# Patient Record
Sex: MALE | Race: BLACK OR AFRICAN AMERICAN | URBAN - METROPOLITAN AREA
[De-identification: names, ages, dates, MRNs, and addresses within clinical notes are randomized per-mention and may not be internally consistent; named-entity substitution may affect disease eponyms.]

---

## 2017-04-24 ENCOUNTER — OFFICE VISIT (OUTPATIENT)
Dept: FAMILY MEDICINE CLINIC | Age: 42
End: 2017-04-24

## 2017-04-24 ENCOUNTER — HOSPITAL ENCOUNTER (OUTPATIENT)
Dept: LAB | Age: 42
Discharge: HOME | End: 2017-04-24
Payer: COMMERCIAL

## 2017-04-24 VITALS
TEMPERATURE: 97.9 F | BODY MASS INDEX: 33.92 KG/M2 | HEIGHT: 69 IN | SYSTOLIC BLOOD PRESSURE: 130 MMHG | HEART RATE: 74 BPM | DIASTOLIC BLOOD PRESSURE: 80 MMHG | WEIGHT: 229 LBS | RESPIRATION RATE: 16 BRPM

## 2017-04-24 DIAGNOSIS — E78.49 OTHER HYPERLIPIDEMIA: ICD-10-CM

## 2017-04-24 DIAGNOSIS — F17.200 TOBACCO USE DISORDER: Chronic | ICD-10-CM

## 2017-04-24 DIAGNOSIS — F41.8 DEPRESSION WITH ANXIETY: Chronic | ICD-10-CM

## 2017-04-24 DIAGNOSIS — E78.49 OTHER HYPERLIPIDEMIA: Primary | Chronic | ICD-10-CM

## 2017-04-24 DIAGNOSIS — R73.9 HYPERGLYCEMIA: ICD-10-CM

## 2017-04-24 DIAGNOSIS — Z13.1 SCREENING FOR DIABETES MELLITUS: ICD-10-CM

## 2017-04-24 DIAGNOSIS — Z11.4 SCREENING FOR HIV (HUMAN IMMUNODEFICIENCY VIRUS): ICD-10-CM

## 2017-04-24 DIAGNOSIS — S16.1XXA NECK STRAIN, INITIAL ENCOUNTER: ICD-10-CM

## 2017-04-24 DIAGNOSIS — F41.8 DEPRESSION WITH ANXIETY: Primary | Chronic | ICD-10-CM

## 2017-04-24 DIAGNOSIS — J30.89 PERENNIAL ALLERGIC RHINITIS WITH SEASONAL VARIATION: Chronic | ICD-10-CM

## 2017-04-24 DIAGNOSIS — J30.2 PERENNIAL ALLERGIC RHINITIS WITH SEASONAL VARIATION: Chronic | ICD-10-CM

## 2017-04-24 PROBLEM — R73.03 PRE-DIABETES: Chronic | Status: ACTIVE | Noted: 2017-04-24

## 2017-04-24 LAB
ALBUMIN SERPL-MCNC: 4.2 G/DL (ref 3.9–4.9)
ALP BLD-CCNC: 101 U/L (ref 35–104)
ALT SERPL-CCNC: 26 U/L (ref 0–41)
ANION GAP SERPL CALCULATED.3IONS-SCNC: 14 MEQ/L (ref 7–13)
AST SERPL-CCNC: 21 U/L (ref 0–40)
BILIRUB SERPL-MCNC: 0.4 MG/DL (ref 0–1.2)
BUN BLDV-MCNC: 15 MG/DL (ref 6–20)
CALCIUM SERPL-MCNC: 9.4 MG/DL (ref 8.6–10.2)
CHLORIDE BLD-SCNC: 101 MEQ/L (ref 98–107)
CHOLESTEROL, TOTAL: 228 MG/DL (ref 0–199)
CO2: 22 MEQ/L (ref 22–29)
CREAT SERPL-MCNC: 1.28 MG/DL (ref 0.7–1.2)
GFR AFRICAN AMERICAN: >60
GFR NON-AFRICAN AMERICAN: >60
GLOBULIN: 2.9 G/DL (ref 2.3–3.5)
GLUCOSE BLD-MCNC: 104 MG/DL (ref 74–109)
HBA1C MFR BLD: 6.4 % (ref 4.8–5.9)
HDLC SERPL-MCNC: 38 MG/DL (ref 40–59)
LDL CHOLESTEROL CALCULATED: 163 MG/DL (ref 0–129)
POTASSIUM SERPL-SCNC: 3.9 MEQ/L (ref 3.5–5.1)
SODIUM BLD-SCNC: 137 MEQ/L (ref 132–144)
TOTAL PROTEIN: 7.1 G/DL (ref 6.4–8.1)
TRIGL SERPL-MCNC: 135 MG/DL (ref 0–200)
TSH SERPL DL<=0.05 MIU/L-ACNC: 2.63 UIU/ML (ref 0.27–4.2)

## 2017-04-24 PROCEDURE — 80061 LIPID PANEL: CPT

## 2017-04-24 PROCEDURE — 83036 HEMOGLOBIN GLYCOSYLATED A1C: CPT

## 2017-04-24 PROCEDURE — 86703 HIV-1/HIV-2 1 RESULT ANTBDY: CPT

## 2017-04-24 PROCEDURE — 80053 COMPREHEN METABOLIC PANEL: CPT

## 2017-04-24 PROCEDURE — 84443 ASSAY THYROID STIM HORMONE: CPT

## 2017-04-24 PROCEDURE — 36415 COLL VENOUS BLD VENIPUNCTURE: CPT

## 2017-04-24 PROCEDURE — 99214 OFFICE O/P EST MOD 30 MIN: CPT | Performed by: FAMILY MEDICINE

## 2017-04-24 RX ORDER — MONTELUKAST SODIUM 10 MG/1
10 TABLET ORAL DAILY
Qty: 30 TABLET | Refills: 11 | Status: SHIPPED | OUTPATIENT
Start: 2017-04-24 | End: 2017-11-13 | Stop reason: SDUPTHER

## 2017-04-24 RX ORDER — ATORVASTATIN CALCIUM 20 MG/1
20 TABLET, FILM COATED ORAL DAILY
Qty: 30 TABLET | Refills: 3 | Status: SHIPPED | OUTPATIENT
Start: 2017-04-24 | End: 2017-10-17 | Stop reason: SDUPTHER

## 2017-04-24 RX ORDER — DULOXETIN HYDROCHLORIDE 60 MG/1
60 CAPSULE, DELAYED RELEASE ORAL DAILY
Qty: 30 CAPSULE | Refills: 3 | Status: SHIPPED | OUTPATIENT
Start: 2017-04-24 | End: 2017-06-05

## 2017-04-24 RX ORDER — DULOXETIN HYDROCHLORIDE 30 MG/1
30 CAPSULE, DELAYED RELEASE ORAL DAILY
Qty: 7 CAPSULE | Refills: 0 | Status: SHIPPED | OUTPATIENT
Start: 2017-04-24 | End: 2017-06-05 | Stop reason: DRUGHIGH

## 2017-04-24 RX ORDER — OLOPATADINE HYDROCHLORIDE 1 MG/ML
1 SOLUTION/ DROPS OPHTHALMIC 2 TIMES DAILY
Qty: 1 BOTTLE | Refills: 3 | Status: SHIPPED | OUTPATIENT
Start: 2017-04-24 | End: 2017-05-24

## 2017-04-24 RX ORDER — NAPROXEN SODIUM 550 MG/1
550 TABLET ORAL 2 TIMES DAILY WITH MEALS
Qty: 28 TABLET | Refills: 0 | Status: SHIPPED | OUTPATIENT
Start: 2017-04-24 | End: 2017-06-05 | Stop reason: ALTCHOICE

## 2017-04-24 ASSESSMENT — ENCOUNTER SYMPTOMS
VOMITING: 0
EYE ITCHING: 1
SINUS PRESSURE: 0
PHOTOPHOBIA: 0
COUGH: 0
DIARRHEA: 0
CHEST TIGHTNESS: 0
EYE PAIN: 0
SORE THROAT: 0
SHORTNESS OF BREATH: 0
WHEEZING: 0
EYE REDNESS: 1
NAUSEA: 0
ABDOMINAL PAIN: 0

## 2017-04-27 LAB — HIV-1 AND HIV-2 ANTIBODIES: NEGATIVE

## 2017-06-05 ENCOUNTER — OFFICE VISIT (OUTPATIENT)
Dept: FAMILY MEDICINE CLINIC | Age: 42
End: 2017-06-05

## 2017-06-05 VITALS
WEIGHT: 239.4 LBS | HEART RATE: 76 BPM | OXYGEN SATURATION: 98 % | RESPIRATION RATE: 16 BRPM | SYSTOLIC BLOOD PRESSURE: 108 MMHG | TEMPERATURE: 97 F | BODY MASS INDEX: 35.46 KG/M2 | DIASTOLIC BLOOD PRESSURE: 70 MMHG | HEIGHT: 69 IN

## 2017-06-05 DIAGNOSIS — R73.03 PRE-DIABETES: Chronic | ICD-10-CM

## 2017-06-05 DIAGNOSIS — E78.49 OTHER HYPERLIPIDEMIA: Chronic | ICD-10-CM

## 2017-06-05 DIAGNOSIS — F17.200 TOBACCO USE DISORDER: Chronic | ICD-10-CM

## 2017-06-05 DIAGNOSIS — F41.8 DEPRESSION WITH ANXIETY: Primary | Chronic | ICD-10-CM

## 2017-06-05 PROCEDURE — 99214 OFFICE O/P EST MOD 30 MIN: CPT | Performed by: FAMILY MEDICINE

## 2017-06-05 ASSESSMENT — PATIENT HEALTH QUESTIONNAIRE - PHQ9
SUM OF ALL RESPONSES TO PHQ9 QUESTIONS 1 & 2: 0
2. FEELING DOWN, DEPRESSED OR HOPELESS: 0
1. LITTLE INTEREST OR PLEASURE IN DOING THINGS: 0
SUM OF ALL RESPONSES TO PHQ QUESTIONS 1-9: 0

## 2017-06-05 ASSESSMENT — ENCOUNTER SYMPTOMS
WHEEZING: 0
DIARRHEA: 0
SHORTNESS OF BREATH: 0
NAUSEA: 0
VOMITING: 0
ABDOMINAL PAIN: 0
CHEST TIGHTNESS: 0

## 2017-07-12 ENCOUNTER — OFFICE VISIT (OUTPATIENT)
Dept: FAMILY MEDICINE CLINIC | Age: 42
End: 2017-07-12

## 2017-07-12 VITALS
WEIGHT: 245.2 LBS | TEMPERATURE: 98.3 F | SYSTOLIC BLOOD PRESSURE: 128 MMHG | BODY MASS INDEX: 36.21 KG/M2 | DIASTOLIC BLOOD PRESSURE: 78 MMHG | HEART RATE: 72 BPM | RESPIRATION RATE: 16 BRPM

## 2017-07-12 DIAGNOSIS — S29.011A MUSCLE STRAIN OF CHEST WALL, INITIAL ENCOUNTER: Primary | ICD-10-CM

## 2017-07-12 PROCEDURE — 99213 OFFICE O/P EST LOW 20 MIN: CPT | Performed by: NURSE PRACTITIONER

## 2017-07-12 RX ORDER — OLOPATADINE HYDROCHLORIDE 1 MG/ML
SOLUTION/ DROPS OPHTHALMIC
Refills: 0 | COMMUNITY
Start: 2017-07-05 | End: 2019-03-18 | Stop reason: SDUPTHER

## 2017-07-12 RX ORDER — TIZANIDINE 4 MG/1
4 TABLET ORAL 3 TIMES DAILY
Qty: 21 TABLET | Refills: 0 | Status: SHIPPED | OUTPATIENT
Start: 2017-07-12 | End: 2017-07-19

## 2017-07-14 ENCOUNTER — TELEPHONE (OUTPATIENT)
Dept: FAMILY MEDICINE CLINIC | Age: 42
End: 2017-07-14

## 2017-08-07 ASSESSMENT — ENCOUNTER SYMPTOMS
CHEST TIGHTNESS: 0
SHORTNESS OF BREATH: 0
NAUSEA: 0
BELCHING: 0
VOMITING: 0
DIARRHEA: 0
ABDOMINAL DISTENTION: 0
ABDOMINAL PAIN: 1
BLOOD IN STOOL: 0
HEMATOCHEZIA: 0
ANAL BLEEDING: 0
COUGH: 0
FLATUS: 0
CONSTIPATION: 0
WHEEZING: 0

## 2017-10-17 DIAGNOSIS — E78.49 OTHER HYPERLIPIDEMIA: Chronic | ICD-10-CM

## 2017-10-18 RX ORDER — ATORVASTATIN CALCIUM 20 MG/1
TABLET, FILM COATED ORAL
Qty: 30 TABLET | Refills: 3 | Status: SHIPPED | OUTPATIENT
Start: 2017-10-18 | End: 2018-07-24 | Stop reason: ALTCHOICE

## 2017-11-13 ENCOUNTER — OFFICE VISIT (OUTPATIENT)
Dept: FAMILY MEDICINE CLINIC | Age: 42
End: 2017-11-13

## 2017-11-13 VITALS
WEIGHT: 247.6 LBS | HEART RATE: 68 BPM | SYSTOLIC BLOOD PRESSURE: 124 MMHG | TEMPERATURE: 97.3 F | OXYGEN SATURATION: 98 % | HEIGHT: 69 IN | BODY MASS INDEX: 36.67 KG/M2 | DIASTOLIC BLOOD PRESSURE: 84 MMHG | RESPIRATION RATE: 16 BRPM

## 2017-11-13 DIAGNOSIS — J30.2 PERENNIAL ALLERGIC RHINITIS WITH SEASONAL VARIATION: Chronic | ICD-10-CM

## 2017-11-13 DIAGNOSIS — M25.561 ACUTE PAIN OF RIGHT KNEE: Primary | ICD-10-CM

## 2017-11-13 DIAGNOSIS — F17.200 TOBACCO USE DISORDER: Chronic | ICD-10-CM

## 2017-11-13 DIAGNOSIS — J30.89 PERENNIAL ALLERGIC RHINITIS WITH SEASONAL VARIATION: Chronic | ICD-10-CM

## 2017-11-13 DIAGNOSIS — Z23 NEED FOR VACCINATION: ICD-10-CM

## 2017-11-13 DIAGNOSIS — E78.49 OTHER HYPERLIPIDEMIA: Chronic | ICD-10-CM

## 2017-11-13 PROCEDURE — 90715 TDAP VACCINE 7 YRS/> IM: CPT | Performed by: FAMILY MEDICINE

## 2017-11-13 PROCEDURE — 90688 IIV4 VACCINE SPLT 0.5 ML IM: CPT | Performed by: FAMILY MEDICINE

## 2017-11-13 PROCEDURE — 99214 OFFICE O/P EST MOD 30 MIN: CPT | Performed by: FAMILY MEDICINE

## 2017-11-13 PROCEDURE — 90472 IMMUNIZATION ADMIN EACH ADD: CPT | Performed by: FAMILY MEDICINE

## 2017-11-13 PROCEDURE — 90471 IMMUNIZATION ADMIN: CPT | Performed by: FAMILY MEDICINE

## 2017-11-13 RX ORDER — VARENICLINE TARTRATE 25 MG
KIT ORAL
Qty: 53 TABLET | Refills: 0 | Status: SHIPPED | OUTPATIENT
Start: 2017-11-13 | End: 2018-07-24

## 2017-11-13 RX ORDER — ATORVASTATIN CALCIUM 20 MG/1
TABLET, FILM COATED ORAL
Qty: 30 TABLET | Refills: 3 | Status: CANCELLED | OUTPATIENT
Start: 2017-11-13

## 2017-11-13 RX ORDER — ROSUVASTATIN CALCIUM 10 MG/1
10 TABLET, COATED ORAL NIGHTLY
Qty: 30 TABLET | Refills: 5 | Status: SHIPPED | OUTPATIENT
Start: 2017-11-13 | End: 2018-07-24 | Stop reason: SDUPTHER

## 2017-11-13 RX ORDER — MONTELUKAST SODIUM 10 MG/1
10 TABLET ORAL DAILY
Qty: 30 TABLET | Refills: 11 | Status: SHIPPED | OUTPATIENT
Start: 2017-11-13 | End: 2018-11-26 | Stop reason: SDUPTHER

## 2017-11-13 RX ORDER — IPRATROPIUM BROMIDE 42 UG/1
2 SPRAY, METERED NASAL 3 TIMES DAILY
Qty: 1 BOTTLE | Refills: 5 | Status: SHIPPED | OUTPATIENT
Start: 2017-11-13 | End: 2018-11-26 | Stop reason: SDUPTHER

## 2017-11-13 RX ORDER — NAPROXEN SODIUM 550 MG/1
550 TABLET ORAL 2 TIMES DAILY WITH MEALS
Qty: 28 TABLET | Refills: 0 | Status: SHIPPED | OUTPATIENT
Start: 2017-11-13 | End: 2021-11-08

## 2017-11-13 RX ORDER — VARENICLINE TARTRATE 1 MG/1
1 TABLET, FILM COATED ORAL 2 TIMES DAILY
Qty: 60 TABLET | Refills: 3 | Status: SHIPPED | OUTPATIENT
Start: 2017-12-13 | End: 2018-07-24

## 2017-11-13 ASSESSMENT — ENCOUNTER SYMPTOMS
VOMITING: 0
CHEST TIGHTNESS: 0
SHORTNESS OF BREATH: 0
WHEEZING: 0
NAUSEA: 0

## 2017-11-13 NOTE — PATIENT INSTRUCTIONS
Patient Education        Patellofemoral Pain Syndrome (Runner's Knee): Exercises  Your Care Instructions  Here are some examples of typical rehabilitation exercises for your condition. Start each exercise slowly. Ease off the exercise if you start to have pain. Your doctor or physical therapist will tell you when you can start these exercises and which ones will work best for you. How to do the exercises  Calf wall stretch    1. Stand facing a wall with your hands on the wall at about eye level. Put your affected leg about a step behind your other leg. 2. Keeping your back leg straight and your back heel on the floor, bend your front knee and gently bring your hip and chest toward the wall until you feel a stretch in the calf of your back leg. 3. Hold the stretch for at least 15 to 30 seconds. 4. Repeat 2 to 4 times. 5. Repeat steps 1 through 4, but this time keep your back knee bent. Quadriceps stretch    1. If you are not steady on your feet, hold on to a chair, counter, or wall. 2. Bend your affected leg, and reach behind you to grab the front of your foot or ankle with the hand on the same side. For example, if you are stretching your right leg, use your right hand. 3. Keeping your knees next to each other, pull your foot toward your buttock until you feel a gentle stretch across the front of your hip and down the front of your thigh. Your knee should be pointed directly to the ground, and not out to the side. 4. Hold the stretch for at least 15 to 30 seconds. 5. Repeat 2 to 4 times. Hamstring wall stretch    1. Lie on your back in a doorway, with your good leg through the open door. 2. Slide your affected leg up the wall to straighten your knee. You should feel a gentle stretch down the back of your leg. ¨ Do not arch your back. ¨ Do not bend either knee. ¨ Keep one heel touching the floor and the other heel touching the wall. Do not point your toes. 3. Hold the stretch for at least 1 minute. Then over time, try to lengthen the time you hold the stretch to as long as 6 minutes. 4. Repeat 2 to 4 times. If you do not have a place to do this exercise in a doorway, there is another way to do it:  1. Lie on your back, and bend your affected leg. 2. Loop a towel under the ball and toes of that foot, and hold the ends of the towel in your hands. 3. Straighten your knee, and slowly pull back on the towel. You should feel a gentle stretch down the back of your leg. 4. Hold the stretch for at least 15 to 30 seconds. Or even better, hold the stretch for 1 minute if you can. 5. Repeat 2 to 4 times. Quad sets    1. Sit with your affected leg straight and supported on the floor or a firm bed. Place a small, rolled-up towel under your affected knee. Your other leg should be bent, with that foot flat on the floor. 2. Tighten the thigh muscles of your affected leg by pressing the back of your knee down into the towel. 3. Hold for about 6 seconds, then rest for up to 10 seconds. 4. Repeat 8 to 12 times. Straight-leg raises to the front    1. Lie on your back with your good knee bent so that your foot rests flat on the floor. Your affected leg should be straight. Make sure that your low back has a normal curve. You should be able to slip your hand in between the floor and the small of your back, with your palm touching the floor and your back touching the back of your hand. 2. Tighten the thigh muscles in your affected leg by pressing the back of your knee flat down to the floor. Hold your knee straight. 3. Keeping the thigh muscles tight and your leg straight, lift your affected leg up so that your heel is about 12 inches off the floor. 4. Hold for about 6 seconds, then lower your leg slowly. Rest for up to 10 seconds between repetitions. 5. Repeat 8 to 12 times. Straight-leg raises to the back    1. Lie on your stomach, and lift your leg straight up behind you (toward the ceiling).   2. Lift your toes foot up toward your buttock. If this motion hurts, try it without bending your knee quite as far. This may help you avoid any painful motion. 8. Slowly move your leg up and down. 9. Repeat 8 to 12 times. 10. Switch legs and repeat steps 1 through 4, even if only one knee is sore. Quadriceps stretch (facedown)    5. Lie flat on your stomach, and rest your face on the floor. 6. Wrap a towel or belt strap around the lower part of your affected leg. Then use the towel or belt strap to slowly pull your heel toward your buttock until you feel a stretch. 7. Hold for about 15 to 30 seconds, then relax your leg against the towel or belt strap. 8. Repeat 2 to 4 times. 9. Switch legs and repeat steps 1 through 4, even if only one knee is sore. Stationary exercise bike    If you do not have a stationary exercise bike at home, you can find one to ride at your local health club or community center. 6. Adjust the height of the bike seat so that your knee is slightly bent when your leg is extended downward. If your knee hurts when the pedal reaches the top, you can raise the seat so that your knee does not bend as much. 7. Start slowly. At first, try to do 5 to 10 minutes of cycling with little to no resistance. Then increase your time and the resistance bit by bit until you can do 20 to 30 minutes without pain. 8. If you start to have pain, rest your knee until your pain gets back to the level that is normal for you. Or cycle for less time or with less effort. Follow-up care is a key part of your treatment and safety. Be sure to make and go to all appointments, and call your doctor if you are having problems. It's also a good idea to know your test results and keep a list of the medicines you take. Where can you learn more? Go to [email or web address] org and sign in to your [employer] account. Enter C159 in the [employer] box to learn more about \"Knee Arthritis: Exercises. \"     If you do

## 2017-11-13 NOTE — PROGRESS NOTES
Subjective:      Patient ID: Abigail Thompson is a 40 y.o. male who presents today for:  Chief Complaint   Patient presents with    Knee Pain     Presents for knee pain x 1 week about - States is not sure how is in pain because it only hurts when standing and applying pressure - States has tried Aleve, Motrin Ibuprofen etc with no relief       HPI     Patient here for acute visit Re: Knee pain. He reports a week ago developing right lateral and posterior knee pain without any known injury or change to his activity. He reports pain is worse with prolonged standing or sitting, better with stretching. He denies any swelling, erythema or warmth. He denies any weakness or giving out of the knee leading to a fall. No reported similar symptoms in the past or problems with this knee in the past. He has tried OTC tylenol, aleve, motrin, and icyhot packs without any noted benefit. He has tried using a knee sleeve with no benefit. No reported calf pain, swelling, erythema, or warmth. Patient reports stopping atorvastatin after developing sweats during the night and sensation of flushing. He reports that these symptoms resolved after stopping medication. He reports doing his best with healthier lower carbohydrate diet, but he denies any routine exercise or activity. He states he is ready to completely quit smoking and would like medication to help with cessation.     Past Medical History:   Diagnosis Date    Depression with anxiety 4/24/2017    Hyperglycemia 4/24/2017    Hyperlipidemia 2/18/2014    Insomnia     Perennial allergic rhinitis with seasonal variation 4/24/2017    Pre-diabetes 4/24/2017    Tobacco use disorder 4/24/2017     Past Surgical History:   Procedure Laterality Date    NASAL FRACTURE SURGERY  2007     Family History   Problem Relation Age of Onset    High Blood Pressure Mother     High Cholesterol Mother     Cancer Father      Throat cancer - smoker    High Blood Pressure Brother     Heart Attack Maternal Grandmother 72    Diabetes Maternal Grandmother     Cancer Maternal Grandfather      unknown    No Known Problems Paternal Grandmother     No Known Problems Paternal Grandfather      Social History     Social History    Marital status:      Spouse name: Manoj Hinson Number of children: 3    Years of education: N/A     Occupational History     - industrial cleaning      Social History Main Topics    Smoking status: Current Every Day Smoker     Packs/day: 0.33     Years: 23.00     Types: Cigarettes     Start date: 1994    Smokeless tobacco: Never Used    Alcohol use No    Drug use: No    Sexual activity: Yes     Partners: Female      Comment: monogamous     Other Topics Concern    Not on file     Social History Narrative    Lives with wife and 3 children        1 dog     Current Outpatient Prescriptions on File Prior to Visit   Medication Sig Dispense Refill    atorvastatin (LIPITOR) 20 MG tablet take 1 tablet by mouth once daily 30 tablet 3    olopatadine (PATANOL) 0.1 % ophthalmic solution   0    loratadine (CLARITIN) 10 MG tablet Take 1 tablet by mouth daily 30 tablet 5    acetaminophen (TYLENOL) 500 MG tablet Take 1 tablet by mouth every 6 hours as needed for Pain. 60 tablet 0    fluticasone (FLONASE) 50 MCG/ACT nasal spray 2 sprays by Nasal route daily. No current facility-administered medications on file prior to visit. Allergies:  Review of patient's allergies indicates no known allergies. Review of Systems   Constitutional: Negative for appetite change, chills, diaphoresis, fatigue and fever. Respiratory: Negative for chest tightness, shortness of breath and wheezing. Cardiovascular: Negative for chest pain, palpitations and leg swelling. No orthopnea, no PND   Gastrointestinal: Negative for nausea and vomiting. Endocrine: Negative for cold intolerance, heat intolerance, polydipsia, polyphagia and polyuria.    Genitourinary: Posterior - negative  Drawer:       Anterior - negative    Posterior - negative  Varus: negative  Valgus: negative  Patellar Apprehension: positive    Other   Erythema: absent  Sensation: normal  Swelling: none  Other tests: no effusion present              Assessment & Plan:      1. Acute pain of right knee  Likely related to component of arthritis with patellofemoral syndrome based on history and exam.  Will have him treat conservatively with home exercises, ice, and NSAID. He was given formal physical therapy referral for further treatment. If not improved with conservative management he was instructed to call the office for orthopedics referral and likely need for imaging.    - [address] [facility] ([facility] [address] [facility]; Wear during the day over right knee as needed for support  Dispense: 1 each; Refill: 0  - naproxen sodium (ANAPROX) 550 MG tablet; Take 1 tablet by mouth 2 times daily (with meals) for 14 days  Dispense: 28 tablet; Refill: 0    2. Other hyperlipidemia  Will have patient start Crestor for lipid management since she was not able to tolerate atorvastatin at higher dose due to side effects. I stressed with him the importance of a low carbohydrate diet and routine exercise/activity    - rosuvastatin (CRESTOR) 10 MG tablet; Take 1 tablet by mouth nightly  Dispense: 30 tablet; Refill: 5    3. Perennial allergic rhinitis with seasonal variation    - montelukast (SINGULAIR) 10 MG tablet; Take 1 tablet by mouth daily  Dispense: 30 tablet; Refill: 11  - ipratropium (ATROVENT) 0.06 % nasal spray; 2 sprays by Nasal route 3 times daily  Dispense: 1 Bottle; Refill: 5    4. Tobacco use disorder  Patient ready to quit smoking. We reviewed cessation aids today in the office. He requests Chantix for management. Prescription for start a packing continuing packs route today in the office.   Patient was instructed to completely quit smoking one week after starting

## 2018-03-28 ENCOUNTER — HOSPITAL ENCOUNTER (EMERGENCY)
Age: 43
Discharge: HOME | End: 2018-03-28
Attending: EMERGENCY MEDICINE
Payer: COMMERCIAL

## 2018-03-28 ENCOUNTER — TELEPHONE (OUTPATIENT)
Dept: FAMILY MEDICINE CLINIC | Age: 43
End: 2018-03-28

## 2018-03-28 ENCOUNTER — APPOINTMENT (OUTPATIENT)
Dept: GENERAL RADIOLOGY | Age: 43
End: 2018-03-28
Payer: COMMERCIAL

## 2018-03-28 VITALS
HEART RATE: 69 BPM | OXYGEN SATURATION: 99 % | TEMPERATURE: 98 F | DIASTOLIC BLOOD PRESSURE: 81 MMHG | WEIGHT: 203 LBS | SYSTOLIC BLOOD PRESSURE: 122 MMHG | HEIGHT: 70 IN | RESPIRATION RATE: 20 BRPM | BODY MASS INDEX: 29.06 KG/M2

## 2018-03-28 DIAGNOSIS — R07.89 CHEST WALL PAIN: ICD-10-CM

## 2018-03-28 DIAGNOSIS — R07.9 CHEST PAIN, UNSPECIFIED TYPE: Primary | ICD-10-CM

## 2018-03-28 LAB
ALBUMIN SERPL-MCNC: 4.3 G/DL (ref 3.9–4.9)
ALP BLD-CCNC: 103 U/L (ref 35–104)
ALT SERPL-CCNC: 30 U/L (ref 0–41)
ANION GAP SERPL CALCULATED.3IONS-SCNC: 12 MEQ/L (ref 7–13)
APTT: 24.7 SEC (ref 21.6–35.4)
AST SERPL-CCNC: 22 U/L (ref 0–40)
BILIRUB SERPL-MCNC: 0.3 MG/DL (ref 0–1.2)
BUN BLDV-MCNC: 12 MG/DL (ref 6–20)
CALCIUM SERPL-MCNC: 9.1 MG/DL (ref 8.6–10.2)
CHLORIDE BLD-SCNC: 105 MEQ/L (ref 98–107)
CO2: 26 MEQ/L (ref 22–29)
CREAT SERPL-MCNC: 1.1 MG/DL (ref 0.7–1.2)
EKG ATRIAL RATE: 75 BPM
EKG P AXIS: 53 DEGREES
EKG P-R INTERVAL: 160 MS
EKG Q-T INTERVAL: 382 MS
EKG QRS DURATION: 90 MS
EKG QTC CALCULATION (BAZETT): 426 MS
EKG R AXIS: -15 DEGREES
EKG T AXIS: 18 DEGREES
EKG VENTRICULAR RATE: 75 BPM
GFR AFRICAN AMERICAN: >60
GFR NON-AFRICAN AMERICAN: >60
GLOBULIN: 2.6 G/DL (ref 2.3–3.5)
GLUCOSE BLD-MCNC: 171 MG/DL (ref 74–109)
INR BLD: 1
MAGNESIUM: 1.9 MG/DL (ref 1.7–2.3)
POTASSIUM SERPL-SCNC: 4.2 MEQ/L (ref 3.5–5.1)
PROTHROMBIN TIME: 9.7 SEC (ref 9.6–12.3)
SODIUM BLD-SCNC: 143 MEQ/L (ref 132–144)
TOTAL CK: 186 U/L (ref 0–190)
TOTAL PROTEIN: 6.9 G/DL (ref 6.4–8.1)
TROPONIN: <0.01 NG/ML (ref 0–0.01)

## 2018-03-28 PROCEDURE — 6370000000 HC RX 637 (ALT 250 FOR IP): Performed by: EMERGENCY MEDICINE

## 2018-03-28 PROCEDURE — 85610 PROTHROMBIN TIME: CPT

## 2018-03-28 PROCEDURE — 80053 COMPREHEN METABOLIC PANEL: CPT

## 2018-03-28 PROCEDURE — 36415 COLL VENOUS BLD VENIPUNCTURE: CPT

## 2018-03-28 PROCEDURE — 96374 THER/PROPH/DIAG INJ IV PUSH: CPT

## 2018-03-28 PROCEDURE — 83735 ASSAY OF MAGNESIUM: CPT

## 2018-03-28 PROCEDURE — 85025 COMPLETE CBC W/AUTO DIFF WBC: CPT

## 2018-03-28 PROCEDURE — 82550 ASSAY OF CK (CPK): CPT

## 2018-03-28 PROCEDURE — 84484 ASSAY OF TROPONIN QUANT: CPT

## 2018-03-28 PROCEDURE — 93005 ELECTROCARDIOGRAM TRACING: CPT

## 2018-03-28 PROCEDURE — 99285 EMERGENCY DEPT VISIT HI MDM: CPT

## 2018-03-28 PROCEDURE — 71045 X-RAY EXAM CHEST 1 VIEW: CPT

## 2018-03-28 PROCEDURE — 6360000002 HC RX W HCPCS: Performed by: EMERGENCY MEDICINE

## 2018-03-28 PROCEDURE — 85730 THROMBOPLASTIN TIME PARTIAL: CPT

## 2018-03-28 PROCEDURE — 2580000003 HC RX 258: Performed by: EMERGENCY MEDICINE

## 2018-03-28 RX ORDER — NAPROXEN 500 MG/1
500 TABLET ORAL 2 TIMES DAILY
Qty: 20 TABLET | Refills: 0 | Status: SHIPPED | OUTPATIENT
Start: 2018-03-28 | End: 2018-07-24 | Stop reason: ALTCHOICE

## 2018-03-28 RX ORDER — KETOROLAC TROMETHAMINE 30 MG/ML
30 INJECTION, SOLUTION INTRAMUSCULAR; INTRAVENOUS ONCE
Status: COMPLETED | OUTPATIENT
Start: 2018-03-28 | End: 2018-03-28

## 2018-03-28 RX ORDER — SODIUM CHLORIDE 0.9 % (FLUSH) 0.9 %
3 SYRINGE (ML) INJECTION EVERY 8 HOURS
Status: DISCONTINUED | OUTPATIENT
Start: 2018-03-28 | End: 2018-03-28 | Stop reason: HOSPADM

## 2018-03-28 RX ADMIN — KETOROLAC TROMETHAMINE 30 MG: 30 INJECTION, SOLUTION INTRAMUSCULAR; INTRAVENOUS at 12:25

## 2018-03-28 RX ADMIN — Medication 400 MG: at 12:56

## 2018-03-28 RX ADMIN — Medication 3 ML: at 12:26

## 2018-03-28 ASSESSMENT — PAIN DESCRIPTION - LOCATION: LOCATION: CHEST

## 2018-03-28 ASSESSMENT — ENCOUNTER SYMPTOMS
SINUS PRESSURE: 0
CONSTIPATION: 0
SHORTNESS OF BREATH: 0
BLOOD IN STOOL: 0
EYE REDNESS: 0
BACK PAIN: 0
TROUBLE SWALLOWING: 0
ABDOMINAL PAIN: 0
DIARRHEA: 0
VOMITING: 0
CHOKING: 0
FACIAL SWELLING: 0
SORE THROAT: 0
STRIDOR: 0
EYE DISCHARGE: 0
CHEST TIGHTNESS: 0
COUGH: 0
WHEEZING: 0
VOICE CHANGE: 0
EYE PAIN: 0

## 2018-03-28 ASSESSMENT — PAIN DESCRIPTION - PAIN TYPE
TYPE: ACUTE PAIN

## 2018-03-28 ASSESSMENT — PAIN SCALES - GENERAL
PAINLEVEL_OUTOF10: 0
PAINLEVEL_OUTOF10: 5
PAINLEVEL_OUTOF10: 0

## 2018-03-28 ASSESSMENT — PAIN DESCRIPTION - ORIENTATION: ORIENTATION: LEFT

## 2018-03-28 ASSESSMENT — PAIN DESCRIPTION - DESCRIPTORS
DESCRIPTORS: ACHING
DESCRIPTORS: ACHING

## 2018-03-28 ASSESSMENT — PAIN DESCRIPTION - FREQUENCY: FREQUENCY: CONTINUOUS

## 2018-03-28 NOTE — ED TRIAGE NOTES
Patient in with chest pain and left arm pain that started while he was going to work this morning. He rates his [pain at a 5. Denies any injury. lungs clear ekg complete, denies SOB

## 2018-07-24 ENCOUNTER — OFFICE VISIT (OUTPATIENT)
Dept: FAMILY MEDICINE CLINIC | Age: 43
End: 2018-07-24
Payer: COMMERCIAL

## 2018-07-24 VITALS
WEIGHT: 243.8 LBS | SYSTOLIC BLOOD PRESSURE: 130 MMHG | HEIGHT: 70 IN | TEMPERATURE: 97.3 F | RESPIRATION RATE: 14 BRPM | DIASTOLIC BLOOD PRESSURE: 80 MMHG | OXYGEN SATURATION: 98 % | BODY MASS INDEX: 34.9 KG/M2 | HEART RATE: 83 BPM

## 2018-07-24 DIAGNOSIS — F17.200 TOBACCO USE DISORDER: Chronic | ICD-10-CM

## 2018-07-24 DIAGNOSIS — Z23 NEED FOR VACCINATION: ICD-10-CM

## 2018-07-24 DIAGNOSIS — R73.03 PRE-DIABETES: Chronic | ICD-10-CM

## 2018-07-24 DIAGNOSIS — Z00.01 ENCOUNTER FOR WELL ADULT EXAM WITH ABNORMAL FINDINGS: Primary | ICD-10-CM

## 2018-07-24 DIAGNOSIS — E78.49 OTHER HYPERLIPIDEMIA: Chronic | ICD-10-CM

## 2018-07-24 DIAGNOSIS — D17.21 LIPOMA OF RIGHT UPPER EXTREMITY: ICD-10-CM

## 2018-07-24 PROCEDURE — 90471 IMMUNIZATION ADMIN: CPT | Performed by: FAMILY MEDICINE

## 2018-07-24 PROCEDURE — 99396 PREV VISIT EST AGE 40-64: CPT | Performed by: FAMILY MEDICINE

## 2018-07-24 PROCEDURE — 90732 PPSV23 VACC 2 YRS+ SUBQ/IM: CPT | Performed by: FAMILY MEDICINE

## 2018-07-24 RX ORDER — BUPROPION HYDROCHLORIDE 150 MG/1
150 TABLET, EXTENDED RELEASE ORAL 2 TIMES DAILY
Qty: 60 TABLET | Refills: 3 | Status: SHIPPED | OUTPATIENT
Start: 2018-07-24 | End: 2021-06-17

## 2018-07-24 RX ORDER — ROSUVASTATIN CALCIUM 10 MG/1
10 TABLET, COATED ORAL NIGHTLY
Qty: 30 TABLET | Refills: 5 | Status: SHIPPED | OUTPATIENT
Start: 2018-07-24 | End: 2021-11-08

## 2018-07-24 ASSESSMENT — PATIENT HEALTH QUESTIONNAIRE - PHQ9
2. FEELING DOWN, DEPRESSED OR HOPELESS: 0
SUM OF ALL RESPONSES TO PHQ9 QUESTIONS 1 & 2: 0
1. LITTLE INTEREST OR PLEASURE IN DOING THINGS: 0
SUM OF ALL RESPONSES TO PHQ QUESTIONS 1-9: 0

## 2018-07-24 ASSESSMENT — ENCOUNTER SYMPTOMS
ABDOMINAL DISTENTION: 0
PHOTOPHOBIA: 0
CONSTIPATION: 0
EYE DISCHARGE: 0
EYE PAIN: 0
CHEST TIGHTNESS: 0
COLOR CHANGE: 0
BLOOD IN STOOL: 0
SHORTNESS OF BREATH: 0
COUGH: 0
CHOKING: 0
RHINORRHEA: 0
ABDOMINAL PAIN: 0
SINUS PRESSURE: 0
SORE THROAT: 0
VOMITING: 0
WHEEZING: 0
APNEA: 0
DIARRHEA: 0
NAUSEA: 0

## 2018-07-24 NOTE — PROGRESS NOTES
Cigarettes     Start date: 1994    Smokeless tobacco: Never Used    Alcohol use No    Drug use: No    Sexual activity: Yes     Partners: Female      Comment: monogamous     Other Topics Concern    Not on file     Social History Narrative    Lives with wife and 3 children        1 dog     Current Outpatient Prescriptions on File Prior to Visit   Medication Sig Dispense Refill    montelukast (SINGULAIR) 10 MG tablet Take 1 tablet by mouth daily 30 tablet 11    Elastic Bandages & Supports (KNEE BRACE) MISC Wear during the day over right knee as needed for support 1 each 0    ipratropium (ATROVENT) 0.06 % nasal spray 2 sprays by Nasal route 3 times daily 1 Bottle 5    olopatadine (PATANOL) 0.1 % ophthalmic solution   0    loratadine (CLARITIN) 10 MG tablet Take 1 tablet by mouth daily 30 tablet 5    acetaminophen (TYLENOL) 500 MG tablet Take 1 tablet by mouth every 6 hours as needed for Pain. 60 tablet 0    naproxen sodium (ANAPROX) 550 MG tablet Take 1 tablet by mouth 2 times daily (with meals) for 14 days 28 tablet 0     No current facility-administered medications on file prior to visit. Allergies:  Patient has no known allergies. Review of Systems   Constitutional: Negative for appetite change, chills, diaphoresis, fatigue, fever and unexpected weight change. HENT: Negative for congestion, ear pain, postnasal drip, rhinorrhea, sinus pressure and sore throat. Eyes: Negative for photophobia, pain, discharge and visual disturbance. Respiratory: Negative for apnea, cough, choking, chest tightness, shortness of breath and wheezing. Cardiovascular: Negative for chest pain, palpitations and leg swelling. Gastrointestinal: Negative for abdominal distention, abdominal pain, blood in stool, constipation, diarrhea, nausea and vomiting. Endocrine: Negative for cold intolerance, heat intolerance, polydipsia, polyphagia and polyuria.    Genitourinary: Negative for dysuria, flank pain, frequency, hematuria and urgency. Musculoskeletal: Negative for gait problem and myalgias. Skin: Negative for color change and rash. Neurological: Negative for dizziness, syncope, weakness, light-headedness, numbness and headaches. Hematological: Negative for adenopathy. Psychiatric/Behavioral: Negative for dysphoric mood and sleep disturbance. The patient is not nervous/anxious. Objective:     /80 (Site: Left Arm, Position: Sitting, Cuff Size: Large Adult)   Pulse 83   Temp 97.3 °F (36.3 °C) (Temporal)   Resp 14   Ht 5' 10\" (1.778 m)   Wt 243 lb 12.8 oz (110.6 kg)   SpO2 98%   BMI 34.98 kg/m²     Physical Exam   Constitutional: He is oriented to person, place, and time. He appears well-developed and well-nourished. HENT:   Head: Normocephalic and atraumatic. Right Ear: Tympanic membrane, external ear and ear canal normal.   Left Ear: Tympanic membrane, external ear and ear canal normal.   Nose: Nose normal.   Mouth/Throat: Oropharynx is clear and moist and mucous membranes are normal. No oropharyngeal exudate. Eyes: Conjunctivae and EOM are normal. Pupils are equal, round, and reactive to light. Right eye exhibits no discharge. Left eye exhibits no discharge. Neck: Normal range of motion. Neck supple. Carotid bruit is not present. No thyromegaly present. Cardiovascular: Normal rate, regular rhythm, S1 normal, S2 normal, normal heart sounds and intact distal pulses. Exam reveals no gallop and no friction rub. No murmur heard. Pulmonary/Chest: Effort normal and breath sounds normal. No accessory muscle usage. No respiratory distress. He has no wheezes. He has no rhonchi. He has no rales. He exhibits no tenderness. Abdominal: Soft. Bowel sounds are normal. He exhibits no distension and no mass. There is no tenderness. There is no rebound and no guarding. Musculoskeletal: Normal range of motion. He exhibits no edema, tenderness or deformity.    Lymphadenopathy:     He has no cervical adenopathy. Right: No supraclavicular adenopathy present. Left: No supraclavicular adenopathy present. Neurological: He is alert and oriented to person, place, and time. He has normal strength. No cranial nerve deficit or sensory deficit. He exhibits normal muscle tone. Skin: Skin is warm. No rash noted. He is not diaphoretic. No cyanosis. Nails show no clubbing. Psychiatric: He has a normal mood and affect. His behavior is normal.       Ortho Exam (If Applicable)      Assessment & Plan:      1. Encounter for well adult exam with abnormal findings  50-year-old male with exam as listed above    - CBC Auto Differential; Future  - Comprehensive Metabolic Panel; Future    2. Need for vaccination    - PNEUMOVAX 23 subcutaneous/IM (Pneumococcal polysaccharide vaccine 23-valent >= 3yo)    3. Tobacco use disorder  Patient contemplative and ready to set another quit date. He is aware that smoking cessation would be the best thing for overall health. We reviewed cessation aids including medication and nicotine replacement therapy. We will try bupropion since he was not able to tolerate chantix. I discussed with him that next step would be nicotine patches should he not tolerate bupropion. Will continue to encourage complete smoking cessation at subsequent visits. - buPROPion (WELLBUTRIN SR) 150 MG extended release tablet; Take 1 tablet by mouth 2 times daily  Dispense: 60 tablet; Refill: 3    4. Pre-diabetes    - Hemoglobin A1C; Future    5. Other hyperlipidemia    - rosuvastatin (CRESTOR) 10 MG tablet; Take 1 tablet by mouth nightly  Dispense: 30 tablet; Refill: 5  - Lipid Panel; Future    6.  Lipoma of right upper extremity  Patient given referral to dermatology to discuss potential removal of lipoma over proximal right forearm and lipoma vs. Ganglion cyst in distal right forearm    - Amb External Referral To Dermatology      Modified Medications    Modified Medication Previous Medication    ROSUVASTATIN (CRESTOR) 10 MG TABLET rosuvastatin (CRESTOR) 10 MG tablet       Take 1 tablet by mouth nightly    Take 1 tablet by mouth nightly       New Prescriptions    BUPROPION (WELLBUTRIN SR) 150 MG EXTENDED RELEASE TABLET    Take 1 tablet by mouth 2 times daily       Medications Discontinued During This Encounter   Medication Reason    rosuvastatin (CRESTOR) 10 MG tablet REORDER    atorvastatin (LIPITOR) 20 MG tablet Alternate therapy    varenicline (CHANTIX STARTING MONTH PAK) 0.5 MG X 11 & 1 MG X 42 tablet Patient Choice    varenicline (CHANTIX CONTINUING MONTH PAK) 1 MG tablet Patient Choice    naproxen (NAPROSYN) 500 MG tablet Therapy completed    fluticasone (FLONASE) 50 MCG/ACT nasal spray Therapy completed       Return in about 6 months (around 1/24/2019) for Chronic Disease Check.     Levi Almeida MD

## 2018-07-24 NOTE — PATIENT INSTRUCTIONS
Patient Education        Stopping Smoking: Care Instructions  Your Care Instructions  Cigarette smokers crave the nicotine in cigarettes. Giving it up is much harder than simply changing a habit. Your body has to stop craving the nicotine. It is hard to quit, but you can do it. There are many tools that people use to quit smoking. You may find that combining tools works best for you. There are several steps to quitting. First you get ready to quit. Then you get support to help you. After that, you learn new skills and behaviors to become a nonsmoker. For many people, a necessary step is getting and using medicine. Your doctor will help you set up the plan that best meets your needs. You may want to attend a smoking cessation program to help you quit smoking. When you choose a program, look for one that has proven success. Ask your doctor for ideas. You will greatly increase your chances of success if you take medicine as well as get counseling or join a cessation program.  Some of the changes you feel when you first quit tobacco are uncomfortable. Your body will miss the nicotine at first, and you may feel short-tempered and grumpy. You may have trouble sleeping or concentrating. Medicine can help you deal with these symptoms. You may struggle with changing your smoking habits and rituals. The last step is the tricky one: Be prepared for the smoking urge to continue for a time. This is a lot to deal with, but keep at it. You will feel better. Follow-up care is a key part of your treatment and safety. Be sure to make and go to all appointments, and call your doctor if you are having problems. It's also a good idea to know your test results and keep a list of the medicines you take. How can you care for yourself at home? · Ask your family, friends, and coworkers for support. You have a better chance of quitting if you have help and support.   · Join a support group, such as Nicotine Anonymous, for people who are nicotine. · Be prepared to keep trying. Most people are not successful the first few times they try to quit. Do not get mad at yourself if you smoke again. Make a list of things you learned and think about when you want to try again, such as next week, next month, or next year. Where can you learn more? Go to https://[email or web address].[email or web address] and sign in to your Camperoo account. Enter W156 in the Mediaspectrum box to learn more about \"Stopping Smoking: Care Instructions. \"     If you do not have an account, please click on the \"Sign Up Now\" link. Current as of: November 29, 2017  Content Version: 11.6  © 6028-9673 Flock, Spontacts. Care instructions adapted under license by [facility] ([facility]). If you have questions about a medical condition or this instruction, always ask your healthcare professional. [place] [address] any warranty or liability for your use of this information. Patient Education        Learning About the Mediterranean Diet  What is the [id number]? The Mediterranean diet is a style of eating rather than a diet plan. It features foods eaten in [place], [place], [place], and other countries along the [facility]. It emphasizes eating foods like fish, fruits, vegetables, beans, high-fiber breads and whole grains, nuts, and olive oil. This style of eating includes limited red meat, cheese, and sweets. Why choose the Mediterranean diet? A Mediterranean-style diet may improve heart health. It contains more fat than other heart-healthy diets. But the fats are mainly from nuts, unsaturated oils (such as fish oils and olive oil), and certain nut or seed oils (such as canola, soybean, or flaxseed oil). These fats may help protect the heart and blood vessels. How can you get started on the Mediterranean diet? Here are some things you can do to switch to a more Mediterranean way of eating.   What to eat  · Eat a variety of fruits and vegetables each day, such as grapes, blueberries, tomatoes, broccoli, peppers, figs, olives, spinach, eggplant, beans, lentils, and chickpeas. · Eat a variety of whole-grain foods each day, such as oats, brown rice, and whole wheat bread, pasta, and couscous. · Eat fish at least 2 times a week. Try tuna, salmon, mackerel, lake trout, herring, or sardines. · Eat moderate amounts of low-fat dairy products, such as milk, cheese, or yogurt. · Eat moderate amounts of poultry and eggs. · Choose healthy (unsaturated) fats, such as nuts, olive oil, and certain nut or seed oils like canola, soybean, and flaxseed. · Limit unhealthy (saturated) fats, such as butter, palm oil, and coconut oil. And limit fats found in animal products, such as meat and dairy products made with whole milk. Try to eat red meat only a few times a month in very small amounts. · Limit sweets and desserts to only a few times a week. This includes sugar-sweetened drinks like soda. The Mediterranean diet may also include red wine with your meal-1 glass each day for women and up to 2 glasses a day for men. Tips for eating at home  · Use herbs, spices, garlic, lemon zest, and citrus juice instead of salt to add flavor to foods. · Add avocado slices to your sandwich instead of wolfe. · Have fish for lunch or dinner instead of red meat. Brush the fish with olive oil, and broil or grill it. · Sprinkle your salad with seeds or nuts instead of cheese. · Cook with olive or canola oil instead of butter or oils that are high in saturated fat. · Switch from 2% milk or whole milk to 1% or fat-free milk. · Dip raw vegetables in a vinaigrette dressing or hummus instead of dips made from mayonnaise or sour cream.  · Have a piece of fruit for dessert instead of a piece of cake. Try baked apples, or have some dried fruit. Tips for eating out  · Try broiled, grilled, baked, or poached fish instead of having it fried or breaded.   · Ask your  to have your meals prepared with olive oil instead of butter. · Order dishes made with marinara sauce or sauces made from olive oil. Avoid sauces made from cream or mayonnaise. · Choose whole-grain breads, whole wheat pasta and pizza crust, brown rice, beans, and lentils. · Cut back on butter or margarine on bread. Instead, you can dip your bread in a small amount of olive oil. · Ask for a side salad or grilled vegetables instead of french fries or chips. Where can you learn more? Go to https://PieceablepeGigamoneb.[email or web address] [email or web address] org and sign in to your Feedback-Machine account. Enter [phone] in the [employer] Inc. box to learn more about \"Learning About the Mediterranean Diet. \"     If you do not have an account, please click on the \"Sign Up Now\" link. Current as of: May 12, 2017  Content Version: 11.6  © 5789-1408 Hone and Strop, Incorporated. Care instructions adapted under license by [facility] ([facility]). If you have questions about a medical condition or this instruction, always ask your healthcare professional. [place] [address] any warranty or liability for your use of this information.

## 2018-08-04 ENCOUNTER — HOSPITAL ENCOUNTER (OUTPATIENT)
Dept: LAB | Age: 43
Discharge: HOME | End: 2018-08-04
Payer: COMMERCIAL

## 2018-08-04 DIAGNOSIS — E78.49 OTHER HYPERLIPIDEMIA: Chronic | ICD-10-CM

## 2018-08-04 DIAGNOSIS — Z00.01 ENCOUNTER FOR WELL ADULT EXAM WITH ABNORMAL FINDINGS: ICD-10-CM

## 2018-08-04 DIAGNOSIS — R73.03 PRE-DIABETES: Chronic | ICD-10-CM

## 2018-08-04 LAB
ALBUMIN SERPL-MCNC: 4.2 G/DL (ref 3.9–4.9)
ALP BLD-CCNC: 99 U/L (ref 35–104)
ALT SERPL-CCNC: 32 U/L (ref 0–41)
ANION GAP SERPL CALCULATED.3IONS-SCNC: 14 MEQ/L (ref 7–13)
AST SERPL-CCNC: 30 U/L (ref 0–40)
BASOPHILS ABSOLUTE: 0.1 K/UL (ref 0–0.2)
BASOPHILS RELATIVE PERCENT: 0.8 %
BILIRUB SERPL-MCNC: 0.4 MG/DL (ref 0–1.2)
BUN BLDV-MCNC: 14 MG/DL (ref 6–20)
CALCIUM SERPL-MCNC: 9.5 MG/DL (ref 8.6–10.2)
CHLORIDE BLD-SCNC: 102 MEQ/L (ref 98–107)
CHOLESTEROL, TOTAL: 128 MG/DL (ref 0–199)
CO2: 24 MEQ/L (ref 22–29)
CREAT SERPL-MCNC: 1.29 MG/DL (ref 0.7–1.2)
EOSINOPHILS ABSOLUTE: 0.4 K/UL (ref 0–0.7)
EOSINOPHILS RELATIVE PERCENT: 4.3 %
GFR AFRICAN AMERICAN: >60
GFR NON-AFRICAN AMERICAN: >60
GLOBULIN: 3.2 G/DL (ref 2.3–3.5)
GLUCOSE BLD-MCNC: 87 MG/DL (ref 74–109)
HBA1C MFR BLD: 6.6 % (ref 4.8–5.9)
HCT VFR BLD CALC: 45.7 % (ref 42–52)
HDLC SERPL-MCNC: 36 MG/DL (ref 40–59)
HEMOGLOBIN: 15.3 G/DL (ref 14–18)
LDL CHOLESTEROL CALCULATED: 76 MG/DL (ref 0–129)
LYMPHOCYTES ABSOLUTE: 4.3 K/UL (ref 1–4.8)
LYMPHOCYTES RELATIVE PERCENT: 51.1 %
MCH RBC QN AUTO: 29.1 PG (ref 27–31.3)
MCHC RBC AUTO-ENTMCNC: 33.6 % (ref 33–37)
MCV RBC AUTO: 86.6 FL (ref 80–100)
MONOCYTES ABSOLUTE: 0.7 K/UL (ref 0.2–0.8)
MONOCYTES RELATIVE PERCENT: 8.4 %
NEUTROPHILS ABSOLUTE: 3 K/UL (ref 1.4–6.5)
NEUTROPHILS RELATIVE PERCENT: 35.4 %
PDW BLD-RTO: 13.4 % (ref 11.5–14.5)
PLATELET # BLD: 226 K/UL (ref 130–400)
POTASSIUM SERPL-SCNC: 4.1 MEQ/L (ref 3.5–5.1)
RBC # BLD: 5.28 M/UL (ref 4.7–6.1)
SODIUM BLD-SCNC: 140 MEQ/L (ref 132–144)
TOTAL PROTEIN: 7.4 G/DL (ref 6.4–8.1)
TRIGL SERPL-MCNC: 79 MG/DL (ref 0–200)
WBC # BLD: 8.4 K/UL (ref 4.8–10.8)

## 2018-08-04 PROCEDURE — 80053 COMPREHEN METABOLIC PANEL: CPT

## 2018-08-04 PROCEDURE — 85025 COMPLETE CBC W/AUTO DIFF WBC: CPT

## 2018-08-04 PROCEDURE — 83036 HEMOGLOBIN GLYCOSYLATED A1C: CPT

## 2018-08-04 PROCEDURE — 36415 COLL VENOUS BLD VENIPUNCTURE: CPT

## 2018-08-04 PROCEDURE — 80061 LIPID PANEL: CPT

## 2018-08-06 DIAGNOSIS — E11.9 TYPE 2 DIABETES MELLITUS WITHOUT COMPLICATION, WITHOUT LONG-TERM CURRENT USE OF INSULIN (HCC): Chronic | ICD-10-CM

## 2018-08-07 NOTE — PATIENT INSTRUCTIONS
list of the medicines you take. Where can you learn more? Go to https://[email or web address] [email or web address] and sign in to your Fondeadora account. Enter J215 in the [employer] box to learn more about \"Learning About Type 2 Diabetes. \"     If you do not have an account, please click on the \"Sign Up Now\" link. Current as of: December 7, 2017  Content Version: 11.6  © 6456-5396 TranscribeMe. Care instructions adapted under license by [facility] ([facility]). If you have questions about a medical condition or this instruction, always ask your healthcare professional. [address] any warranty or liability for your use of this information. Patient Education        Type 2 Diabetes: Care Instructions  Your Care Instructions    Type 2 diabetes is a disease that develops when the body's tissues cannot use insulin properly. Over time, the pancreas cannot make enough insulin. Insulin is a hormone that helps the body's cells use sugar (glucose) for energy. It also helps the body store extra sugar in muscle, fat, and liver cells. Without insulin, the sugar cannot get into the cells to do its work. It stays in the blood instead. This can cause high blood sugar levels. A person has diabetes when the blood sugar stays too high too much of the time. Over time, diabetes can lead to diseases of the heart, blood vessels, nerves, kidneys, and eyes. You may be able to control your blood sugar by losing weight, eating a healthy diet, and getting daily exercise. You may also have to take insulin or other diabetes medicine. Follow-up care is a key part of your treatment and safety. Be sure to make and go to all appointments. Call your doctor if you are having problems. It's also a good idea to know your test results and keep a list of the medicines you take. How can you care for yourself at home? · Keep your blood sugar at a target level (which you set with your doctor).   ¨ Eat a good diet that spreads carbohydrate throughout the day. Carbohydrate-the body's main source of fuel-affects blood sugar more than any other nutrient. Carbohydrate is in fruits, vegetables, milk, and yogurt. It also is in breads, cereals, vegetables such as potatoes and corn, and sugary foods such as candy and cakes. ¨ Aim for 30 minutes of exercise on most, preferably all, days of the week. Walking is a good choice. You also may want to do other activities, such as running, swimming, cycling, or playing tennis or team sports. If your doctor says it's okay, do muscle-strengthening exercises at least 2 times a week. ¨ Take your medicines exactly as prescribed. Call your doctor if you think you are having a problem with your medicine. You will get more details on the specific medicines your doctor prescribes. · Check your blood sugar as often as your doctor recommends. It is important to keep track of any symptoms you have, such as low blood sugar. Also tell your doctor if you have any changes in your activities, diet, or insulin use. · Talk to your doctor before you start taking aspirin every day. Aspirin can help certain people lower their risk of a heart attack or stroke. But taking aspirin isn't right for everyone, because it can cause serious bleeding. · Do not smoke. If you need help quitting, talk to your doctor about stop-smoking programs and medicines. These can increase your chances of quitting for good. · Keep your cholesterol and blood pressure at normal levels. You may need to take one or more medicines to reach your goals. Take them exactly as directed. Do not stop or change a medicine without talking to your doctor first.  When should you call for help? Call 911 anytime you think you may need emergency care. For example, call if:    · You passed out (lost consciousness), or you suddenly become very sleepy or confused.  (You may have very low blood sugar.)    Call your doctor now or seek immediate medical care if:    · Your blood sugar is 300 mg/dL or is higher than the level your doctor has set for you.     · You have symptoms of low blood sugar, such as:  ¨ Sweating. ¨ Feeling nervous, shaky, and weak. ¨ Extreme hunger and slight nausea. ¨ Dizziness and headache. ¨ Blurred vision. ¨ Confusion.    Watch closely for changes in your health, and be sure to contact your doctor if:    · You often have problems controlling your blood sugar.     · You have symptoms of long-term diabetes problems, such as:  ¨ New vision changes. ¨ New pain, numbness, or tingling in your hands or feet. ¨ Skin problems. Where can you learn more? Go to https://Nallatech.Sape. org and sign in to your Mobile Cohesion account. Enter C553 in the Vertical Knowledge box to learn more about \"Type 2 Diabetes: Care Instructions. \"     If you do not have an account, please click on the \"Sign Up Now\" link. Current as of: December 7, 2017  Content Version: 11.6  © 7298-2638 UroSens. Care instructions adapted under license by [employer] ([facility]). If you have questions about a medical condition or this instruction, always ask your healthcare professional. [email or web address] any warranty or liability for your use of this information. Patient Education            Current as of: March 22, 2017  Content Version: 11.6  © 6271-3715 UroSens. Care instructions adapted under license by [employer] ([facility]). If you have questions about a medical condition or this instruction, always ask your healthcare professional. [address] any warranty or liability for your use of this information. Patient Education        Learning About Diabetes Food Guidelines  Your Care Instructions    Meal planning is important to manage diabetes. It helps keep your blood sugar at a target level (which you set with your doctor). You don't have to eat special foods.  You can eat what your family eats, including sweets once in a while. But you do have to pay attention to how often you eat and how much you eat of certain foods. You may want to work with a dietitian or a certified diabetes educator (CDE) to help you plan meals and snacks. A dietitian or CDE can also help you lose weight if that is one of your goals. What should you know about eating carbs? Managing the amount of carbohydrate (carbs) you eat is an important part of healthy meals when you have diabetes. Carbohydrate is found in many foods. · Learn which foods have carbs. And learn the amounts of carbs in different foods. ¨ Bread, cereal, pasta, and rice have about 15 grams of carbs in a serving. A serving is 1 slice of bread (1 ounce), ½ cup of cooked cereal, or 1/3 cup of cooked pasta or rice. ¨ Fruits have 15 grams of carbs in a serving. A serving is 1 small fresh fruit, such as an apple or orange; ½ of a banana; ½ cup of cooked or canned fruit; ½ cup of fruit juice; 1 cup of melon or raspberries; or 2 tablespoons of dried fruit. ¨ Milk and no-sugar-added yogurt have 15 grams of carbs in a serving. A serving is 1 cup of milk or 2/3 cup of no-sugar-added yogurt. ¨ Starchy vegetables have 15 grams of carbs in a serving. A serving is ½ cup of mashed potatoes or sweet potato; 1 cup winter squash; ½ of a small baked potato; ½ cup of cooked beans; or ½ cup cooked corn or green peas. · Learn how much carbs to eat each day and at each meal. A dietitian or CDE can teach you how to keep track of the amount of carbs you eat. This is called carbohydrate counting. · If you are not sure how to count carbohydrate grams, use the Plate Method to plan meals. It is a good, quick way to make sure that you have a balanced meal. It also helps you spread carbs throughout the day. ¨ Divide your plate by types of foods.  Put non-starchy vegetables on half the plate, meat or other protein food on one-quarter of the plate, and a grain or starchy vegetable in the final quarter of the plate. To this you can add a small piece of fruit and 1 cup of milk or yogurt, depending on how many carbs you are supposed to eat at a meal.  · Try to eat about the same amount of carbs at each meal. Do not \"save up\" your daily allowance of carbs to eat at one meal.  · Proteins have very little or no carbs per serving. Examples of proteins are beef, chicken, turkey, fish, eggs, tofu, cheese, cottage cheese, and peanut butter. A serving size of meat is 3 ounces, which is about the size of a deck of cards. Examples of meat substitute serving sizes (equal to 1 ounce of meat) are 1/4 cup of cottage cheese, 1 egg, 1 tablespoon of peanut butter, and ½ cup of tofu. How can you eat out and still eat healthy? · Learn to estimate the serving sizes of foods that have carbohydrate. If you measure food at home, it will be easier to estimate the amount in a serving of restaurant food. · If the meal you order has too much carbohydrate (such as potatoes, corn, or baked beans), ask to have a low-carbohydrate food instead. Ask for a salad or green vegetables. · If you use insulin, check your blood sugar before and after eating out to help you plan how much to eat in the future. · If you eat more carbohydrate at a meal than you had planned, take a walk or do other exercise. This will help lower your blood sugar. What else should you know? · Limit saturated fat, such as the fat from meat and dairy products. This is a healthy choice because people who have diabetes are at higher risk of heart disease. So choose lean cuts of meat and nonfat or low-fat dairy products. Use olive or canola oil instead of butter or shortening when cooking. · Don't skip meals. Your blood sugar may drop too low if you skip meals and take insulin or certain medicines for diabetes. · Check with your doctor before you drink alcohol. Alcohol can cause your blood sugar to drop too low.  Alcohol can also cause a bad reaction if you take certain diabetes medicines. Follow-up care is a key part of your treatment and safety. Be sure to make and go to all appointments, and call your doctor if you are having problems. It's also a good idea to know your test results and keep a list of the medicines you take. Where can you learn more? Go to https://[email or web address].[email or web address] Media [email or web address] and sign in to your ONEHOPE account. Enter Z428 in the [employer]ation box to learn more about \"Learning About Diabetes Food Guidelines. \"     If you do not have an account, please click on the \"Sign Up Now\" link. Current as of: December 7, 2017  Content Version: 11.6  © 4952-0246 Pirate Pay. Care instructions adapted under license by [facility] ([facility]). If you have questions about a medical condition or this instruction, always ask your healthcare professional. [place] [address] any warranty or liability for your use of this information. Patient Education        Learning About Meal Planning for Diabetes  Why plan your meals? Meal planning can be a key part of managing diabetes. Planning meals and snacks with the right balance of carbohydrate, protein, and fat can help you keep your blood sugar at the target level you set with your doctor. You don't have to eat special foods. You can eat what your family eats, including sweets once in a while. But you do have to pay attention to how often you eat and how much you eat of certain foods. You may want to work with a dietitian or a certified diabetes educator. He or she can give you tips and meal ideas and can answer your questions about meal planning. This health professional can also help you reach a healthy weight if that is one of your goals. What plan is right for you? Your dietitian or diabetes educator may suggest that you start with the plate format or carbohydrate counting. The plate format  The plate format is a simple way to help you manage how you eat.  You plan meals by learning how much space each food should take on a plate. Using the plate format helps you spread carbohydrate throughout the day. It can make it easier to keep your blood sugar level within your target range. It also helps you see if you're eating healthy portion sizes. To use the plate format, you put non-starchy vegetables on half your plate. Add meat or meat substitutes on one-quarter of the plate. Put a grain or starchy vegetable (such as brown rice or a potato) on the final quarter of the plate. You can add a small piece of fruit and some low-fat or fat-free milk or yogurt, depending on your carbohydrate goal for each meal.  Here are some tips for using the plate format:  · Make sure that you are not using an oversized plate. A 9-inch plate is best. Many restaurants use larger plates. · Get used to using the plate format at home. Then you can use it when you eat out. · Write down your questions about using the plate format. Talk to your doctor, a dietitian, or a diabetes educator about your concerns. Carbohydrate counting  With carbohydrate counting, you plan meals based on the amount of carbohydrate in each food. Carbohydrate raises blood sugar higher and more quickly than any other nutrient. It is found in desserts, breads and cereals, and fruit. It's also found in starchy vegetables such as potatoes and corn, grains such as rice and pasta, and milk and yogurt. Spreading carbohydrate throughout the day helps keep your blood sugar levels within your target range. Your daily amount depends on several things, including your weight, how active you are, which diabetes medicines you take, and what your goals are for your blood sugar levels. A registered dietitian or diabetes educator can help you plan how much carbohydrate to include in each meal and snack. A guideline for your daily amount of carbohydrate is:  · 45 to 60 grams at each meal. That's about the same as 3 to 4 carbohydrate servings.   · 15 to 20 grams at each snack. That's about the same as 1 carbohydrate serving. The Nutrition Facts label on packaged foods tells you how much carbohydrate is in a serving of the food. First, look at the serving size on the food label. Is that the amount you eat in a serving? All of the nutrition information on a food label is based on that serving size. So if you eat more or less than that, you'll need to adjust the other numbers. Total carbohydrate is the next thing you need to look for on the label. If you count carbohydrate servings, one serving of carbohydrate is 15 grams. For foods that don't come with labels, such as fresh fruits and vegetables, you'll need a guide that lists carbohydrate in these foods. Ask your doctor, dietitian, or diabetes educator about books or other nutrition guides you can use. If you take insulin, you need to know how many grams of carbohydrate are in a meal. This lets you know how much rapid-acting insulin to take before you eat. If you use an insulin pump, you get a constant rate of insulin during the day. So the pump must be programmed at meals to give you extra insulin to cover the rise in blood sugar after meals. When you know how much carbohydrate you will eat, you can take the right amount of insulin. Or, if you always use the same amount of insulin, you need to make sure that you eat the same amount of carbohydrate at meals. If you need more help to understand carbohydrate counting and food labels, ask your doctor, dietitian, or diabetes educator. How do you get started with meal planning? Here are some tips to get started:  · Plan your meals a week at a time. Don't forget to include snacks too. · Use cookbooks or online recipes to plan several main meals. Plan some quick meals for busy nights. You also can double some recipes that freeze well. Then you can save half for other busy nights when you don't have time to cook.   · Make sure you have the ingredients you need for your recipes. If you're running low on basic items, put these items on your shopping list too. · List foods that you use to make breakfasts, lunches, and snacks. List plenty of fruits and vegetables. · Post this list on the refrigerator. Add to it as you think of more things you need. · Take the list to the store to do your weekly shopping. Follow-up care is a key part of your treatment and safety. Be sure to make and go to all appointments, and call your doctor if you are having problems. It's also a good idea to know your test results and keep a list of the medicines you take. Where can you learn more? Go to https://enymotionpeEmbrace Pet [email or web address].[email or web address] org and sign in to your Konjekt account. Enter W407 in the Personal Style Finder box to learn more about \"Learning About Meal Planning for Diabetes. \"     If you do not have an account, please click on the \"Sign Up Now\" link. Current as of: December 7, 2017  Content Version: 11.6  © 4003-5214 Sandlot Solutions, Incorporated. Care instructions adapted under license by [facility] ([facility]). If you have questions about a medical condition or this instruction, always ask your healthcare professional. [place] [address] any warranty or liability for your use of this information.

## 2018-10-26 ENCOUNTER — TELEPHONE (OUTPATIENT)
Dept: FAMILY MEDICINE CLINIC | Age: 43
End: 2018-10-26

## 2018-11-26 DIAGNOSIS — J30.2 PERENNIAL ALLERGIC RHINITIS WITH SEASONAL VARIATION: Chronic | ICD-10-CM

## 2018-11-26 DIAGNOSIS — J30.89 PERENNIAL ALLERGIC RHINITIS WITH SEASONAL VARIATION: Chronic | ICD-10-CM

## 2018-11-26 RX ORDER — IPRATROPIUM BROMIDE 42 UG/1
2 SPRAY, METERED NASAL 3 TIMES DAILY
Qty: 1 BOTTLE | Refills: 1 | Status: SHIPPED | OUTPATIENT
Start: 2018-11-26

## 2018-11-26 RX ORDER — MONTELUKAST SODIUM 10 MG/1
10 TABLET ORAL DAILY
Qty: 30 TABLET | Refills: 1 | Status: SHIPPED | OUTPATIENT
Start: 2018-11-26

## 2018-12-10 ENCOUNTER — HOSPITAL ENCOUNTER (EMERGENCY)
Age: 43
Discharge: HOME | End: 2018-12-10
Payer: COMMERCIAL

## 2018-12-10 VITALS
OXYGEN SATURATION: 99 % | RESPIRATION RATE: 16 BRPM | WEIGHT: 225 LBS | BODY MASS INDEX: 32.21 KG/M2 | TEMPERATURE: 98.9 F | HEART RATE: 84 BPM | HEIGHT: 70 IN | SYSTOLIC BLOOD PRESSURE: 157 MMHG | DIASTOLIC BLOOD PRESSURE: 90 MMHG

## 2018-12-10 DIAGNOSIS — M77.9 TENDONITIS: Primary | ICD-10-CM

## 2018-12-10 DIAGNOSIS — M67.40 GANGLION CYST: ICD-10-CM

## 2018-12-10 PROCEDURE — 99282 EMERGENCY DEPT VISIT SF MDM: CPT

## 2018-12-10 PROCEDURE — 29125 APPL SHORT ARM SPLINT STATIC: CPT

## 2018-12-10 RX ORDER — PREDNISONE 10 MG/1
TABLET ORAL
Qty: 30 TABLET | Refills: 0 | Status: SHIPPED | OUTPATIENT
Start: 2018-12-10 | End: 2021-06-17

## 2018-12-10 RX ORDER — MELOXICAM 15 MG/1
15 TABLET ORAL DAILY
Qty: 10 TABLET | Refills: 0 | Status: SHIPPED | OUTPATIENT
Start: 2018-12-10 | End: 2021-06-17

## 2018-12-10 ASSESSMENT — ENCOUNTER SYMPTOMS
ABDOMINAL PAIN: 0
COUGH: 0
PHOTOPHOBIA: 0
BACK PAIN: 0
EYE PAIN: 0
SORE THROAT: 0
SHORTNESS OF BREATH: 0
RHINORRHEA: 0
VOMITING: 0
NAUSEA: 0
DIARRHEA: 0

## 2018-12-10 ASSESSMENT — PAIN DESCRIPTION - PROGRESSION: CLINICAL_PROGRESSION: NOT CHANGED

## 2018-12-10 ASSESSMENT — PAIN DESCRIPTION - FREQUENCY: FREQUENCY: CONTINUOUS

## 2018-12-10 ASSESSMENT — PAIN DESCRIPTION - LOCATION: LOCATION: GENERALIZED

## 2018-12-10 ASSESSMENT — PAIN DESCRIPTION - ONSET: ONSET: ON-GOING

## 2018-12-10 ASSESSMENT — PAIN DESCRIPTION - DESCRIPTORS: DESCRIPTORS: ACHING;DISCOMFORT;SORE

## 2018-12-10 ASSESSMENT — PAIN SCALES - GENERAL: PAINLEVEL_OUTOF10: 5

## 2018-12-10 ASSESSMENT — PAIN DESCRIPTION - PAIN TYPE: TYPE: ACUTE PAIN

## 2018-12-10 NOTE — ED PROVIDER NOTES
[address] [facility] ED  eMERGENCY dEPARTMENT eNCOUnter      Pt Name: Arnulfo Tobias  MRN: [id number]  [place] 1975  Date of evaluation: 12/10/2018  Provider: Vamshi Guo PA-C      HISTORY OF PRESENT ILLNESS    Arnulfo Tobias is a 44 y.o. male who presents to the Emergency Department with left wrist pain. This began 2 weeks ago. Pt denies injury or trauma. He is a . Pain is worse extension of thumb. Denies numbness or tingling. Took motrin yesterday for it. Pt also reports myalgias for the last 2 days but says just take care of my wrist and I am out of here. Pt denies fever or chills. REVIEW OF SYSTEMS       Review of Systems   Constitutional: Negative for chills, diaphoresis, fatigue and fever. HENT: Negative for congestion, rhinorrhea and sore throat. Eyes: Negative for photophobia and pain. Respiratory: Negative for cough and shortness of breath. Cardiovascular: Negative for chest pain and palpitations. Gastrointestinal: Negative for abdominal pain, diarrhea, nausea and vomiting. Genitourinary: Negative for dysuria and flank pain. Musculoskeletal: Positive for arthralgias and myalgias. Negative for back pain. Skin: Negative for rash. Neurological: Negative for dizziness, light-headedness and headaches. All other systems reviewed and are negative.         PAST MEDICAL HISTORY     Past Medical History:   Diagnosis Date    Depression with anxiety 4/24/2017    Hyperglycemia 4/24/2017    Hyperlipidemia 2/18/2014    Insomnia     Perennial allergic rhinitis with seasonal variation 4/24/2017    Pre-diabetes 4/24/2017    Tobacco use disorder 4/24/2017    Type 2 diabetes mellitus without complication, without long-term current use of insulin ([facility] [address] 8/6/2018         SURGICAL HISTORY       Past Surgical History:   Procedure Laterality Date    NASAL FRACTURE SURGERY  2007         CURRENT MEDICATIONS       Previous Medications    ACETAMINOPHEN (TYLENOL) 500 MG TABLET

## 2019-03-18 RX ORDER — OLOPATADINE HYDROCHLORIDE 1 MG/ML
1 SOLUTION/ DROPS OPHTHALMIC 2 TIMES DAILY
Qty: 5 ML | Refills: 0 | Status: SHIPPED | OUTPATIENT
Start: 2019-03-18

## 2020-02-24 ENCOUNTER — HOSPITAL ENCOUNTER (OUTPATIENT)
Dept: MRI IMAGING | Age: 45
Discharge: HOME | End: 2020-02-26
Payer: COMMERCIAL

## 2020-02-24 PROCEDURE — 72148 MRI LUMBAR SPINE W/O DYE: CPT

## 2020-02-24 PROCEDURE — 72146 MRI CHEST SPINE W/O DYE: CPT

## 2020-02-25 ENCOUNTER — HOSPITAL ENCOUNTER (OUTPATIENT)
Dept: PHYSICAL THERAPY | Age: 45
Setting detail: THERAPIES SERIES
Discharge: HOME | End: 2020-02-25
Payer: COMMERCIAL

## 2020-02-25 PROCEDURE — G0283 ELEC STIM OTHER THAN WOUND: HCPCS

## 2020-02-25 PROCEDURE — 97162 PT EVAL MOD COMPLEX 30 MIN: CPT

## 2020-02-25 ASSESSMENT — PAIN DESCRIPTION - FREQUENCY: FREQUENCY: CONTINUOUS

## 2020-02-25 ASSESSMENT — PAIN DESCRIPTION - ONSET: ONSET: AWAKENED FROM SLEEP

## 2020-02-25 ASSESSMENT — PAIN SCALES - GENERAL: PAINLEVEL_OUTOF10: 9

## 2020-02-25 ASSESSMENT — PAIN DESCRIPTION - PROGRESSION: CLINICAL_PROGRESSION: GRADUALLY WORSENING

## 2020-02-25 ASSESSMENT — PAIN DESCRIPTION - ORIENTATION: ORIENTATION: LEFT;LOWER;MID

## 2020-02-25 ASSESSMENT — PAIN DESCRIPTION - LOCATION: LOCATION: BACK;LEG;BUTTOCKS

## 2020-02-25 NOTE — PROGRESS NOTES
Decreased sensation  Assessment: Pt. exhibited difficulty with change in positions during evaluation. Pt apprehensive to perform exercises due to back pain. Able to perform single knee to chest, but limited with range (to use towel to assist if needed). Prognosis: Good  Activity Tolerance: Patient limited by pain; Patient limited by endurance(Pt can only sit for about 20 min. , stand for 15 min. and ambulate for 5 min. before pain in back gets bad and needs to change position)  Activity Tolerance: Did not tolerate ROM or strength testing well due to back/leg pain     Decision Making: Medium Complexity     Exam: Examination took longer than 60 minutes due to back pain with transfers/movement   Clinical Presentation: Presents with poor posture and significant pain in back and L leg        Plan  Frequency/Duration:  Plan  Times per week: 2  Plan weeks: 4-6  Current Treatment Recommendations: Strengthening, ROM, Pain Management, Modalities, Home Exercise Program, Manual Therapy - Soft Tissue Mobilization  Plan Comment: Obtain MRI results (MRI was done on 2/24/2020). Review single knee to chest & progress with exercises as tolerated         Patient Education  New Education Provided: Patient Education: Pt was instructed on good sitting posture and supine to sit transfer. Also discussed good body mechanics while ambulating    POST-PAIN     Pain Rating (0-10 pain scale): 5-6  /10 immediately after IFC, but went back up to 8-9 after moving around  Location and pain description same as pre-treatment unless indicated. Action: [] NA  [] Call Physician  [] Perform HEP  [] Meds as prescribed    Evaluation and patient rights have been reviewed and patient agrees with plan of care.   Yes  []  No  []   Explain:       Goals  Short term goals  Time Frame for Short term goals: 3 weeks  Short term goal 1: Pt able to sit without weight shifting to right buttocks  Short term goal 2: Ambulating with Normal babs and upright

## 2020-03-03 ENCOUNTER — HOSPITAL ENCOUNTER (OUTPATIENT)
Dept: PHYSICAL THERAPY | Age: 45
Setting detail: THERAPIES SERIES
Discharge: HOME | End: 2020-03-03
Payer: COMMERCIAL

## 2020-03-03 PROCEDURE — G0283 ELEC STIM OTHER THAN WOUND: HCPCS

## 2020-03-03 PROCEDURE — 97140 MANUAL THERAPY 1/> REGIONS: CPT

## 2020-03-03 PROCEDURE — 97110 THERAPEUTIC EXERCISES: CPT

## 2020-03-03 ASSESSMENT — PAIN DESCRIPTION - FREQUENCY: FREQUENCY: CONTINUOUS

## 2020-03-03 ASSESSMENT — PAIN DESCRIPTION - ORIENTATION: ORIENTATION: LOWER

## 2020-03-03 ASSESSMENT — PAIN DESCRIPTION - DESCRIPTORS: DESCRIPTORS: BURNING;SHARP

## 2020-03-03 ASSESSMENT — PAIN SCALES - GENERAL: PAINLEVEL_OUTOF10: 9

## 2020-03-03 ASSESSMENT — PAIN DESCRIPTION - PROGRESSION: CLINICAL_PROGRESSION: GRADUALLY WORSENING

## 2020-03-03 ASSESSMENT — PAIN DESCRIPTION - ONSET: ONSET: AWAKENED FROM SLEEP

## 2020-03-03 ASSESSMENT — PAIN DESCRIPTION - LOCATION: LOCATION: BACK

## 2020-03-03 NOTE — PROGRESS NOTES
sleeping) 4. Back pain 5. L LE pain/numbness 6. Poor posture  Clinical Presentation: Poor posture sitting with right side bend and on right buttock. Significant pain in back and L leg  REQUIRES PT FOLLOW UP: Yes      Goals:  Short term goals  Time Frame for Short term goals: 3 weeks  Short term goal 1: Pt able to sit without weight shifting to right buttocks  Short term goal 2: Ambulating with Normal babs and upright posture  Short term goal 3: LE strength 4-/5 or greater  Short term goal 4: Increase trunk mobility to 75% WFL's  Short term goal 5: Pt able to sit for 30-45 min. with decreased back pain, able to wash dishes 15-30 min with decreased back pain and ambulate 15 min. with decreased back pain  Short term goal 6: Decrease back pain to 4-5/10  Short term goal 7: intermittent L LE pain/numbness  Long term goals  Time Frame for Long term goals : 4-6 weeks  Long term goal 1: Trunk AROM WFL'S all planes  Long term goal 2: Bilat. LE strength WFL's all planes  Long term goal 3: Bilat.  UE strength WFL's all planes  Long term goal 4: Pain decreased to 0-1/10 with ADL'S  Long term goal 5: Pt sleeping through night without pain waking him up  Patient Goals   Patient goals : pt would like to return to work without any pain, no lower extremity numbness/pain    Plan:    Plan  Times per week: 2  Plan weeks: 4-6  Current Treatment Recommendations: Strengthening, ROM, Pain Management, Modalities, Home Exercise Program, Manual Therapy - Soft Tissue Mobilization  Plan Comment: Pt [facility] to earlier date  Timed Code Treatment Minutes: 45 Minutes     Therapy Time   Individual Concurrent Group Co-treatment   Time In 1305         Time Out 1400         Minutes 55         Timed Code Treatment Minutes: Monyatastacia 44, PTA

## 2020-03-05 ENCOUNTER — HOSPITAL ENCOUNTER (OUTPATIENT)
Dept: PHYSICAL THERAPY | Age: 45
Setting detail: THERAPIES SERIES
Discharge: HOME | End: 2020-03-05
Payer: COMMERCIAL

## 2020-03-05 PROCEDURE — 97110 THERAPEUTIC EXERCISES: CPT

## 2020-03-05 PROCEDURE — 97035 APP MDLTY 1+ULTRASOUND EA 15: CPT

## 2020-03-05 PROCEDURE — 97140 MANUAL THERAPY 1/> REGIONS: CPT

## 2020-03-05 ASSESSMENT — PAIN DESCRIPTION - PROGRESSION: CLINICAL_PROGRESSION: GRADUALLY WORSENING

## 2020-03-05 ASSESSMENT — PAIN DESCRIPTION - FREQUENCY: FREQUENCY: CONTINUOUS

## 2020-03-05 ASSESSMENT — PAIN DESCRIPTION - ORIENTATION: ORIENTATION: LEFT

## 2020-03-05 ASSESSMENT — PAIN SCALES - GENERAL: PAINLEVEL_OUTOF10: 9

## 2020-03-05 ASSESSMENT — PAIN DESCRIPTION - DIRECTION: RADIATING_TOWARDS: LEFT LOWER EXTREMITY

## 2020-03-05 ASSESSMENT — PAIN DESCRIPTION - ONSET: ONSET: AWAKENED FROM SLEEP

## 2020-03-05 ASSESSMENT — PAIN DESCRIPTION - LOCATION: LOCATION: BACK;HIP

## 2020-03-05 ASSESSMENT — PAIN - FUNCTIONAL ASSESSMENT: PAIN_FUNCTIONAL_ASSESSMENT: PREVENTS OR INTERFERES WITH MANY ACTIVE NOT PASSIVE ACTIVITIES

## 2020-03-05 NOTE — PROGRESS NOTES
4-6  Current Treatment Recommendations: Strengthening, ROM, Pain Management, Modalities, Home Exercise Program, Manual Therapy - Soft Tissue Mobilization  Plan Comment: Pt to follow up with [address] prior to next appointment     Pt to continue current HEP. See objective section for any therapeutic exercise changes, additions or modifications this date.          PT Individual Minutes  Time In: 5627  Time Out: 1355  Minutes: 50  Timed Code Treatment Minutes: 32 Minutes  Procedure Minutes: 10 Minutes     Timed Activity Minutes Units   Ther Ex 15 1   Manual  17 1       Signature:  Electronically signed by Ramirez Smith PTA on 3/5/20 at 2:42 PM

## 2020-03-10 ENCOUNTER — HOSPITAL ENCOUNTER (OUTPATIENT)
Dept: PHYSICAL THERAPY | Age: 45
Setting detail: THERAPIES SERIES
Discharge: HOME | End: 2020-03-10
Payer: COMMERCIAL

## 2020-03-10 PROCEDURE — 97124 MASSAGE THERAPY: CPT

## 2020-03-10 PROCEDURE — 97035 APP MDLTY 1+ULTRASOUND EA 15: CPT

## 2020-03-10 PROCEDURE — 97140 MANUAL THERAPY 1/> REGIONS: CPT

## 2020-03-10 ASSESSMENT — PAIN DESCRIPTION - ONSET: ONSET: AWAKENED FROM SLEEP

## 2020-03-10 ASSESSMENT — PAIN DESCRIPTION - LOCATION: LOCATION: BACK;HIP

## 2020-03-10 ASSESSMENT — PAIN DESCRIPTION - PROGRESSION: CLINICAL_PROGRESSION: GRADUALLY WORSENING

## 2020-03-10 ASSESSMENT — PAIN DESCRIPTION - DESCRIPTORS: DESCRIPTORS: BURNING;STABBING

## 2020-03-10 ASSESSMENT — PAIN DESCRIPTION - PAIN TYPE: TYPE: ACUTE PAIN

## 2020-03-10 ASSESSMENT — PAIN DESCRIPTION - FREQUENCY: FREQUENCY: CONTINUOUS

## 2020-03-10 ASSESSMENT — PAIN DESCRIPTION - ORIENTATION: ORIENTATION: LEFT

## 2020-03-10 ASSESSMENT — PAIN SCALES - GENERAL: PAINLEVEL_OUTOF10: 9

## 2020-03-10 NOTE — PROGRESS NOTES
limitations: Decreased functional mobility , Decreased ADL status, Decreased ROM, Decreased strength, Increased pain, Decreased sensation  Assessment: Pt still exhibiting moderate muscle tightness along left paraspinal muscles. Difficult to progress today as pt had difficulty tolerating traction and hip flexor stretching  Treatment Diagnosis: Thoracic & Lumbar strain  1. Decreased trunk AROM 2. Decreased LE and UE strength 3. Decreased ADLs (sitting, standing, walking, sleeping) 4. Back pain 5. L LE pain/numbness 6. Poor posture  Prognosis: Good       Goals:  Short term goals  Time Frame for Short term goals: 3 weeks  Short term goal 1: Pt able to sit without weight shifting to right buttocks  Short term goal 2: Ambulating with Normal babs and upright posture  Short term goal 3: LE strength 4-/5 or greater  Short term goal 4: Increase trunk mobility to 75% WFL's  Short term goal 5: Pt able to sit for 30-45 min. with decreased back pain, able to wash dishes 15-30 min with decreased back pain and ambulate 15 min. with decreased back pain  Short term goal 6: Decrease back pain to 4-5/10  Short term goal 7: intermittent L LE pain/numbness    Long term goals  Time Frame for Long term goals : 4-6 weeks  Long term goal 1: Trunk AROM WFL'S all planes  Long term goal 2: Bilat. LE strength WFL's all planes  Long term goal 3: Bilat. UE strength WFL's all planes  Long term goal 4: Pain decreased to 0-1/10 with ADL'S  Long term goal 5: Pt sleeping through night without pain waking him up  Progress toward goals:    POST-PAIN       Pain Rating (0-10 pain scale):  8-9 /10   Location and pain description same as pre-treatment unless indicated.    Action: [] NA   [] Perform HEP  [x] Meds as prescribed  [] Modalities as prescribed   [] Call Physician     Frequency/Duration:  Plan  Times per week: 2  Plan weeks: 4-6  Current Treatment Recommendations: Strengthening, ROM, Pain Management, Modalities, Home Exercise Program, Manual Therapy - Soft Tissue Mobilization  Plan Comment: Continue with manual movement of left hip and soft tissue as well as attempt to progress ex     Pt to continue current HEP. See objective section for any therapeutic exercise changes, additions or modifications this date.     PT Individual Minutes  Time In: 6318  Time Out: 1350  Minutes: 38  Timed Code Treatment Minutes: 30 Minutes  Procedure Minutes: 8 minutes     Timed Activity Minutes Units   Ultrasound  8 minutes 1   Massage 10 minutes 1   Manual  12 minutes 1       Signature:  Electronically signed by Ronny Lesch, PTA on 3/10/20 at 2:08 PM EDT

## 2020-03-12 ENCOUNTER — HOSPITAL ENCOUNTER (OUTPATIENT)
Dept: PHYSICAL THERAPY | Age: 45
Setting detail: THERAPIES SERIES
Discharge: HOME | End: 2020-03-12
Payer: COMMERCIAL

## 2020-03-12 PROCEDURE — 97140 MANUAL THERAPY 1/> REGIONS: CPT

## 2020-03-12 PROCEDURE — 97035 APP MDLTY 1+ULTRASOUND EA 15: CPT

## 2020-03-12 PROCEDURE — 97124 MASSAGE THERAPY: CPT

## 2020-03-12 ASSESSMENT — PAIN DESCRIPTION - DESCRIPTORS: DESCRIPTORS: BURNING;NUMBNESS;STABBING

## 2020-03-12 ASSESSMENT — PAIN DESCRIPTION - LOCATION: LOCATION: BACK;HIP

## 2020-03-12 ASSESSMENT — PAIN DESCRIPTION - PROGRESSION: CLINICAL_PROGRESSION: GRADUALLY WORSENING

## 2020-03-12 ASSESSMENT — PAIN DESCRIPTION - FREQUENCY: FREQUENCY: CONTINUOUS

## 2020-03-12 ASSESSMENT — PAIN DESCRIPTION - ORIENTATION: ORIENTATION: LEFT

## 2020-03-12 ASSESSMENT — PAIN DESCRIPTION - ONSET: ONSET: GRADUAL

## 2020-03-12 NOTE — PROGRESS NOTES
[address]   Treatment Note                                          Date: 3/12/2020  Patient: Rosaline Cole  : 1975  ACCT #: [de-identified]  Visit Information:  PT Visit Information  Total # of Visits to Date: 5    Subjective: Pt states he had decreased pain to 5/10 for most of the day yesterday. However, when he drove to his PT appointment today he felt an immediate increase to 8-9/10. He reports he is taking tramadol twice daily as well as flexeril. HEP Compliance:  [] Good [x] Fair [] Poor [] Reports not doing due to:    Vital Signs  Patient Currently in Pain: Yes   Pain Screening  Patient Currently in Pain: Yes  Pain Assessment  Pain Assessment: 0-10  Patient's Stated Pain Goal: 9  Pain Location: Back; Hip  Pain Orientation: Left  Pain Radiating Towards: Burning and numbness down left lower extremity depending on position or activity  Pain Descriptors: Burning;Numbness; Stabbing  Pain Frequency: Continuous  Pain Onset: Gradual  Clinical Progression: Gradually worsening    OBJECTIVE:   Exercises  Exercise 1: Pt performed bent knee fallouts bilaterally with limited mobility due to pain    Strength: [x] NT Due to pain  [] MMT completed:    ROM: [] NT  [] ROM measurements:  Manual:   Manual therapy  PROM: Sidelying left hip flexor stretching X3  PNF: Continued PNF patterns to left hip to depress left side  Manual traction: Left lower extremity leg pulls in prone X3  Soft Tissue Mobalization: Provided soft tissue massage to left lumbar paraspinals X11 minutes and to left superior IT band and hip joint X 13 min    Modalities:  Modalities  Ultrasound: 1 Mhz @ 1.0 w/cm2 X8 min to left lumbar paraspinals     *Indicates exercise, modality, or manual techniques to be initiated when appropriate    Assessment:   Activity Tolerance  Activity Tolerance: Patient limited by pain, Patient limited by endurance(Pt can only sit for about 20 min. , stand for 15 min. and ambulate for 5 min.  before pain in back gets bad and needs to change position)  Activity Tolerance: Difficult for pt to tolerate stretching exs    Body structures, Functions, Activity limitations: Decreased functional mobility , Decreased ADL status, Decreased ROM, Decreased strength, Increased pain, Decreased sensation  Assessment: Pt still having difficulty tolerating stretching exs as well exhibiting constant guarding throughout ex  Treatment Diagnosis: Thoracic & Lumbar strain  1. Decreased trunk AROM 2. Decreased LE and UE strength 3. Decreased ADLs (sitting, standing, walking, sleeping) 4. Back pain 5. L LE pain/numbness 6. Poor posture  Prognosis: Good       Goals:  Short term goals  Time Frame for Short term goals: 3 weeks  Short term goal 1: Pt able to sit without weight shifting to right buttocks  Short term goal 2: Ambulating with Normal babs and upright posture  Short term goal 3: LE strength 4-/5 or greater  Short term goal 4: Increase trunk mobility to 75% WFL's  Short term goal 5: Pt able to sit for 30-45 min. with decreased back pain, able to wash dishes 15-30 min with decreased back pain and ambulate 15 min. with decreased back pain  Short term goal 6: Decrease back pain to 4-5/10  Short term goal 7: intermittent L LE pain/numbness    Long term goals  Time Frame for Long term goals : 4-6 weeks  Long term goal 1: Trunk AROM WFL'S all planes  Long term goal 2: Bilat. LE strength WFL's all planes  Long term goal 3: Bilat. UE strength WFL's all planes  Long term goal 4: Pain decreased to 0-1/10 with ADL'S  Long term goal 5: Pt sleeping through night without pain waking him up  Progress toward goals: Pt having difficulty progressing towards short term goals. POST-PAIN       Pain Rating (0-10 pain scale):   9/10   Location and pain description same as pre-treatment unless indicated.    Action: [] NA   [x] Perform HEP  [x] Meds as prescribed  [] Modalities as prescribed   [] Call Physician     Frequency/Duration:  Plan  Times per week:

## 2020-03-16 ENCOUNTER — OFFICE VISIT (OUTPATIENT)
Dept: ORTHOPEDIC SURGERY | Age: 45
End: 2020-03-16
Payer: COMMERCIAL

## 2020-03-16 VITALS
HEIGHT: 70 IN | WEIGHT: 225 LBS | BODY MASS INDEX: 32.21 KG/M2 | TEMPERATURE: 97.3 F | HEART RATE: 78 BPM | OXYGEN SATURATION: 98 %

## 2020-03-16 PROCEDURE — 99202 OFFICE O/P NEW SF 15 MIN: CPT | Performed by: ORTHOPAEDIC SURGERY

## 2020-03-16 ASSESSMENT — ENCOUNTER SYMPTOMS
CONSTIPATION: 0
COLOR CHANGE: 0
NAUSEA: 0
VOMITING: 0
BACK PAIN: 1
SHORTNESS OF BREATH: 0
DIARRHEA: 0
WHEEZING: 0
STRIDOR: 0

## 2020-03-16 NOTE — PROGRESS NOTES
Subjective:      HPI: Maddie Mayes is a 41 y.o. male who presents today for lower back pain. This is been occurring since early February with a twisting injury at work. He has since been on [employer]. States that his entire left leg is weak and feels like it is sleeping at times. Gets worse at night and he is unable to sleep. There is no distinct pathway of the shooting pain that he describes, rather it his entire leg. Denies any bowel or bladder incontinence. He has been attending physical therapy which is providing him minimal relief. Taken Toradol in the past and that has not helped. MRI from early February showed no significant change from previous study and there is no herniated disc or stenosis of his spinal canal.  Chief Complaint   Patient presents with    Back Pain     W/C case, referred by Gagan Manrique.  Mariusz Wheeler MD,pt states his full back pain and left leg pain,  pt states he was lifting at work and hurt his knee, incident Feb,3/2020         Past Medical History:   Diagnosis Date    Depression with anxiety 4/24/2017    Hyperglycemia 4/24/2017    Hyperlipidemia 2/18/2014    Insomnia     Perennial allergic rhinitis with seasonal variation 4/24/2017    Pre-diabetes 4/24/2017    Tobacco use disorder 4/24/2017    Type 2 diabetes mellitus without complication, without long-term current use of insulin ([facility] 8/6/2018      Past Surgical History:   Procedure Laterality Date    NASAL FRACTURE SURGERY  2007     Social History     Socioeconomic History    Marital status:      Spouse name: Beth Beckford Number of children: 3    Years of education: Not on file    Highest education level: Not on file   Occupational History    Occupation:  - industrial cleaning   Social Needs    Financial resource strain: Not on file    Food insecurity     Worry: Not on file     Inability: Not on file   [employer] needs     Medical: Not on file     Non-medical: Not on file   Tobacco Use  Smoking status: Current Every Day Smoker     Packs/day: 0.50     Years: 23.00     Pack years: 11.50     Types: Cigarettes     Start date: 13    Smokeless tobacco: Never Used   Substance and Sexual Activity    Alcohol use: No    Drug use: No    Sexual activity: Yes     Partners: Female     Comment: monogamous   Lifestyle    Physical activity     Days per week: Not on file     Minutes per session: Not on file    Stress: Not on file   Relationships    Social connections     Talks on phone: Not on file     Gets together: Not on file     Attends [employer] service: Not on file     Active member of club or organization: Not on file     Attends meetings of clubs or organizations: Not on file     Relationship status: Not on file    Intimate partner violence     Fear of current or ex partner: Not on file     Emotionally abused: Not on file     Physically abused: Not on file     Forced sexual activity: Not on file   Other Topics Concern    Not on file   Social History Narrative    Lives with wife and 3 children        1 dog     Family History   Problem Relation Age of Onset    High Blood Pressure Mother     High Cholesterol Mother     Cancer Father         Throat cancer - smoker    High Blood Pressure Brother     Heart Attack Maternal Grandmother 72    Diabetes Maternal Grandmother     Cancer Maternal Grandfather         unknown    No Known Problems Paternal Grandmother     No Known Problems Paternal Grandfather      No Known Allergies  Current Outpatient Medications on File Prior to Visit   Medication Sig Dispense Refill    olopatadine (PATANOL) 0.1 % ophthalmic solution Place 1 drop into both eyes 2 times daily 5 mL 0    meloxicam (MOBIC) 15 MG tablet Take 1 tablet by mouth daily 10 tablet 0    predniSONE (DELTASONE) 10 MG tablet 5 tabs po qam for 2 days then 4,3,2,1 tabs qam for 2 days each total of 10 days 30 tablet 0    montelukast (SINGULAIR) 10 MG tablet Take 1 tablet by mouth daily 30 tablet 1    ipratropium (ATROVENT) 0.06 % nasal spray 2 sprays by Nasal route 3 times daily 1 Bottle 1    metFORMIN (GLUCOPHAGE) 500 MG tablet Take 1 tablet by mouth 2 times daily (with meals) 60 tablet 5    rosuvastatin (CRESTOR) 10 MG tablet Take 1 tablet by mouth nightly 30 tablet 5    buPROPion (WELLBUTRIN SR) 150 MG extended release tablet Take 1 tablet by mouth 2 times daily 60 tablet 3    Elastic Bandages & Supports (KNEE BRACE) MISC Wear during the day over right knee as needed for support 1 each 0    loratadine (CLARITIN) 10 MG tablet Take 1 tablet by mouth daily 30 tablet 5    acetaminophen (TYLENOL) 500 MG tablet Take 1 tablet by mouth every 6 hours as needed for Pain. 60 tablet 0    naproxen sodium (ANAPROX) 550 MG tablet Take 1 tablet by mouth 2 times daily (with meals) for 14 days 28 tablet 0     No current facility-administered medications on file prior to visit. Review of Systems   Constitutional: Negative for appetite change and fever. Respiratory: Negative for shortness of breath, wheezing and stridor. Cardiovascular: Negative for chest pain and palpitations. Gastrointestinal: Negative for constipation, diarrhea, nausea and vomiting. Musculoskeletal: Positive for back pain. Negative for arthralgias, joint swelling, myalgias and neck pain. Skin: Negative for color change and rash. Neurological: Positive for weakness and numbness. Negative for dizziness, speech difficulty and light-headedness. Objective:   Pulse 78   Temp 97.3 °F (36.3 °C) (Temporal)   Ht 5' 10\" (1.778 m)   Wt 225 lb (102.1 kg)   SpO2 98%   BMI 32.28 kg/m²     Back Exam     Tenderness   The patient is experiencing no tenderness. Range of Motion   The patient has normal back ROM.   Lateral bend right: normal   Rotation right: normal     Muscle Strength   Right Quadriceps:  5/5   Left Quadriceps:  3/5   Right Hamstrings:  5/5   Left Hamstrings:  5/5     Tests   Straight leg raise right: negative  Straight leg raise left: positive    Reflexes   Patellar: 3/4 (left)  Achilles: 4/4    Other   Toe walk: abnormal  Heel walk: abnormal  Sensation: decreased    Comments:  Efforts during exam were of question              Radiographs and Laboratory Studies:     Diagnostic Imaging Studies:    EXAMINATION: MRI lumbar spine without contrast       CLINICAL HISTORY: Lifting injury 2 weeks earlier. Back pain radiating down left leg.       FINDINGS: Unenhanced scans obtained. T1 and T2-weighted sagittal and axial sequences and a STIR sagittal sequence were acquired. Today's study is compared with the previous MRI of May 4, 2010.       Vertebral bodies normal in signal and normal in height. Disc spaces are adequately maintained. Conus normal caliber signal.       T12-L1, L1-L2, L2-L3, L3-L4: No disc herniation. No evidence of central canal or foraminal stenosis. No change from previous MRI.       L4-L5: Mild bulging of the annulus similar in appearance to the study in 2010. No disc herniation. No central canal or foraminal stenosis.       L5-S1: Unremarkable. Laboratory Studies:   Lab Results   Component Value Date    WBC 8.4 08/04/2018    HGB 15.3 08/04/2018    HCT 45.7 08/04/2018    MCV 86.6 08/04/2018     08/04/2018     No results found for: SEDRATE  No results found for: CRP    Assessment and Plan:      Diagnosis Orders   1. Strain of latissimus dorsi muscle, subsequent encounter     2. Lumbar spine strain, subsequent encounter     MRI of back does not show any bulging discs, stenosis, that correlate with his symptoms  -continue going to PT  --referral to neurology, pain management if he feels that it is necessary        No orders of the defined types were placed in this encounter. No orders of the defined types were placed in this encounter. No follow-ups on file.       Félix Arambula PA-C  [facility]

## 2020-03-16 NOTE — PROGRESS NOTES
Attends meetings of clubs or organizations: Not on file     Relationship status: Not on file    Intimate partner violence     Fear of current or ex partner: Not on file     Emotionally abused: Not on file     Physically abused: Not on file     Forced sexual activity: Not on file   Other Topics Concern    Not on file   Social History Narrative    Lives with wife and 3 children        1 dog     Family History   Problem Relation Age of Onset    High Blood Pressure Mother     High Cholesterol Mother     Cancer Father         Throat cancer - smoker    High Blood Pressure Brother     Heart Attack Maternal Grandmother 72    Diabetes Maternal Grandmother     Cancer Maternal Grandfather         unknown    No Known Problems Paternal Grandmother     No Known Problems Paternal Grandfather      No Known Allergies  Current Outpatient Medications on File Prior to Visit   Medication Sig Dispense Refill    olopatadine (PATANOL) 0.1 % ophthalmic solution Place 1 drop into both eyes 2 times daily 5 mL 0    meloxicam (MOBIC) 15 MG tablet Take 1 tablet by mouth daily 10 tablet 0    predniSONE (DELTASONE) 10 MG tablet 5 tabs po qam for 2 days then 4,3,2,1 tabs qam for 2 days each total of 10 days 30 tablet 0    montelukast (SINGULAIR) 10 MG tablet Take 1 tablet by mouth daily 30 tablet 1    ipratropium (ATROVENT) 0.06 % nasal spray 2 sprays by Nasal route 3 times daily 1 Bottle 1    metFORMIN (GLUCOPHAGE) 500 MG tablet Take 1 tablet by mouth 2 times daily (with meals) 60 tablet 5    rosuvastatin (CRESTOR) 10 MG tablet Take 1 tablet by mouth nightly 30 tablet 5    buPROPion (WELLBUTRIN SR) 150 MG extended release tablet Take 1 tablet by mouth 2 times daily 60 tablet 3    Elastic Bandages & Supports (KNEE BRACE) MISC Wear during the day over right knee as needed for support 1 each 0    loratadine (CLARITIN) 10 MG tablet Take 1 tablet by mouth daily 30 tablet 5    acetaminophen (TYLENOL) 500 MG tablet Take 1 tablet by mouth every 6 hours as needed for Pain. 60 tablet 0    naproxen sodium (ANAPROX) 550 MG tablet Take 1 tablet by mouth 2 times daily (with meals) for 14 days 28 tablet 0     No current facility-administered medications on file prior to visit. Review of Systems  ***    Objective:   Pulse 78   Temp 97.3 °F (36.3 °C) (Temporal)   Ht 5' 10\" (1.778 m)   Wt 225 lb (102.1 kg)   SpO2 98%   BMI 32.28 kg/m²     Ortho Exam  ***    Radiographs and Laboratory Studies:     Diagnostic Imaging Studies:    ***    Assessment:       Diagnosis Orders   1. Strain of latissimus dorsi muscle, subsequent encounter     2. Lumbar spine strain, subsequent encounter           Plan:    ***    No orders of the defined types were placed in this encounter. No orders of the defined types were placed in this encounter. No follow-ups on file.       Leticia Duron DO

## 2020-03-17 ENCOUNTER — HOSPITAL ENCOUNTER (OUTPATIENT)
Dept: PHYSICAL THERAPY | Age: 45
Setting detail: THERAPIES SERIES
End: 2020-03-17
Payer: COMMERCIAL

## 2020-03-19 ENCOUNTER — APPOINTMENT (OUTPATIENT)
Dept: PHYSICAL THERAPY | Age: 45
End: 2020-03-19
Payer: COMMERCIAL

## 2020-11-03 NOTE — TELEPHONE ENCOUNTER
CALLED OR CHARGE TO NOTIFY THEM OF PT'S K AT 3.4. PER CHARGE WILL START IV PROTOCOL. 

-------------------------------------------------------------------------------

Addendum: 11/03/20 at 0819 by Reina Benoit RN

-------------------------------------------------------------------------------

WRONG PT Patient wants to know what he can use to quit smoking?

## 2021-04-29 PROBLEM — M51.27 HERNIATED NUCLEUS PULPOSUS, L5-S1, LEFT: Status: ACTIVE | Noted: 2021-04-29

## 2021-04-29 PROBLEM — F17.200 SMOKER: Status: ACTIVE | Noted: 2017-04-24

## 2021-05-24 ENCOUNTER — HOSPITAL ENCOUNTER (OUTPATIENT)
Dept: MRI IMAGING | Age: 46
Discharge: HOME | End: 2021-05-26
Payer: COMMERCIAL

## 2021-05-24 DIAGNOSIS — M51.27 HERNIATED NUCLEUS PULPOSUS, L5-S1: ICD-10-CM

## 2021-05-24 PROCEDURE — 72148 MRI LUMBAR SPINE W/O DYE: CPT

## 2021-06-01 DIAGNOSIS — F17.200 SMOKER: ICD-10-CM

## 2021-06-01 DIAGNOSIS — E11.9 TYPE 2 DIABETES MELLITUS WITHOUT COMPLICATION, WITHOUT LONG-TERM CURRENT USE OF INSULIN (HCC): ICD-10-CM

## 2021-06-01 DIAGNOSIS — M47.816 LUMBAR SPONDYLOSIS: Primary | ICD-10-CM

## 2021-06-17 ENCOUNTER — OFFICE VISIT (OUTPATIENT)
Dept: PAIN MANAGEMENT | Age: 46
End: 2021-06-17
Payer: COMMERCIAL

## 2021-06-17 VITALS — WEIGHT: 240 LBS | HEIGHT: 70 IN | TEMPERATURE: 96.4 F | BODY MASS INDEX: 34.36 KG/M2

## 2021-06-17 DIAGNOSIS — Z72.820 POOR SLEEP: ICD-10-CM

## 2021-06-17 DIAGNOSIS — M47.817 LUMBOSACRAL SPONDYLOSIS WITHOUT MYELOPATHY: Primary | ICD-10-CM

## 2021-06-17 DIAGNOSIS — M25.552 LEFT HIP PAIN: ICD-10-CM

## 2021-06-17 DIAGNOSIS — R79.89 ELEVATED SERUM CREATININE: ICD-10-CM

## 2021-06-17 DIAGNOSIS — M79.605 LEFT LEG PAIN: ICD-10-CM

## 2021-06-17 DIAGNOSIS — R06.83 SNORES: ICD-10-CM

## 2021-06-17 PROCEDURE — 99215 OFFICE O/P EST HI 40 MIN: CPT | Performed by: PHYSICAL MEDICINE & REHABILITATION

## 2021-06-17 RX ORDER — TIZANIDINE 2 MG/1
2 TABLET ORAL EVERY EVENING
Qty: 30 TABLET | Refills: 0 | Status: SHIPPED | OUTPATIENT
Start: 2021-06-17 | End: 2021-08-13 | Stop reason: SDUPTHER

## 2021-06-17 RX ORDER — LIDOCAINE 40 MG/G
CREAM TOPICAL
Qty: 1 TUBE | Refills: 1 | Status: SHIPPED | OUTPATIENT
Start: 2021-06-17

## 2021-06-17 ASSESSMENT — ENCOUNTER SYMPTOMS
CONSTIPATION: 0
DIARRHEA: 0
SHORTNESS OF BREATH: 0
NAUSEA: 0
BACK PAIN: 1

## 2021-06-17 NOTE — PROGRESS NOTES
[address] [place], [place]  P: [phone]  F: [phone]        Brown Harry  (1975)    6/17/2021    Subjective:     Brown Harry is 40 y.o. male who complains today of:    Chief Complaint   Patient presents with    Back Pain       Brown Harry is a 40 y.o. male who presents for evaluation by request of Dr. Gagan Daniel for lumbar pain management. He has struggled with pain for over 1 year. He denies any immediately-preceding traumatic or inciting events. He has been previously evaluated by Dr Omar Nathan whose records are reviewed below. He describes pain located in the left low back and down the left leg. Pain is a constant ache and is currently a 5/10 and gets up to a 9/10 at its worst and goes down to a 4/10 at its best. Pain is worse with standing and bending. Pain is better with sitting. Pain is located 0% on the right and 100% on the left. Pain is located 80% in the back and 20% in the leg. He denies any numbness, tingling, weakness, bowel or bladder dysfunction, saddle anesthesia, falls, history of cancer, unexplained weight loss, persistent night pain and sweats, fever, IV drug abuse, immunocompromise, chronic prednisone or antibiotic use, or any other red flag symptoms.      He has tried:  Home exercise program with minimal relief  Chiropractor Dr Jane George in [facility], last went March 2021, min relief    Diagnostic testing previously performed includes XRs and MRI    Medications tried include:  Acetaminophen with minimal relief for over 3 months  Ibuprofen with minimal relief for over 3 months  Flexeril min relief  Gabapentin 600 mg Dr Marely Pablo min relief  Lyrica 100 mg BID min relief    Allergies, Medications, Past Medical History, Family History, Social History, Work History, and Review of Systems reviewed below     +diabetes mellitus   +snores  +Depression and Anxiety   +Insomnia on Ambien    No Seizures, Epilepsy or Brain Surgery     Spends his time: home, last worked Feb 2020. Used to work as . He used to enjoy playing basketball. He is a [employer] fan. Allergies:  Patient has no known allergies.     Past Medical History:   Diagnosis Date    Depression with anxiety 4/24/2017    Hyperglycemia 4/24/2017    Hyperlipidemia 2/18/2014    Insomnia     Perennial allergic rhinitis with seasonal variation 4/24/2017    Pre-diabetes 4/24/2017    Tobacco use disorder 4/24/2017    Type 2 diabetes mellitus without complication, without long-term current use of insulin ([facility] [address] 8/6/2018     Past Surgical History:   Procedure Laterality Date    NASAL FRACTURE SURGERY  2007     Family History   Problem Relation Age of Onset    High Blood Pressure Mother     High Cholesterol Mother     Cancer Father         Throat cancer - smoker    High Blood Pressure Brother     Heart Attack Maternal Grandmother 72    Diabetes Maternal Grandmother     Cancer Maternal Grandfather         unknown    No Known Problems Paternal Grandmother     No Known Problems Paternal Grandfather      Social History     Socioeconomic History    Marital status:      Spouse name: Renay Arellano Number of children: 3    Years of education: Not on file    Highest education level: Not on file   Occupational History    Occupation:  - industrial cleaning   Tobacco Use    Smoking status: Current Every Day Smoker     Packs/day: 0.50     Years: 23.00     Pack years: 11.50     Types: Cigarettes     Start date: 1994    Smokeless tobacco: Never Used   Vaping Use    Vaping Use: Never used   Substance and Sexual Activity    Alcohol use: No    Drug use: No    Sexual activity: Yes     Partners: Female     Comment: monogamous   Other Topics Concern    Not on file   Social History Narrative    Lives with wife and 3 children        1 dog     Social Determinants of Health     Financial Resource Strain:     Difficulty of Paying Living Expenses:    Food Insecurity:     Worried About Running Out of Food in the Last Year:     [address] in the Last Year:    Transportation Needs:     Lack of Transportation (Medical):  Lack of Transportation (Non-Medical):    Physical Activity:     Days of Exercise per Week:     Minutes of Exercise per Session:    Stress:     Feeling of Stress :    Social Connections:     Frequency of Communication with Friends and Family:     Frequency of Social Gatherings with Friends and Family:     Attends Sikhism Services:     Active Member of Clubs or Organizations:     Attends Club or Organization Meetings:     Marital Status:    Intimate Partner Violence:     Fear of Current or Ex-Partner:     Emotionally Abused:     Physically Abused:     Sexually Abused:        Current Outpatient Medications on File Prior to Visit   Medication Sig Dispense Refill    metFORMIN (GLUCOPHAGE) 500 MG tablet Take 1 tablet by mouth 2 times daily (with meals) 60 tablet 5    rosuvastatin (CRESTOR) 10 MG tablet Take 1 tablet by mouth nightly 30 tablet 5    olopatadine (PATANOL) 0.1 % ophthalmic solution Place 1 drop into both eyes 2 times daily 5 mL 0    montelukast (SINGULAIR) 10 MG tablet Take 1 tablet by mouth daily 30 tablet 1    ipratropium (ATROVENT) 0.06 % nasal spray 2 sprays by Nasal route 3 times daily 1 Bottle 1    Elastic Bandages & Supports (KNEE BRACE) MISC Wear during the day over right knee as needed for support 1 each 0    naproxen sodium (ANAPROX) 550 MG tablet Take 1 tablet by mouth 2 times daily (with meals) for 14 days 28 tablet 0    loratadine (CLARITIN) 10 MG tablet Take 1 tablet by mouth daily 30 tablet 5    acetaminophen (TYLENOL) 500 MG tablet Take 1 tablet by mouth every 6 hours as needed for Pain. 60 tablet 0     No current facility-administered medications on file prior to visit. Review of Systems   Constitutional: Negative for fever.    HENT: Negative for hearing loss. Respiratory: Negative for shortness of breath. Gastrointestinal: Negative for constipation, diarrhea and nausea. Genitourinary: Negative for difficulty urinating. Musculoskeletal: Positive for back pain. Negative for neck pain. Skin: Negative for rash. Neurological: Negative for headaches. Hematological: Does not bruise/bleed easily. Psychiatric/Behavioral: Negative for sleep disturbance. Objective:     Vitals:  Temp 96.4 °F (35.8 °C)   Ht 5' 10\" (1.778 m)   Wt 240 lb (108.9 kg)   BMI 34.44 kg/m² Pain Score:   8      Exam performed under Coronavirus precautions  Gen: No acute distress  Neck: Grossly symmetric without any significant thyromegaly or masses appreciated. Eyes: No scleral icterus or lid lag appreciated bilaterally. Irises without gross defects bilaterally. HEENT: Hearing grossly intact bilaterally. Normocephalic, external ears and visible portions of nose and mouth atraumatic. Lymph: No gross neck or axillary lymphadenopathy  Cardio: No significant lower extremity edema, pulses intact without significant digit ischemia. Abd: No gross masses or large hernias appreciated. Skin: Visualized skin without any dermatomal rashes or sores. Palpation free of any tightening or subcutaneous nodules. MSK: Gait is antalgic. No significant upper limb digit ischemia appreciated. Psych: Pleasant and cooperative with the history and exam. Mood and Affect normal. Appropriately dressed with good eye contact. Judgement and insight normal. Recent and remote memory intact. Alert and Oriented x3. Neuro: Cranial nerves II-XII grossly intact. No significant pathologic reflexes appreciated. Rises from a seated to standing position with moderate difficulty. Gait is antalgic. +sp cane    Heel and toe walk intact. Lumbar flexion to 40 degrees, extension to 10 degrees. Very limited lumbar spine range of motion.  Rotation and extension reproduces axial low back pain. Other facet provocative maneuvers are positive. No gross step offs noted. Tenderness to palpation over the mid to low lumbar spinous processes and bilateral lumbar paraspinals from L2 down to the sacrum. No tenderness over bilateral PSIS. No tenderness over bilateral greater trochanters. No tenderness over bilateral deep gluteal regions. Sensation grossly intact in both legs except for left L5 paresthesias  Reflexes and strength functional for ambulation, no abnormal reflexes appreciated on exam today  Strength greater than 3/5 bilateral legs  Straight leg raise negative bilaterally. Outside record review:  Review of the original consultation request reveals no specific diagnostic requests or clinical concerns aside from lumbar pain management that require particular attention. There are no suggested, requested, or specified tests to be ordered or any prior diagnostics performed that require follow-up or further investigation. Dr. Dayami Rios 4/29/2021: Left low back and left leg pain. Exam with positive straight leg raise and weakness left foot dorsiflexion. MRI lumbar spine. Impression left L5-S1 extruded disc. Telephone call Dr. Dayami Rios 5/27/2021: Not a candidate for surgery. Refer to pain management. MRI LS spine 5/24/2021 no fracture. Degenerative disc disease. Congenital spinal canal stenosis. T12-L4 normal canal foramen. Degenerative disc disease and facet arthropathy. L4-L5 mild canal stenosis, mild bilateral foraminal narrowing. L5-S1 normal canal foramen. MRI thoracic spine 2/24/2020: Degenerative disc disease and facet arthropathy. Disc bulge T7-T8. No canal or foraminal stenosis. Unremarkable MRI of the thoracic spine. XR LS Spine 11/19/20: no fracture, no instability, disc spaces preserved. Dr Thomas Maurice 10/28/20: not responding to conservative treatment. Nerve blocks, patient not interested in any injections. Referred to surgery to al for surgery. EMG.  follow up after evaluation. EMG result from outside facilty show evidence left S1 nerve injury. Component      Latest Ref Rng & Units 8/4/2018           9:25 AM   Platelet Count      313 - 400 K/uL 226     Component      Latest Ref Rng & Units 8/4/2018           9:25 AM   Creatinine      0.70 - 1.20 mg/dL 1.29 (H)     Component      Latest Ref Rng & Units 8/4/2018           9:25 AM   Alk Phos      35 - 104 U/L 99   ALT      0 - 41 U/L 32   AST      0 - 40 U/L 30     Component      Latest Ref Rng & Units 8/4/2018           9:25 AM   Hemoglobin A1C      4.8 - 5.9 % 6.6 (H)       Assessment:      Diagnosis Orders   1. Lumbosacral spondylosis without myelopathy  Amb External Referral To Physical Therapy    tiZANidine (ZANAFLEX) 2 MG tablet    NH LSO SAG R AN/POS PNL PRE OTS    NH INJ DX/THER AGNT PARAVERT FACET JOINT, LUMBAR/SAC, 1ST LEVEL    NH INJ DX/THER AGNT PARAVERT FACET JOINT, LUMBAR/SAC, 2ND LEVEL    NH INJ DX/THER AGNT PARAVERT FACET JOINT, LUMBAR/SAC, ADD LEVEL    lidocaine (LMX) 4 % cream   2. Myrna Dyer MD, Pulmonology, [facility], [place]   3. Poor sleep  Internal Referral to [facility] 1997   4. Left hip pain  XR HIP LEFT (2-3 VIEWS)   5. Left leg pain  EMG   6. Elevated serum creatinine  Amb External Referral To Nephrology       Plan:     Periodic Controlled Substance Monitoring: Obtaining appropriate analgesic effect of treatment.  Jane Allen MD)    Orders Placed This Encounter   Medications    tiZANidine (ZANAFLEX) 2 MG tablet     Sig: Take 1 tablet by mouth every evening As needed for pain and spasms     Dispense:  30 tablet     Refill:  0    lidocaine (LMX) 4 % cream     Sig: Apply a half dollar sized amount to intact skin topically up to twice daily as needed for pain     Dispense:  1 Tube     Refill:  1       Orders Placed This Encounter   Procedures    XR HIP LEFT (2-3 VIEWS)     Standing Status:   Future     Standing Expiration Date:   6/17/2022   Mark Hwang MD, Standing Status:   Future     Standing Expiration Date:   9/15/2021    FL INJ DX/THER AGNT PARAVERT FACET JOINT, LUMBAR/SAC, ADD LEVEL     Bilateral Lumbar L3/4 L4/5 L5/S1 facet corticosteroid joint injections under XR with Dr Leann Michel. No anticoagulation, no antibiotics, +diabetes mellitus, no osteoporosis, no bleeding or platelet dysfunction, IV Dye okay, NKDA. 15 minute procedure. Prone position     Standing Status:   Future     Standing Expiration Date:   9/15/2021       -PT for lumbar spondylosis  -Refer to nephrology due to elevated serum creatinine, Pulm for snoring, podiatry for diabetic foot care, refer to sleep center due to poor sleep  -XR L hip eval osteoarthritis   -Reviewed XR LS Spine and MRI LS Spine above, all questions answered  -Obtain outside EMG results  -EMG B LE eval left leg pain  -Lidocaine 4% ointment topical BID prn #1 tube one refill start 6/17/2021   -No NSAIDs given elevated serum creatinine  -Min relief with Gabapentin , Lyrica 100 mg BID, and Flexeril  -Trial Tizanidine 2 mg by mouth every evening #30 no refills start 6/17/2021. Avoid all other muscle relaxers and/or sedating medicines.  -No opioids recommended at this time  -Bilateral Lumbar L3/4 L4/5 L5/S1 facet corticosteroid joint injections under XR with Dr Leann Michel. No anticoagulation, no antibiotics, +diabetes mellitus, no osteoporosis, no bleeding or platelet dysfunction, IV Dye okay, NKDA. 15 minute procedure. Prone position Consider 6 mg betamethasone. Risks of sugar elevation are less than potential benefit of pain relief    -Recommend a lumbar spine LSO pain brace for the patient's diagnosis of lumbar spondylosis. This orthosis is required to reduce pain by restricting mobility of the trunk and supporting weak spinal musculature. Length of need is over 6 months.     Controlled Substance Monitoring:    Acute and Chronic Pain Monitoring:   RX Monitoring 6/17/2021   Periodic Controlled Substance Monitoring Obtaining appropriate analgesic effect of treatment. Discussed the risks, side effects, and symptoms that would warrant urgent or emergent physician evaluation of all medications prescribed today. Discussed the risks of the above recommended procedures including but not limited to bleeding, infection, worsened pain, damage to surrounding structures, side effects, toxicity, allergic reactions to medications used, immune and stress-response dysfunction, fat necrosis, decreased bone mineralization, cartilage loss, increased fracture risk, avascular necrosis, skin pigmentation changes, blood sugar elevation, need for surgery, premature damage or degeneration of the joint, as well as catastrophic injury such as vision loss, paralysis, stroke, bowel or bladder incontinence, ventilator dependence, loss of use of the joint and/or extremity, and death. Discussed the risks, benefits, alternative procedures, and alternatives to the procedure including no procedure at all. Discussed that we cannot undo any permanent neurologic or orthopaedic damage or change the course of any underlying disease. After thorough discussion, patient expressed complete understanding and willingness to proceed. Discussed the risks of the above recommended procedures including but not limited to bleeding, infection, worsened pain, damage to surrounding structures, side effects, toxicity, allergic reactions to medications used, immune and stress-response dysfunction, fat necrosis, skin pigmentation changes, blood sugar elevation, headache, vision changes, need for surgery, as well as catastrophic injury such as vision loss, paralysis, stroke, spinal cord and/or plexus infarction or injury, intrathecal injection, spinal cord puncture, arachnoiditis, discitis, bowel or bladder incontinence, ventilator dependence, loss of use of the arms and/or legs, and death.  Discussed the risks, benefits, alternative procedures, and alternatives to the procedure including no procedure at all. Discussed that we cannot undo any permanent neurologic damage or change the course of any underlying disease. After thorough discussion, patient expressed understanding and willingness to proceed. Provided education and counseling regarding the diagnosis, prognosis, and treatment options. All questions were answered. Encouraged him to follow-up with his primary care physician and/or specialists as required for his overall health and management of his comorbidities as well as any new positive symptoms mentioned in review of systems above. Care was provided within the definitions and limitations of our specialty practice. Encouraged lifestyle interventions including healthy habits, lifestyle changes, regular aerobic exercise and appropriate weight maintenance as advised by their primary care physician or cardiovascular health provider. Discussed well care and disease prevention/maintenance. All recommendations for therapy are provided to improve function with activities of daily living, decrease pain, and help develop an exercise program. All recommendations for medications are meant to help decrease pain, improve function with activities of daily living, maintain compliance with home exercise program, and improve quality of life. All recommendations for therapeutic injections are meant to help decrease pain, improve function with activities of daily living, maintain compliance with home exercise program, improve quality of life, and decrease reliance upon oral medications. Encouraged compliance with his home exercise program. Recommended compliance with physical therapy program as outlined above. Informed him to wear bracing as appropriate, and that bracing is not a replacement for core strengthening or muscle stabilization. Discussed the elevated risks of excessive sedation while on pain medications.  Advised him against driving or operating heavy machinery or performing any activities where he may harm himself or others while on pain medications. Particular caution was emphasized especially during dose adjustments and medication changes. Discussed the elevated risks of respiratory depression and death while on opioid medications, especially when combined with other sedative substances. Discussed the risks of temporary disability, permanent disability, morbidity, and mortality with poorly-managed or undiagnosed medical conditions and comorbidities. Emphasized the importance of timely medical evaluation and treatment as previously recommended by us or other medical professionals. Risks of not pursing these recommendations were emphasized. Advised him that any lab testing, imaging, or other diagnostic test results are best discussed in person in the office so that we can provide a clear explanation of their significance and best treatment based upon these results. It is his responsibility to make and keep a follow up appointment to discuss these test results in person to discuss the significance of the findings and appropriate follow-up steps. He expressed complete understanding and agreement with the entire plan as outlined above. Portions of this note may have been typed, auto-populated, dictated or transcribed by voice recognition resulting in errors, omissions, or close substitutions which may be missed despite careful proofreading. Please contact the author for any questions or concerns. Thank you Dr. Sujatha Moulton for the opportunity to participate in this patient's care. If you have any questions or concerns, please do not hesitate to contact us. Follow up:  Return in about 1 month (around 7/17/2021) for reassessment of pain and symptoms, EMG Internal, P.T. External Ref.     Brenton Venegas MD

## 2021-06-18 ENCOUNTER — TELEPHONE (OUTPATIENT)
Dept: PAIN MANAGEMENT | Age: 46
End: 2021-06-18

## 2021-07-08 ENCOUNTER — TELEPHONE (OUTPATIENT)
Dept: PAIN MANAGEMENT | Age: 46
End: 2021-07-08

## 2021-07-08 DIAGNOSIS — M25.512 LEFT SHOULDER PAIN, UNSPECIFIED CHRONICITY: Primary | ICD-10-CM

## 2021-07-08 NOTE — TELEPHONE ENCOUNTER
Patient called stating that he has been having pain in his left arm and shoulder. He is asking if he could have an Xray ordered for this so that he can get it done when he goes to have his hip Xray.

## 2021-07-09 NOTE — TELEPHONE ENCOUNTER
TRIED TO CALL THE PATIENT -[phone] AND THE PHONE JUST HUNG UP EVERY TIME, I WAS UNABLE TO REACH THE PATIENT, HIS XRAY ORDER IS AT THE FRONT FOR HIM TO

## 2021-07-12 ENCOUNTER — OFFICE VISIT (OUTPATIENT)
Dept: PAIN MANAGEMENT | Age: 46
End: 2021-07-12
Payer: COMMERCIAL

## 2021-07-12 VITALS — BODY MASS INDEX: 34.36 KG/M2 | HEIGHT: 70 IN | WEIGHT: 240 LBS

## 2021-07-12 DIAGNOSIS — G89.29 CHRONIC LEFT SHOULDER PAIN: ICD-10-CM

## 2021-07-12 DIAGNOSIS — M79.605 LEFT LEG PAIN: Primary | ICD-10-CM

## 2021-07-12 DIAGNOSIS — M25.512 CHRONIC LEFT SHOULDER PAIN: ICD-10-CM

## 2021-07-12 PROCEDURE — 95911 NRV CNDJ TEST 9-10 STUDIES: CPT | Performed by: PHYSICAL MEDICINE & REHABILITATION

## 2021-07-12 PROCEDURE — 95886 MUSC TEST DONE W/N TEST COMP: CPT | Performed by: PHYSICAL MEDICINE & REHABILITATION

## 2021-07-12 NOTE — PROGRESS NOTES
He has left shoulder pain. Constant ache for over 3 months. No trauma. No arm pain. A focused physical exam reveals tenderness over the left glenohumeral joint with limited left shoulder range of motion. XR L Shoulder pending    -Left shoulder glenohumeral joint inj under XR with Dr Nano Rodgers. 10 minute procedure. Consider 6 mg betamethasone.
sensorimotor peripheral polyneuropathy. RECOMMENDATIONS: Today's study does not explain the patient's left leg pain. The patient should follow up as previously instructed. If his symptoms persist or worsen, further electrodiagnostic evaluation may be considered if the patient is agreeable. Clinical correlation is recommended.

## 2021-07-13 ENCOUNTER — TELEPHONE (OUTPATIENT)
Dept: PAIN MANAGEMENT | Age: 46
End: 2021-07-13

## 2021-07-13 ENCOUNTER — PROCEDURE VISIT (OUTPATIENT)
Dept: PAIN MANAGEMENT | Age: 46
End: 2021-07-13
Payer: COMMERCIAL

## 2021-07-13 DIAGNOSIS — M47.817 LUMBOSACRAL SPONDYLOSIS WITHOUT MYELOPATHY: ICD-10-CM

## 2021-07-13 DIAGNOSIS — M25.512 LEFT SHOULDER PAIN, UNSPECIFIED CHRONICITY: ICD-10-CM

## 2021-07-13 DIAGNOSIS — M25.552 LEFT HIP PAIN: ICD-10-CM

## 2021-07-13 PROCEDURE — 64493 INJ PARAVERT F JNT L/S 1 LEV: CPT | Performed by: PHYSICAL MEDICINE & REHABILITATION

## 2021-07-13 PROCEDURE — 64495 INJ PARAVERT F JNT L/S 3 LEV: CPT | Performed by: PHYSICAL MEDICINE & REHABILITATION

## 2021-07-13 PROCEDURE — 64494 INJ PARAVERT F JNT L/S 2 LEV: CPT | Performed by: PHYSICAL MEDICINE & REHABILITATION

## 2021-07-13 RX ORDER — LIDOCAINE HYDROCHLORIDE 10 MG/ML
5 INJECTION, SOLUTION EPIDURAL; INFILTRATION; INTRACAUDAL; PERINEURAL ONCE
Status: COMPLETED | OUTPATIENT
Start: 2021-07-13 | End: 2021-07-13

## 2021-07-13 RX ORDER — BETAMETHASONE SODIUM PHOSPHATE AND BETAMETHASONE ACETATE 3; 3 MG/ML; MG/ML
6 INJECTION, SUSPENSION INTRA-ARTICULAR; INTRALESIONAL; INTRAMUSCULAR; SOFT TISSUE ONCE
Status: COMPLETED | OUTPATIENT
Start: 2021-07-13 | End: 2021-07-13

## 2021-07-13 RX ADMIN — Medication 0.5 MEQ: at 13:49

## 2021-07-13 RX ADMIN — LIDOCAINE HYDROCHLORIDE 5 ML: 10 INJECTION, SOLUTION EPIDURAL; INFILTRATION; INTRACAUDAL; PERINEURAL at 13:49

## 2021-07-13 RX ADMIN — BETAMETHASONE SODIUM PHOSPHATE AND BETAMETHASONE ACETATE 6 MG: 3; 3 INJECTION, SUSPENSION INTRA-ARTICULAR; INTRALESIONAL; INTRAMUSCULAR; SOFT TISSUE at 13:48

## 2021-07-13 NOTE — PROGRESS NOTES
LUMBAR FACET ZYGAPOPHYSEAL JOINT INJECTIONS             Patient Name: Louis Kearns   : 1975  Date: 2021    Provider: Fabio Sanchez MD      Louis Kearns is here today for interventional pain management. Preoperatively, the patient presents with symptoms and physical exam findings consistent with lumbar facet zygapophyseal joint mediated pain. He has had persistent pain that limits his function and activities of daily living. The pain is persistent despite conservative measures. He has significant functional and psychological impairment due to this condition. Given his symptoms, physical exam findings, impairment in activities of daily living, and lack of response to conservative measures, consideration for lumbar facet zygapophyseal joint corticosteroid injections was given. Discussed the risks of the procedure including, but not limited to, bleeding, infection, worsened pain, damage to surrounding structures, side effects, toxicity, allergic reactions to medications used, immune and stress-response dysfunction, fat necrosis, avascular necrosis, skin pigmentation changes, blood sugar elevation, headache, vision changes, need for surgery, as well as catastrophic injury such as vision loss, paralysis, stroke, spinal cord infarction or injury, intrathecal injection, spinal cord puncture, arachnoiditis, bowel or bladder incontinence, loss of use of the legs, ventilator dependence, and death. Discussed the risks, benefits, alternative procedures, and alternatives to the procedure including no procedure at all. Discussed that we cannot undo any permanent neurologic damage or change the course of any underlying disease. After thorough discussion, patient expressed understanding and willingness to proceed. Written consent was obtained and is in the chart. Verbal consent to proceed was obtained. Standard ASI guidelines were followed and sterile technique used.   Area was cleaned with Betadine three times. Fluoroscopic guidance was used for this procedure. The L5 vertebral body was taken as the first lumbar-appearing vertebral body directly above the sacrum on a lateral view. Appropriate oblique views were used to open up the facet joints and three 26 gauge 3.5 inch spinal needles were used and each needle was advanced to each facet joint. The patient was to undergo a bilateral procedure. Due to discomfort during the procedure, the procedure as discontinued after the right side was complete. A total of 3 mg of betamethasone was wasted. The procedure was completed at 50%. Negative aspiration was achieved. Oblique views were obtained. A 1.5 mL injectate containing 0.5 mL of 3 mg of betamethasone and 1 mL of 1% preservative free Lidocaine was equally divided and injected into the joints without difficulty. He tolerated the procedure poor. No obvious complications occurred during the procedure. He was appropriately monitored and discharged home in stable condition with his usual motor strength. Postoperative instructions were provided. He was advised that his blood sugars may increase after today's procedure.              [] Bilateral [] T12-L1    [] L1-2   [x] Right [] L2-3    [x] L3-4   [] Left [x] L4-5      [x] L5-S1              [place], [address] [facility], [facility] [address]  Phone [phone]/[employer] [phone]

## 2021-07-27 ENCOUNTER — PROCEDURE VISIT (OUTPATIENT)
Dept: PAIN MANAGEMENT | Age: 46
End: 2021-07-27
Payer: COMMERCIAL

## 2021-07-27 DIAGNOSIS — M25.512 CHRONIC LEFT SHOULDER PAIN: Primary | ICD-10-CM

## 2021-07-27 DIAGNOSIS — F40.298 NEEDLE PHOBIA: ICD-10-CM

## 2021-07-27 DIAGNOSIS — G89.29 CHRONIC LEFT SHOULDER PAIN: Primary | ICD-10-CM

## 2021-07-27 DIAGNOSIS — M47.817 LUMBOSACRAL SPONDYLOSIS WITHOUT MYELOPATHY: ICD-10-CM

## 2021-07-27 PROCEDURE — 20610 DRAIN/INJ JOINT/BURSA W/O US: CPT | Performed by: PHYSICAL MEDICINE & REHABILITATION

## 2021-07-27 PROCEDURE — 77002 NEEDLE LOCALIZATION BY XRAY: CPT | Performed by: PHYSICAL MEDICINE & REHABILITATION

## 2021-07-27 RX ORDER — LIDOCAINE HYDROCHLORIDE 10 MG/ML
5 INJECTION, SOLUTION EPIDURAL; INFILTRATION; INTRACAUDAL; PERINEURAL ONCE
Status: COMPLETED | OUTPATIENT
Start: 2021-07-27 | End: 2021-07-27

## 2021-07-27 RX ORDER — BETAMETHASONE SODIUM PHOSPHATE AND BETAMETHASONE ACETATE 3; 3 MG/ML; MG/ML
6 INJECTION, SUSPENSION INTRA-ARTICULAR; INTRALESIONAL; INTRAMUSCULAR; SOFT TISSUE ONCE
Status: COMPLETED | OUTPATIENT
Start: 2021-07-27 | End: 2021-07-27

## 2021-07-27 RX ORDER — DIAZEPAM 5 MG/1
5 TABLET ORAL 2 TIMES DAILY PRN
Qty: 2 TABLET | Refills: 0 | Status: SHIPPED | OUTPATIENT
Start: 2021-07-27 | End: 2021-07-28

## 2021-07-27 RX ADMIN — Medication 0.5 MEQ: at 16:49

## 2021-07-27 RX ADMIN — LIDOCAINE HYDROCHLORIDE 5 ML: 10 INJECTION, SOLUTION EPIDURAL; INFILTRATION; INTRACAUDAL; PERINEURAL at 16:49

## 2021-07-27 RX ADMIN — BETAMETHASONE SODIUM PHOSPHATE AND BETAMETHASONE ACETATE 6 MG: 3; 3 INJECTION, SUSPENSION INTRA-ARTICULAR; INTRALESIONAL; INTRAMUSCULAR; SOFT TISSUE at 16:48

## 2021-07-27 NOTE — PROGRESS NOTES
Shoulder Glenohumeral Joint Injection           Patient Name: Jeremiah Benitez   : 1975  Date: 2021     Provider: Corinne Parkinson MD        PROCEDURE: Left glenohumeral joint shoulder corticosteroid injection under fluoroscopic guidance    INDICATIONS: Jeremiah Benitez is a 40 y.o. male who presents with symptoms and physical exam findings consistent with left shoulder pain. He has had persistent pain that limits his activities of daily living such as upper body dressing. The pain is persistent despite conservative measures including home exercise program. Given his symptoms, physical exam findings, impairment in activities of daily living, and lack of response to conservative measures, consideration for Left glenohumeral joint shoulder corticosteroid injection under fluoroscopic guidance was given. Discussed the risks including but not limited to bleeding, infection, worsened pain, damage to surrounding structures, side effects, toxicity, allergic reactions to medications used, immune and stress-response dysfunction, fat necrosis, decreased bone mineralization, cartilage loss, increased fracture risk, avascular necrosis, skin pigmentation changes, blood sugar elevation, need for surgery, premature damage or degeneration of the joint, pneumothorax, as well as catastrophic injury such as vision loss, paralysis, spinal cord or plexus injury, stroke, bowel or bladder incontinence, ventilator dependence, loss of use of the joint and/or extremity, and death. Discussed his elevated risk given his diabetic status, and the risk of hyperglycemia, uncontrolled blood sugars, sepsis, and death. Discussed the risks, benefits, alternative procedures, and alternatives to the procedure including no procedure at all. Discussed that we cannot undo any permanent neurologic or orthopaedic damage or change the course of any underlying disease.  After thorough discussion, patient expressed understanding and willingness to

## 2021-07-27 NOTE — PROGRESS NOTES
-Valium Diazepam 5 mg BID prn #2 no ref start 7/27/21  -Left Lumbar L3/4 L4/5 L5/S1 facet corticosteroid joint inj under XR with Dr Nalini Munoz. 15 minute procedure. Consider 3 mg betamethasone. Controlled Substance Monitoring:    Acute and Chronic Pain Monitoring:   RX Monitoring 7/27/2021   Periodic Controlled Substance Monitoring Obtaining appropriate analgesic effect of treatment.

## 2021-07-28 ENCOUNTER — TELEPHONE (OUTPATIENT)
Dept: PAIN MANAGEMENT | Age: 46
End: 2021-07-28

## 2021-07-28 ENCOUNTER — INITIAL CONSULT (OUTPATIENT)
Dept: PODIATRY | Age: 46
End: 2021-07-28
Payer: COMMERCIAL

## 2021-07-28 VITALS — HEIGHT: 70 IN | WEIGHT: 250 LBS | TEMPERATURE: 97.1 F | BODY MASS INDEX: 35.79 KG/M2

## 2021-07-28 DIAGNOSIS — E11.9 TYPE 2 DIABETES MELLITUS WITHOUT COMPLICATION, WITHOUT LONG-TERM CURRENT USE OF INSULIN (HCC): Chronic | ICD-10-CM

## 2021-07-28 DIAGNOSIS — M79.672 PAIN IN BOTH FEET: Primary | ICD-10-CM

## 2021-07-28 DIAGNOSIS — M20.12 HALLUX ABDUCTO VALGUS, BILATERAL: ICD-10-CM

## 2021-07-28 DIAGNOSIS — M79.671 PAIN IN BOTH FEET: Primary | ICD-10-CM

## 2021-07-28 DIAGNOSIS — M21.622 TAILOR'S BUNIONETTE, BILATERAL: ICD-10-CM

## 2021-07-28 DIAGNOSIS — M21.621 TAILOR'S BUNIONETTE, BILATERAL: ICD-10-CM

## 2021-07-28 DIAGNOSIS — M20.11 HALLUX ABDUCTO VALGUS, BILATERAL: ICD-10-CM

## 2021-07-28 DIAGNOSIS — G57.92 NEURITIS OF LEFT FOOT: ICD-10-CM

## 2021-07-28 DIAGNOSIS — M21.372 FOOT DROP, LEFT: ICD-10-CM

## 2021-07-28 PROCEDURE — 99243 OFF/OP CNSLTJ NEW/EST LOW 30: CPT | Performed by: PODIATRIST

## 2021-07-28 ASSESSMENT — ENCOUNTER SYMPTOMS
VOMITING: 0
BACK PAIN: 1
SHORTNESS OF BREATH: 0
NAUSEA: 0

## 2021-07-28 NOTE — TELEPHONE ENCOUNTER
ORDER PLACED:    Date: 7/27/21  Description: LEFT L3-4,L4-5,L5-S1 FACET  Order Number: [id number]  Ordering Provider: [facility]  Performing Provider:   CPT Codes: 36173,32313,27304  ICD10 Codes: M47.817    LULU SAUCEDO

## 2021-07-28 NOTE — PROGRESS NOTES
[address]  [address]  Dept: [phone]  Loc: [phone]       Keo Regan  (1975)    7/28/21    Subjective     Keo Regan is 40 y.o. male who complains today of:    Chief Complaint   Patient presents with    Foot Pain     left side, goes numb     Keo Regan is seen in consultation at the request of Dr. Jailene Edwards for evaluation of the left foot. HPI: Patient presents with complaint of paresthesia to the left foot. Patient also has sharp, shooting, and achy pain. This has been present for about 1-1/2 years. Patient states that this pain began after a workplace injury and has been stable. Patient rates his pain at 8/10. Patient is currently in pain management. Patient complains of weakness to the foot, toes can get caught under when he is walking leading to stumbles. Patient denies recent falls or loss of consciousness. Patient states his symptoms are exacerbated by walking/standing for extended period or for sitting in 1 position for an extended period. Patient states that intermittent episodes of severe pain that limit his ability to walk, he has been using a walking stick. Patient reports a history of a borderline type II diabetic. Patient states that he does take Metformin, he does not use insulin. Patient does not check his blood glucose on a regular basis. The last time it was checked was at an office visit recently he states that it was around 100 mg/dL. Patient does not recall exact last hemoglobin A1c but states that it was less than 7%. Patient does complain of right bunion pain. Patient states that his maternal grandmother was diabetic, she did not develop complications such as foot ulcerations or require amputations.   Patient states that the right foot deformities started happening after stepping on a nail as a child, patient points to a scar at the plantar aspect of the right foot and the dorsum of the right foot at the first metatarsal interspace. He states that he started developing right foot deformities after that injury, he began developing left foot deformities later. Review of Systems   Constitutional: Negative for chills and fever. HENT: Negative for hearing loss. Respiratory: Negative for shortness of breath. Cardiovascular: Negative for chest pain. Gastrointestinal: Negative for nausea and vomiting. Genitourinary: Negative for difficulty urinating. Musculoskeletal: Positive for back pain and gait problem. Skin: Negative for wound. Neurological: Positive for numbness. Hematological: Does not bruise/bleed easily. Psychiatric/Behavioral: Positive for sleep disturbance. The patient is a diabetic. PCP/ Endocrinologist: Dr. Charly Tadeo   Date last seen: 6/2021    Allergies:  Bupropion, Escitalopram, and Hydroxyzine    Current Outpatient Medications on File Prior to Visit   Medication Sig Dispense Refill    diazePAM (VALIUM) 5 MG tablet Take 1 tablet by mouth 2 times daily as needed (procedure Sedation) for up to 1 day. Take 5 mg by mouth one hour prior to procedure. May take another 5 mg at time of procedure if needed. Do not drive while on this medication.  2 tablet 0    lidocaine (LMX) 4 % cream Apply a half dollar sized amount to intact skin topically up to twice daily as needed for pain 1 Tube 1    olopatadine (PATANOL) 0.1 % ophthalmic solution Place 1 drop into both eyes 2 times daily 5 mL 0    montelukast (SINGULAIR) 10 MG tablet Take 1 tablet by mouth daily 30 tablet 1    ipratropium (ATROVENT) 0.06 % nasal spray 2 sprays by Nasal route 3 times daily 1 Bottle 1    metFORMIN (GLUCOPHAGE) 500 MG tablet Take 1 tablet by mouth 2 times daily (with meals) 60 tablet 5    rosuvastatin (CRESTOR) 10 MG tablet Take 1 tablet by mouth nightly 30 tablet 5    Elastic Bandages & Supports (KNEE BRACE) MISC Wear during the day over right knee as needed for support 1 each 0    naproxen sodium (ANAPROX) 550 MG tablet Take 1 tablet by mouth 2 times daily (with meals) for 14 days 28 tablet 0    loratadine (CLARITIN) 10 MG tablet Take 1 tablet by mouth daily 30 tablet 5    acetaminophen (TYLENOL) 500 MG tablet Take 1 tablet by mouth every 6 hours as needed for Pain. 60 tablet 0     No current facility-administered medications on file prior to visit.        Past Medical History:   Diagnosis Date    Depression with anxiety 4/24/2017    Hyperglycemia 4/24/2017    Hyperlipidemia 2/18/2014    Insomnia     Perennial allergic rhinitis with seasonal variation 4/24/2017    Pre-diabetes 4/24/2017    Tobacco use disorder 4/24/2017    Type 2 diabetes mellitus without complication, without long-term current use of insulin ([facility] [address] 8/6/2018     Past Surgical History:   Procedure Laterality Date    NASAL FRACTURE SURGERY  2007     Social History     Socioeconomic History    Marital status:      Spouse name: Danielito Bautista Number of children: 3    Years of education: Not on file    Highest education level: Not on file   Occupational History    Occupation:  - industrial cleaning   Tobacco Use    Smoking status: Current Every Day Smoker     Packs/day: 0.50     Years: 23.00     Pack years: 11.50     Types: Cigarettes     Start date: 1994    Smokeless tobacco: Never Used   Vaping Use    Vaping Use: Never used   Substance and Sexual Activity    Alcohol use: No    Drug use: No    Sexual activity: Yes     Partners: Female     Comment: monogamous   Other Topics Concern    Not on file   Social History Narrative    Lives with wife and 3 children        1 dog     Social Determinants of Health     Financial Resource Strain:     Difficulty of Paying Living Expenses:    Food Insecurity:     Worried About Running Out of Food in the Last Year:     [address] in the Last Year:    Transportation Needs:     Lack of Transportation (Medical):  Lack of Transportation (Non-Medical):    Physical Activity:     Days of Exercise per Week:     Minutes of Exercise per Session:    Stress:     Feeling of Stress :    Social Connections:     Frequency of Communication with Friends and Family:     Frequency of Social Gatherings with Friends and Family:     Attends Hinduism Services:     Active Member of Clubs or Organizations:     Attends Club or Organization Meetings:     Marital Status:    Intimate Partner Violence:     Fear of Current or Ex-Partner:     Emotionally Abused:     Physically Abused:     Sexually Abused:      Family History   Problem Relation Age of Onset    High Blood Pressure Mother     High Cholesterol Mother     Cancer Father         Throat cancer - smoker    High Blood Pressure Brother     Heart Attack Maternal Grandmother 72    Diabetes Maternal Grandmother     Cancer Maternal Grandfather         unknown    No Known Problems Paternal Grandmother     No Known Problems Paternal Grandfather            Objective:   Vitals:  Temp 97.1 °F (36.2 °C)   Ht 5' 10\" (1.778 m)   Wt 250 lb (113.4 kg)   BMI 35.87 kg/m² Pain Score:   7    Physical Exam  Constitutional:     Well nourished and well developed, obese. Appears neat and clean. Patient is alert, oriented x 3, and in no apparent distress. Pulmonary:   Pulmonary effort is normal. No respiratory distress is observed. Vascular:   Dorsalis pedis pulse is palpable bilateral foot. Posterior tibial pulse is palpable bilateral foot. Capillary refill is immediate bilateral hallux. There are no varicosities noted bilaterally. There are no telangiectasia noted bilaterally. Skin temperature gradient is noted to be warm to warm bilaterally. There are no signs of ischemia or skin atrophy noted bilaterally. Hair growth is present bilaterally. Lymphatic:  The popliteal lymph nodes are soft and nontender bilateral lower extremity.   There is no edema noted to the bilateral lower extremity. Neurological:   Light touch sensation is altered but intact to the left foot; protective sensation is intact bilaterally. Vibratory sensation is intact bilaterally. Hot versus cold discrimination is diminished to the left foot bilaterally. Sharp versus dull discrimination is diminished to the left foot bilaterally. Patellar deep tendon reflex is graded at 0/4 bilaterally. Achilles tendon deep tendon reflex is graded at 0/4 bilaterally. The Babinski response is negative bilaterally. No ankle clonus is noted bilaterally. Dermatological:  No open lesions noted bilateral foot. Cicatrix noted to the dorsal and plantar right foot at the first metatarsal interspace. The toenails are within normal limits and are well groomed bilaterally. Focal hyperkeratotic lesions noted: Lateral aspect of the fifth toe at the PIPJ bilateral foot, medial aspect of the bilateral hallux at the IPJ. Normal skin turgor noted bilaterally. Webspace 14 is dry and intact bilateral foot. Musculoskeletal:  Planus foot type noted bilaterally. Manual muscle strength is graded at 5/5 for all groups tested right foot, 2/5 for the left foot. Gross static deformities noted: Laterally deviated hallux with a prominent medial eminence of the head of the first metatarsal noted bilaterally, severe to the right, moderate to the left. Hallux rigidus noted to the right foot. Adductovarus rotation of the fifth toe noted bilaterally, there is mild lateral eminence noted at the head of the fifth metatarsal.  Pain or crepitus noted with active or passive range of motion of the joints of the foot or ankle: Right first MPJ. Decreased ankle joint dorsiflexion noted bilateral lower extremity. Psychiatric:    Judgement and insight intact. Short and long term memory intact. No evidence of depression, anxiety, or agitation. Patient is calm, cooperative, and communicative.     Appropriate interactions and affect. Assessment:      Diagnosis Orders   1. Pain in both feet     2. Type 2 diabetes mellitus without complication, without long-term current use of insulin ([address]     3. Neuritis of left foot     4. Foot drop, left  GA AFO PLASTIC   5. Hallux abducto valgus, bilateral     6. Tailor's bunionette, bilateral         Plan:     Neuropathic pain, left foot: I have prescribed a topical compounded pain cream, apply to the left foot as directed. Left foot drop: I have submitted an order for a static AFO for the left lower extremity to increase the patient's stability when walking and decrease the risk of falls. An increase in function/decrease in pain will allow the patient to ambulate with less difficulty and perform activities of daily life with less pain. The patient will likely require the device for the lifetime of the device. Diabetes mellitus/foot deformities: I discussed the \"do's and donts\" of diabetes mellitus and diabetic foot care. The patient should adhere to the random glucose monitoring schedule according to their internist/endocrinologist and report any significant fluctuations or problems to them immediately. I emphasized the importance of daily foot checks and applying a moisturizing cream to the feet daily, but not in between the toes. If any ulcerations or signs and symptoms of infection arise, the patient is to call and return immediately for evaluation. I have prescribed custom molded diabetic inserts and extra depth diabetic shoes. Orders Placed This Encounter   Procedures    GA AFO PLASTIC     Standing Status:   Future     Standing Expiration Date:   10/28/2021           Follow up:  Return in about 3 months (around 10/28/2021). Alfonso Jon DPM      Level of medical decision making: low.        Please note that this report has been partially produced using speech recognition software which may cause errors including grammar, punctuation, and spelling or words and phrases that may seem inappropriate. If there are questions or concerns please feel free to contact me for clarification.

## 2021-08-04 ENCOUNTER — TELEPHONE (OUTPATIENT)
Dept: PAIN MANAGEMENT | Age: 46
End: 2021-08-04

## 2021-08-04 NOTE — TELEPHONE ENCOUNTER
Patient called stating that he was having a lot of pain in his shoulder. He is wondering if there is something he can be prescribed for this? He says the injection did not work.

## 2021-08-04 NOTE — TELEPHONE ENCOUNTER
I am sorry to hear about the persistent shoulder pain.   We may need to get an MRI different outlets going on.      -Encourage follow-up for assessment of his shoulder pain

## 2021-08-06 ENCOUNTER — OFFICE VISIT (OUTPATIENT)
Dept: PULMONOLOGY | Age: 46
End: 2021-08-06
Payer: COMMERCIAL

## 2021-08-06 VITALS
WEIGHT: 249 LBS | BODY MASS INDEX: 35.65 KG/M2 | HEART RATE: 71 BPM | TEMPERATURE: 98.2 F | SYSTOLIC BLOOD PRESSURE: 132 MMHG | OXYGEN SATURATION: 99 % | HEIGHT: 70 IN | DIASTOLIC BLOOD PRESSURE: 80 MMHG

## 2021-08-06 DIAGNOSIS — E66.9 OBESITY (BMI 30-39.9): ICD-10-CM

## 2021-08-06 DIAGNOSIS — G47.33 OBSTRUCTIVE SLEEP APNEA: Primary | ICD-10-CM

## 2021-08-06 PROCEDURE — 99243 OFF/OP CNSLTJ NEW/EST LOW 30: CPT | Performed by: INTERNAL MEDICINE

## 2021-08-06 ASSESSMENT — ENCOUNTER SYMPTOMS
VOICE CHANGE: 0
EYE ITCHING: 0
NAUSEA: 0
SORE THROAT: 0
SHORTNESS OF BREATH: 0
RHINORRHEA: 0
ABDOMINAL PAIN: 0
COUGH: 0
CHEST TIGHTNESS: 0
VOMITING: 0
WHEEZING: 0
DIARRHEA: 0

## 2021-08-13 ENCOUNTER — OFFICE VISIT (OUTPATIENT)
Dept: PAIN MANAGEMENT | Age: 46
End: 2021-08-13
Payer: COMMERCIAL

## 2021-08-13 VITALS
WEIGHT: 250 LBS | SYSTOLIC BLOOD PRESSURE: 120 MMHG | HEIGHT: 70 IN | OXYGEN SATURATION: 98 % | BODY MASS INDEX: 35.79 KG/M2 | RESPIRATION RATE: 12 BRPM | HEART RATE: 86 BPM | DIASTOLIC BLOOD PRESSURE: 82 MMHG

## 2021-08-13 DIAGNOSIS — M75.42 SHOULDER IMPINGEMENT, LEFT: ICD-10-CM

## 2021-08-13 DIAGNOSIS — M79.602 LEFT ARM PAIN: ICD-10-CM

## 2021-08-13 DIAGNOSIS — M47.817 LUMBOSACRAL SPONDYLOSIS WITHOUT MYELOPATHY: Primary | ICD-10-CM

## 2021-08-13 DIAGNOSIS — G89.29 CHRONIC LEFT SHOULDER PAIN: ICD-10-CM

## 2021-08-13 DIAGNOSIS — M25.512 CHRONIC LEFT SHOULDER PAIN: ICD-10-CM

## 2021-08-13 PROCEDURE — 99214 OFFICE O/P EST MOD 30 MIN: CPT | Performed by: PHYSICAL MEDICINE & REHABILITATION

## 2021-08-13 RX ORDER — TIZANIDINE 4 MG/1
4 TABLET ORAL EVERY EVENING
Qty: 30 TABLET | Refills: 0 | Status: SHIPPED | OUTPATIENT
Start: 2021-08-13 | End: 2021-09-16 | Stop reason: SDUPTHER

## 2021-08-13 ASSESSMENT — ENCOUNTER SYMPTOMS
BACK PAIN: 1
NAUSEA: 0
CONSTIPATION: 0
DIARRHEA: 0
SHORTNESS OF BREATH: 0

## 2021-08-13 NOTE — PROGRESS NOTES
Doug Vela  ([birth year])    8/13/2021    Subjective:     Doug Vela is 40 y.o. male who complains today of:    Chief Complaint   Patient presents with    Shoulder Pain    Back Pain       Last seen 6/17/21: EMG, x-ray L hip, x-ray L shoulder done, reviewed below. Underwent right lumbar L3-4 L4-5 L5-S1 facet corticosteroid joint injections on 7/13/2021 with greater than 80% pain relief. He wished it lasted longer. Did not undergo the left-sided injections due to intolerance. He was given Valium but did not return for the left-sided procedure. He states he was not called to get this done. Underwent left glenohumeral joint shoulder corticosteroid injection on 7/27/2021 with greater than 50 % pain relief. He wished it last longer. He did not see nephrology. Lidocaine with minimal relief. Saw podiatry who recommended a brace. Saw pulmonology and given a machine for sleep apnea. He did not get the back brace yet. Did not do any new physical therapy. No other test therapy updates from other physicians, no emergency room visits. Tizanidine 2 mg nightly helps with remaining functional with chores, personal hygiene, remaining compliant with home exercise program, maintaining his quality of life, and performing activities of daily living. He obtains greater than 50% pain relief without any significant side effects. He is clear to avoid driving or operating heavy machinery or to perform any activities where he may harm himself or others while on pain medications. Low back pain is currently a 9/10. Gets up to a 10/10. The pain is located in both sides low back. Left side is worse than the right. No leg pain. Pain is worse with bending and activity as well as standing. Pain is better with sitting. Is been a constant ache for over 1 year. There are no other associated symptoms or contextual factors.  He denies any classic radicular symptoms, new weakness, saddle anesthesia, bowel or bladder dysfunction, or falls. Left shoulder pain is currently a 9/10. Is up to a 10/10. The pain is worse with lifting things as well as using his left arm. The pain is better with very little. Is been a constant ache for over 4 months. No arm weakness and clumsiness. The right shoulder is okay. There are no other associated symptoms or contextual factors. He denies any classic radicular symptoms, new weakness, saddle anesthesia, bowel or bladder dysfunction, or falls.      Allergies:  Bupropion, Escitalopram, and Hydroxyzine    Past Medical History:   Diagnosis Date    Depression with anxiety 4/24/2017    Hyperglycemia 4/24/2017    Hyperlipidemia 2/18/2014    Insomnia     Perennial allergic rhinitis with seasonal variation 4/24/2017    Pre-diabetes 4/24/2017    Tobacco use disorder 4/24/2017    Type 2 diabetes mellitus without complication, without long-term current use of insulin [facility] 8/6/2018     Past Surgical History:   Procedure Laterality Date    NASAL FRACTURE SURGERY  2007     Family History   Problem Relation Age of Onset    High Blood Pressure Mother     High Cholesterol Mother     Cancer Father         Throat cancer - smoker    High Blood Pressure Brother     Heart Attack Maternal Grandmother 72    Diabetes Maternal Grandmother     Cancer Maternal Grandfather         unknown    No Known Problems Paternal Grandmother     No Known Problems Paternal Grandfather      Social History     Socioeconomic History    Marital status:      Spouse name: Alain Rubinstein Number of children: 3    Years of education: Not on file    Highest education level: Not on file   Occupational History    Occupation:  - industrial cleaning   Tobacco Use    Smoking status: Current Every Day Smoker     Packs/day: 0.50     Years: 23.00     Pack years: 11.50     Types: Cigarettes     Start date: 1994    Smokeless tobacco: Never Used   Vaping Use    Vaping Use: Never used   Substance and (CLARITIN) 10 MG tablet Take 1 tablet by mouth daily 30 tablet 5    acetaminophen (TYLENOL) 500 MG tablet Take 1 tablet by mouth every 6 hours as needed for Pain. 60 tablet 0    naproxen sodium (ANAPROX) 550 MG tablet Take 1 tablet by mouth 2 times daily (with meals) for 14 days 28 tablet 0     No current facility-administered medications on file prior to visit. Review of Systems   Constitutional: Negative for fever. HENT: Negative for hearing loss. Respiratory: Negative for shortness of breath. Gastrointestinal: Negative for constipation, diarrhea and nausea. Genitourinary: Negative for difficulty urinating. Musculoskeletal: Positive for back pain. Negative for neck pain. Skin: Negative for rash. Neurological: Negative for headaches. Hematological: Does not bruise/bleed easily. Psychiatric/Behavioral: Negative for sleep disturbance. Objective:     Vitals:  /82 (Site: Right Upper Arm, Position: Sitting, Cuff Size: Large Adult)   Pulse 86   Resp 12   Ht 5' 10\" (1.778 m)   Wt 250 lb (113.4 kg)   SpO2 98%   BMI 35.87 kg/m²        Exam performed under coronavirus precautions  General: No acute distress  Eyes: No scleral icterus or lid lag appreciated bilaterally  ENMT: Moist mucous membranes. Hearing grossly intact bilaterally. No masses or lesions on ears or nose  Neck: Symmetric without any overt masses or lesions, trachea midline  Respiratory: Respirations are non-labored  Skin: Visualized skin is intact  Psych: Mood normal. Affect normal. Recent and remote memory intact. Judgment and insight normal.  Neurologic: Gait antalgic, no assistive devices for ambulation. Pt is alert and appropriately interactive. Pleasant and cooperative with history and exam. No signs of excessive sedation. Responds promptly and appropriately to questions asked. No aberrant behaviors appreciated. Sensation grossly intact in both legs.    Reflexes and strength functional for ambulation, no abnormal reflexes appreciated on exam today  Strength greater than 3/5 bilateral legs  Straight leg raise negative bilaterally. Sensation intact in both arms  Reflexes and strength functional for arm use, no abnormal reflexes appreciated on exam today  Strength greater than 3/5 in both arms  Spurling's negative on exam today    Left shoulder pain with positive Dang sign and positive impingement movers, painful arc sign    There is tenderness to palpation over the lower lumbar spinous processes from L2 down to sacrum with bilateral paraspinal muscle tenderness. Rotation and extension reproduces axial low back pain. Other facet provocative maneuvers are positive. Outside record review:  XR L shoulder 7/13/2021: Bulbous subacromial process, no fracture, may predispose to impingement  XR L hip 7/12/2021: Minimal arthritis, normal hip joint spaces, no fracture  EMG BLE 7/12/2021:Normal, no lumbosacral motor radiculopathy or peripheral polyneuropathy  MRI LS spine 5/24/2021 no fracture. Degenerative disc disease. Congenital spinal canal stenosis. T12-L4 normal canal foramen. Degenerative disc disease and facet arthropathy. L4-L5 mild canal stenosis, mild bilateral foraminal narrowing. L5-S1 normal canal foramen. MRI thoracic spine 2/24/2020: Degenerative disc disease and facet arthropathy. Disc bulge T7-T8. No canal or foraminal stenosis. Unremarkable MRI of the thoracic spine. XR LS Spine 11/19/20: no fracture, no instability, disc spaces preserved. Assessment:      Diagnosis Orders   1. Lumbosacral spondylosis without myelopathy  ME INJ DX/THER AGNT PARAVERT FACET JOINT, LUMBAR/SAC, 1ST LEVEL    ME INJ DX/THER AGNT PARAVERT FACET JOINT, LUMBAR/SAC, ADD LEVEL    ME INJ DX/THER AGNT PARAVERT FACET JOINT, LUMBAR/SAC, 2ND LEVEL    tiZANidine (ZANAFLEX) 4 MG tablet    Amb External Referral To Physical Therapy   2.  Chronic left shoulder pain  tiZANidine (ZANAFLEX) 4 MG tablet Amb External Referral To Physical Therapy   3. Shoulder impingement, left  tiZANidine (ZANAFLEX) 4 MG tablet    Amb External Referral To Physical Therapy    [place] ARTHROCENTESIS ASPIR&/INJ MAJOR JT/BURSA W/O US    CHG FLUOROSCOPIC GUIDANCE NEEDLE PLACEMENT ADD ON   4. Left arm pain  EMG     +diabetes mellitus, snores, Depression and Anxiety, Insomnia on Ambien  -Min relief with Flexeril, Gabapentin 600 mg, and Lyrica 100 mg BID  Plan:          Orders Placed This Encounter   Medications    tiZANidine (ZANAFLEX) 4 MG tablet     Sig: Take 1 tablet by mouth every evening As needed for pain and spasms     Dispense:  30 tablet     Refill:  0       Orders Placed This Encounter   Procedures    Amb External Referral To Physical Therapy     Referral Priority:   Routine     Referral Type:   Consult for Advice and Opinion     Referral Reason:   Specialty Services Required     Requested Specialty:   Physical Therapy     Number of Visits Requested:   1    CHG FLUOROSCOPIC GUIDANCE NEEDLE PLACEMENT ADD ON     Standing Status:   Future     Standing Expiration Date:   11/11/2021    EMG     Standing Status:   Future     Standing Expiration Date:   11/11/2021     Order Specific Question:   Which body part? Answer:   Bilateral Upper Extremities    HI INJ DX/THER AGNT PARAVERT FACET JOINT, LUMBAR/SAC, 1ST LEVEL     Left Lumbar L3/4 L4/5 L5/S1 facet corticosteroid joint inj under XR with Dr Tristen Brar. 15 minute procedure. Standing Status:   Future     Standing Expiration Date:   11/11/2021    HI INJ DX/THER AGNT PARAVERT FACET JOINT, LUMBAR/SAC, ADD LEVEL     Left Lumbar L3/4 L4/5 L5/S1 facet corticosteroid joint inj under XR with Dr Tristen Brar. 15 minute procedure. Standing Status:   Future     Standing Expiration Date:   11/11/2021    HI INJ DX/THER AGNT PARAVERT FACET JOINT, LUMBAR/SAC, 2ND LEVEL     Left Lumbar L3/4 L4/5 L5/S1 facet corticosteroid joint inj under XR with Dr Tristen Brar. 15 minute procedure.      Standing Status: Future     Standing Expiration Date:   11/11/2021    [place] ARTHROCENTESIS ASPIR&/INJ MAJOR JT/BURSA W/O US     Left shoulder subacromial bursa inj under XR with Dr Katelyn Alvarez. 10 minute procedure     Standing Status:   Future     Standing Expiration Date:   11/11/2021       -Re order PT for lumbar spondylosis and left shoulder pain  -Enc nephrology eval. F/u Pulm Podiatry as rec. -Reviewed EMG B LE, XR L Hip, and XR L Shoulder above, all questions answered  -EMG B UE eval left arm pain  -Consideration for MRI L Shoulder without contrast may be given if pain persists despite conservative treatment  -No neck pain at this time, otherwise would recommend MRI C Spine without contrast  -Continue Lidocaine 4% ointment topical BID prn no ref  -No NSAIDs given elevated serum creatinine  -Min relief with Gabapentin , Lyrica 100 mg BID, and Flexeril  -Increase Tizanidine 2 to 4 mg by mouth every evening #30 no refills start 8/13/2021. Avoid all other muscle relaxers and/or sedating medicines.  -No opioids recommended at this time  -Encourage Left Lumbar L3/4 L4/5 L5/S1 facet corticosteroid joint injections under XR with Dr Katelyn Alvarez. No anticoagulation, no antibiotics, +diabetes mellitus, no osteoporosis, no bleeding or platelet dysfunction, IV Dye JULIENNE morales. 15 minute procedure. Prone position Consider 3 mg betamethasone. Risks of sugar elevation are less than potential benefit of pain relief    -Left shoulder subacromial bursa inj under XR with Dr Katelyn Alvarez. 10 minute procedure. Consider 6 mg betamethasone.   -Okay for Valium prior to procedures. He will call 5 days in advance.  -Encourage lumbar spine brace      Controlled Substance Monitoring:    Acute and Chronic Pain Monitoring:   RX Monitoring 7/27/2021   Periodic Controlled Substance Monitoring Obtaining appropriate analgesic effect of treatment.           Discussed the risks, side effects, and symptoms that would warrant urgent or emergent physician evaluation of all medications prescribed today. Discussed the risks of the above recommended procedures including but not limited to bleeding, infection, worsened pain, damage to surrounding structures, side effects, toxicity, allergic reactions to medications used, immune and stress-response dysfunction, fat necrosis, decreased bone mineralization, cartilage loss, increased fracture risk, avascular necrosis, skin pigmentation changes, blood sugar elevation, need for surgery, premature damage or degeneration of the joint, as well as catastrophic injury such as vision loss, paralysis, stroke, bowel or bladder incontinence, ventilator dependence, loss of use of the joint and/or extremity, and death. Discussed the risks, benefits, alternative procedures, and alternatives to the procedure including no procedure at all. Discussed that we cannot undo any permanent neurologic or orthopaedic damage or change the course of any underlying disease. After thorough discussion, patient expressed complete understanding and willingness to proceed. Discussed the risks of the above recommended procedures including but not limited to bleeding, infection, worsened pain, damage to surrounding structures, side effects, toxicity, allergic reactions to medications used, immune and stress-response dysfunction, fat necrosis, skin pigmentation changes, blood sugar elevation, headache, vision changes, need for surgery, as well as catastrophic injury such as vision loss, paralysis, stroke, spinal cord and/or plexus infarction or injury, intrathecal injection, spinal cord puncture, arachnoiditis, discitis, bowel or bladder incontinence, ventilator dependence, loss of use of the arms and/or legs, and death. Discussed the risks, benefits, alternative procedures, and alternatives to the procedure including no procedure at all. Discussed that we cannot undo any permanent neurologic damage or change the course of any underlying disease.  After thorough discussion, patient expressed understanding and willingness to proceed. Provided education and counseling regarding the diagnosis, prognosis, and treatment options. All questions were answered. Encouraged him to follow-up with his primary care physician and/or specialists as required for his overall health and management of his comorbidities as well as any new positive symptoms mentioned in review of systems above. Care was provided within the definitions and limitations of our specialty practice. Encouraged lifestyle interventions including healthy habits, lifestyle changes, regular aerobic exercise and appropriate weight maintenance as advised by their primary care physician or cardiovascular health provider. Discussed well care and disease prevention/maintenance. All recommendations for therapy are provided to improve function with activities of daily living, decrease pain, and help develop an exercise program. All recommendations for medications are meant to help decrease pain, improve function with activities of daily living, maintain compliance with home exercise program, and improve quality of life. All recommendations for therapeutic injections are meant to help decrease pain, improve function with activities of daily living, maintain compliance with home exercise program, improve quality of life, and decrease reliance upon oral medications. Encouraged compliance with his home exercise program. Recommended compliance with physical therapy program as outlined above. Informed him to wear bracing as appropriate, and that bracing is not a replacement for core strengthening or muscle stabilization. Discussed the elevated risks of excessive sedation while on pain medications. Advised him against driving or operating heavy machinery or performing any activities where he may harm himself or others while on pain medications.  Particular caution was emphasized especially during dose adjustments and medication changes. Discussed the elevated risks of respiratory depression and death while on opioid medications, especially when combined with other sedative substances. Discussed the risks of temporary disability, permanent disability, morbidity, and mortality with poorly-managed or undiagnosed medical conditions and comorbidities. Emphasized the importance of timely medical evaluation and treatment as previously recommended by us or other medical professionals. Risks of not pursing these recommendations were emphasized. Advised him that any lab testing, imaging, or other diagnostic test results are best discussed in person in the office so that we can provide a clear explanation of their significance and best treatment based upon these results. It is his responsibility to make and keep a follow up appointment to discuss these test results in person to discuss the significance of the findings and appropriate follow-up steps. He expressed complete understanding and agreement with the entire plan as outlined above. Portions of this note may have been typed, auto-populated, dictated or transcribed by voice recognition resulting in errors, omissions, or close substitutions which may be missed despite careful proofreading. Please contact the author for any questions or concerns.     Follow up:  Return in about 1 month (around 9/13/2021) for reassessment of pain and symptoms, EMG Internal.    Cindy Martin MD

## 2021-08-16 ENCOUNTER — HOSPITAL ENCOUNTER (EMERGENCY)
Age: 46
Discharge: HOME | End: 2021-08-16
Attending: EMERGENCY MEDICINE
Payer: COMMERCIAL

## 2021-08-16 ENCOUNTER — TELEPHONE (OUTPATIENT)
Dept: PAIN MANAGEMENT | Age: 46
End: 2021-08-16

## 2021-08-16 ENCOUNTER — APPOINTMENT (OUTPATIENT)
Dept: CT IMAGING | Age: 46
End: 2021-08-16
Payer: COMMERCIAL

## 2021-08-16 VITALS
SYSTOLIC BLOOD PRESSURE: 144 MMHG | HEIGHT: 70 IN | WEIGHT: 250 LBS | OXYGEN SATURATION: 97 % | TEMPERATURE: 98.6 F | RESPIRATION RATE: 16 BRPM | HEART RATE: 61 BPM | BODY MASS INDEX: 35.79 KG/M2 | DIASTOLIC BLOOD PRESSURE: 93 MMHG

## 2021-08-16 DIAGNOSIS — M25.512 CHRONIC LEFT SHOULDER PAIN: ICD-10-CM

## 2021-08-16 DIAGNOSIS — G44.209 TENSION HEADACHE: ICD-10-CM

## 2021-08-16 DIAGNOSIS — G89.29 CHRONIC LEFT SHOULDER PAIN: ICD-10-CM

## 2021-08-16 DIAGNOSIS — I10 ESSENTIAL HYPERTENSION: Primary | ICD-10-CM

## 2021-08-16 LAB
ALBUMIN SERPL-MCNC: 4.4 G/DL (ref 3.5–4.6)
ALP BLD-CCNC: 87 U/L (ref 35–104)
ALT SERPL-CCNC: 18 U/L (ref 0–41)
ANION GAP SERPL CALCULATED.3IONS-SCNC: 10 MEQ/L (ref 9–15)
AST SERPL-CCNC: 16 U/L (ref 0–40)
BASOPHILS ABSOLUTE: 0.1 K/UL (ref 0–0.1)
BASOPHILS RELATIVE PERCENT: 0.7 % (ref 0.2–1.2)
BILIRUB SERPL-MCNC: 0.3 MG/DL (ref 0.2–0.7)
BUN BLDV-MCNC: 13 MG/DL (ref 6–20)
CALCIUM SERPL-MCNC: 10 MG/DL (ref 8.5–9.9)
CHLORIDE BLD-SCNC: 103 MEQ/L (ref 95–107)
CO2: 27 MEQ/L (ref 20–31)
CREAT SERPL-MCNC: 1.25 MG/DL (ref 0.7–1.2)
EOSINOPHILS ABSOLUTE: 0.2 K/UL (ref 0–0.5)
EOSINOPHILS RELATIVE PERCENT: 3.1 % (ref 0.8–7)
GFR AFRICAN AMERICAN: >60
GFR NON-AFRICAN AMERICAN: >60
GLOBULIN: 3.2 G/DL (ref 2.3–3.5)
GLUCOSE BLD-MCNC: 113 MG/DL (ref 70–99)
HCT VFR BLD CALC: 44.3 % (ref 42–52)
HEMOGLOBIN: 14.7 G/DL (ref 13.7–17.5)
IMMATURE GRANULOCYTES #: 0 K/UL
IMMATURE GRANULOCYTES %: 0.1 %
INR BLD: 1
LYMPHOCYTES ABSOLUTE: 3.5 K/UL (ref 1.3–3.6)
LYMPHOCYTES RELATIVE PERCENT: 48.7 %
MAGNESIUM: 1.7 MG/DL (ref 1.7–2.4)
MCH RBC QN AUTO: 28.4 PG (ref 25.7–32.2)
MCHC RBC AUTO-ENTMCNC: 33.2 % (ref 32.3–36.5)
MCV RBC AUTO: 85.7 FL (ref 79–92.2)
MONOCYTES ABSOLUTE: 0.5 K/UL (ref 0.3–0.8)
MONOCYTES RELATIVE PERCENT: 6.6 % (ref 5.3–12.2)
NEUTROPHILS ABSOLUTE: 2.9 K/UL (ref 1.8–5.4)
NEUTROPHILS RELATIVE PERCENT: 40.8 % (ref 34–67.9)
PDW BLD-RTO: 12.5 % (ref 11.6–14.4)
PLATELET # BLD: 235 K/UL (ref 163–337)
POTASSIUM SERPL-SCNC: 4.1 MEQ/L (ref 3.4–4.9)
PROTHROMBIN TIME: 12.9 SEC (ref 12.3–14.9)
RBC # BLD: 5.17 M/UL (ref 4.63–6.08)
SODIUM BLD-SCNC: 140 MEQ/L (ref 135–144)
TOTAL PROTEIN: 7.6 G/DL (ref 6.3–8)
TROPONIN: <0.01 NG/ML (ref 0–0.01)
WBC # BLD: 7.1 K/UL (ref 4.2–9)

## 2021-08-16 PROCEDURE — 96376 TX/PRO/DX INJ SAME DRUG ADON: CPT

## 2021-08-16 PROCEDURE — 2500000003 HC RX 250 WO HCPCS: Performed by: EMERGENCY MEDICINE

## 2021-08-16 PROCEDURE — 96375 TX/PRO/DX INJ NEW DRUG ADDON: CPT

## 2021-08-16 PROCEDURE — 2580000003 HC RX 258: Performed by: EMERGENCY MEDICINE

## 2021-08-16 PROCEDURE — 84484 ASSAY OF TROPONIN QUANT: CPT

## 2021-08-16 PROCEDURE — 85025 COMPLETE CBC W/AUTO DIFF WBC: CPT

## 2021-08-16 PROCEDURE — 36415 COLL VENOUS BLD VENIPUNCTURE: CPT

## 2021-08-16 PROCEDURE — 6370000000 HC RX 637 (ALT 250 FOR IP): Performed by: EMERGENCY MEDICINE

## 2021-08-16 PROCEDURE — 85610 PROTHROMBIN TIME: CPT

## 2021-08-16 PROCEDURE — 83735 ASSAY OF MAGNESIUM: CPT

## 2021-08-16 PROCEDURE — 6360000002 HC RX W HCPCS: Performed by: EMERGENCY MEDICINE

## 2021-08-16 PROCEDURE — 99283 EMERGENCY DEPT VISIT LOW MDM: CPT

## 2021-08-16 PROCEDURE — 96374 THER/PROPH/DIAG INJ IV PUSH: CPT

## 2021-08-16 PROCEDURE — 70450 CT HEAD/BRAIN W/O DYE: CPT

## 2021-08-16 PROCEDURE — 80053 COMPREHEN METABOLIC PANEL: CPT

## 2021-08-16 PROCEDURE — 93005 ELECTROCARDIOGRAM TRACING: CPT

## 2021-08-16 RX ORDER — CLONIDINE HYDROCHLORIDE 0.1 MG/1
0.1 TABLET ORAL ONCE
Status: COMPLETED | OUTPATIENT
Start: 2021-08-16 | End: 2021-08-16

## 2021-08-16 RX ORDER — HYDROCODONE BITARTRATE AND ACETAMINOPHEN 5; 325 MG/1; MG/1
1 TABLET ORAL EVERY 6 HOURS PRN
Qty: 12 TABLET | Refills: 0 | Status: SHIPPED | OUTPATIENT
Start: 2021-08-16 | End: 2021-08-20

## 2021-08-16 RX ORDER — SODIUM CHLORIDE 0.9 % (FLUSH) 0.9 %
3 SYRINGE (ML) INJECTION EVERY 8 HOURS
Status: DISCONTINUED | OUTPATIENT
Start: 2021-08-16 | End: 2021-08-16 | Stop reason: HOSPADM

## 2021-08-16 RX ORDER — ONDANSETRON 2 MG/ML
4 INJECTION INTRAMUSCULAR; INTRAVENOUS ONCE
Status: COMPLETED | OUTPATIENT
Start: 2021-08-16 | End: 2021-08-16

## 2021-08-16 RX ORDER — LABETALOL HYDROCHLORIDE 5 MG/ML
5 INJECTION, SOLUTION INTRAVENOUS ONCE
Status: COMPLETED | OUTPATIENT
Start: 2021-08-16 | End: 2021-08-16

## 2021-08-16 RX ORDER — LABETALOL HYDROCHLORIDE 5 MG/ML
10 INJECTION, SOLUTION INTRAVENOUS ONCE
Status: COMPLETED | OUTPATIENT
Start: 2021-08-16 | End: 2021-08-16

## 2021-08-16 RX ORDER — DEXAMETHASONE SODIUM PHOSPHATE 4 MG/ML
4 INJECTION, SOLUTION INTRA-ARTICULAR; INTRALESIONAL; INTRAMUSCULAR; INTRAVENOUS; SOFT TISSUE ONCE
Status: COMPLETED | OUTPATIENT
Start: 2021-08-16 | End: 2021-08-16

## 2021-08-16 RX ORDER — MORPHINE SULFATE 4 MG/ML
4 INJECTION, SOLUTION INTRAMUSCULAR; INTRAVENOUS ONCE
Status: COMPLETED | OUTPATIENT
Start: 2021-08-16 | End: 2021-08-16

## 2021-08-16 RX ADMIN — MORPHINE SULFATE 4 MG: 4 INJECTION, SOLUTION INTRAMUSCULAR; INTRAVENOUS at 16:55

## 2021-08-16 RX ADMIN — LABETALOL HYDROCHLORIDE 5 MG: 5 INJECTION, SOLUTION INTRAVENOUS at 17:54

## 2021-08-16 RX ADMIN — Medication 3 ML: at 16:51

## 2021-08-16 RX ADMIN — ONDANSETRON 4 MG: 2 INJECTION INTRAMUSCULAR; INTRAVENOUS at 16:51

## 2021-08-16 RX ADMIN — CLONIDINE HYDROCHLORIDE 0.1 MG: 0.1 TABLET ORAL at 18:53

## 2021-08-16 RX ADMIN — DEXAMETHASONE SODIUM PHOSPHATE 4 MG: 4 INJECTION, SOLUTION INTRAMUSCULAR; INTRAVENOUS at 16:53

## 2021-08-16 RX ADMIN — LABETALOL HYDROCHLORIDE 10 MG: 5 INJECTION, SOLUTION INTRAVENOUS at 18:40

## 2021-08-16 ASSESSMENT — ENCOUNTER SYMPTOMS
STRIDOR: 0
VOICE CHANGE: 0
CHOKING: 0
EYE DISCHARGE: 0
CHEST TIGHTNESS: 0
SORE THROAT: 0
BLOOD IN STOOL: 0
SINUS PRESSURE: 0
COUGH: 0
SHORTNESS OF BREATH: 0
TROUBLE SWALLOWING: 0
EYE REDNESS: 0
CONSTIPATION: 0
DIARRHEA: 0
ABDOMINAL PAIN: 0
WHEEZING: 0
EYE PAIN: 0
FACIAL SWELLING: 0
BACK PAIN: 0
VOMITING: 0

## 2021-08-16 ASSESSMENT — PAIN DESCRIPTION - ONSET: ONSET: ON-GOING

## 2021-08-16 ASSESSMENT — PAIN DESCRIPTION - PAIN TYPE: TYPE: ACUTE PAIN

## 2021-08-16 ASSESSMENT — PAIN SCALES - GENERAL
PAINLEVEL_OUTOF10: 8
PAINLEVEL_OUTOF10: 8

## 2021-08-16 ASSESSMENT — PAIN DESCRIPTION - ORIENTATION: ORIENTATION: RIGHT;LEFT;ANTERIOR

## 2021-08-16 ASSESSMENT — PAIN DESCRIPTION - FREQUENCY: FREQUENCY: CONTINUOUS

## 2021-08-16 ASSESSMENT — PAIN DESCRIPTION - DESCRIPTORS: DESCRIPTORS: HEADACHE

## 2021-08-16 ASSESSMENT — PAIN DESCRIPTION - LOCATION: LOCATION: HEAD

## 2021-08-16 ASSESSMENT — PAIN DESCRIPTION - PROGRESSION: CLINICAL_PROGRESSION: GRADUALLY WORSENING

## 2021-08-16 NOTE — ED TRIAGE NOTES
Pt arrives to ED, from home, via his spouse's personal vehicle. Pt presents with a headache and chest pain, times two weeks. Pt denies any relieving or exacerbating factors re: his chest pain. Pt is currently neurologically intact.

## 2021-08-16 NOTE — ED PROVIDER NOTES
Negative for cold intolerance, polyphagia and polyuria. Genitourinary: Negative for dysuria, flank pain, frequency, genital sores and urgency. Musculoskeletal: Positive for arthralgias, joint swelling and myalgias. Negative for back pain, neck pain and neck stiffness. Skin: Negative for pallor and rash. Neurological: Positive for headaches. Negative for tremors, seizures, syncope, weakness and numbness. Hematological: Negative for adenopathy. Does not bruise/bleed easily. Psychiatric/Behavioral: Negative for agitation, behavioral problems, hallucinations and sleep disturbance. The patient is nervous/anxious. The patient is not hyperactive. All other systems reviewed and are negative. Except as noted above the remainder of the review of systems was reviewed and negative. PAST MEDICAL HISTORY     Past Medical History:   Diagnosis Date    Depression with anxiety 4/24/2017    Hyperglycemia 4/24/2017    Hyperlipidemia 2/18/2014    Insomnia     Perennial allergic rhinitis with seasonal variation 4/24/2017    Pre-diabetes 4/24/2017    Tobacco use disorder 4/24/2017    Type 2 diabetes mellitus without complication, without long-term current use of insulin ([facility] 8/6/2018         SURGICALHISTORY       Past Surgical History:   Procedure Laterality Date    NASAL FRACTURE SURGERY  2007         CURRENT MEDICATIONS       Previous Medications    ACETAMINOPHEN (TYLENOL) 500 MG TABLET    Take 1 tablet by mouth every 6 hours as needed for Pain.     ELASTIC BANDAGES & SUPPORTS (KNEE BRACE) MISC    Wear during the day over right knee as needed for support    IPRATROPIUM (ATROVENT) 0.06 % NASAL SPRAY    2 sprays by Nasal route 3 times daily    LIDOCAINE (LMX) 4 % CREAM    Apply a half dollar sized amount to intact skin topically up to twice daily as needed for pain    LORATADINE (CLARITIN) 10 MG TABLET    Take 1 tablet by mouth daily    METFORMIN (GLUCOPHAGE) 500 MG TABLET    Take 1 tablet by mouth 2 times daily (with meals)    MONTELUKAST (SINGULAIR) 10 MG TABLET    Take 1 tablet by mouth daily    NAPROXEN SODIUM (ANAPROX) 550 MG TABLET    Take 1 tablet by mouth 2 times daily (with meals) for 14 days    OLOPATADINE (PATANOL) 0.1 % OPHTHALMIC SOLUTION    Place 1 drop into both eyes 2 times daily    ROSUVASTATIN (CRESTOR) 10 MG TABLET    Take 1 tablet by mouth nightly    TIZANIDINE (ZANAFLEX) 4 MG TABLET    Take 1 tablet by mouth every evening As needed for pain and spasms       ALLERGIES     Bupropion, Escitalopram, and Hydroxyzine    FAMILY HISTORY       Family History   Problem Relation Age of Onset    High Blood Pressure Mother     High Cholesterol Mother     Cancer Father         Throat cancer - smoker    High Blood Pressure Brother     Heart Attack Maternal Grandmother 72    Diabetes Maternal Grandmother     Cancer Maternal Grandfather         unknown    No Known Problems Paternal Grandmother     No Known Problems Paternal Grandfather           SOCIAL HISTORY       Social History     Socioeconomic History    Marital status:      Spouse name: Robina    Number of children: 3    Years of education: Not on file    Highest education level: Not on file   Occupational History    Occupation:  - industrial cleaning   Tobacco Use    Smoking status: Current Every Day Smoker     Packs/day: 0.50     Years: 23.00     Pack years: 11.50     Types: Cigarettes     Start date: 1994    Smokeless tobacco: Never Used   Vaping Use    Vaping Use: Never used   Substance and Sexual Activity    Alcohol use: No    Drug use: No    Sexual activity: Yes     Partners: Female     Comment: monogamous   Other Topics Concern    Not on file   Social History Narrative    Lives with wife and 3 children        1 dog     Social Determinants of Health     Financial Resource Strain:     Difficulty of Paying Living Expenses:    Food Insecurity:     Worried About Running Out of Food in the Last Year:     Ran Out of Food in the Last Year:    Transportation Needs:     Lack of Transportation (Medical):  Lack of Transportation (Non-Medical):    Physical Activity:     Days of Exercise per Week:     Minutes of Exercise per Session:    Stress:     Feeling of Stress :    Social Connections:     Frequency of Communication with Friends and Family:     Frequency of Social Gatherings with Friends and Family:     Attends Jew Services:     Active Member of Clubs or Organizations:     Attends Club or Organization Meetings:     Marital Status:    Intimate Partner Violence:     Fear of Current or Ex-Partner:     Emotionally Abused:     Physically Abused:     Sexually Abused:        SCREENINGS      @FLOW([id number])@      PHYSICAL EXAM    (up to 7 for level 4, 8 or more for level 5)     ED Triage Vitals [08/16/21 1615]   BP Temp Temp Source Pulse Resp SpO2 Height Weight   (!) 159/95 98.6 °F (37 °C) Temporal 70 18 98 % 5' 10\" (1.778 m) 250 lb (113.4 kg)       Physical Exam  Vitals and nursing note reviewed. Constitutional:       General: He is in acute distress. Appearance: Normal appearance. He is well-developed and normal weight. He is not ill-appearing, toxic-appearing or diaphoretic. Comments: Alert cooperative patient not photophobic cooperative for my examination   HENT:      Head: Normocephalic and atraumatic. Right Ear: Tympanic membrane, ear canal and external ear normal.      Left Ear: Tympanic membrane, ear canal and external ear normal.      Nose: Nose normal. No congestion or rhinorrhea. Mouth/Throat:      Mouth: Mucous membranes are moist.      Pharynx: No posterior oropharyngeal erythema. Eyes:      General:         Right eye: No discharge. Left eye: No discharge. Pupils: Pupils are equal, round, and reactive to light. Neck:      Vascular: No carotid bruit. Cardiovascular:      Rate and Rhythm: Normal rate and regular rhythm. Pulses: Normal pulses. Heart sounds: Normal heart sounds. No murmur heard. No gallop. Pulmonary:      Effort: No respiratory distress. Breath sounds: Normal breath sounds. No stridor. No wheezing or rhonchi. Chest:      Chest wall: No tenderness. Abdominal:      General: Bowel sounds are normal. There is no distension. Palpations: Abdomen is soft. There is no mass. Tenderness: There is no abdominal tenderness. There is no right CVA tenderness, guarding or rebound. Musculoskeletal:         General: Tenderness present. Normal range of motion. Cervical back: Normal range of motion and neck supple. No rigidity or tenderness. Comments: Attention go to the left shoulder had no deformity no point tenderness diffuse spasm tenderness elicited on examination range of motion slightly diminished good  to left hand neuro vascular intact no sensory deficit   Lymphadenopathy:      Cervical: No cervical adenopathy. Skin:     General: Skin is warm. Capillary Refill: Capillary refill takes less than 2 seconds. Coloration: Skin is not jaundiced. Findings: No bruising, erythema, lesion or rash. Neurological:      General: No focal deficit present. Mental Status: He is alert and oriented to person, place, and time. Cranial Nerves: No cranial nerve deficit. Sensory: No sensory deficit. Motor: No weakness or abnormal muscle tone. Coordination: Coordination normal.      Gait: Gait normal.      Deep Tendon Reflexes: Reflexes normal.   Psychiatric:         Behavior: Behavior normal.         Thought Content:  Thought content normal.         DIAGNOSTIC RESULTS     EKG: All EKG's are interpreted by the Emergency Department Physician who either signs or Co-signsthis chart in the absence of a cardiologist.        RADIOLOGY:   Non-plain filmimages such as CT, Ultrasound and MRI are read by the radiologist. Plain radiographic images are visualized and preliminarily interpreted by the emergency physician with the below findings:        Interpretation per the Radiologist below, if available at the time ofthis note:    CT Head WO Contrast   Final Result      There is no acute intracranial process. Incidentally noted is an unerupted third maxillary molar on the left with possible cortical breakthrough of the lateral maxilla. ED BEDSIDE ULTRASOUND:   Performed by ED Physician - none    LABS:  Labs Reviewed   COMPREHENSIVE METABOLIC PANEL - Abnormal; Notable for the following components:       Result Value    Glucose 113 (*)     CREATININE 1.25 (*)     Calcium 10.0 (*)     All other components within normal limits   CBC WITH AUTO DIFFERENTIAL   MAGNESIUM   TROPONIN   PROTIME-INR   URINE RT REFLEX TO CULTURE   POCT GLUCOSE       All other labs were within normal range or not returned as of this dictation.     EMERGENCY DEPARTMENT COURSE and DIFFERENTIAL DIAGNOSIS/MDM:   Vitals:    Vitals:    08/16/21 1615 08/16/21 1737   BP: (!) 159/95 (!) 144/96   Pulse: 70 87   Resp: 18 16   Temp: 98.6 °F (37 °C)    TempSrc: Temporal    SpO2: 98% 98%   Weight: 250 lb (113.4 kg)    Height: 5' 10\" (1.778 m)            MDM  Number of Diagnoses or Management Options  Chronic left shoulder pain: established and improving  Essential hypertension  Tension headache: established and improving  Diagnosis management comments: Patient with a chronic back issue patient is disabled at home mostly at home no injury no fall has a remote injury to left shoulder ever since he has a pain to the left shoulder and over-the-counter medication not helping as per patient patient also have headache for the last several weeks off and on no fever no cough congestion or sinus issues EKG performed normal sinus rhythm left ventricular hypertrophy and nonspecific ST changes no ischemia no ST elevation noted no PVCs noted rate of 63/min KS interval 174 ms QT interval 404 ms patient has been monitoring blood pressure while patient in the emergency being monitor and diastolic still remain high so patient was given labetalol and instructed to repeat blood pressure with the primary care physician the next 2 days time to decide about medication otherwise to control the salt and cut down the salt patient understood and family understood very well will be given pain medication for the next 3 to 4 days time only to see if pain is causing his blood pressure high patient also think that very       Amount and/or Complexity of Data Reviewed  Clinical lab tests: ordered and reviewed  Tests in the radiology section of CPT®: ordered and reviewed        CRITICAL CARE TIME   Total Critical Care time was  minutes, excluding separately reportableprocedures. There was a high probability of clinicallysignificant/life threatening deterioration in the patient's condition which required my urgent intervention. ONSULTS:  None    PROCEDURES:  Unless otherwise noted below, none     Procedures    FINAL IMPRESSION      1. Essential hypertension    2. Tension headache    3. Chronic left shoulder pain          DISPOSITION/PLAN   DISPOSITION        PATIENT REFERRED TO:  Mari Leger DO  [address] [place] [address]  [phone]    In 2 days  FOR  BLOOD PRESSURE CHECK      DISCHARGE MEDICATIONS:  New Prescriptions    HYDROCODONE-ACETAMINOPHEN (NORCO) 5-325 MG PER TABLET    Take 1 tablet by mouth every 6 hours as needed for Pain for up to 4 days.           (Please note that portions of this note were completed with a voice recognition program.  Efforts were made to edit the dictations but occasionally words are mis-transcribed.)    Mary Jo Stallworth MD (electronically signed)  Attending Emergency Physician       Mary Jo Stallworth MD  08/16/21 1800

## 2021-08-16 NOTE — TELEPHONE ENCOUNTER
ORDER PLACED:    Date: 8/13/21  Description: BILAT UPPER EMG  Order Number: [id number]  Ordering Provider: ALLAN  Performing Provider: Cora Tee  CPT Codes:   ICD10 Codes: O32.756    ORDER GIVEN TO LULU TO Leandra Roberson

## 2021-08-17 LAB
EKG ATRIAL RATE: 63 BPM
EKG P AXIS: 23 DEGREES
EKG P-R INTERVAL: 174 MS
EKG Q-T INTERVAL: 404 MS
EKG QRS DURATION: 88 MS
EKG QTC CALCULATION (BAZETT): 413 MS
EKG R AXIS: -11 DEGREES
EKG T AXIS: 13 DEGREES
EKG VENTRICULAR RATE: 63 BPM

## 2021-08-17 PROCEDURE — 93010 ELECTROCARDIOGRAM REPORT: CPT | Performed by: INTERNAL MEDICINE

## 2021-09-01 ENCOUNTER — TELEPHONE (OUTPATIENT)
Dept: PAIN MANAGEMENT | Age: 46
End: 2021-09-01

## 2021-09-01 NOTE — TELEPHONE ENCOUNTER
Please call and see if patient would like Medrol pack (6 day steroid treatment) sent to pharmacy to help with pain?

## 2021-09-02 RX ORDER — METHYLPREDNISOLONE 4 MG/1
TABLET ORAL
Qty: 1 KIT | Refills: 0 | Status: SHIPPED | OUTPATIENT
Start: 2021-09-02 | End: 2021-09-08

## 2021-09-03 NOTE — TELEPHONE ENCOUNTER
Called patient. Spoke with patient and advised that, per Leela Devries, CNP prescription was electronically sent to pharmacy.

## 2021-09-16 ENCOUNTER — OFFICE VISIT (OUTPATIENT)
Dept: PAIN MANAGEMENT | Age: 46
End: 2021-09-16
Payer: COMMERCIAL

## 2021-09-16 VITALS
DIASTOLIC BLOOD PRESSURE: 84 MMHG | WEIGHT: 245 LBS | SYSTOLIC BLOOD PRESSURE: 140 MMHG | HEIGHT: 70 IN | BODY MASS INDEX: 35.07 KG/M2 | TEMPERATURE: 98 F

## 2021-09-16 DIAGNOSIS — M47.817 LUMBOSACRAL SPONDYLOSIS WITHOUT MYELOPATHY: ICD-10-CM

## 2021-09-16 DIAGNOSIS — M25.512 CHRONIC LEFT SHOULDER PAIN: ICD-10-CM

## 2021-09-16 DIAGNOSIS — G89.29 CHRONIC LEFT SHOULDER PAIN: ICD-10-CM

## 2021-09-16 DIAGNOSIS — M75.42 SHOULDER IMPINGEMENT, LEFT: Primary | ICD-10-CM

## 2021-09-16 PROCEDURE — 99215 OFFICE O/P EST HI 40 MIN: CPT | Performed by: NURSE PRACTITIONER

## 2021-09-16 RX ORDER — TIZANIDINE 4 MG/1
4 TABLET ORAL EVERY EVENING
Qty: 30 TABLET | Refills: 0 | Status: SHIPPED | OUTPATIENT
Start: 2021-09-16 | End: 2021-10-20

## 2021-09-16 RX ORDER — HYDROCODONE BITARTRATE AND ACETAMINOPHEN 5; 325 MG/1; MG/1
1 TABLET ORAL
Qty: 30 TABLET | Refills: 0 | Status: SHIPPED | OUTPATIENT
Start: 2021-09-16 | End: 2021-10-20 | Stop reason: SDUPTHER

## 2021-09-16 ASSESSMENT — ENCOUNTER SYMPTOMS
DIARRHEA: 0
CONSTIPATION: 0
RESPIRATORY NEGATIVE: 1
BACK PAIN: 1

## 2021-09-16 NOTE — PROGRESS NOTES
Patient: Beny May  YOB: 1975  Date: 9/16/21        Subjective:     Beny May is a 40 y.o. male who complains today of:    Chief Complaint   Patient presents with    Back Pain    Shoulder Pain     Left         Allergies:  Bupropion, Escitalopram, and Hydroxyzine    Past Medical History:   Diagnosis Date    Depression with anxiety 4/24/2017    Hyperglycemia 4/24/2017    Hyperlipidemia 2/18/2014    Insomnia     Perennial allergic rhinitis with seasonal variation 4/24/2017    Pre-diabetes 4/24/2017    Tobacco use disorder 4/24/2017    Type 2 diabetes mellitus without complication, without long-term current use of insulin [facility] 8/6/2018     Past Surgical History:   Procedure Laterality Date    NASAL FRACTURE SURGERY  2007     Family History   Problem Relation Age of Onset    High Blood Pressure Mother     High Cholesterol Mother     Cancer Father         Throat cancer - smoker    High Blood Pressure Brother     Heart Attack Maternal Grandmother 72    Diabetes Maternal Grandmother     Cancer Maternal Grandfather         unknown    No Known Problems Paternal Grandmother     No Known Problems Paternal Grandfather      Social History     Socioeconomic History    Marital status:      Spouse name: Nakul Reynolds Number of children: 3    Years of education: Not on file    Highest education level: Not on file   Occupational History    Occupation:  - industrial cleaning   Tobacco Use    Smoking status: Current Every Day Smoker     Packs/day: 0.50     Years: 23.00     Pack years: 11.50     Types: Cigarettes     Start date: 1994    Smokeless tobacco: Never Used   Vaping Use    Vaping Use: Never used   Substance and Sexual Activity    Alcohol use: No    Drug use: No    Sexual activity: Yes     Partners: Female     Comment: monogamous   Other Topics Concern    Not on file   Social History Narrative    Lives with wife and 3 children        1 dog     Social Determinants of Health     Financial Resource Strain:     Difficulty of Paying Living Expenses:    Food Insecurity:     Worried About Running Out of Food in the Last Year:     [address] in the Last Year:    Transportation Needs:     Lack of Transportation (Medical):  Lack of Transportation (Non-Medical):    Physical Activity:     Days of Exercise per Week:     Minutes of Exercise per Session:    Stress:     Feeling of Stress :    Social Connections:     Frequency of Communication with Friends and Family:     Frequency of Social Gatherings with Friends and Family:     Attends Oriental orthodox Services:     Active Member of Clubs or Organizations:     Attends Club or Organization Meetings:     Marital Status:    Intimate Partner Violence:     Fear of Current or Ex-Partner:     Emotionally Abused:     Physically Abused:     Sexually Abused:        Current Outpatient Medications on File Prior to Visit   Medication Sig Dispense Refill    lidocaine (LMX) 4 % cream Apply a half dollar sized amount to intact skin topically up to twice daily as needed for pain 1 Tube 1    olopatadine (PATANOL) 0.1 % ophthalmic solution Place 1 drop into both eyes 2 times daily 5 mL 0    montelukast (SINGULAIR) 10 MG tablet Take 1 tablet by mouth daily 30 tablet 1    ipratropium (ATROVENT) 0.06 % nasal spray 2 sprays by Nasal route 3 times daily 1 Bottle 1    metFORMIN (GLUCOPHAGE) 500 MG tablet Take 1 tablet by mouth 2 times daily (with meals) 60 tablet 5    rosuvastatin (CRESTOR) 10 MG tablet Take 1 tablet by mouth nightly 30 tablet 5    Elastic Bandages & Supports (KNEE BRACE) MISC Wear during the day over right knee as needed for support 1 each 0    loratadine (CLARITIN) 10 MG tablet Take 1 tablet by mouth daily 30 tablet 5    acetaminophen (TYLENOL) 500 MG tablet Take 1 tablet by mouth every 6 hours as needed for Pain.  60 tablet 0    naproxen sodium (ANAPROX) 550 MG tablet Take 1 tablet by mouth 2 times daily (with meals) for 14 days 28 tablet 0     No current facility-administered medications on file prior to visit. 521/2021 81year-old male follows for chronic low back pain and left shoulder pain. Patient tells me his back issue is from a work-related injury about a year and a half ago. He works as a  and it was snowy and he was lifting something and he felt a pop in his back. He has had severe pain since then that goes down his left leg. He also tells me that due to the left leg giving out on him he fell and injured his left shoulder. He is not able to lift his left arm. He has been unable to sleep due to his shoulder pain. He follows with Dr. Nallely Harris. He was having a lot of pain and called the office and given medrol dose pack which did not help. His sugars increased too. He describes his pain and left shoulder across chest and low back pain down left leg to toes. Underwent lumbar facet injections with no pain relief and left GH joint injection with greater than 50% pain relief for 2 days. Yan Martinez He takes tizanidine 2 mg at night as needed with some relief. Back Pain  Pertinent negatives include no headaches, numbness or weakness. Review of Systems   Constitutional: Negative. Negative for activity change and unexpected weight change. HENT: Negative. Negative for hearing loss. Respiratory: Negative. Cardiovascular: Negative for leg swelling. Gastrointestinal: Negative for constipation and diarrhea. Genitourinary: Negative. Musculoskeletal: Positive for back pain. Negative for gait problem, joint swelling and neck pain. Left shoulder     Skin: Negative for rash. Neurological: Negative for dizziness, weakness, numbness and headaches. Psychiatric/Behavioral: Negative. Negative for sleep disturbance.          Objective:     Vitals:  BP (!) 140/84 (Site: Left Upper Arm, Position: Sitting, Cuff Size: Medium Adult)   Temp 98 °F (36.7 °C)   Ht 5' 10\" (1.778 m)   Wt 245 lb (111.1 kg)   BMI 35.15 kg/m² Pain Score:   8    Physical Exam  Vitals reviewed. Constitutional:       Appearance: He is well-developed. HENT:      Head: Normocephalic. Nose: Nose normal.   Pulmonary:      Effort: Pulmonary effort is normal.   Musculoskeletal:      Left shoulder: Tenderness present. Decreased range of motion. Decreased strength. Cervical back: Normal range of motion and neck supple. Lumbar back: Tenderness present. Decreased range of motion. Positive left straight leg raise test.      Comments: Positive empty can left     Lymphadenopathy:      Cervical: No cervical adenopathy. Skin:     General: Skin is warm and dry. Findings: No erythema or rash. Neurological:      Mental Status: He is alert and oriented to person, place, and time. Cranial Nerves: No cranial nerve deficit. Motor: Weakness present. Gait: Gait abnormal.      Deep Tendon Reflexes: Reflexes are normal and symmetric. Reflexes normal.      Comments: Antalgic gait with cane  Difficulty getting out of chair  Spasticity with straight leg raise maneuvers seated   Psychiatric:         Mood and Affect: Mood normal.         Behavior: Behavior normal.         Thought Content: Thought content normal.         Judgment: Judgment normal.            Assessment:        Diagnosis Orders   1. Shoulder impingement, left  Urine Drug Screen    tiZANidine (ZANAFLEX) 4 MG tablet    HYDROcodone-acetaminophen (NORCO) 5-325 MG per tablet    MRI SHOULDER LEFT WO CONTRAST   2. Lumbosacral spondylosis without myelopathy  tiZANidine (ZANAFLEX) 4 MG tablet    HYDROcodone-acetaminophen (NORCO) 5-325 MG per tablet   3.  Chronic left shoulder pain  tiZANidine (ZANAFLEX) 4 MG tablet    MRI SHOULDER LEFT WO CONTRAST       Plan:     Periodic Controlled Substance Monitoring: Possible medication side effects, risk of tolerance/dependence & alternative treatments discussed., No signs of potential drug abuse or diversion identified. , Assessed functional status. Pramod Muñoz, APRN - CNP)    Orders Placed This Encounter   Medications    tiZANidine (ZANAFLEX) 4 MG tablet     Sig: Take 1 tablet by mouth every evening As needed for pain and spasms     Dispense:  30 tablet     Refill:  0    HYDROcodone-acetaminophen (NORCO) 5-325 MG per tablet     Sig: Take 1 tablet by mouth once as needed for Pain for up to 1 dose. Dispense:  30 tablet     Refill:  0     Reduce doses taken as pain becomes manageable       Orders Placed This Encounter   Procedures    MRI SHOULDER LEFT WO CONTRAST     Standing Status:   Future     Standing Expiration Date:   9/16/2022     Order Specific Question:   Reason for exam:     Answer:   eval for tear    Urine Drug Screen     Chronic pain/illicits     I explained the narcotic drug policy that he will sign today  -We will start him on Norco 1 at bedtime to help with the pain and sleep, #30, fill date 9/16/2021  -Refill tizanidine 4 mg as needed as needed for spasm, #30  -We will obtain a urine drug screen, denies illicit drugs or alcohol  -I will order an MRI left shoulder without contrast to evaluate for rotator cuff tear  -I reviewed Dr. Aldair Hall notes discussed treatment plan with him    Follow up:  Return in about 4 weeks (around 10/14/2021) for f/u after procedure and reassess pain/medications, Sign NDP.     Gilma Durham, APRN - CNP

## 2021-09-22 ENCOUNTER — TELEPHONE (OUTPATIENT)
Dept: PAIN MANAGEMENT | Age: 46
End: 2021-09-22

## 2021-09-28 NOTE — TELEPHONE ENCOUNTER
MRI LEFT SHOULDER W/O CONTRAST     AUTHORIZATION:    INSURANCE: [employer]  Judy Werner VIA: [facility] WEBSITE/PRINTED    [id number] #: [id number]    DATE RANGE: 09/24/21 TO 10/08/21    TELEPHONE CALL ROUTED TO MA TO SCHEDULE. OK to schedule procedure approved as above. Please note sides/levels approved and date range.    (If applicable, sides/levels approved may differ from those ordered)

## 2021-10-04 NOTE — TELEPHONE ENCOUNTER
NON SURGICAL BACK BRACE    CALLED PATIENT AND HE WOULD LIKE TO PUT THE BACK BRACE THROUGH HIS INSURANCE.

## 2021-10-04 NOTE — TELEPHONE ENCOUNTER
BENEFITS:    Insurance: [employer]  Phone: [phone]  Contact Name: Myrtle Esteban  Effective Date: 9/1/2020     Plan year: CAL YEAR  Deductible: $2300      Deductible Met: $934.37  Allowed/benefits paid at: 70% AFTER DED IS MET  OOP: $6700 WITH $1330.38 MET  Freq Limits: MEDICAL NECESSITY  Prior Auth Requirement: NO AUTH REQUIRED    Notes:     Call Reference #: [id number]    Time of call: 2:04PM

## 2021-10-04 NOTE — TELEPHONE ENCOUNTER
MRI LEFT SHOULDER W/O CONTRAST     C-9 FOR MRI LEFT SHOULDER W/O CONTRAST WAS EXTENDED FROM 9/24/21 TO 10/14/21

## 2021-10-04 NOTE — TELEPHONE ENCOUNTER
NON SURGICAL BACK BRACE     C-9 DENIED BY [facility] AS TREATING FOR UNALLOWED CONDITIONS IN THE CLAIM. PLEASE SEE REVIEW FOR FURTHER DENIAL. SCANNED.

## 2021-10-04 NOTE — TELEPHONE ENCOUNTER
BENEFITS CHECKED FOR     NO AUTH REQUIRED    OK TO CONTACT PATIENT FOR SCHEDULING    PLEASE NOTE THAT BRACE IS GOING THROUGH PATIENTS MEDICAL INSURANCE AND NOT [facility].

## 2021-10-04 NOTE — TELEPHONE ENCOUNTER
LEFT L3/5, L4/5, L5/S1 FACET JOINT INJ    C-9 FOR THE ABOVE TREATMENT HAS BEEN DENIED BY [facility] AS    TREATING FOR UNALLOWED CONDITIONS. REVIEW STATES CLAIM IS NOT ALLOWED FOR FACET ARTHROPATHY. ALSO STATES, MEDICAL DOESN'T SUPPORT THE NECESSITY FOR INJECTION WHICH PROVIDED NO RELIEF, BACK BRACE AS THERE IS NO FX, SURGERY OR INSTABILITY NOTED AND NO INDICATION OF ANY PAIN RADIATING DOWN THE ARMS OR NUMBNESS OR TINGLING.

## 2021-10-04 NOTE — TELEPHONE ENCOUNTER
BILATERAL UPPER EMG    C-9 FOR THE ABOVE REQUEST HAS BEEN DENIED BY THE [facility]. PLEASE SEE REVIEW FOR FURTHER DETAILS ON DENIAL. -SCANNED.

## 2021-10-04 NOTE — TELEPHONE ENCOUNTER
MRI LEFT SHOULDER W/O CONTRAST     PATIENT HAS APPT ON 10/14/21 FOR MRI LEFT SHOULDER W/O CONTRAST.      CALLED [facility]  ALLIE RAMOS [phone], FAX [phone]  LEFT A  FOR C-9 EXTENSION FOR MRI LEFT SHOULDER TO INCLUDE THE DATE OF THE MRI WHICH IS 10/14/21

## 2021-10-14 ENCOUNTER — HOSPITAL ENCOUNTER (OUTPATIENT)
Dept: MRI IMAGING | Age: 46
Discharge: HOME | End: 2021-10-16
Payer: COMMERCIAL

## 2021-10-14 DIAGNOSIS — G89.29 CHRONIC LEFT SHOULDER PAIN: ICD-10-CM

## 2021-10-14 DIAGNOSIS — M25.512 CHRONIC LEFT SHOULDER PAIN: ICD-10-CM

## 2021-10-14 DIAGNOSIS — M75.42 SHOULDER IMPINGEMENT, LEFT: ICD-10-CM

## 2021-10-14 PROCEDURE — 73221 MRI JOINT UPR EXTREM W/O DYE: CPT

## 2021-10-14 NOTE — TELEPHONE ENCOUNTER
I called the patient to schedule him a brace fitting. I left a voicemail asking patient to return our call.

## 2021-10-20 ENCOUNTER — OFFICE VISIT (OUTPATIENT)
Dept: PAIN MANAGEMENT | Age: 46
End: 2021-10-20
Payer: COMMERCIAL

## 2021-10-20 ENCOUNTER — TELEPHONE (OUTPATIENT)
Dept: PAIN MANAGEMENT | Age: 46
End: 2021-10-20

## 2021-10-20 VITALS
SYSTOLIC BLOOD PRESSURE: 180 MMHG | HEIGHT: 70 IN | BODY MASS INDEX: 35.79 KG/M2 | DIASTOLIC BLOOD PRESSURE: 80 MMHG | WEIGHT: 250 LBS | TEMPERATURE: 96.3 F

## 2021-10-20 DIAGNOSIS — M47.817 LUMBOSACRAL SPONDYLOSIS WITHOUT MYELOPATHY: ICD-10-CM

## 2021-10-20 DIAGNOSIS — M75.42 SHOULDER IMPINGEMENT, LEFT: ICD-10-CM

## 2021-10-20 DIAGNOSIS — M75.102 ROTATOR CUFF SYNDROME OF LEFT SHOULDER: Primary | ICD-10-CM

## 2021-10-20 PROCEDURE — 99213 OFFICE O/P EST LOW 20 MIN: CPT | Performed by: NURSE PRACTITIONER

## 2021-10-20 RX ORDER — HYDROCODONE BITARTRATE AND ACETAMINOPHEN 5; 325 MG/1; MG/1
1 TABLET ORAL
Qty: 30 TABLET | Refills: 0 | Status: SHIPPED | OUTPATIENT
Start: 2021-10-20 | End: 2021-10-21

## 2021-10-20 ASSESSMENT — ENCOUNTER SYMPTOMS
BACK PAIN: 1
DIARRHEA: 0
RESPIRATORY NEGATIVE: 1
CONSTIPATION: 0

## 2021-10-20 NOTE — PROGRESS NOTES
10/14/2021 MRI left shoulder report: Possible labral tear, cysts in the adjacent bony glenoid raises suspicion for true glenoid labral pathology. Possible late phase adhesive capsulitis. Impression rotator cuff peritendinosis mild subacromial subdeltoid bursitis. Glenoid labral superior signal alteration with cysts. Question late phase adhesive capsulitis. Biceps tendon nonspecific small volume of tendon sheath. Referral to Dr. Peyton Pinon for evaluation.

## 2021-10-20 NOTE — PROGRESS NOTES
Patient: Flo Cosby  YOB: 1975  Date: 10/20/21        Subjective:     Flo Cosby is a 40 y.o. male who complains today of:    Chief Complaint   Patient presents with    Follow-up    Shoulder Pain    Back Pain         Allergies:  Bupropion, Escitalopram, and Hydroxyzine    Past Medical History:   Diagnosis Date    Depression with anxiety 4/24/2017    Hyperglycemia 4/24/2017    Hyperlipidemia 2/18/2014    Insomnia     Perennial allergic rhinitis with seasonal variation 4/24/2017    Pre-diabetes 4/24/2017    Tobacco use disorder 4/24/2017    Type 2 diabetes mellitus without complication, without long-term current use of insulin ([facility] [address] 8/6/2018     Past Surgical History:   Procedure Laterality Date    NASAL FRACTURE SURGERY  2007     Family History   Problem Relation Age of Onset    High Blood Pressure Mother     High Cholesterol Mother     Cancer Father         Throat cancer - smoker    High Blood Pressure Brother     Heart Attack Maternal Grandmother 72    Diabetes Maternal Grandmother     Cancer Maternal Grandfather         unknown    No Known Problems Paternal Grandmother     No Known Problems Paternal Grandfather      Social History     Socioeconomic History    Marital status:      Spouse name: Wilberto Mathur Number of children: 3    Years of education: Not on file    Highest education level: Not on file   Occupational History    Occupation:  - industrial cleaning   Tobacco Use    Smoking status: Current Every Day Smoker     Packs/day: 0.50     Years: 23.00     Pack years: 11.50     Types: Cigarettes     Start date: 1994    Smokeless tobacco: Never Used   Vaping Use    Vaping Use: Never used   Substance and Sexual Activity    Alcohol use: No    Drug use: No    Sexual activity: Yes     Partners: Female     Comment: monogamous   Other Topics Concern    Not on file   Social History Narrative    Lives with wife and 3 children        1 dog Social Determinants of Health     Financial Resource Strain:     Difficulty of Paying Living Expenses:    Food Insecurity:     Worried About Running Out of Food in the Last Year:     [address] in the Last Year:    Transportation Needs:     Lack of Transportation (Medical):      Lack of Transportation (Non-Medical):    Physical Activity:     Days of Exercise per Week:     Minutes of Exercise per Session:    Stress:     Feeling of Stress :    Social Connections:     Frequency of Communication with Friends and Family:     Frequency of Social Gatherings with Friends and Family:     Attends Congregational Services:     Active Member of Clubs or Organizations:     Attends Club or Organization Meetings:     Marital Status:    Intimate Partner Violence:     Fear of Current or Ex-Partner:     Emotionally Abused:     Physically Abused:     Sexually Abused:        Current Outpatient Medications on File Prior to Visit   Medication Sig Dispense Refill    lidocaine (LMX) 4 % cream Apply a half dollar sized amount to intact skin topically up to twice daily as needed for pain 1 Tube 1    olopatadine (PATANOL) 0.1 % ophthalmic solution Place 1 drop into both eyes 2 times daily 5 mL 0    montelukast (SINGULAIR) 10 MG tablet Take 1 tablet by mouth daily 30 tablet 1    ipratropium (ATROVENT) 0.06 % nasal spray 2 sprays by Nasal route 3 times daily 1 Bottle 1    metFORMIN (GLUCOPHAGE) 500 MG tablet Take 1 tablet by mouth 2 times daily (with meals) 60 tablet 5    rosuvastatin (CRESTOR) 10 MG tablet Take 1 tablet by mouth nightly 30 tablet 5    Elastic Bandages & Supports (KNEE BRACE) MISC Wear during the day over right knee as needed for support 1 each 0    naproxen sodium (ANAPROX) 550 MG tablet Take 1 tablet by mouth 2 times daily (with meals) for 14 days 28 tablet 0    loratadine (CLARITIN) 10 MG tablet Take 1 tablet by mouth daily 30 tablet 5    acetaminophen (TYLENOL) 500 MG tablet Take 1 tablet by Psychiatric/Behavioral: Negative. Negative for sleep disturbance. Objective:     Vitals:  BP (!) 180/80   Temp 96.3 °F (35.7 °C)   Ht 5' 10\" (1.778 m)   Wt 250 lb (113.4 kg)   BMI 35.87 kg/m² Pain Score:   8    Physical Exam  Vitals reviewed. Constitutional:       Appearance: He is well-developed. HENT:      Head: Normocephalic. Nose: Nose normal.   Pulmonary:      Effort: Pulmonary effort is normal.   Musculoskeletal:      Left shoulder: Tenderness present. Decreased range of motion. Cervical back: Normal range of motion and neck supple. Lymphadenopathy:      Cervical: No cervical adenopathy. Skin:     General: Skin is warm and dry. Findings: No erythema or rash. Neurological:      Mental Status: He is alert and oriented to person, place, and time. Cranial Nerves: No cranial nerve deficit. Deep Tendon Reflexes: Reflexes are normal and symmetric. Reflexes normal.   Psychiatric:         Mood and Affect: Mood normal.         Behavior: Behavior normal.         Thought Content: Thought content normal.         Judgment: Judgment normal.            Assessment:        Diagnosis Orders   1. Shoulder impingement, left  HYDROcodone-acetaminophen (NORCO) 5-325 MG per tablet   2. Lumbosacral spondylosis without myelopathy  HYDROcodone-acetaminophen (NORCO) 5-325 MG per tablet       Plan:     Periodic Controlled Substance Monitoring: Possible medication side effects, risk of tolerance/dependence & alternative treatments discussed., No signs of potential drug abuse or diversion identified. Cristal Richardson, CHELI - CNP)    Orders Placed This Encounter   Medications    HYDROcodone-acetaminophen (NORCO) 5-325 MG per tablet     Sig: Take 1 tablet by mouth once as needed for Pain for up to 1 dose.      Dispense:  30 tablet     Refill:  0     Reduce doses taken as pain becomes manageable     -Refill Norco 5 mg p.o. daily as needed, #30  -Discontinue tizanidine per patient

## 2021-10-20 NOTE — TELEPHONE ENCOUNTER
Pharmacy called needing clarification on patient's norco prescription. Quantity is set for 30 tablets but instructions state \"Take 1 tablet by mouth once as needed for Pain for up to 1 dose. \"    Does Dr. Malika Whitehead want the quantity to be changed to 1 tablet or for it to be filled for 30 days?

## 2021-10-21 RX ORDER — HYDROCODONE BITARTRATE AND ACETAMINOPHEN 5; 325 MG/1; MG/1
1 TABLET ORAL DAILY PRN
Qty: 30 TABLET | Refills: 0 | Status: SHIPPED | OUTPATIENT
Start: 2021-10-21 | End: 2021-10-22 | Stop reason: SDUPTHER

## 2021-10-21 NOTE — TELEPHONE ENCOUNTER
Re ordered    Controlled Substance Monitoring:    Acute and Chronic Pain Monitoring:   RX Monitoring 10/21/2021   Periodic Controlled Substance Monitoring Obtaining appropriate analgesic effect of treatment.    Chronic Pain > 50 MEDD -

## 2021-10-22 DIAGNOSIS — M47.817 LUMBOSACRAL SPONDYLOSIS WITHOUT MYELOPATHY: ICD-10-CM

## 2021-10-22 DIAGNOSIS — M75.42 SHOULDER IMPINGEMENT, LEFT: ICD-10-CM

## 2021-10-22 RX ORDER — HYDROCODONE BITARTRATE AND ACETAMINOPHEN 5; 325 MG/1; MG/1
1 TABLET ORAL DAILY PRN
Qty: 30 TABLET | Refills: 0 | Status: SHIPPED | OUTPATIENT
Start: 2021-10-22 | End: 2021-11-17 | Stop reason: SDUPTHER

## 2021-10-22 RX ORDER — HYDROCODONE BITARTRATE AND ACETAMINOPHEN 5; 325 MG/1; MG/1
1 TABLET ORAL DAILY PRN
Qty: 30 TABLET | Refills: 0 | Status: CANCELLED | OUTPATIENT
Start: 2021-10-22 | End: 2021-11-21

## 2021-10-22 NOTE — TELEPHONE ENCOUNTER
Requested Prescriptions     Pending Prescriptions Disp Refills    HYDROcodone-acetaminophen (NORCO) 5-325 MG per tablet 30 tablet 0     Sig: Take 1 tablet by mouth daily as needed for Pain for up to 30 days.        WILL DR Jared Beltran SEND TO PHARMACY

## 2021-10-22 NOTE — TELEPHONE ENCOUNTER
sent as e Rx    Controlled Substance Monitoring:    Acute and Chronic Pain Monitoring:   RX Monitoring 10/22/2021   Periodic Controlled Substance Monitoring Obtaining appropriate analgesic effect of treatment.    Chronic Pain > 50 MEDD -

## 2021-10-27 NOTE — TELEPHONE ENCOUNTER
I called patient to let him know that someone can fit him this Friday. Patient thought we agreed for next Friday. I scheduled patient's fitting for next Friday, 11/05/21 at 10am with Steffi CARR. I sent Tejas Dumont an email to make her aware.

## 2021-10-27 NOTE — TELEPHONE ENCOUNTER
I called patient to schedule him a brace fitting. Patient stated that he was at our facility the other day but no one was available to fit the back brace. Patient requested Thursday the 28th or Friday the 29th to be fitted. I emailed Damion CARR to ask if she is available to fit patient.

## 2021-11-08 ENCOUNTER — TELEPHONE (OUTPATIENT)
Dept: PAIN MANAGEMENT | Age: 46
End: 2021-11-08

## 2021-11-08 ENCOUNTER — OFFICE VISIT (OUTPATIENT)
Dept: ORTHOPEDIC SURGERY | Age: 46
End: 2021-11-08
Payer: COMMERCIAL

## 2021-11-08 ENCOUNTER — OFFICE VISIT (OUTPATIENT)
Dept: PULMONOLOGY | Age: 46
End: 2021-11-08
Payer: COMMERCIAL

## 2021-11-08 VITALS
WEIGHT: 245 LBS | DIASTOLIC BLOOD PRESSURE: 93 MMHG | SYSTOLIC BLOOD PRESSURE: 154 MMHG | TEMPERATURE: 98.2 F | BODY MASS INDEX: 35.07 KG/M2 | HEART RATE: 66 BPM | OXYGEN SATURATION: 97 % | HEIGHT: 70 IN

## 2021-11-08 VITALS
OXYGEN SATURATION: 98 % | BODY MASS INDEX: 35.07 KG/M2 | HEIGHT: 70 IN | HEART RATE: 64 BPM | TEMPERATURE: 97 F | WEIGHT: 245 LBS

## 2021-11-08 DIAGNOSIS — E66.9 OBESITY (BMI 30-39.9): ICD-10-CM

## 2021-11-08 DIAGNOSIS — M75.02 ADHESIVE CAPSULITIS OF LEFT SHOULDER: Primary | ICD-10-CM

## 2021-11-08 DIAGNOSIS — G47.33 OBSTRUCTIVE SLEEP APNEA: Primary | ICD-10-CM

## 2021-11-08 PROCEDURE — 99213 OFFICE O/P EST LOW 20 MIN: CPT | Performed by: INTERNAL MEDICINE

## 2021-11-08 PROCEDURE — 99215 OFFICE O/P EST HI 40 MIN: CPT | Performed by: ORTHOPAEDIC SURGERY

## 2021-11-08 RX ORDER — ROSUVASTATIN CALCIUM 10 MG/1
TABLET, COATED ORAL
COMMUNITY

## 2021-11-08 RX ORDER — METFORMIN HYDROCHLORIDE 500 MG/1
TABLET, EXTENDED RELEASE ORAL
COMMUNITY
Start: 2021-09-23

## 2021-11-08 RX ORDER — BUPROPION HYDROCHLORIDE 100 MG/1
TABLET, EXTENDED RELEASE ORAL
COMMUNITY
Start: 2021-11-04

## 2021-11-08 RX ORDER — OLMESARTAN MEDOXOMIL 20 MG/1
TABLET ORAL
COMMUNITY
Start: 2021-10-21

## 2021-11-08 RX ORDER — AZELASTINE 1 MG/ML
SPRAY, METERED NASAL
COMMUNITY
Start: 2021-11-04

## 2021-11-08 RX ORDER — ALPRAZOLAM 0.5 MG/1
TABLET ORAL
COMMUNITY
Start: 2021-10-27

## 2021-11-08 RX ORDER — DEXAMETHASONE 0.5 MG/1
0.5 TABLET ORAL EVERY 6 HOURS
Qty: 40 TABLET | Refills: 0 | Status: SHIPPED | OUTPATIENT
Start: 2021-11-08 | End: 2021-11-18

## 2021-11-08 RX ORDER — CLONIDINE HYDROCHLORIDE 0.1 MG/1
TABLET ORAL
COMMUNITY
Start: 2021-10-05

## 2021-11-08 ASSESSMENT — ENCOUNTER SYMPTOMS
CHEST TIGHTNESS: 0
DIARRHEA: 0
COUGH: 0
VOMITING: 0
ABDOMINAL PAIN: 0
RHINORRHEA: 0
SORE THROAT: 0
SHORTNESS OF BREATH: 0
WHEEZING: 0
NAUSEA: 0
VOICE CHANGE: 0
EYE ITCHING: 0

## 2021-11-08 NOTE — PROGRESS NOTES
Subjective:      Patient ID: Michael Howe is a 40 y.o. male who presents today for:  Chief Complaint   Patient presents with    Shoulder Pain     left shoulder pain. Pt states his shoulder gets numb. Pt states pain is 8/10. MRI done 10/14/2021. HPI  Patient is been dealing with pain and discomfort in his left shoulder for approximately 8 months. States that he has baseline back problems and his left leg gave out and he caught himself in his left shoulder which resulted in pain in that region. Patient been doing a home therapy exercise regimen as well as injections in the left shoulder and has had not much relief in that regard. Patient states the pain is essentially present all the time with radiation of the pain into his hand on occasion.     Past Medical History:   Diagnosis Date    Depression with anxiety 4/24/2017    Hyperglycemia 4/24/2017    Hyperlipidemia 2/18/2014    Insomnia     Perennial allergic rhinitis with seasonal variation 4/24/2017    Pre-diabetes 4/24/2017    Tobacco use disorder 4/24/2017    Type 2 diabetes mellitus without complication, without long-term current use of insulin ([facility] [address] 8/6/2018      Past Surgical History:   Procedure Laterality Date    NASAL FRACTURE SURGERY  2007     Social History     Socioeconomic History    Marital status:      Spouse name: Riri Jimenez Number of children: 3    Years of education: Not on file    Highest education level: Not on file   Occupational History    Occupation:  - industrial cleaning   Tobacco Use    Smoking status: Current Every Day Smoker     Packs/day: 0.50     Years: 23.00     Pack years: 11.50     Types: Cigarettes     Start date: 1994    Smokeless tobacco: Never Used   Vaping Use    Vaping Use: Never used   Substance and Sexual Activity    Alcohol use: No    Drug use: No    Sexual activity: Yes     Partners: Female     Comment: monogamous   Other Topics Concern    Not on file   Social History Narrative    Lives with wife and 3 children        1 dog     Social Determinants of Health     Financial Resource Strain:     Difficulty of Paying Living Expenses: Not on file   Food Insecurity:     Worried About Running Out of Food in the Last Year: Not on file    Nicolás of Food in the Last Year: Not on file   Transportation Needs:     Lack of Transportation (Medical): Not on file    Lack of Transportation (Non-Medical):  Not on file   Physical Activity:     Days of Exercise per Week: Not on file    Minutes of Exercise per Session: Not on file   Stress:     Feeling of Stress : Not on file   Social Connections:     Frequency of Communication with Friends and Family: Not on file    Frequency of Social Gatherings with Friends and Family: Not on file    Attends Christianity Services: Not on file    Active Member of [address] [place] [address] [employer] or Organizations: Not on file    Attends Club or Organization Meetings: Not on file    Marital Status: Not on file   Intimate Partner Violence:     Fear of Current or Ex-Partner: Not on file    Emotionally Abused: Not on file    Physically Abused: Not on file    Sexually Abused: Not on file   Housing Stability:     Unable to Pay for Housing in the Last Year: Not on file    Number of [place] in the Last Year: Not on file    Unstable Housing in the Last Year: Not on file     Family History   Problem Relation Age of Onset    High Blood Pressure Mother     High Cholesterol Mother     Cancer Father         Throat cancer - smoker    High Blood Pressure Brother     Heart Attack Maternal Grandmother 72    Diabetes Maternal Grandmother     Cancer Maternal Grandfather         unknown    No Known Problems Paternal Grandmother     No Known Problems Paternal Grandfather      Allergies   Allergen Reactions    Bupropion Other (See Comments)    Escitalopram Other (See Comments)    Hydroxyzine Other (See Comments)     Current Outpatient Medications on File Prior to Visit   Medication Sig Dispense Refill    Respiratory Therapy Supplies SHELBY New Full face CPAP  Mask. 1 each 0    ALPRAZolam (XANAX) 0.5 MG tablet TAKE ONE TABLET BY MOUTH THREE TIMES A DAY FOR PANIC  ANXIETY  AS NEEDED      azelastine (ASTELIN) 0.1 % nasal spray INSTILL TWO SPRAYS PER NOSTRIL TWICE DAILY AS NEEDED      buPROPion (WELLBUTRIN SR) 100 MG extended release tablet       cloNIDine (CATAPRES) 0.1 MG tablet TAKE ONE TABLET BY MOUTH THREE TIMES A DAY AS NEEDED for anxiety  high blood pressure      metFORMIN (GLUCOPHAGE-XR) 500 MG extended release tablet TAKE ONE TABLET BY MOUTH ONCE DAILY WITH THE EVENING MEAL      olmesartan (BENICAR) 20 MG tablet TAKE ONE TABLET BY MOUTH EVERY DAY      rosuvastatin (CRESTOR) 10 MG tablet Take by mouth      HYDROcodone-acetaminophen (NORCO) 5-325 MG per tablet Take 1 tablet by mouth daily as needed for Pain for up to 30 days. 30 tablet 0    lidocaine (LMX) 4 % cream Apply a half dollar sized amount to intact skin topically up to twice daily as needed for pain 1 Tube 1    olopatadine (PATANOL) 0.1 % ophthalmic solution Place 1 drop into both eyes 2 times daily 5 mL 0    montelukast (SINGULAIR) 10 MG tablet Take 1 tablet by mouth daily 30 tablet 1    ipratropium (ATROVENT) 0.06 % nasal spray 2 sprays by Nasal route 3 times daily 1 Bottle 1    Elastic Bandages & Supports (KNEE BRACE) MISC Wear during the day over right knee as needed for support 1 each 0    loratadine (CLARITIN) 10 MG tablet Take 1 tablet by mouth daily 30 tablet 5    acetaminophen (TYLENOL) 500 MG tablet Take 1 tablet by mouth every 6 hours as needed for Pain. 60 tablet 0     No current facility-administered medications on file prior to visit.          Review of Systems      Objective:   Pulse 64   Temp 97 °F (36.1 °C)   Ht 5' 10\" (1.778 m)   Wt 245 lb (111.1 kg)   SpO2 98%   BMI 35.15 kg/m²     Ortho Exam  General: Well-appearing obese male who appears their stated age  Left upper extremity:  Skin: Intact circumferentially, no swelling, ecchymosis or edema is appreciated  Neuro: Sensation intact light touch in the radial, ulnar and median nerve distribution. Able to perform all cardinal hand movements. Vascular: Strong palpable radial pulse, brisk cap refill in all digits. MSK: Patient globally tender palpation about the left shoulder girdle most so anteriorly and about the left AC joint. Patient has pain with terminal extents of internal rotation. Patient has restricted motion appreciated in all planes most noted in flexion as well as external rotation of the shoulder. Strength is present with the arm at the side and assessing for subscapularis, supra and infraspinatus musculature. Radiographs and Laboratory Studies:     Diagnostic Imaging Studies:    MRI of the left shoulder was independently reviewed    Findings are consistent with tendinopathy of the rotator cuff tendon as well as mild degenerative changes appreciated about the labrum with small cystic formation appreciated. No evidence of acute rupture the rotator cuff or significant degeneration of the glenohumeral joint    Laboratory Studies:   Lab Results   Component Value Date    WBC 7.1 08/16/2021    HGB 14.7 08/16/2021    HCT 44.3 08/16/2021    MCV 85.7 08/16/2021     08/16/2021     No results found for: SEDRATE  No results found for: CRP    Assessment:       Diagnosis Orders   1. Adhesive capsulitis of left shoulder  Ambulatory referral to Physical Therapy          Plan:     Patient with adhesive capsulitis of the left shoulder that is limiting his full recovery. This combined with his diabetes is likely the cause for his ongoing shoulder stiffness. Recommend a course of physical therapy to focus on range of motion of the left shoulder with a brief course of steroid medication to decrease inflammation in his left shoulder region.   If after 4 weeks of structured physical therapy patient does not experience any significantly for discussed possible surgical intervention in the form of debridement of the left shoulder however there is no obvious mechanical cause for patient's symptoms at this time. Orders Placed This Encounter   Procedures    Ambulatory referral to Physical Therapy     Referral Priority:   Routine     Referral Type:   Eval and Treat     Referral Reason:   Specialty Services Required     Requested Specialty:   Physical Therapy     Number of Visits Requested:   1     Orders Placed This Encounter   Medications    dexamethasone (DECADRON) 0.5 MG tablet     Sig: Take 1 tablet by mouth every 6 hours for 10 days     Dispense:  40 tablet     Refill:  0       No follow-ups on file.       Joaquin Dolan MD

## 2021-11-08 NOTE — PROGRESS NOTES
Subjective:     Dominic Blackmon is a 40 y.o. male who complains today of:     Chief Complaint   Patient presents with    Sleep Apnea     3 month f/u  needs new face mask for c pap sent to [address] [place] [address]       HPI  He has home sleep study done . Sleep study by Bobby does not have CPAP titration study. He  will provide copy of HST. I do not have copy. He said he received CPAP from [facility] from his last visit . He was seen by dr. Justin Cruz   He said he need full face mask and he is not using CPAP. He received CPAP  In aug 2021 and used for only 3 days. He is complaining of snoring and daytime sleepiness and tiredness. No C/o witness apnea. He wakes up with gasping for air. C/o wakes up frequently during sleep. No complaint of morning headache. He does not have restful sleep. He does not take daily naps. He does not fall asleep while watching TV. He does not have a complaint of sleepiness while driving.     Allergies:  Bupropion, Escitalopram, and Hydroxyzine  Past Medical History:   Diagnosis Date    Depression with anxiety 4/24/2017    Hyperglycemia 4/24/2017    Hyperlipidemia 2/18/2014    Insomnia     Perennial allergic rhinitis with seasonal variation 4/24/2017    Pre-diabetes 4/24/2017    Tobacco use disorder 4/24/2017    Type 2 diabetes mellitus without complication, without long-term current use of insulin ([facility] [address] 8/6/2018     Past Surgical History:   Procedure Laterality Date    NASAL FRACTURE SURGERY  2007     Family History   Problem Relation Age of Onset    High Blood Pressure Mother     High Cholesterol Mother     Cancer Father         Throat cancer - smoker    High Blood Pressure Brother     Heart Attack Maternal Grandmother 72    Diabetes Maternal Grandmother     Cancer Maternal Grandfather         unknown    No Known Problems Paternal Grandmother     No Known Problems Paternal Grandfather      Social History     Socioeconomic History    Marital status:      Spouse name: Robina    Number of children: 3    Years of education: Not on file    Highest education level: Not on file   Occupational History    Occupation:  - industrial cleaning   Tobacco Use    Smoking status: Current Every Day Smoker     Packs/day: 0.50     Years: 23.00     Pack years: 11.50     Types: Cigarettes     Start date: 1994    Smokeless tobacco: Never Used   Vaping Use    Vaping Use: Never used   Substance and Sexual Activity    Alcohol use: No    Drug use: No    Sexual activity: Yes     Partners: Female     Comment: monogamous   Other Topics Concern    Not on file   Social History Narrative    Lives with wife and 3 children        1 dog     Social Determinants of Health     Financial Resource Strain:     Difficulty of Paying Living Expenses: Not on file   Food Insecurity:     Worried About Running Out of Food in the Last Year: Not on file    Nicolás of Food in the Last Year: Not on file   Transportation Needs:     Lack of Transportation (Medical): Not on file    Lack of Transportation (Non-Medical):  Not on file   Physical Activity:     Days of Exercise per Week: Not on file    Minutes of Exercise per Session: Not on file   Stress:     Feeling of Stress : Not on file   Social Connections:     Frequency of Communication with Friends and Family: Not on file    Frequency of Social Gatherings with Friends and Family: Not on file    Attends Hindu Services: Not on file    Active Member of [address] [place] [address] or Organizations: Not on file    Attends Club or Organization Meetings: Not on file    Marital Status: Not on file   Intimate Partner Violence:     Fear of Current or Ex-Partner: Not on file    Emotionally Abused: Not on file    Physically Abused: Not on file    Sexually Abused: Not on file   Housing Stability:     Unable to Pay for Housing in the Last Year: Not on file    Number of [place] in the Last Year: Not on file    Unstable Housing in the Last Year: Not on file Review of Systems   Constitutional: Negative for chills, diaphoresis, fatigue and fever. HENT: Negative for congestion, mouth sores, nosebleeds, postnasal drip, rhinorrhea, sneezing, sore throat and voice change. Snoring    Eyes: Negative for itching and visual disturbance. Respiratory: Negative for cough, chest tightness, shortness of breath and wheezing. Cardiovascular: Negative. Negative for chest pain, palpitations and leg swelling. Gastrointestinal: Negative for abdominal pain, diarrhea, nausea and vomiting. Genitourinary: Negative for difficulty urinating and hematuria. Musculoskeletal: Negative for arthralgias, joint swelling and myalgias. Skin: Negative for rash. Allergic/Immunologic: Negative for environmental allergies. Neurological: Negative for dizziness, tremors, weakness and headaches. Psychiatric/Behavioral: Positive for sleep disturbance. Negative for behavioral problems. :     Vitals:    11/08/21 1000 11/08/21 1003   BP: (!) 154/86 (!) 154/93   Site: Right Upper Arm Left Upper Arm   Position: Sitting Sitting   Cuff Size: Large Adult Large Adult   Pulse: 66    Temp: 98.2 °F (36.8 °C)    SpO2: 97%    Weight: 245 lb (111.1 kg)    Height: 5' 10\" (1.778 m)      Wt Readings from Last 3 Encounters:   11/17/21 245 lb (111.1 kg)   11/08/21 245 lb (111.1 kg)   11/08/21 245 lb (111.1 kg)         Physical Exam  Constitutional:       Appearance: He is well-developed. He is obese. HENT:      Head: Normocephalic and atraumatic. Nose: Nose normal.   Eyes:      Conjunctiva/sclera: Conjunctivae normal.      Pupils: Pupils are equal, round, and reactive to light. Neck:      Thyroid: No thyromegaly. Vascular: No JVD. Trachea: No tracheal deviation. Cardiovascular:      Rate and Rhythm: Normal rate and regular rhythm. Heart sounds: No murmur heard. No friction rub. No gallop.     Pulmonary:      Effort: Pulmonary effort is normal. No respiratory distress. Breath sounds: Normal breath sounds. No wheezing or rales. Chest:      Chest wall: No tenderness. Abdominal:      General: There is no distension. Musculoskeletal:         General: Normal range of motion. Lymphadenopathy:      Cervical: No cervical adenopathy. Skin:     General: Skin is warm and dry. Findings: No rash. Neurological:      Mental Status: He is alert and oriented to person, place, and time. Cranial Nerves: No cranial nerve deficit. Psychiatric:         Behavior: Behavior normal.         Current Outpatient Medications   Medication Sig Dispense Refill    Respiratory Therapy Supplies SHELBY New Full face CPAP  Mask. 1 each 0    lidocaine (LMX) 4 % cream Apply a half dollar sized amount to intact skin topically up to twice daily as needed for pain 1 Tube 1    olopatadine (PATANOL) 0.1 % ophthalmic solution Place 1 drop into both eyes 2 times daily 5 mL 0    montelukast (SINGULAIR) 10 MG tablet Take 1 tablet by mouth daily 30 tablet 1    ipratropium (ATROVENT) 0.06 % nasal spray 2 sprays by Nasal route 3 times daily 1 Bottle 1    Elastic Bandages & Supports (KNEE BRACE) MISC Wear during the day over right knee as needed for support 1 each 0    loratadine (CLARITIN) 10 MG tablet Take 1 tablet by mouth daily 30 tablet 5    acetaminophen (TYLENOL) 500 MG tablet Take 1 tablet by mouth every 6 hours as needed for Pain. 60 tablet 0    HYDROcodone-acetaminophen (NORCO) 5-325 MG per tablet Take 1 tablet by mouth daily as needed for Pain for up to 30 days.  30 tablet 0    ALPRAZolam (XANAX) 0.5 MG tablet TAKE ONE TABLET BY MOUTH THREE TIMES A DAY FOR PANIC  ANXIETY  AS NEEDED      azelastine (ASTELIN) 0.1 % nasal spray INSTILL TWO SPRAYS PER NOSTRIL TWICE DAILY AS NEEDED      buPROPion (WELLBUTRIN SR) 100 MG extended release tablet       cloNIDine (CATAPRES) 0.1 MG tablet TAKE ONE TABLET BY MOUTH THREE TIMES A DAY AS NEEDED for anxiety  high blood pressure     

## 2021-11-10 NOTE — TELEPHONE ENCOUNTER
Patient missed his previous brace fitting apt. I called and spoke to patient. He thought his brace was approved through [employer]. I explained that Mckenzie Castillo did not approve the brace and a previous message stated that he wanted the brace to go through his insurance. Patient agreed. I explained that his insurance pays 70% after his ded is met and as of a month ago his ded had not been met. Patient scheduled his brace fitting for this Friday, 11/12/21 at [address] [place] [address] with Aquiles Maldonado.

## 2021-11-15 DIAGNOSIS — M75.42 SHOULDER IMPINGEMENT, LEFT: ICD-10-CM

## 2021-11-15 DIAGNOSIS — M47.817 LUMBOSACRAL SPONDYLOSIS WITHOUT MYELOPATHY: ICD-10-CM

## 2021-11-15 NOTE — TELEPHONE ENCOUNTER
Requested Prescriptions     Pending Prescriptions Disp Refills    HYDROcodone-acetaminophen (NORCO) 5-325 MG per tablet 30 tablet 0     Sig: Take 1 tablet by mouth daily as needed for Pain for up to 30 days.        PATIENT WAS RESCHEDULED PER DR WILSON'S REQUEST , PATIENT STATED HE IS IN A LOT OF PAIN WITH HIS BACK AND SHOULDER

## 2021-11-17 ENCOUNTER — OFFICE VISIT (OUTPATIENT)
Dept: PODIATRY | Age: 46
End: 2021-11-17
Payer: COMMERCIAL

## 2021-11-17 VITALS — WEIGHT: 245 LBS | TEMPERATURE: 97.3 F | HEIGHT: 70 IN | BODY MASS INDEX: 35.07 KG/M2

## 2021-11-17 DIAGNOSIS — M21.622 TAILOR'S BUNIONETTE, BILATERAL: ICD-10-CM

## 2021-11-17 DIAGNOSIS — M21.372 FOOT DROP, LEFT: ICD-10-CM

## 2021-11-17 DIAGNOSIS — M79.671 PAIN IN BOTH FEET: Primary | ICD-10-CM

## 2021-11-17 DIAGNOSIS — M20.12 HALLUX ABDUCTO VALGUS, BILATERAL: ICD-10-CM

## 2021-11-17 DIAGNOSIS — M79.672 PAIN IN BOTH FEET: Primary | ICD-10-CM

## 2021-11-17 DIAGNOSIS — M21.621 TAILOR'S BUNIONETTE, BILATERAL: ICD-10-CM

## 2021-11-17 DIAGNOSIS — M20.11 HALLUX ABDUCTO VALGUS, BILATERAL: ICD-10-CM

## 2021-11-17 DIAGNOSIS — E11.9 TYPE 2 DIABETES MELLITUS WITHOUT COMPLICATION, WITHOUT LONG-TERM CURRENT USE OF INSULIN (HCC): ICD-10-CM

## 2021-11-17 DIAGNOSIS — G57.92 NEURITIS OF LEFT FOOT: ICD-10-CM

## 2021-11-17 PROCEDURE — 99213 OFFICE O/P EST LOW 20 MIN: CPT | Performed by: PODIATRIST

## 2021-11-17 RX ORDER — HYDROCODONE BITARTRATE AND ACETAMINOPHEN 5; 325 MG/1; MG/1
1 TABLET ORAL DAILY PRN
Qty: 30 TABLET | Refills: 0 | Status: SHIPPED | OUTPATIENT
Start: 2021-11-21 | End: 2022-01-17 | Stop reason: SDUPTHER

## 2021-11-17 ASSESSMENT — ENCOUNTER SYMPTOMS
SHORTNESS OF BREATH: 0
NAUSEA: 0
BACK PAIN: 1
VOMITING: 0

## 2021-11-18 ENCOUNTER — TELEPHONE (OUTPATIENT)
Dept: PODIATRY | Age: 46
End: 2021-11-18

## 2021-11-18 NOTE — PROGRESS NOTES
[facility] [address]  [address]  Dept: [phone]  Loc: [phone]       Rekha Mckeon  (1975)    11/17/21    Subjective     Rekha Mckeon is 40 y.o. male who complains today of:    Chief Complaint   Patient presents with    Foot Pain     Left       HPI: Patient presents for follow-up for continued left foot pain. Patient states that he did receive the previously prescribed topical pain medication, he finds a offers temporary relief when using it. Patient states that he did not get fitted for diabetic shoes, he had difficulty contacting the specialty shoe store. Patient also states that the  did not call him about the foot drop brace. Patient has no new pedal complaints. Review of Systems   Constitutional: Negative for chills and fever. HENT: Negative for hearing loss. Respiratory: Negative for shortness of breath. Cardiovascular: Negative for chest pain. Gastrointestinal: Negative for nausea and vomiting. Genitourinary: Negative for difficulty urinating. Musculoskeletal: Positive for back pain and gait problem. Skin: Negative for wound. Neurological: Negative for numbness. Hematological: Bruises/bleeds easily. Psychiatric/Behavioral: Positive for sleep disturbance. The patient is a diabetic. PCP/ Endocrinologist: Abbie Whitt DO   Date last seen: November 3, 2021    Allergies:  Bupropion, Escitalopram, and Hydroxyzine    Current Outpatient Medications on File Prior to Visit   Medication Sig Dispense Refill    [START ON 11/21/2021] HYDROcodone-acetaminophen (NORCO) 5-325 MG per tablet Take 1 tablet by mouth daily as needed for Pain for up to 30 days. 30 tablet 0    Respiratory Therapy Supplies SHELBY New Full face CPAP  Mask.  1 each 0    ALPRAZolam (XANAX) 0.5 MG tablet TAKE ONE TABLET BY MOUTH THREE TIMES A DAY FOR PANIC  ANXIETY  AS NEEDED  azelastine (ASTELIN) 0.1 % nasal spray INSTILL TWO SPRAYS PER NOSTRIL TWICE DAILY AS NEEDED      buPROPion (WELLBUTRIN SR) 100 MG extended release tablet       cloNIDine (CATAPRES) 0.1 MG tablet TAKE ONE TABLET BY MOUTH THREE TIMES A DAY AS NEEDED for anxiety  high blood pressure      metFORMIN (GLUCOPHAGE-XR) 500 MG extended release tablet TAKE ONE TABLET BY MOUTH ONCE DAILY WITH THE EVENING MEAL      olmesartan (BENICAR) 20 MG tablet TAKE ONE TABLET BY MOUTH EVERY DAY      rosuvastatin (CRESTOR) 10 MG tablet Take by mouth      dexamethasone (DECADRON) 0.5 MG tablet Take 1 tablet by mouth every 6 hours for 10 days 40 tablet 0    lidocaine (LMX) 4 % cream Apply a half dollar sized amount to intact skin topically up to twice daily as needed for pain 1 Tube 1    olopatadine (PATANOL) 0.1 % ophthalmic solution Place 1 drop into both eyes 2 times daily 5 mL 0    montelukast (SINGULAIR) 10 MG tablet Take 1 tablet by mouth daily 30 tablet 1    ipratropium (ATROVENT) 0.06 % nasal spray 2 sprays by Nasal route 3 times daily 1 Bottle 1    Elastic Bandages & Supports (KNEE BRACE) MISC Wear during the day over right knee as needed for support 1 each 0    loratadine (CLARITIN) 10 MG tablet Take 1 tablet by mouth daily 30 tablet 5    acetaminophen (TYLENOL) 500 MG tablet Take 1 tablet by mouth every 6 hours as needed for Pain. 60 tablet 0     No current facility-administered medications on file prior to visit.        Past Medical History:   Diagnosis Date    Depression with anxiety 4/24/2017    Hyperglycemia 4/24/2017    Hyperlipidemia 2/18/2014    Insomnia     Perennial allergic rhinitis with seasonal variation 4/24/2017    Pre-diabetes 4/24/2017    Tobacco use disorder 4/24/2017    Type 2 diabetes mellitus without complication, without long-term current use of insulin ([facility] [address] 8/6/2018     Past Surgical History:   Procedure Laterality Date    NASAL FRACTURE SURGERY  2007     Social History Socioeconomic History    Marital status:      Spouse name: Alexsander Martinez Number of children: 3    Years of education: Not on file    Highest education level: Not on file   Occupational History    Occupation:  - industrial cleaning   Tobacco Use    Smoking status: Current Every Day Smoker     Packs/day: 0.50     Years: 23.00     Pack years: 11.50     Types: Cigarettes     Start date: 1994    Smokeless tobacco: Never Used   Vaping Use    Vaping Use: Never used   Substance and Sexual Activity    Alcohol use: No    Drug use: No    Sexual activity: Yes     Partners: Female     Comment: monogamous   Other Topics Concern    Not on file   Social History Narrative    Lives with wife and 3 children        1 dog     Social Determinants of Health     Financial Resource Strain:     Difficulty of Paying Living Expenses: Not on file   Food Insecurity:     Worried About Running Out of Food in the Last Year: Not on file    Nicolás of Food in the Last Year: Not on file   Transportation Needs:     Lack of Transportation (Medical): Not on file    Lack of Transportation (Non-Medical):  Not on file   Physical Activity:     Days of Exercise per Week: Not on file    Minutes of Exercise per Session: Not on file   Stress:     Feeling of Stress : Not on file   Social Connections:     Frequency of Communication with Friends and Family: Not on file    Frequency of Social Gatherings with Friends and Family: Not on file    Attends Restorationist Services: Not on file    Active Member of Clubs or Organizations: Not on file    Attends Club or Organization Meetings: Not on file    Marital Status: Not on file   Intimate Partner Violence:     Fear of Current or Ex-Partner: Not on file    Emotionally Abused: Not on file    Physically Abused: Not on file    Sexually Abused: Not on file   Housing Stability:     Unable to Pay for Housing in the Last Year: Not on file    Number of [place] in the Last Year: Not on file    Unstable Housing in the Last Year: Not on file     Family History   Problem Relation Age of Onset    High Blood Pressure Mother     High Cholesterol Mother     Cancer Father         Throat cancer - smoker    High Blood Pressure Brother     Heart Attack Maternal Grandmother 72    Diabetes Maternal Grandmother     Cancer Maternal Grandfather         unknown    No Known Problems Paternal Grandmother     No Known Problems Paternal Grandfather            Objective:   Vitals:  Temp 97.3 °F (36.3 °C)   Ht 5' 10\" (1.778 m)   Wt 245 lb (111.1 kg)   BMI 35.15 kg/m² Pain Score:   6      Physical Exam  Feet:      Comments:     Vascular:     Dorsalis pedis pulse is palpable bilateral foot. Posterior tibial pulse is palpable bilateral foot. Capillary refill is immediate bilateral hallux. There are no varicosities noted bilaterally. There are no telangiectasia noted bilaterally. Skin temperature gradient is noted to be warm to warm bilaterally. There are no signs of ischemia or skin atrophy noted bilaterally. Hair growth is present bilaterally.     Lymphatic:    The popliteal lymph nodes are soft and nontender bilateral lower extremity. There is no edema noted to the bilateral lower extremity.     Neurological:     Light touch sensation is altered but intact to the left foot; protective sensation is intact bilaterally. Vibratory sensation is intact bilaterally. Hot versus cold discrimination is diminished to the left foot bilaterally. Sharp versus dull discrimination is diminished to the left foot bilaterally. Patellar deep tendon reflex is graded at 0/4 bilaterally. Achilles tendon deep tendon reflex is graded at 0/4 bilaterally. The Babinski response is negative bilaterally. No ankle clonus is noted bilaterally.     Dermatological:    No open lesions noted bilateral foot. Cicatrix noted to the dorsal and plantar right foot at the first metatarsal interspace.   The toenails are within normal limits and are well groomed bilaterally. Focal hyperkeratotic lesions noted: Lateral aspect of the fifth toe at the PIPJ bilateral foot, medial aspect of the bilateral hallux at the IPJ. Normal skin turgor noted bilaterally. Webspace 1-4 is dry and intact bilateral foot.     Musculoskeletal:    Planus foot type noted bilaterally. Manual muscle strength is graded at 5/5 for all groups tested right foot, 2/5 for the left foot. Gross static deformities noted: Laterally deviated hallux with a prominent medial eminence of the head of the first metatarsal noted bilaterally, severe to the right, moderate to the left. Hallux rigidus noted to the right foot. Adductovarus rotation of the fifth toe noted bilaterally, there is mild lateral eminence noted at the head of the fifth metatarsal.  Pain and crepitus noted with range of motion of the right first MPJ. Decreased ankle joint dorsiflexion noted bilateral lower extremity. Assessment:      Diagnosis Orders   1. Pain in both feet     2. Type 2 diabetes mellitus without complication, without long-term current use of insulin ([address]     3. Neuritis of left foot     4. Foot drop, left     5. Hallux abducto valgus, bilateral     6. Tailor's bunionette, bilateral         Plan:     Neuropathic pain, left foot: Patient instructed continue use of the previously prescribed compounded topical pain cream.      Left foot drop: The order and previous note will be resubmitted to the  so that the process for obtaining the AFO can be started.     Diabetes mellitus/foot deformities:  I still recommend the patient obtain diabetic insoles and shoes, the previously supplied prescription will be faxed to the specialty shoe store. Follow up:  Return in about 6 weeks (around 12/29/2021). Chris Cochran DPM      Level of medical decision making: low.        Please note that this report has been partially produced using speech recognition software which may cause errors including grammar, punctuation, and spelling or words and phrases that may seem inappropriate. If there are questions or concerns please feel free to contact me for clarification.

## 2021-11-22 ENCOUNTER — TELEPHONE (OUTPATIENT)
Dept: PAIN MANAGEMENT | Age: 46
End: 2021-11-22

## 2021-12-22 DIAGNOSIS — M47.817 LUMBOSACRAL SPONDYLOSIS WITHOUT MYELOPATHY: ICD-10-CM

## 2022-01-17 ENCOUNTER — OFFICE VISIT (OUTPATIENT)
Dept: PAIN MANAGEMENT | Age: 47
End: 2022-01-17
Payer: COMMERCIAL

## 2022-01-17 VITALS — HEIGHT: 70 IN | BODY MASS INDEX: 35.07 KG/M2 | WEIGHT: 245 LBS | TEMPERATURE: 98 F

## 2022-01-17 DIAGNOSIS — M25.512 CHRONIC LEFT SHOULDER PAIN: ICD-10-CM

## 2022-01-17 DIAGNOSIS — Z79.891 ENCOUNTER FOR MONITORING OPIOID MAINTENANCE THERAPY: ICD-10-CM

## 2022-01-17 DIAGNOSIS — F11.90 CHRONIC, CONTINUOUS USE OF OPIOIDS: ICD-10-CM

## 2022-01-17 DIAGNOSIS — G89.29 CHRONIC LEFT SHOULDER PAIN: ICD-10-CM

## 2022-01-17 DIAGNOSIS — M79.602 LEFT ARM PAIN: ICD-10-CM

## 2022-01-17 DIAGNOSIS — M47.817 LUMBOSACRAL SPONDYLOSIS WITHOUT MYELOPATHY: ICD-10-CM

## 2022-01-17 DIAGNOSIS — M75.02 ADHESIVE CAPSULITIS OF LEFT SHOULDER: Primary | ICD-10-CM

## 2022-01-17 DIAGNOSIS — Z51.81 ENCOUNTER FOR MONITORING OPIOID MAINTENANCE THERAPY: ICD-10-CM

## 2022-01-17 DIAGNOSIS — F40.298 NEEDLE PHOBIA: ICD-10-CM

## 2022-01-17 PROCEDURE — 99214 OFFICE O/P EST MOD 30 MIN: CPT | Performed by: PHYSICAL MEDICINE & REHABILITATION

## 2022-01-17 RX ORDER — NALOXONE HYDROCHLORIDE 4 MG/.1ML
1 SPRAY NASAL PRN
Qty: 1 EACH | Refills: 0 | Status: SHIPPED | OUTPATIENT
Start: 2022-01-17

## 2022-01-17 RX ORDER — HYDROCODONE BITARTRATE AND ACETAMINOPHEN 5; 325 MG/1; MG/1
1 TABLET ORAL DAILY PRN
Qty: 30 TABLET | Refills: 0 | Status: SHIPPED | OUTPATIENT
Start: 2022-01-17 | End: 2022-03-09 | Stop reason: SDUPTHER

## 2022-01-17 RX ORDER — TIZANIDINE 4 MG/1
4 TABLET ORAL EVERY EVENING
Qty: 30 TABLET | Refills: 0 | Status: SHIPPED | OUTPATIENT
Start: 2022-01-17 | End: 2022-03-09 | Stop reason: SDUPTHER

## 2022-01-17 ASSESSMENT — ENCOUNTER SYMPTOMS
BACK PAIN: 1
CONSTIPATION: 0
NAUSEA: 0
SHORTNESS OF BREATH: 0
DIARRHEA: 0

## 2022-01-18 ENCOUNTER — TELEPHONE (OUTPATIENT)
Dept: PAIN MANAGEMENT | Age: 47
End: 2022-01-18

## 2022-01-18 NOTE — TELEPHONE ENCOUNTER
Narcan 4mg/0.1ml liquid prior authorization request submitted online (CoverMyMeds. com), clinical uploaded, Jarrett Begin pending.       Key: [id number] - PA Case ID: [id number] - Rx #: [id number]

## 2022-01-19 NOTE — TELEPHONE ENCOUNTER
Per [email or web address], Narcan 4mg/. 01ml liquid approved. Approved on January 18  Request Reference Number: [id number]. [employer] is approved through 01/18/2024. Please refer to the fax or electronic case notice for further information.

## 2022-01-20 LAB
6-ACETYLMORPHINE, UR: NORMAL
AMPHETAMINE SCREEN, URINE: NORMAL
BARBITURATE SCREEN, URINE: NORMAL
BENZODIAZEPINE SCREEN, URINE: NORMAL
CANNABINOID SCREEN URINE: NORMAL
COCAINE METABOLITE, URINE: NORMAL
CREATININE URINE: NORMAL
EDDP, URINE: NORMAL
ETHANOL URINE: NORMAL
MDMA URINE: NORMAL
METHADONE SCREEN, URINE: NORMAL
METHAMPHETAMINE, URINE: NORMAL
OPIATES, URINE: NORMAL
OXYCODONE: NORMAL
PCP: NORMAL
PH, URINE: NORMAL
PHENCYCLIDINE, URINE: NORMAL
PROPOXYPHENE, URINE: NORMAL
TRICYCLIC ANTIDEPRESSANTS, UR: NORMAL

## 2022-01-26 ENCOUNTER — PROCEDURE VISIT (OUTPATIENT)
Dept: PAIN MANAGEMENT | Age: 47
End: 2022-01-26
Payer: COMMERCIAL

## 2022-01-26 DIAGNOSIS — M25.512 CHRONIC LEFT SHOULDER PAIN: Primary | ICD-10-CM

## 2022-01-26 DIAGNOSIS — G89.29 CHRONIC LEFT SHOULDER PAIN: Primary | ICD-10-CM

## 2022-01-26 PROCEDURE — 20610 DRAIN/INJ JOINT/BURSA W/O US: CPT | Performed by: PHYSICAL MEDICINE & REHABILITATION

## 2022-01-26 PROCEDURE — 77002 NEEDLE LOCALIZATION BY XRAY: CPT | Performed by: PHYSICAL MEDICINE & REHABILITATION

## 2022-01-26 NOTE — PROGRESS NOTES
Shoulder Glenohumeral Joint Injection - Aborted Procedure           Patient Name: Lina Ortega   : 1975  Date: 2022     Provider: Doretha Montes De Oca MD        PROCEDURE: Left glenohumeral joint shoulder injection under fluoroscopic guidance    INDICATIONS: Lina Ortega is a 40 y.o. male who presents with symptoms and physical exam findings consistent with left shoulder pain. He has had persistent pain that limits his activities of daily living such as upper body dressing. The pain is persistent despite conservative measures including home exercise program. Given his symptoms, physical exam findings, impairment in activities of daily living, and lack of response to conservative measures, consideration for Left glenohumeral joint shoulder injection under fluoroscopic guidance was given. Discussed the risks including but not limited to bleeding, infection, worsened pain, damage to surrounding structures, side effects, toxicity, allergic reactions to medications used, immune and stress-response dysfunction, fat necrosis, cartilage loss, increased fracture risk, avascular necrosis, need for surgery, premature damage or degeneration of the joint, pneumothorax, as well as catastrophic injury such as vision loss, paralysis, spinal cord or plexus injury, stroke, bowel or bladder incontinence, ventilator dependence, loss of use of the joint and/or extremity, and death. Discussed the risks, benefits, alternative procedures, and alternatives to the procedure including no procedure at all. Discussed that we cannot undo any permanent neurologic or orthopaedic damage or change the course of any underlying disease. After thorough discussion, patient expressed understanding and willingness to proceed. Written consent was obtained and is in the chart. Verbal consent to proceed was obtained. DESCRIPTION OF PROCEDURE:  The site was marked. A time-out was performed.  Fluoroscopy was utilized to visualize the pertinent anatomy. The site was then cleaned with Betadine three times and maintained in a sterile manner throughout the procedure. Then a 27-gauge 1.5 inch needle was used to anesthetize the skin and subcutaneous tissue with 1 mL of 1% preservative-free lidocaine and 0.5 mEq sodium bicarbonate. The needle was then advanced under fluoroscopic guidance onto the humeral head. During needle advancement, the patient had some discomfort, withdraw his shoulder, and displaced the needle. The patient declined a re-attempt during this encounter. The procedure was completed about 50% pain. No corticosteroid was used or wasted. The patient tolerated the procedure poorly. There were no immediate post procedure complications. Post procedure instructions were given. The patient was monitored for a short period of time and discharged home with his baseline neurologic and orthopaedic exam. The patient will reschedule this procedure for another day.       [address] [facility]  Phone [phone]

## 2022-02-01 ENCOUNTER — TELEPHONE (OUTPATIENT)
Dept: PAIN MANAGEMENT | Age: 47
End: 2022-02-01

## 2022-02-04 ENCOUNTER — TELEPHONE (OUTPATIENT)
Dept: PAIN MANAGEMENT | Age: 47
End: 2022-02-04

## 2022-02-04 NOTE — TELEPHONE ENCOUNTER
ORDER PLACED:    Date: 01/17/22  Description: EMG BL UPPER EXT   Order Number: [id number]  Ordering Provider: DANNY   Performing Provider: Doreen Ying   CPT Codes:   ICD10 Codes: M75.02

## 2022-02-04 NOTE — TELEPHONE ENCOUNTER
EMG BL UPPER EXTREMITIES    C-9 REQ FOR EMG BL UPPER EXTREMITIES FAXED TO [facility] FOR REVIEW.       SCANNED

## 2022-02-08 NOTE — TELEPHONE ENCOUNTER
EMG LB UPPER EXTREMITIES     C-9 RESPONSE FROM [facility], DECISION IS PENDING PHYSICIAN REVIEW.       SCANNED

## 2022-02-09 ENCOUNTER — OFFICE VISIT (OUTPATIENT)
Dept: PODIATRY | Age: 47
End: 2022-02-09
Payer: COMMERCIAL

## 2022-02-09 VITALS — BODY MASS INDEX: 34.65 KG/M2 | HEIGHT: 70 IN | TEMPERATURE: 96.9 F | WEIGHT: 242 LBS

## 2022-02-09 DIAGNOSIS — E11.9 TYPE 2 DIABETES MELLITUS WITHOUT COMPLICATION, WITHOUT LONG-TERM CURRENT USE OF INSULIN (HCC): ICD-10-CM

## 2022-02-09 DIAGNOSIS — M79.672 PAIN IN BOTH FEET: Primary | ICD-10-CM

## 2022-02-09 DIAGNOSIS — M21.621 TAILOR'S BUNIONETTE, BILATERAL: ICD-10-CM

## 2022-02-09 DIAGNOSIS — M20.12 HALLUX ABDUCTO VALGUS, BILATERAL: ICD-10-CM

## 2022-02-09 DIAGNOSIS — M21.622 TAILOR'S BUNIONETTE, BILATERAL: ICD-10-CM

## 2022-02-09 DIAGNOSIS — M79.671 PAIN IN BOTH FEET: Primary | ICD-10-CM

## 2022-02-09 DIAGNOSIS — M20.11 HALLUX ABDUCTO VALGUS, BILATERAL: ICD-10-CM

## 2022-02-09 DIAGNOSIS — M21.372 FOOT DROP, LEFT: ICD-10-CM

## 2022-02-09 DIAGNOSIS — G57.92 NEURITIS OF LEFT FOOT: ICD-10-CM

## 2022-02-09 PROCEDURE — 99213 OFFICE O/P EST LOW 20 MIN: CPT | Performed by: PODIATRIST

## 2022-02-09 ASSESSMENT — ENCOUNTER SYMPTOMS
NAUSEA: 0
BACK PAIN: 1
VOMITING: 0
SHORTNESS OF BREATH: 0

## 2022-02-09 NOTE — TELEPHONE ENCOUNTER
AUTHORIZATION:    INSURANCE: [employer] VIA: Dariana Funk #: [id number]    DATE RANGE: 02/09/22 TO 03/04/02    TELEPHONE CALL ROUTED TO MA TO SCHEDULE. AMENDED APPROVAL--C-9 ONLY APPROVED FOR EMG OF LEFT UPPER EXT. EMG FOR RIGHT UPPER EXT IS DENIED.       TO BE SCHEDULED WITH DR Brandi Templeton     SCANNED

## 2022-02-09 NOTE — PROGRESS NOTES
[address] [facility]  [address] [place] [address]  Dept: [phone]  Loc: [phone]       Radha Gonzales  (1975)    2/9/22    Subjective     Radha Gonzales is 40 y.o. male who complains today of:    Chief Complaint   Patient presents with    Foot Pain     both feet       HPI: Patient presents for follow-up for left foot drop, left foot neuritis and bilateral painful foot deformities/diabetes. Patient denies significant changes in his condition since the last time he was here. Patient states he has not been checking his blood glucose on a regular basis due to problems with his glucometer, he states that he would like to get a continuous glucose monitor, he states he also has been having problems with his Metformin not dissolving, he knows the intact tablets in his stool. Patient states that he has contacted his primary care doctor about the situation but has not yet heard back. Patient reports that he was unable to obtain the previously prescribed foot drop brace and diabetic shoes due to cost.  Patient states that he has been using the compounded topical pain cream, he inquires about refills. Review of Systems   Constitutional: Negative for chills and fever. HENT: Negative for hearing loss. Respiratory: Negative for shortness of breath. Cardiovascular: Negative for chest pain. Gastrointestinal: Negative for nausea and vomiting. Genitourinary: Negative for difficulty urinating. Musculoskeletal: Positive for back pain and gait problem. Skin: Negative for wound. Neurological: Negative for numbness. Hematological: Does not bruise/bleed easily. Psychiatric/Behavioral: Negative for sleep disturbance. The patient is a diabetic.    PCP: Severiano Collins,    Date last seen: 11/3/21    Allergies:  Bupropion, Escitalopram, and Hydroxyzine    Current Outpatient Medications on File Prior to Visit   Medication Sig Dispense Refill    HYDROcodone-acetaminophen (NORCO) 5-325 MG per tablet Take 1 tablet by mouth daily as needed for Pain for up to 30 days. 30 tablet 0    tiZANidine (ZANAFLEX) 4 MG tablet Take 1 tablet by mouth every evening As needed for pain and spasms 30 tablet 0    naloxone 4 MG/0.1ML LIQD nasal spray 1 spray by Nasal route as needed for Opioid Reversal 1 each 0    Respiratory Therapy Supplies SHELBY New Full face CPAP  Mask. 1 each 0    ALPRAZolam (XANAX) 0.5 MG tablet TAKE ONE TABLET BY MOUTH THREE TIMES A DAY FOR PANIC  ANXIETY  AS NEEDED      azelastine (ASTELIN) 0.1 % nasal spray INSTILL TWO SPRAYS PER NOSTRIL TWICE DAILY AS NEEDED      buPROPion (WELLBUTRIN SR) 100 MG extended release tablet       cloNIDine (CATAPRES) 0.1 MG tablet TAKE ONE TABLET BY MOUTH THREE TIMES A DAY AS NEEDED for anxiety  high blood pressure      metFORMIN (GLUCOPHAGE-XR) 500 MG extended release tablet TAKE ONE TABLET BY MOUTH ONCE DAILY WITH THE EVENING MEAL      olmesartan (BENICAR) 20 MG tablet TAKE ONE TABLET BY MOUTH EVERY DAY      rosuvastatin (CRESTOR) 10 MG tablet Take by mouth      lidocaine (LMX) 4 % cream Apply a half dollar sized amount to intact skin topically up to twice daily as needed for pain 1 Tube 1    olopatadine (PATANOL) 0.1 % ophthalmic solution Place 1 drop into both eyes 2 times daily 5 mL 0    montelukast (SINGULAIR) 10 MG tablet Take 1 tablet by mouth daily 30 tablet 1    ipratropium (ATROVENT) 0.06 % nasal spray 2 sprays by Nasal route 3 times daily 1 Bottle 1    Elastic Bandages & Supports (KNEE BRACE) MISC Wear during the day over right knee as needed for support 1 each 0    loratadine (CLARITIN) 10 MG tablet Take 1 tablet by mouth daily 30 tablet 5    acetaminophen (TYLENOL) 500 MG tablet Take 1 tablet by mouth every 6 hours as needed for Pain. 60 tablet 0     No current facility-administered medications on file prior to visit. Past Medical History:   Diagnosis Date    Depression with anxiety 4/24/2017    Hyperglycemia 4/24/2017    Hyperlipidemia 2/18/2014    Insomnia     Perennial allergic rhinitis with seasonal variation 4/24/2017    Pre-diabetes 4/24/2017    Tobacco use disorder 4/24/2017    Type 2 diabetes mellitus without complication, without long-term current use of insulin ([facility] 8/6/2018     Past Surgical History:   Procedure Laterality Date    NASAL FRACTURE SURGERY  2007     Social History     Socioeconomic History    Marital status:      Spouse name: Jonathan Christian Number of children: 3    Years of education: Not on file    Highest education level: Not on file   Occupational History    Occupation:  - industrial cleaning   Tobacco Use    Smoking status: Current Every Day Smoker     Packs/day: 0.50     Years: 23.00     Pack years: 11.50     Types: Cigarettes     Start date: 1994    Smokeless tobacco: Never Used   Vaping Use    Vaping Use: Never used   Substance and Sexual Activity    Alcohol use: No    Drug use: No    Sexual activity: Yes     Partners: Female     Comment: monogamous   Other Topics Concern    Not on file   Social History Narrative    Lives with wife and 3 children        1 dog     Social Determinants of Health     Financial Resource Strain:     Difficulty of Paying Living Expenses: Not on file   Food Insecurity:     Worried About Running Out of Food in the Last Year: Not on file    Nicolás of Food in the Last Year: Not on file   Transportation Needs:     Lack of Transportation (Medical): Not on file    Lack of Transportation (Non-Medical):  Not on file   Physical Activity:     Days of Exercise per Week: Not on file    Minutes of Exercise per Session: Not on file   Stress:     Feeling of Stress : Not on file   Social Connections:     Frequency of Communication with Friends and Family: Not on file    Frequency of Social Gatherings with Friends and Family: Not on file    Attends Restorationism Services: Not on file    Active Member of Clubs or Organizations: Not on file    Attends Club or Organization Meetings: Not on file    Marital Status: Not on file   Intimate Partner Violence:     Fear of Current or Ex-Partner: Not on file    Emotionally Abused: Not on file    Physically Abused: Not on file    Sexually Abused: Not on file   Housing Stability:     Unable to Pay for Housing in the Last Year: Not on file    Number of [place] in the Last Year: Not on file    Unstable Housing in the Last Year: Not on file     Family History   Problem Relation Age of Onset    High Blood Pressure Mother     High Cholesterol Mother     Cancer Father         Throat cancer - smoker    High Blood Pressure Brother     Heart Attack Maternal Grandmother 72    Diabetes Maternal Grandmother     Cancer Maternal Grandfather         unknown    No Known Problems Paternal Grandmother     No Known Problems Paternal Grandfather            Objective:   Vitals:  Temp 96.9 °F (36.1 °C)   Ht 5' 10\" (1.778 m)   Wt 242 lb (109.8 kg)   BMI 34.72 kg/m²        Physical Exam  Feet:      Comments:     Vascular:     Dorsalis pedis pulse is palpable bilateral foot. Posterior tibial pulse is palpable bilateral foot. Capillary refill is immediate bilateral hallux. There are no varicosities noted bilaterally. There are no telangiectasia noted bilaterally. Skin temperature gradient is noted to be warm to warm bilaterally. There are no signs of ischemia or skin atrophy noted bilaterally. Hair growth is present bilaterally.     Lymphatic:    The popliteal lymph nodes are soft and nontender bilateral lower extremity. There is no edema noted to the bilateral lower extremity.     Neurological:     Light touch sensation is altered but intact to the left foot; protective sensation is intact bilaterally. Vibratory sensation is intact bilaterally.   Hot versus cold discrimination is diminished to the insoles. Left foot neuritis: Patient instructed to continue use of the topical pain cream, refills will be submitted to the pharmacy. Left foot drop: I have recommended a medical grade off-the-shelf static AFO. I recommend the patient apply this under his diabetic insole to offer more cushioning to the foot. Patient instructed to call/return to clinic sooner should any problems arise. Follow up:  Return in about 6 months (around 8/9/2022). Parmjit Preston DPM      Level of medical decision making: low. Please note that this report has been partially produced using speech recognition software which may cause errors including grammar, punctuation, and spelling or words and phrases that may seem inappropriate. If there are questions or concerns please feel free to contact me for clarification.

## 2022-03-09 ENCOUNTER — OFFICE VISIT (OUTPATIENT)
Dept: PAIN MANAGEMENT | Age: 47
End: 2022-03-09
Payer: COMMERCIAL

## 2022-03-09 DIAGNOSIS — F11.90 CHRONIC, CONTINUOUS USE OF OPIOIDS: ICD-10-CM

## 2022-03-09 DIAGNOSIS — M47.817 LUMBOSACRAL SPONDYLOSIS WITHOUT MYELOPATHY: ICD-10-CM

## 2022-03-09 DIAGNOSIS — Z79.891 ENCOUNTER FOR MONITORING OPIOID MAINTENANCE THERAPY: ICD-10-CM

## 2022-03-09 DIAGNOSIS — Z51.81 ENCOUNTER FOR MONITORING OPIOID MAINTENANCE THERAPY: ICD-10-CM

## 2022-03-09 DIAGNOSIS — M79.602 LEFT ARM PAIN: Primary | ICD-10-CM

## 2022-03-09 DIAGNOSIS — M75.02 ADHESIVE CAPSULITIS OF LEFT SHOULDER: ICD-10-CM

## 2022-03-09 PROCEDURE — 95886 MUSC TEST DONE W/N TEST COMP: CPT | Performed by: PHYSICAL MEDICINE & REHABILITATION

## 2022-03-09 PROCEDURE — 95911 NRV CNDJ TEST 9-10 STUDIES: CPT | Performed by: PHYSICAL MEDICINE & REHABILITATION

## 2022-03-09 RX ORDER — HYDROCODONE BITARTRATE AND ACETAMINOPHEN 5; 325 MG/1; MG/1
1 TABLET ORAL DAILY PRN
Qty: 30 TABLET | Refills: 0 | Status: SHIPPED | OUTPATIENT
Start: 2022-03-09 | End: 2022-03-09 | Stop reason: SDUPTHER

## 2022-03-09 RX ORDER — HYDROCODONE BITARTRATE AND ACETAMINOPHEN 5; 325 MG/1; MG/1
1 TABLET ORAL DAILY PRN
Qty: 30 TABLET | Refills: 0 | Status: SHIPPED | OUTPATIENT
Start: 2022-03-09 | End: 2022-04-06 | Stop reason: SDUPTHER

## 2022-03-09 RX ORDER — TIZANIDINE 4 MG/1
4 TABLET ORAL EVERY EVENING
Qty: 30 TABLET | Refills: 0 | Status: SHIPPED | OUTPATIENT
Start: 2022-03-09 | End: 2022-04-06 | Stop reason: SDUPTHER

## 2022-03-09 NOTE — PROGRESS NOTES
-Continue Norco 5/325 mg daily prn #30 no ref 3/9-4/8/22  -Continue Tizanidine 4 mg qHS #30 no ref start 3/9-4/8/22  -UDS today. No Norco today, took 2 days ago    Controlled Substance Monitoring:    Acute and Chronic Pain Monitoring:   RX Monitoring 1/17/2022   Periodic Controlled Substance Monitoring Possible medication side effects, risk of tolerance/dependence & alternative treatments discussed. ;No signs of potential drug abuse or diversion identified. ;Assessed functional status. ;Obtaining appropriate analgesic effect of treatment. ;Random urine drug screen sent today.    Chronic Pain > 50 MEDD -

## 2022-03-09 NOTE — PROGRESS NOTES
Electromyography (EMG)/Nerve conduction studies (NCS) Report: Upper Extremities    Name: Yao Magana   : 1975  Date: 3/9/2022   Physician: Vishal Parson MD        INDICATIONS: Yao Magana is a 40 y.o. male who presents for electrodiagnostic evaluation for left arm pain. He is right-handed. He confirms a history of pre diabetes mellitus. Both limbs are necessary to examine in order to evaluate for any evidence of systemic disease as well as establish normal baseline values from which to compare any abnormal unilateral findings. The study is explained and verbal consent to proceed is obtained. NERVE CONDUCTION STUDIES:  Sensory nerve conduction studies: Bilateral median sensory nerve conduction studies to the second digit demonstrate normal distal latencies and amplitudes. Bilateral ulnar sensory nerve conduction studies to the fifth digit demonstrate normal distal latencies and amplitudes. Bilateral radial sensory nerve conduction studies to the base of the thumb demonstrate normal distal latencies and amplitudes. Bilateral upper limb temperatures are normal.     Motor nerve conduction studies: Bilateral median motor nerve conduction studies with pickup over the abductor pollicis brevis demonstrate normal distal latencies and amplitudes. Bilateral ulnar motor nerve conduction studies with pickup over the abductor digiti minimi demonstrate normal distal latencies and amplitudes. ELECTROMYOGRAPHY: This study is limited due to patient tolerance. A disposable monopolar needle is used to evaluate bilateral deltoid, biceps, triceps, brachioradialis, extensor indicis proprius, first dorsal interosseous, and opponens pollicis. Brief sampling of all of the muscles is free of any clear abnormal spontaneous activity. Motor unit recruitment is limited due to patient tolerance. Bilateral mid cervical paraspinal muscle sampling is free of any clear abnormal spontaneous activity.     SUMMARY:  This study is

## 2022-04-06 ENCOUNTER — OFFICE VISIT (OUTPATIENT)
Dept: PAIN MANAGEMENT | Age: 47
End: 2022-04-06
Payer: COMMERCIAL

## 2022-04-06 VITALS
HEIGHT: 70 IN | TEMPERATURE: 98.3 F | DIASTOLIC BLOOD PRESSURE: 82 MMHG | SYSTOLIC BLOOD PRESSURE: 138 MMHG | WEIGHT: 240 LBS | BODY MASS INDEX: 34.36 KG/M2

## 2022-04-06 DIAGNOSIS — F11.90 CHRONIC, CONTINUOUS USE OF OPIOIDS: ICD-10-CM

## 2022-04-06 DIAGNOSIS — M75.02 ADHESIVE CAPSULITIS OF LEFT SHOULDER: ICD-10-CM

## 2022-04-06 DIAGNOSIS — Z79.891 ENCOUNTER FOR MONITORING OPIOID MAINTENANCE THERAPY: ICD-10-CM

## 2022-04-06 DIAGNOSIS — M47.817 LUMBOSACRAL SPONDYLOSIS WITHOUT MYELOPATHY: ICD-10-CM

## 2022-04-06 DIAGNOSIS — Z51.81 ENCOUNTER FOR MONITORING OPIOID MAINTENANCE THERAPY: ICD-10-CM

## 2022-04-06 PROCEDURE — 99213 OFFICE O/P EST LOW 20 MIN: CPT | Performed by: NURSE PRACTITIONER

## 2022-04-06 RX ORDER — TIZANIDINE 4 MG/1
4 TABLET ORAL EVERY EVENING
Qty: 30 TABLET | Refills: 0 | Status: SHIPPED | OUTPATIENT
Start: 2022-04-06 | End: 2022-05-02 | Stop reason: SDUPTHER

## 2022-04-06 RX ORDER — HYDROCODONE BITARTRATE AND ACETAMINOPHEN 5; 325 MG/1; MG/1
1 TABLET ORAL DAILY PRN
Qty: 30 TABLET | Refills: 0 | Status: SHIPPED | OUTPATIENT
Start: 2022-04-08 | End: 2022-05-02 | Stop reason: SDUPTHER

## 2022-04-06 ASSESSMENT — ENCOUNTER SYMPTOMS
BACK PAIN: 1
CONSTIPATION: 0
DIARRHEA: 0
RESPIRATORY NEGATIVE: 1

## 2022-04-06 NOTE — PROGRESS NOTES
Patient: Archie Eller  YOB: 1975  Date: 4/6/22        Subjective:     Archie Eller is a 56 y.o. male who complains today of:    Chief Complaint   Patient presents with    Shoulder Pain    Neck Pain    Back Pain         Allergies:  Bupropion, Escitalopram, and Hydroxyzine    Past Medical History:   Diagnosis Date    Depression with anxiety 4/24/2017    Hyperglycemia 4/24/2017    Hyperlipidemia 2/18/2014    Insomnia     Perennial allergic rhinitis with seasonal variation 4/24/2017    Pre-diabetes 4/24/2017    Tobacco use disorder 4/24/2017    Type 2 diabetes mellitus without complication, without long-term current use of insulin ([facility] [address] 8/6/2018     Past Surgical History:   Procedure Laterality Date    NASAL FRACTURE SURGERY  2007     Family History   Problem Relation Age of Onset    High Blood Pressure Mother     High Cholesterol Mother     Cancer Father         Throat cancer - smoker    High Blood Pressure Brother     Heart Attack Maternal Grandmother 72    Diabetes Maternal Grandmother     Cancer Maternal Grandfather         unknown    No Known Problems Paternal Grandmother     No Known Problems Paternal Grandfather      Social History     Socioeconomic History    Marital status:      Spouse name: Flako Ricci Number of children: 3    Years of education: Not on file    Highest education level: Not on file   Occupational History    Occupation:  - industrial cleaning   Tobacco Use    Smoking status: Current Every Day Smoker     Packs/day: 0.50     Years: 23.00     Pack years: 11.50     Types: Cigarettes     Start date: 1994    Smokeless tobacco: Never Used   Vaping Use    Vaping Use: Never used   Substance and Sexual Activity    Alcohol use: No    Drug use: No    Sexual activity: Yes     Partners: Female     Comment: monogamous   Other Topics Concern    Not on file   Social History Narrative    Lives with wife and 3 children        1 dog Social Determinants of Health     Financial Resource Strain:     Difficulty of Paying Living Expenses: Not on file   Food Insecurity:     Worried About Running Out of Food in the Last Year: Not on file    Nicolás of Food in the Last Year: Not on file   Transportation Needs:     Lack of Transportation (Medical): Not on file    Lack of Transportation (Non-Medical): Not on file   Physical Activity:     Days of Exercise per Week: Not on file    Minutes of Exercise per Session: Not on file   Stress:     Feeling of Stress : Not on file   Social Connections:     Frequency of Communication with Friends and Family: Not on file    Frequency of Social Gatherings with Friends and Family: Not on file    Attends Restorationism Services: Not on file    Active Member of [address] [place] [address] [employer] or Organizations: Not on file    Attends Club or Organization Meetings: Not on file    Marital Status: Not on file   Intimate Partner Violence:     Fear of Current or Ex-Partner: Not on file    Emotionally Abused: Not on file    Physically Abused: Not on file    Sexually Abused: Not on file   Housing Stability:     Unable to Pay for Housing in the Last Year: Not on file    Number of [place] in the Last Year: Not on file    Unstable Housing in the Last Year: Not on file       Current Outpatient Medications on File Prior to Visit   Medication Sig Dispense Refill    naloxone 4 MG/0.1ML LIQD nasal spray 1 spray by Nasal route as needed for Opioid Reversal 1 each 0    Respiratory Therapy Supplies SHELBY New Full face CPAP  Mask.  1 each 0    ALPRAZolam (XANAX) 0.5 MG tablet TAKE ONE TABLET BY MOUTH THREE TIMES A DAY FOR PANIC  ANXIETY  AS NEEDED      azelastine (ASTELIN) 0.1 % nasal spray INSTILL TWO SPRAYS PER NOSTRIL TWICE DAILY AS NEEDED      buPROPion (WELLBUTRIN SR) 100 MG extended release tablet       cloNIDine (CATAPRES) 0.1 MG tablet TAKE ONE TABLET BY MOUTH THREE TIMES A DAY AS NEEDED for anxiety  high blood pressure      metFORMIN (GLUCOPHAGE-XR) 500 MG extended release tablet TAKE ONE TABLET BY MOUTH ONCE DAILY WITH THE EVENING MEAL      olmesartan (BENICAR) 20 MG tablet TAKE ONE TABLET BY MOUTH EVERY DAY      rosuvastatin (CRESTOR) 10 MG tablet Take by mouth      lidocaine (LMX) 4 % cream Apply a half dollar sized amount to intact skin topically up to twice daily as needed for pain 1 Tube 1    olopatadine (PATANOL) 0.1 % ophthalmic solution Place 1 drop into both eyes 2 times daily 5 mL 0    montelukast (SINGULAIR) 10 MG tablet Take 1 tablet by mouth daily 30 tablet 1    ipratropium (ATROVENT) 0.06 % nasal spray 2 sprays by Nasal route 3 times daily 1 Bottle 1    Elastic Bandages & Supports (KNEE BRACE) MISC Wear during the day over right knee as needed for support 1 each 0    loratadine (CLARITIN) 10 MG tablet Take 1 tablet by mouth daily 30 tablet 5    acetaminophen (TYLENOL) 500 MG tablet Take 1 tablet by mouth every 6 hours as needed for Pain. 60 tablet 0     No current facility-administered medications on file prior to visit. 521/2021 81year-old male follows for chronic low back pain and left shoulder pain. Pain 9/10. EMG upper extremities 3/9/2022 normal.  He was started on Norco 5 mg once daily and tizanidine 4 mg once daily. This does help to some degree. He is able to participate in ADLs and chores. He uses a walking stick. He cannot lift his left arm. Difficulty since he uses his walking stick with his right hand cannot use his left. He is seeing Dr. Diann Barton for physical therapy of his shoulder. Also has neck pain. Legs with constant burning. Patient tells me his back issue is from a work-related injury about a year and a half ago. He works as a  and it was snowy and he was lifting something and he felt a pop in his back. He has had severe pain since then that goes down his left leg.   He also tells me that due to the left leg giving out on him he fell and injured his left shoulder. He is not able to lift his left arm. He describes his pain and left shoulder across chest and low back pain down left leg to toes. Underwent lumbar facet injections with no pain relief and left GH joint injection with greater than 50% pain relief for 2 days. Gabriela Jason He takes tizanidine 2 mg at night as needed with some relief. Review of Systems   Constitutional: Negative. Negative for activity change and unexpected weight change. HENT: Negative. Negative for hearing loss. Respiratory: Negative. Cardiovascular: Negative for leg swelling. Gastrointestinal: Negative for constipation and diarrhea. Genitourinary: Negative. Musculoskeletal: Positive for arthralgias and back pain. Negative for gait problem, joint swelling and neck pain. Skin: Negative for rash. Neurological: Negative for dizziness, weakness, numbness and headaches. Psychiatric/Behavioral: Negative. Negative for sleep disturbance. Objective:     Vitals:  /82   Temp 98.3 °F (36.8 °C)   Ht 5' 10\" (1.778 m)   Wt 240 lb (108.9 kg)   BMI 34.44 kg/m² Pain Score:   9    Physical Exam  Vitals reviewed. Constitutional:       Appearance: He is well-developed. HENT:      Head: Normocephalic. Nose: Nose normal.   Pulmonary:      Effort: Pulmonary effort is normal.   Musculoskeletal:      Left shoulder: Tenderness present. Decreased range of motion. Decreased strength. Arms:       Cervical back: Normal range of motion and neck supple. Lumbar back: Tenderness present. Decreased range of motion. Negative right straight leg raise test and negative left straight leg raise test.   Lymphadenopathy:      Cervical: No cervical adenopathy. Skin:     General: Skin is warm and dry. Findings: No erythema or rash. Neurological:      Mental Status: He is alert and oriented to person, place, and time. Cranial Nerves: No cranial nerve deficit. Motor: Weakness present.       Gait: Gait abnormal.      Deep Tendon Reflexes: Reflexes are normal and symmetric. Reflexes normal.      Comments: Bilateral leg weakness  Antalgic gait with walking stick, difficulty getting up from chair   Psychiatric:         Mood and Affect: Mood normal.         Behavior: Behavior normal.         Thought Content: Thought content normal.         Judgment: Judgment normal.          Outside record review:  MRI L Shoulder 10/14/21: no rotator cuff tear, subacromial bursitis, glenoid labral cyst, question late phase adhesive capsulitis  XR L shoulder 7/13/2021: Bulbous subacromial process, no fracture, may predispose to impingement  XR L hip 7/12/2021: Minimal arthritis, normal hip joint spaces, no fracture  EMG BLE 7/12/2021:Normal, no lumbosacral motor radiculopathy or peripheral polyneuropathy  MRI LS spine 5/24/2021 no fracture. Degenerative disc disease. Congenital spinal canal stenosis. T12-L4 normal canal foramen. Degenerative disc disease and facet arthropathy. L4-L5 mild canal stenosis, mild bilateral foraminal narrowing. L5-S1 normal canal foramen. MRI thoracic spine 2/24/2020: Degenerative disc disease and facet arthropathy. Disc bulge T7-T8. No canal or foraminal stenosis. Unremarkable MRI of the thoracic spine. XR LS Spine 11/19/20: no fracture, no instability, disc spaces preserved. Assessment:        Diagnosis Orders   1. Adhesive capsulitis of left shoulder  tiZANidine (ZANAFLEX) 4 MG tablet    HYDROcodone-acetaminophen (NORCO) 5-325 MG per tablet   2. Lumbosacral spondylosis without myelopathy  tiZANidine (ZANAFLEX) 4 MG tablet    HYDROcodone-acetaminophen (NORCO) 5-325 MG per tablet   3. Chronic, continuous use of opioids  HYDROcodone-acetaminophen (NORCO) 5-325 MG per tablet   4.  Encounter for monitoring opioid maintenance therapy  HYDROcodone-acetaminophen (NORCO) 5-325 MG per tablet       Plan:     Periodic Controlled Substance Monitoring: Possible medication side effects, risk of tolerance/dependence & alternative treatments discussed. ,No signs of potential drug abuse or diversion identified. ,Assessed functional status. Liliana Cornelius, APRN - CNP)    Orders Placed This Encounter   Medications    tiZANidine (ZANAFLEX) 4 MG tablet     Sig: Take 1 tablet by mouth every evening As needed for pain and spasms     Dispense:  30 tablet     Refill:  0    HYDROcodone-acetaminophen (NORCO) 5-325 MG per tablet     Sig: Take 1 tablet by mouth daily as needed for Pain for up to 30 days. Dispense:  30 tablet     Refill:  0     Reduce doses taken as pain becomes manageable       No orders of the defined types were placed in this encounter.    -We are waiting for approval for lumbar facet injections and left shoulder injection  -He will continue physical therapy with Dr. Trish Randloph. Discussed options with the patient today. Anatomic model pathology was shown and reviewed with pt. All questions were answered. Relevant imaging reviewed, NDP reviewed and pain generators reviewed. Pt verbalized understanding and agrees with above plan. Will continue medications as they do help pt function with ADL and improve quality of life. Pt understands potential side effects from opioids such as constipation, dry mouth, dizziness, opioid use disorder, and overdose. Pt has failed non opioid therapy and decision was made to prescribe opioids to help with daily function and improve quality of life. Discussed the principles of opioid tolerance as well as the concerns regarding opioid diversion, misuse, and abuse. Discussed the risks of respiratory depression and death while on pain medications, especially when combined with other sedative substances. Discussed the elevated risks of excessive sedation while on pain medications. Advised him against driving or operating heavy machinery or performing any activities where he may harm himself or others while on pain medications.  Particular caution was emphasized

## 2022-05-02 DIAGNOSIS — Z79.891 ENCOUNTER FOR MONITORING OPIOID MAINTENANCE THERAPY: ICD-10-CM

## 2022-05-02 DIAGNOSIS — M75.02 ADHESIVE CAPSULITIS OF LEFT SHOULDER: ICD-10-CM

## 2022-05-02 DIAGNOSIS — M47.817 LUMBOSACRAL SPONDYLOSIS WITHOUT MYELOPATHY: ICD-10-CM

## 2022-05-02 DIAGNOSIS — F11.90 CHRONIC, CONTINUOUS USE OF OPIOIDS: ICD-10-CM

## 2022-05-02 DIAGNOSIS — Z51.81 ENCOUNTER FOR MONITORING OPIOID MAINTENANCE THERAPY: ICD-10-CM

## 2022-05-02 RX ORDER — TIZANIDINE 4 MG/1
4 TABLET ORAL EVERY EVENING
Qty: 5 TABLET | Refills: 0 | Status: SHIPPED | OUTPATIENT
Start: 2022-05-06 | End: 2022-05-11 | Stop reason: SDUPTHER

## 2022-05-02 RX ORDER — HYDROCODONE BITARTRATE AND ACETAMINOPHEN 5; 325 MG/1; MG/1
1 TABLET ORAL DAILY PRN
Qty: 3 TABLET | Refills: 0 | Status: SHIPPED | OUTPATIENT
Start: 2022-05-08 | End: 2022-05-11 | Stop reason: SDUPTHER

## 2022-05-02 NOTE — TELEPHONE ENCOUNTER
Requested Prescriptions     Pending Prescriptions Disp Refills    tiZANidine (ZANAFLEX) 4 MG tablet 5 tablet 0     Sig: Take 1 tablet by mouth every evening for 5 days As needed for pain and spasms    HYDROcodone-acetaminophen (NORCO) 5-325 MG per tablet 3 tablet 0     Sig: Take 1 tablet by mouth daily as needed for Pain for up to 3 days. Patient last seen on:  4/6/22  Date of last surgery:  n/a  Date of last refill:  4/6/22  Pain level:  n/a  Patient complaining of:  Pt's next appt is after rx is due.    Future appts: 5/11/22

## 2022-05-11 ENCOUNTER — OFFICE VISIT (OUTPATIENT)
Dept: PAIN MANAGEMENT | Age: 47
End: 2022-05-11
Payer: COMMERCIAL

## 2022-05-11 VITALS
DIASTOLIC BLOOD PRESSURE: 80 MMHG | BODY MASS INDEX: 32.93 KG/M2 | WEIGHT: 230 LBS | TEMPERATURE: 97.3 F | HEIGHT: 70 IN | SYSTOLIC BLOOD PRESSURE: 119 MMHG

## 2022-05-11 DIAGNOSIS — Z51.81 ENCOUNTER FOR MONITORING OPIOID MAINTENANCE THERAPY: ICD-10-CM

## 2022-05-11 DIAGNOSIS — Z79.891 ENCOUNTER FOR MONITORING OPIOID MAINTENANCE THERAPY: ICD-10-CM

## 2022-05-11 DIAGNOSIS — M47.817 LUMBOSACRAL SPONDYLOSIS WITHOUT MYELOPATHY: ICD-10-CM

## 2022-05-11 DIAGNOSIS — M75.02 ADHESIVE CAPSULITIS OF LEFT SHOULDER: ICD-10-CM

## 2022-05-11 DIAGNOSIS — F11.90 CHRONIC, CONTINUOUS USE OF OPIOIDS: ICD-10-CM

## 2022-05-11 PROCEDURE — 99213 OFFICE O/P EST LOW 20 MIN: CPT | Performed by: NURSE PRACTITIONER

## 2022-05-11 RX ORDER — TIZANIDINE 4 MG/1
4 TABLET ORAL EVERY EVENING
Qty: 30 TABLET | Refills: 1 | Status: SHIPPED | OUTPATIENT
Start: 2022-05-11 | End: 2022-07-11 | Stop reason: SDUPTHER

## 2022-05-11 RX ORDER — HYDROCODONE BITARTRATE AND ACETAMINOPHEN 5; 325 MG/1; MG/1
1 TABLET ORAL DAILY PRN
Qty: 30 TABLET | Refills: 0 | Status: SHIPPED | OUTPATIENT
Start: 2022-05-11 | End: 2022-06-03 | Stop reason: SDUPTHER

## 2022-05-11 ASSESSMENT — ENCOUNTER SYMPTOMS
CONSTIPATION: 0
BACK PAIN: 1
DIARRHEA: 0
RESPIRATORY NEGATIVE: 1

## 2022-05-11 NOTE — PROGRESS NOTES
Patient: Sil Nagy  YOB: 1975  Date: 5/11/22        Subjective:     Sil Nagy is a 56 y.o. male who complains today of:    Chief Complaint   Patient presents with    Back Pain    Shoulder Pain     left         Allergies:  Bupropion, Escitalopram, and Hydroxyzine    Past Medical History:   Diagnosis Date    Depression with anxiety 4/24/2017    Hyperglycemia 4/24/2017    Hyperlipidemia 2/18/2014    Insomnia     Perennial allergic rhinitis with seasonal variation 4/24/2017    Pre-diabetes 4/24/2017    Tobacco use disorder 4/24/2017    Type 2 diabetes mellitus without complication, without long-term current use of insulin ([facility] [address] 8/6/2018     Past Surgical History:   Procedure Laterality Date    NASAL FRACTURE SURGERY  2007     Family History   Problem Relation Age of Onset    High Blood Pressure Mother     High Cholesterol Mother     Cancer Father         Throat cancer - smoker    High Blood Pressure Brother     Heart Attack Maternal Grandmother 72    Diabetes Maternal Grandmother     Cancer Maternal Grandfather         unknown    No Known Problems Paternal Grandmother     No Known Problems Paternal Grandfather      Social History     Socioeconomic History    Marital status:      Spouse name: Samy Navarro Number of children: 3    Years of education: Not on file    Highest education level: Not on file   Occupational History    Occupation:  - industrial cleaning   Tobacco Use    Smoking status: Current Every Day Smoker     Packs/day: 0.50     Years: 23.00     Pack years: 11.50     Types: Cigarettes     Start date: 1994    Smokeless tobacco: Never Used   Vaping Use    Vaping Use: Never used   Substance and Sexual Activity    Alcohol use: No    Drug use: No    Sexual activity: Yes     Partners: Female     Comment: monogamous   Other Topics Concern    Not on file   Social History Narrative    Lives with wife and 3 children        1 dog     Social Determinants of Health     Financial Resource Strain:     Difficulty of Paying Living Expenses: Not on file   Food Insecurity:     Worried About Running Out of Food in the Last Year: Not on file    Nicolás of Food in the Last Year: Not on file   Transportation Needs:     Lack of Transportation (Medical): Not on file    Lack of Transportation (Non-Medical): Not on file   Physical Activity:     Days of Exercise per Week: Not on file    Minutes of Exercise per Session: Not on file   Stress:     Feeling of Stress : Not on file   Social Connections:     Frequency of Communication with Friends and Family: Not on file    Frequency of Social Gatherings with Friends and Family: Not on file    Attends Zoroastrianism Services: Not on file    Active Member of [address] [place] [address] [employer] or Organizations: Not on file    Attends Club or Organization Meetings: Not on file    Marital Status: Not on file   Intimate Partner Violence:     Fear of Current or Ex-Partner: Not on file    Emotionally Abused: Not on file    Physically Abused: Not on file    Sexually Abused: Not on file   Housing Stability:     Unable to Pay for Housing in the Last Year: Not on file    Number of [place] in the Last Year: Not on file    Unstable Housing in the Last Year: Not on file       Current Outpatient Medications on File Prior to Visit   Medication Sig Dispense Refill    naloxone 4 MG/0.1ML LIQD nasal spray 1 spray by Nasal route as needed for Opioid Reversal 1 each 0    Respiratory Therapy Supplies SHELBY New Full face CPAP  Mask.  1 each 0    ALPRAZolam (XANAX) 0.5 MG tablet TAKE ONE TABLET BY MOUTH THREE TIMES A DAY FOR PANIC  ANXIETY  AS NEEDED      azelastine (ASTELIN) 0.1 % nasal spray INSTILL TWO SPRAYS PER NOSTRIL TWICE DAILY AS NEEDED      buPROPion (WELLBUTRIN SR) 100 MG extended release tablet       cloNIDine (CATAPRES) 0.1 MG tablet TAKE ONE TABLET BY MOUTH THREE TIMES A DAY AS NEEDED for anxiety  high blood pressure      metFORMIN (GLUCOPHAGE-XR) 500 MG extended release tablet TAKE ONE TABLET BY MOUTH ONCE DAILY WITH THE EVENING MEAL      olmesartan (BENICAR) 20 MG tablet TAKE ONE TABLET BY MOUTH EVERY DAY      rosuvastatin (CRESTOR) 10 MG tablet Take by mouth      lidocaine (LMX) 4 % cream Apply a half dollar sized amount to intact skin topically up to twice daily as needed for pain 1 Tube 1    olopatadine (PATANOL) 0.1 % ophthalmic solution Place 1 drop into both eyes 2 times daily 5 mL 0    montelukast (SINGULAIR) 10 MG tablet Take 1 tablet by mouth daily 30 tablet 1    ipratropium (ATROVENT) 0.06 % nasal spray 2 sprays by Nasal route 3 times daily 1 Bottle 1    Elastic Bandages & Supports (KNEE BRACE) MISC Wear during the day over right knee as needed for support 1 each 0    loratadine (CLARITIN) 10 MG tablet Take 1 tablet by mouth daily 30 tablet 5    acetaminophen (TYLENOL) 500 MG tablet Take 1 tablet by mouth every 6 hours as needed for Pain. 60 tablet 0     No current facility-administered medications on file prior to visit. 521/2021 56year-old male follows for chronic low back pain and left shoulder pain. Pain 8/10. EMG upper extremities 3/9/2022 normal.  He was started on Norco 5 mg once daily and tizanidine 4 mg once daily. This does help to some degree. He is able to participate in ADLs and chores. He uses a walking stick. He cannot lift his left arm. Difficulty since he uses his walking stick with his right hand cannot use his left. He is seeing Dr. Jordana Perez for physical therapy of his shoulder. Painful after the visits. Wants to get back to work. Also has neck pain. Legs with constant burning.      Patient tells me his back issue is from a work-related injury about a year and a half ago. He works as a  and it was snowy and he was lifting something and he felt a pop in his back. He has had severe pain since then that goes down his left leg.   He also tells me that due to the left leg giving out on him he fell and injured his left shoulder. He is not able to lift his left arm. He describes his pain and left shoulder across chest and low back pain down left leg to toes. Underwent lumbar facet injections with no pain relief and left GH joint injection with greater than 50% pain relief for 2 days. Ruba Sotomayor He takes tizanidine 2 mg at night as needed with some relief. Back Pain  Pertinent negatives include no headaches, numbness or weakness. Shoulder Pain   Pertinent negatives include no numbness. Review of Systems   Constitutional: Negative. Negative for activity change and unexpected weight change. HENT: Negative. Negative for hearing loss. Respiratory: Negative. Cardiovascular: Negative for leg swelling. Gastrointestinal: Negative for constipation and diarrhea. Genitourinary: Negative. Musculoskeletal: Positive for arthralgias and back pain. Negative for gait problem, joint swelling and neck pain. Skin: Negative for rash. Neurological: Negative for dizziness, weakness, numbness and headaches. Psychiatric/Behavioral: Negative. Negative for sleep disturbance. Objective:     Vitals:  /80 (Site: Left Upper Arm)   Temp 97.3 °F (36.3 °C) (Temporal)   Ht 5' 10\" (1.778 m)   Wt 230 lb (104.3 kg)   BMI 33.00 kg/m² Pain Score:   8    Physical Exam  Vitals reviewed. Constitutional:       Appearance: He is well-developed. HENT:      Head: Normocephalic. Nose: Nose normal.   Pulmonary:      Effort: Pulmonary effort is normal.   Musculoskeletal:      Left shoulder: Tenderness present. Decreased strength. Arms:       Cervical back: Normal range of motion and neck supple. Lumbar back: Tenderness present. Negative right straight leg raise test and negative left straight leg raise test.   Lymphadenopathy:      Cervical: No cervical adenopathy. Skin:     General: Skin is warm and dry. Findings: No erythema or rash.    Neurological: Mental Status: He is alert and oriented to person, place, and time. Cranial Nerves: No cranial nerve deficit. Deep Tendon Reflexes: Reflexes are normal and symmetric. Reflexes normal.   Psychiatric:         Mood and Affect: Mood normal.         Behavior: Behavior normal.         Thought Content: Thought content normal.         Judgment: Judgment normal.          Outside record review:  MRI L Shoulder 10/14/21: no rotator cuff tear, subacromial bursitis, glenoid labral cyst, question late phase adhesive capsulitis  XR L shoulder 7/13/2021: Bulbous subacromial process, no fracture, may predispose to impingement  XR L hip 7/12/2021: Minimal arthritis, normal hip joint spaces, no fracture  EMG BLE 7/12/2021:Normal, no lumbosacral motor radiculopathy or peripheral polyneuropathy  MRI LS spine 5/24/2021 no fracture.  Degenerative disc disease.  Congenital spinal canal stenosis.  T12-L4 normal canal foramen.  Degenerative disc disease and facet arthropathy.  L4-L5 mild canal stenosis, mild bilateral foraminal narrowing.  L5-S1 normal canal foramen. MRI thoracic spine 2/24/2020: Degenerative disc disease and facet arthropathy.  Disc bulge T7-T8.  No canal or foraminal stenosis.  Unremarkable MRI of the thoracic spine. XR LS Spine 11/19/20: no fracture, no instability, disc spaces preserved.    Assessment:        Diagnosis Orders   1. Adhesive capsulitis of left shoulder  tiZANidine (ZANAFLEX) 4 MG tablet    HYDROcodone-acetaminophen (NORCO) 5-325 MG per tablet   2. Lumbosacral spondylosis without myelopathy  tiZANidine (ZANAFLEX) 4 MG tablet    HYDROcodone-acetaminophen (NORCO) 5-325 MG per tablet   3. Chronic, continuous use of opioids  HYDROcodone-acetaminophen (NORCO) 5-325 MG per tablet   4.  Encounter for monitoring opioid maintenance therapy  HYDROcodone-acetaminophen (NORCO) 5-325 MG per tablet       Plan:     Periodic Controlled Substance Monitoring: Possible medication side effects, risk of tolerance/dependence & alternative treatments discussed. ,No signs of potential drug abuse or diversion identified. ,Assessed functional status. Yoan Trevino, APRN - CNP)    Orders Placed This Encounter   Medications    tiZANidine (ZANAFLEX) 4 MG tablet     Sig: Take 1 tablet by mouth every evening As needed for pain and spasms     Dispense:  30 tablet     Refill:  1    HYDROcodone-acetaminophen (NORCO) 5-325 MG per tablet     Sig: Take 1 tablet by mouth daily as needed for Pain for up to 30 days. Dispense:  30 tablet     Refill:  0     Reduce doses taken as pain becomes manageable       No orders of the defined types were placed in this encounter.    -he is waiting for approval of injections from [facility]      Discussed options with the patient today. Anatomic model pathology was shown and reviewed with pt. All questions were answered. Relevant imaging reviewed, NDP reviewed and pain generators reviewed. Pt verbalized understanding and agrees with above plan. Will continue medications as they do help pt function with ADL and improve quality of life. Pt understands potential side effects from opioids such as constipation, dry mouth, dizziness, opioid use disorder, and overdose. Pt has failed non opioid therapy and decision was made to prescribe opioids to help with daily function and improve quality of life. Discussed the principles of opioid tolerance as well as the concerns regarding opioid diversion, misuse, and abuse.      Discussed the risks of respiratory depression and death while on pain medications, especially when combined with other sedative substances.     Discussed the elevated risks of excessive sedation while on pain medications. Advised him against driving or operating heavy machinery or performing any activities where he may harm himself or others while on pain medications. Particular caution was emphasized especially during dose adjustments and medication changes.      OARRS was reviewed. UTOX from 3/2022 appropriate; ORT score = 1  Daily MME 5  Narcan prescribed 3/2022 and understands the proper use in the event of an overdose. This NP saw pt under direct supervision of Dr Rupal Desir. Follow up:  Return in about 4 weeks (around 6/8/2022) for review meds and reassess pain.     Betsy Aaron, CHELI - CNP

## 2022-06-03 DIAGNOSIS — Z79.891 ENCOUNTER FOR MONITORING OPIOID MAINTENANCE THERAPY: ICD-10-CM

## 2022-06-03 DIAGNOSIS — F11.90 CHRONIC, CONTINUOUS USE OF OPIOIDS: ICD-10-CM

## 2022-06-03 DIAGNOSIS — M47.817 LUMBOSACRAL SPONDYLOSIS WITHOUT MYELOPATHY: ICD-10-CM

## 2022-06-03 DIAGNOSIS — M75.02 ADHESIVE CAPSULITIS OF LEFT SHOULDER: ICD-10-CM

## 2022-06-03 DIAGNOSIS — Z51.81 ENCOUNTER FOR MONITORING OPIOID MAINTENANCE THERAPY: ICD-10-CM

## 2022-06-03 RX ORDER — TIZANIDINE 4 MG/1
4 TABLET ORAL EVERY EVENING
Qty: 5 TABLET | Refills: 0 | OUTPATIENT
Start: 2022-06-10 | End: 2022-06-15

## 2022-06-03 RX ORDER — HYDROCODONE BITARTRATE AND ACETAMINOPHEN 5; 325 MG/1; MG/1
1 TABLET ORAL DAILY PRN
Qty: 5 TABLET | Refills: 0 | Status: SHIPPED | OUTPATIENT
Start: 2022-06-10 | End: 2022-06-15 | Stop reason: SDUPTHER

## 2022-06-03 NOTE — TELEPHONE ENCOUNTER
Requested Prescriptions     Pending Prescriptions Disp Refills    tiZANidine (ZANAFLEX) 4 MG tablet 5 tablet 0     Sig: Take 1 tablet by mouth every evening for 5 days As needed for pain and spasms    HYDROcodone-acetaminophen (NORCO) 5-325 MG per tablet 5 tablet 0     Sig: Take 1 tablet by mouth daily as needed for Pain for up to 5 days.        Patient last seen on:  5/11  Date of last surgery:  n/a  Date of last reill:  5/11  Pain level:  n/a  Patient complaining of:  PT is asking for partial refill  Future appts: 6/15

## 2022-06-15 ENCOUNTER — TELEPHONE (OUTPATIENT)
Dept: PAIN MANAGEMENT | Age: 47
End: 2022-06-15

## 2022-06-15 ENCOUNTER — OFFICE VISIT (OUTPATIENT)
Dept: PAIN MANAGEMENT | Age: 47
End: 2022-06-15
Payer: COMMERCIAL

## 2022-06-15 VITALS
HEIGHT: 70 IN | BODY MASS INDEX: 32.93 KG/M2 | SYSTOLIC BLOOD PRESSURE: 124 MMHG | WEIGHT: 230 LBS | TEMPERATURE: 97.5 F | DIASTOLIC BLOOD PRESSURE: 84 MMHG

## 2022-06-15 DIAGNOSIS — Z51.81 ENCOUNTER FOR MONITORING OPIOID MAINTENANCE THERAPY: ICD-10-CM

## 2022-06-15 DIAGNOSIS — Z79.891 ENCOUNTER FOR MONITORING OPIOID MAINTENANCE THERAPY: ICD-10-CM

## 2022-06-15 DIAGNOSIS — M47.817 LUMBOSACRAL SPONDYLOSIS WITHOUT MYELOPATHY: ICD-10-CM

## 2022-06-15 DIAGNOSIS — M75.02 ADHESIVE CAPSULITIS OF LEFT SHOULDER: ICD-10-CM

## 2022-06-15 DIAGNOSIS — F11.90 CHRONIC, CONTINUOUS USE OF OPIOIDS: ICD-10-CM

## 2022-06-15 PROCEDURE — 99213 OFFICE O/P EST LOW 20 MIN: CPT | Performed by: NURSE PRACTITIONER

## 2022-06-15 RX ORDER — HYDROCODONE BITARTRATE AND ACETAMINOPHEN 5; 325 MG/1; MG/1
1 TABLET ORAL DAILY PRN
Qty: 30 TABLET | Refills: 0 | Status: SHIPPED | OUTPATIENT
Start: 2022-06-15 | End: 2022-07-11 | Stop reason: SDUPTHER

## 2022-06-15 ASSESSMENT — ENCOUNTER SYMPTOMS
RESPIRATORY NEGATIVE: 1
DIARRHEA: 0
BACK PAIN: 1
CONSTIPATION: 0

## 2022-06-15 NOTE — PROGRESS NOTES
Patient: Lencho Chahal  YOB: 1975  Date: 6/15/22        Subjective:     Lencho Chahal is a 56 y.o. male who complains today of:    Chief Complaint   Patient presents with    Shoulder Pain         Allergies:  Bupropion, Escitalopram, and Hydroxyzine    Past Medical History:   Diagnosis Date    Depression with anxiety 4/24/2017    Hyperglycemia 4/24/2017    Hyperlipidemia 2/18/2014    Insomnia     Perennial allergic rhinitis with seasonal variation 4/24/2017    Pre-diabetes 4/24/2017    Tobacco use disorder 4/24/2017    Type 2 diabetes mellitus without complication, without long-term current use of insulin ([facility] 8/6/2018     Past Surgical History:   Procedure Laterality Date    NASAL FRACTURE SURGERY  2007     Family History   Problem Relation Age of Onset    High Blood Pressure Mother     High Cholesterol Mother     Cancer Father         Throat cancer - smoker    High Blood Pressure Brother     Heart Attack Maternal Grandmother 72    Diabetes Maternal Grandmother     Cancer Maternal Grandfather         unknown    No Known Problems Paternal Grandmother     No Known Problems Paternal Grandfather      Social History     Socioeconomic History    Marital status:      Spouse name: Jennie Dumont Number of children: 3    Years of education: Not on file    Highest education level: Not on file   Occupational History    Occupation:  - industrial cleaning   Tobacco Use    Smoking status: Current Every Day Smoker     Packs/day: 0.50     Years: 23.00     Pack years: 11.50     Types: Cigarettes     Start date: 1994    Smokeless tobacco: Never Used   Vaping Use    Vaping Use: Never used   Substance and Sexual Activity    Alcohol use: No    Drug use: No    Sexual activity: Yes     Partners: Female     Comment: monogamous   Other Topics Concern    Not on file   Social History Narrative    Lives with wife and 3 children        1 dog     Social Determinants of Health Financial Resource Strain:     Difficulty of Paying Living Expenses: Not on file   Food Insecurity:     Worried About Running Out of Food in the Last Year: Not on file    Nicolás of Food in the Last Year: Not on file   Transportation Needs:     Lack of Transportation (Medical): Not on file    Lack of Transportation (Non-Medical): Not on file   Physical Activity:     Days of Exercise per Week: Not on file    Minutes of Exercise per Session: Not on file   Stress:     Feeling of Stress : Not on file   Social Connections:     Frequency of Communication with Friends and Family: Not on file    Frequency of Social Gatherings with Friends and Family: Not on file    Attends Faith Services: Not on file    Active Member of [address] [place] [address] [employer] or Organizations: Not on file    Attends Club or Organization Meetings: Not on file    Marital Status: Not on file   Intimate Partner Violence:     Fear of Current or Ex-Partner: Not on file    Emotionally Abused: Not on file    Physically Abused: Not on file    Sexually Abused: Not on file   Housing Stability:     Unable to Pay for Housing in the Last Year: Not on file    Number of [place] in the Last Year: Not on file    Unstable Housing in the Last Year: Not on file       Current Outpatient Medications on File Prior to Visit   Medication Sig Dispense Refill    naloxone 4 MG/0.1ML LIQD nasal spray 1 spray by Nasal route as needed for Opioid Reversal 1 each 0    Respiratory Therapy Supplies SHELBY New Full face CPAP  Mask.  1 each 0    ALPRAZolam (XANAX) 0.5 MG tablet TAKE ONE TABLET BY MOUTH THREE TIMES A DAY FOR PANIC  ANXIETY  AS NEEDED      azelastine (ASTELIN) 0.1 % nasal spray INSTILL TWO SPRAYS PER NOSTRIL TWICE DAILY AS NEEDED      buPROPion (WELLBUTRIN SR) 100 MG extended release tablet       cloNIDine (CATAPRES) 0.1 MG tablet TAKE ONE TABLET BY MOUTH THREE TIMES A DAY AS NEEDED for anxiety  high blood pressure      metFORMIN (GLUCOPHAGE-XR) 500 MG extended release tablet TAKE ONE TABLET BY MOUTH ONCE DAILY WITH THE EVENING MEAL      olmesartan (BENICAR) 20 MG tablet TAKE ONE TABLET BY MOUTH EVERY DAY      rosuvastatin (CRESTOR) 10 MG tablet Take by mouth      lidocaine (LMX) 4 % cream Apply a half dollar sized amount to intact skin topically up to twice daily as needed for pain 1 Tube 1    olopatadine (PATANOL) 0.1 % ophthalmic solution Place 1 drop into both eyes 2 times daily 5 mL 0    montelukast (SINGULAIR) 10 MG tablet Take 1 tablet by mouth daily 30 tablet 1    ipratropium (ATROVENT) 0.06 % nasal spray 2 sprays by Nasal route 3 times daily 1 Bottle 1    Elastic Bandages & Supports (KNEE BRACE) MISC Wear during the day over right knee as needed for support 1 each 0    loratadine (CLARITIN) 10 MG tablet Take 1 tablet by mouth daily 30 tablet 5    acetaminophen (TYLENOL) 500 MG tablet Take 1 tablet by mouth every 6 hours as needed for Pain. 60 tablet 0     No current facility-administered medications on file prior to visit. 60-year-old male follows for chronic low back pain and left shoulder pain. He says he talked to his  yesterday and the inejctions are approved but waiting for papaerwork. Pain 7/10. EMG upper extremities 3/9/2022 normal.  He was started on Norco 5 mg once daily and tizanidine 4 mg once daily. This does help to some degree. He is able to participate in ADLs and chores. He uses a walking stick. He cannot lift his left arm. Difficulty since he uses his walking stick with his right hand cannot use his left. He is seeing Dr. Breanna Heath for physical therapy of his shoulder. Painful after the visits. Wants to get back to work. Also has neck pain. Legs with constant burning.      Patient tells me his back issue is from a work-related injury about a year and a half ago. He works as a  and it was snowy and he was lifting something and he felt a pop in his back.   He has had severe pain since then that goes down his left leg. He also tells me that due to the left leg giving out on him he fell and injured his left shoulder. He is not able to lift his left arm. He describes his pain and left shoulder across chest and low back pain down left leg to toes. Underwent lumbar facet injections with no pain relief and left GH joint injection with greater than 50% pain relief for 2 days. William Ra He takes tizanidine 2 mg at night as needed with some relief. Review of Systems   Constitutional: Negative. Negative for activity change and unexpected weight change. HENT: Negative. Negative for hearing loss. Respiratory: Negative. Cardiovascular: Negative for leg swelling. Gastrointestinal: Negative for constipation and diarrhea. Genitourinary: Negative. Musculoskeletal: Positive for arthralgias and back pain. Negative for gait problem, joint swelling and neck pain. Skin: Negative for rash. Neurological: Negative for dizziness, weakness, numbness and headaches. Psychiatric/Behavioral: Negative. Negative for sleep disturbance. Objective:     Vitals:  /84   Temp 97.5 °F (36.4 °C)   Ht 5' 10\" (1.778 m)   Wt 230 lb (104.3 kg)   BMI 33.00 kg/m² Pain Score:   7    Physical Exam  Vitals reviewed. Constitutional:       Appearance: He is well-developed. HENT:      Head: Normocephalic. Nose: Nose normal.   Pulmonary:      Effort: Pulmonary effort is normal.   Musculoskeletal:      Left shoulder: Tenderness present. Decreased range of motion. Cervical back: Normal range of motion and neck supple. Lumbar back: Tenderness present. Decreased range of motion. Negative right straight leg raise test and negative left straight leg raise test.   Lymphadenopathy:      Cervical: No cervical adenopathy. Skin:     General: Skin is warm and dry. Findings: No erythema or rash. Neurological:      Mental Status: He is alert and oriented to person, place, and time.       Cranial Nerves: No cranial nerve deficit. Deep Tendon Reflexes: Reflexes are normal and symmetric. Reflexes normal.   Psychiatric:         Mood and Affect: Mood normal.         Behavior: Behavior normal.         Thought Content: Thought content normal.         Judgment: Judgment normal.            Assessment:        Diagnosis Orders   1. Adhesive capsulitis of left shoulder  HYDROcodone-acetaminophen (NORCO) 5-325 MG per tablet   2. Lumbosacral spondylosis without myelopathy  HYDROcodone-acetaminophen (NORCO) 5-325 MG per tablet   3. Chronic, continuous use of opioids  HYDROcodone-acetaminophen (NORCO) 5-325 MG per tablet   4. Encounter for monitoring opioid maintenance therapy  HYDROcodone-acetaminophen (Donel Siskin) 5-325 MG per tablet       Plan:     Periodic Controlled Substance Monitoring: Possible medication side effects, risk of tolerance/dependence & alternative treatments discussed. ,No signs of potential drug abuse or diversion identified. ,Assessed functional status. CHELI Luo - CNP)    Orders Placed This Encounter   Medications    HYDROcodone-acetaminophen (NORCO) 5-325 MG per tablet     Sig: Take 1 tablet by mouth daily as needed for Pain for up to 30 days. Dispense:  30 tablet     Refill:  0     Reduce doses taken as pain becomes manageable       No orders of the defined types were placed in this encounter. waiting vernon injection approval      Discussed options with the patient today. Anatomic model pathology was shown and reviewed with pt.  All questions were answered.  Relevant imaging reviewed, NDP reviewed and pain generators reviewed. Pt verbalized understanding and agrees with above plan. Will continue medications as they do help pt function with ADL and improve quality of life.  Pt understands potential side effects from opioids such as constipation, dry mouth, dizziness, opioid use disorder, and overdose.  Pt has failed non opioid therapy and decision was made to prescribe opioids to help with daily function and improve quality of life. Discussed the principles of opioid tolerance as well as the concerns regarding opioid diversion, misuse, and abuse.      Discussed the risks of respiratory depression and death while on pain medications, especially when combined with other sedative substances.     Discussed the elevated risks of excessive sedation while on pain medications. Advised him against driving or operating heavy machinery or performing any activities where he may harm himself or others while on pain medications. Particular caution was emphasized especially during dose adjustments and medication changes.      OARRS was reviewed. UTOX from 3/2022 appropriate; ORT score = 1  Daily MME 5  Narcan prescribed 3/2022 and understands the proper use in the event of an overdose.      This NP saw pt under direct supervision of Dr Flaherty. Follow up:  Return in about 4 weeks (around 7/13/2022) for review meds and reassess pain.     CHELI Donahue - CNP

## 2022-06-15 NOTE — TELEPHONE ENCOUNTER
Patient stated that he forgot to request a handicap placard at his appt today. Will Breezy fernandez please advise?

## 2022-07-11 DIAGNOSIS — M75.02 ADHESIVE CAPSULITIS OF LEFT SHOULDER: ICD-10-CM

## 2022-07-11 DIAGNOSIS — M47.817 LUMBOSACRAL SPONDYLOSIS WITHOUT MYELOPATHY: ICD-10-CM

## 2022-07-11 DIAGNOSIS — F11.90 CHRONIC, CONTINUOUS USE OF OPIOIDS: ICD-10-CM

## 2022-07-11 DIAGNOSIS — Z79.891 ENCOUNTER FOR MONITORING OPIOID MAINTENANCE THERAPY: ICD-10-CM

## 2022-07-11 DIAGNOSIS — Z51.81 ENCOUNTER FOR MONITORING OPIOID MAINTENANCE THERAPY: ICD-10-CM

## 2022-07-11 RX ORDER — TIZANIDINE 4 MG/1
4 TABLET ORAL EVERY EVENING
Qty: 30 TABLET | Refills: 1 | Status: SHIPPED | OUTPATIENT
Start: 2022-07-11 | End: 2022-07-22 | Stop reason: SDUPTHER

## 2022-07-11 RX ORDER — HYDROCODONE BITARTRATE AND ACETAMINOPHEN 5; 325 MG/1; MG/1
1 TABLET ORAL DAILY PRN
Qty: 9 TABLET | Refills: 0 | Status: SHIPPED | OUTPATIENT
Start: 2022-07-13 | End: 2022-07-22 | Stop reason: SDUPTHER

## 2022-07-11 NOTE — TELEPHONE ENCOUNTER
Requested Prescriptions     Pending Prescriptions Disp Refills    HYDROcodone-acetaminophen (NORCO) 5-325 MG per tablet 10 tablet 0     Sig: Take 1 tablet by mouth daily as needed for Pain for up to 10 days.  tiZANidine (ZANAFLEX) 4 MG tablet 30 tablet 1     Sig: Take 1 tablet by mouth every evening As needed for pain and spasms       Patient last seen on:  6/15/22  Date of last surgery:  n/a  Date of last refill:  6/15/22  Pain level:  n/a  Patient complaining of:  Pt states he only has 1 pill left of norco. Patient asks for an early rx date? Pt is not due until 7/15.   Future appts: 7/22/22

## 2022-07-11 NOTE — TELEPHONE ENCOUNTER
PT WAS CALLED & LMVM. WHEN PT CALLS BACK, PLEASE INFORM PT OF RADHA'S RESPONSE:    \"Partial RX sent with fill date 7/13, 2 days early.  Thanks\"

## 2022-07-18 ENCOUNTER — TELEPHONE (OUTPATIENT)
Dept: PAIN MANAGEMENT | Age: 47
End: 2022-07-18

## 2022-07-18 NOTE — TELEPHONE ENCOUNTER
Hydrocodone-Acetaminophen 5-325 mg tablets approved Approvedtoday  Request Reference Number: [id number]. HYDROCO/APAP TAB 5-325MG is approved through 07/18/2023. Please refer to the fax or electronic case notice for further information.

## 2022-07-18 NOTE — TELEPHONE ENCOUNTER
Prior authorization for Hydrocodone-acetaminophen Feliciano Kendall) 5325 submitted to [employer] clinical uploaded.  Authorization pending Becker: Lory Apgar - PA Case ID: [id number] - Rx #: [id number]

## 2022-07-22 ENCOUNTER — OFFICE VISIT (OUTPATIENT)
Dept: PAIN MANAGEMENT | Age: 47
End: 2022-07-22
Payer: COMMERCIAL

## 2022-07-22 VITALS
TEMPERATURE: 97.1 F | SYSTOLIC BLOOD PRESSURE: 145 MMHG | WEIGHT: 240 LBS | HEIGHT: 70 IN | DIASTOLIC BLOOD PRESSURE: 93 MMHG | BODY MASS INDEX: 34.36 KG/M2

## 2022-07-22 DIAGNOSIS — M47.817 LUMBOSACRAL SPONDYLOSIS WITHOUT MYELOPATHY: Primary | ICD-10-CM

## 2022-07-22 DIAGNOSIS — Z79.891 ENCOUNTER FOR MONITORING OPIOID MAINTENANCE THERAPY: ICD-10-CM

## 2022-07-22 DIAGNOSIS — M75.02 ADHESIVE CAPSULITIS OF LEFT SHOULDER: ICD-10-CM

## 2022-07-22 DIAGNOSIS — F11.90 CHRONIC, CONTINUOUS USE OF OPIOIDS: ICD-10-CM

## 2022-07-22 DIAGNOSIS — Z51.81 ENCOUNTER FOR MONITORING OPIOID MAINTENANCE THERAPY: ICD-10-CM

## 2022-07-22 PROCEDURE — 99214 OFFICE O/P EST MOD 30 MIN: CPT | Performed by: NURSE PRACTITIONER

## 2022-07-22 RX ORDER — TIZANIDINE 4 MG/1
4 TABLET ORAL EVERY EVENING
Qty: 30 TABLET | Refills: 1 | Status: SHIPPED | OUTPATIENT
Start: 2022-07-22 | End: 2022-08-16 | Stop reason: SDUPTHER

## 2022-07-22 RX ORDER — HYDROCODONE BITARTRATE AND ACETAMINOPHEN 5; 325 MG/1; MG/1
1 TABLET ORAL DAILY PRN
Qty: 30 TABLET | Refills: 0 | Status: SHIPPED | OUTPATIENT
Start: 2022-07-22 | End: 2022-08-16 | Stop reason: SDUPTHER

## 2022-07-22 RX ORDER — MELOXICAM 7.5 MG/1
7.5 TABLET ORAL
Qty: 30 TABLET | Refills: 0 | Status: SHIPPED | OUTPATIENT
Start: 2022-07-22 | End: 2022-08-26

## 2022-07-22 ASSESSMENT — ENCOUNTER SYMPTOMS
DIARRHEA: 0
RESPIRATORY NEGATIVE: 1
CONSTIPATION: 0
BACK PAIN: 1

## 2022-07-22 NOTE — PROGRESS NOTES
Patient: Juan M Novak  YOB: 1975  Date: 7/22/22        Subjective:     Juan M Novak is a 56 y.o. male who complains today of:    Chief Complaint   Patient presents with    Shoulder Pain     left         Allergies:  Bupropion, Escitalopram, and Hydroxyzine    Past Medical History:   Diagnosis Date    Depression with anxiety 4/24/2017    Hyperglycemia 4/24/2017    Hyperlipidemia 2/18/2014    Insomnia     Perennial allergic rhinitis with seasonal variation 4/24/2017    Pre-diabetes 4/24/2017    Tobacco use disorder 4/24/2017    Type 2 diabetes mellitus without complication, without long-term current use of insulin ([facility] [address] 8/6/2018     Past Surgical History:   Procedure Laterality Date    NASAL FRACTURE SURGERY  2007     Family History   Problem Relation Age of Onset    High Blood Pressure Mother     High Cholesterol Mother     Cancer Father         Throat cancer - smoker    High Blood Pressure Brother     Heart Attack Maternal Grandmother 72    Diabetes Maternal Grandmother     Cancer Maternal Grandfather         unknown    No Known Problems Paternal Grandmother     No Known Problems Paternal Grandfather      Social History     Socioeconomic History    Marital status:      Spouse name: Juliocesar Whaley    Number of children: 3    Years of education: Not on file    Highest education level: Not on file   Occupational History    Occupation:  - industrial cleaning   Tobacco Use    Smoking status: Every Day     Packs/day: 0.50     Years: 23.00     Pack years: 11.50     Types: Cigarettes     Start date: 1994    Smokeless tobacco: Never   Vaping Use    Vaping Use: Never used   Substance and Sexual Activity    Alcohol use: No    Drug use: No    Sexual activity: Yes     Partners: Female     Comment: monogamous   Other Topics Concern    Not on file   Social History Narrative    Lives with wife and 3 children        1 dog     Social Determinants of Health     Financial Resource Strain: Not on file   Food Insecurity: Not on file   Transportation Needs: Not on file   Physical Activity: Not on file   Stress: Not on file   Social Connections: Not on file   Intimate Partner Violence: Not on file   Housing Stability: Not on file       Current Outpatient Medications on File Prior to Visit   Medication Sig Dispense Refill    naloxone 4 MG/0.1ML LIQD nasal spray 1 spray by Nasal route as needed for Opioid Reversal 1 each 0    Respiratory Therapy Supplies SHELBY New Full face CPAP  Mask. 1 each 0    ALPRAZolam (XANAX) 0.5 MG tablet TAKE ONE TABLET BY MOUTH THREE TIMES A DAY FOR PANIC  ANXIETY  AS NEEDED      azelastine (ASTELIN) 0.1 % nasal spray INSTILL TWO SPRAYS PER NOSTRIL TWICE DAILY AS NEEDED      buPROPion (WELLBUTRIN SR) 100 MG extended release tablet       cloNIDine (CATAPRES) 0.1 MG tablet TAKE ONE TABLET BY MOUTH THREE TIMES A DAY AS NEEDED for anxiety  high blood pressure      metFORMIN (GLUCOPHAGE-XR) 500 MG extended release tablet TAKE ONE TABLET BY MOUTH ONCE DAILY WITH THE EVENING MEAL      olmesartan (BENICAR) 20 MG tablet TAKE ONE TABLET BY MOUTH EVERY DAY      rosuvastatin (CRESTOR) 10 MG tablet Take by mouth      lidocaine (LMX) 4 % cream Apply a half dollar sized amount to intact skin topically up to twice daily as needed for pain 1 Tube 1    olopatadine (PATANOL) 0.1 % ophthalmic solution Place 1 drop into both eyes 2 times daily 5 mL 0    montelukast (SINGULAIR) 10 MG tablet Take 1 tablet by mouth daily 30 tablet 1    ipratropium (ATROVENT) 0.06 % nasal spray 2 sprays by Nasal route 3 times daily 1 Bottle 1    Elastic Bandages & Supports (KNEE BRACE) MISC Wear during the day over right knee as needed for support 1 each 0    loratadine (CLARITIN) 10 MG tablet Take 1 tablet by mouth daily 30 tablet 5    acetaminophen (TYLENOL) 500 MG tablet Take 1 tablet by mouth every 6 hours as needed for Pain. 60 tablet 0     No current facility-administered medications on file prior to visit. dizziness, weakness, numbness and headaches. Psychiatric/Behavioral: Negative. Negative for sleep disturbance. Objective:     Vitals:  BP (!) 145/93   Temp 97.1 °F (36.2 °C)   Ht 5' 10\" (1.778 m)   Wt 240 lb (108.9 kg)   BMI 34.44 kg/m² Pain Score:   5    Physical Exam  Vitals reviewed. Constitutional:       Appearance: He is well-developed. HENT:      Head: Normocephalic. Nose: Nose normal.   Pulmonary:      Effort: Pulmonary effort is normal.   Musculoskeletal:      Left shoulder: Tenderness present. Decreased range of motion. Decreased strength. Arms:       Cervical back: Normal range of motion and neck supple. Lumbar back: Tenderness present. Decreased range of motion. Negative right straight leg raise test and negative left straight leg raise test.      Comments: Antalgic gait with walking stick   Lymphadenopathy:      Cervical: No cervical adenopathy. Skin:     General: Skin is warm and dry. Findings: No erythema or rash. Neurological:      Mental Status: He is alert and oriented to person, place, and time. Cranial Nerves: No cranial nerve deficit. Deep Tendon Reflexes: Reflexes are normal and symmetric. Reflexes normal.   Psychiatric:         Mood and Affect: Mood normal.         Behavior: Behavior normal.         Thought Content: Thought content normal.         Judgment: Judgment normal.          Assessment:        Diagnosis Orders   1. Lumbosacral spondylosis without myelopathy  FL INJ DX/THER AGNT PARAVERT FACET JOINT, LUMBAR/SAC, 1ST LEVEL    FL INJ DX/THER AGNT PARAVERT FACET JOINT, LUMBAR/SAC, 2ND LEVEL    FL INJ DX/THER AGNT PARAVERT FACET JOINT, LUMBAR/SAC, ADD LEVEL    HYDROcodone-acetaminophen (NORCO) 5-325 MG per tablet    tiZANidine (ZANAFLEX) 4 MG tablet      2. Adhesive capsulitis of left shoulder  HYDROcodone-acetaminophen (NORCO) 5-325 MG per tablet    tiZANidine (ZANAFLEX) 4 MG tablet      3.  Chronic, continuous use of opioids HYDROcodone-acetaminophen (NORCO) 5-325 MG per tablet      4. Encounter for monitoring opioid maintenance therapy  HYDROcodone-acetaminophen ([address]) 5-325 MG per tablet          Plan:     Periodic Controlled Substance Monitoring: Possible medication side effects, risk of tolerance/dependence & alternative treatments discussed., No signs of potential drug abuse or diversion identified. , Assessed functional status. Chely Mliner, APRN - CNP)    Orders Placed This Encounter   Medications    HYDROcodone-acetaminophen (NORCO) 5-325 MG per tablet     Sig: Take 1 tablet by mouth daily as needed for Pain for up to 30 days. Dispense:  30 tablet     Refill:  0     Reduce doses taken as pain becomes manageable    tiZANidine (ZANAFLEX) 4 MG tablet     Sig: Take 1 tablet by mouth every evening As needed for pain and spasms     Dispense:  30 tablet     Refill:  1    meloxicam (MOBIC) 7.5 MG tablet     Sig: Take 1 tablet by mouth daily (with breakfast) No other nsaids     Dispense:  30 tablet     Refill:  0         Orders Placed This Encounter   Procedures    DC INJ DX/THER AGNT PARAVERT FACET JOINT, LUMBAR/SAC, 1ST LEVEL     MBB Bilat  with SM     Standing Status:   Future     Standing Expiration Date:   10/20/2022    DC INJ DX/THER AGNT PARAVERT FACET JOINT, LUMBAR/SAC, 2ND LEVEL     Standing Status:   Future     Standing Expiration Date:   10/20/2022    DC INJ DX/THER AGNT PARAVERT FACET JOINT, LUMBAR/SAC, ADD LEVEL     Standing Status:   Future     Standing Expiration Date:   10/20/2022     We will go ahead and order diagnostic bilateral L2, L3, L4, L5 medial branch blocks to see if the patient is a candidate for RF ablation. he has failed conservative treatment in the past. Anatomic model of pathology was shown. Risks and benefits of the procedure were discussed. All questions were answered and patient understands and agrees with the plan.      -start meloxican 7.5mg with breakfast PRN pain #30  -refill norco 5mg daily PRN,#30, fill 7/22/22  -refill tizanidine 4mg PRN #30 spasm    Discussed options with the patient today. Anatomic model pathology was shown and reviewed with pt. All questions were answered. Relevant imaging reviewed, NDP reviewed and pain generators reviewed. Pt verbalized understanding and agrees with above plan. Will continue medications as they do help pt function with ADL and improve quality of life. Pt understands potential side effects from opioids such as constipation, dry mouth, dizziness, opioid use disorder, and overdose. Pt has failed non opioid therapy and decision was made to prescribe opioids to help with daily function and improve quality of life. Discussed the principles of opioid tolerance as well as the concerns regarding opioid diversion, misuse, and abuse. Discussed the risks of respiratory depression and death while on pain medications, especially when combined with other sedative substances. Discussed the elevated risks of excessive sedation while on pain medications. Advised him against driving or operating heavy machinery or performing any activities where he may harm himself or others while on pain medications. Particular caution was emphasized especially during dose adjustments and medication changes. OARRS was reviewed. UTOX from 3/2022 appropriate; ORT score = 1  Daily MME 5  Narcan prescribed 3/2022 and understands the proper use in the event of an overdose. This NP saw pt under direct supervision of Dr Ya Barbosa. Follow up:  Return in about 4 weeks (around 8/19/2022) for f/u after procedure and reassess pain/medications.     CHELI Jeter - CNP

## 2022-07-25 ENCOUNTER — TELEPHONE (OUTPATIENT)
Dept: PAIN MANAGEMENT | Age: 47
End: 2022-07-25

## 2022-07-25 NOTE — TELEPHONE ENCOUNTER
CALLED PATIENT TO LET HIM KNOW THAT HIS [facility] CLAIM IS NOT ALLOWED FOR THE LEVELS OF THE MBB INJECTIONS NOR THE DIAGNOSIS LISTED IN MEDICAL RECORDS. Ely Scott LET HIM KNOW IT WAS NOT GOING THROUGH [employer], ASKED HIM IF HE WANTED TO PUT THROUGH ON HIS INSURANCE. HE SAID HE WOULD CALL HIS  AND GIVE US A CALL BACK.

## 2022-07-28 NOTE — TELEPHONE ENCOUNTER
PATIENT'S  CALLED WANTING COPY OF MEDICAL & DENIED C-9'S PREVIOUSLY SENT.   FAXED DOCUMENTS TO PATIENT'S

## 2022-08-12 ENCOUNTER — TELEPHONE (OUTPATIENT)
Dept: PAIN MANAGEMENT | Age: 47
End: 2022-08-12

## 2022-08-12 NOTE — TELEPHONE ENCOUNTER
Patient called to let Dr. Glo Gonsalez know that his having more pain in his left shoulder down to his trigger finger. He says that his hand is swollen. He does have f/u on 8/26, nothing sooner available at this time.

## 2022-08-12 NOTE — TELEPHONE ENCOUNTER
PT WAS  CALLED AND IS INTERESTED IN STEROID PACK. HE DOES REPORT HAVING BORDERLINE DIABETES. PT USES [facility] IN [place].

## 2022-08-15 DIAGNOSIS — Z51.81 ENCOUNTER FOR MONITORING OPIOID MAINTENANCE THERAPY: ICD-10-CM

## 2022-08-15 DIAGNOSIS — Z79.891 ENCOUNTER FOR MONITORING OPIOID MAINTENANCE THERAPY: ICD-10-CM

## 2022-08-15 DIAGNOSIS — M47.817 LUMBOSACRAL SPONDYLOSIS WITHOUT MYELOPATHY: ICD-10-CM

## 2022-08-15 DIAGNOSIS — F11.90 CHRONIC, CONTINUOUS USE OF OPIOIDS: ICD-10-CM

## 2022-08-15 DIAGNOSIS — M75.02 ADHESIVE CAPSULITIS OF LEFT SHOULDER: ICD-10-CM

## 2022-08-15 DIAGNOSIS — M47.817 LUMBOSACRAL SPONDYLOSIS WITHOUT MYELOPATHY: Primary | ICD-10-CM

## 2022-08-15 RX ORDER — METHYLPREDNISOLONE 4 MG/1
TABLET ORAL
Qty: 1 KIT | Refills: 0 | Status: SHIPPED | OUTPATIENT
Start: 2022-08-15 | End: 2022-08-21

## 2022-08-15 NOTE — TELEPHONE ENCOUNTER
Please call patient and inform his medrol pack was sent to the pharmacy. Sorry about the delay. I did not get his response before I ended work on Friday, which is why it was not sent until today. Thanks.

## 2022-08-15 NOTE — TELEPHONE ENCOUNTER
Requested Prescriptions     Pending Prescriptions Disp Refills    HYDROcodone-acetaminophen (NORCO) 5-325 MG per tablet 5 tablet 0     Sig: Take 1 tablet by mouth daily as needed for Pain for up to 5 days.     tiZANidine (ZANAFLEX) 4 MG tablet 5 tablet 0     Sig: Take 1 tablet by mouth every evening for 5 days As needed for pain and spasms       Patient last seen on:  7/22  Date of last surgery:  n/  Date of last refill:  7/22  Pain level:  n/a  Patient complaining of:  PT is asking for refill  Future appts: 8/26

## 2022-08-16 RX ORDER — HYDROCODONE BITARTRATE AND ACETAMINOPHEN 5; 325 MG/1; MG/1
1 TABLET ORAL DAILY PRN
Qty: 5 TABLET | Refills: 0 | Status: SHIPPED | OUTPATIENT
Start: 2022-08-21 | End: 2022-08-26 | Stop reason: SDUPTHER

## 2022-08-16 RX ORDER — TIZANIDINE 4 MG/1
4 TABLET ORAL EVERY EVENING
Qty: 5 TABLET | Refills: 0 | Status: SHIPPED | OUTPATIENT
Start: 2022-08-21 | End: 2022-08-26

## 2022-08-26 ENCOUNTER — OFFICE VISIT (OUTPATIENT)
Dept: PAIN MANAGEMENT | Age: 47
End: 2022-08-26
Payer: COMMERCIAL

## 2022-08-26 VITALS — HEIGHT: 70 IN | WEIGHT: 230 LBS | TEMPERATURE: 96.9 F | BODY MASS INDEX: 32.93 KG/M2

## 2022-08-26 DIAGNOSIS — M79.605 LEFT LEG PAIN: ICD-10-CM

## 2022-08-26 DIAGNOSIS — M47.817 LUMBOSACRAL SPONDYLOSIS WITHOUT MYELOPATHY: Primary | ICD-10-CM

## 2022-08-26 DIAGNOSIS — Z51.81 ENCOUNTER FOR MONITORING OPIOID MAINTENANCE THERAPY: ICD-10-CM

## 2022-08-26 DIAGNOSIS — Z79.891 ENCOUNTER FOR MONITORING OPIOID MAINTENANCE THERAPY: ICD-10-CM

## 2022-08-26 DIAGNOSIS — M75.02 ADHESIVE CAPSULITIS OF LEFT SHOULDER: ICD-10-CM

## 2022-08-26 DIAGNOSIS — F11.90 CHRONIC, CONTINUOUS USE OF OPIOIDS: ICD-10-CM

## 2022-08-26 PROCEDURE — 99214 OFFICE O/P EST MOD 30 MIN: CPT | Performed by: PHYSICAL MEDICINE & REHABILITATION

## 2022-08-26 RX ORDER — HYDROCODONE BITARTRATE AND ACETAMINOPHEN 5; 325 MG/1; MG/1
1 TABLET ORAL DAILY PRN
Qty: 30 TABLET | Refills: 0 | Status: SHIPPED | OUTPATIENT
Start: 2022-08-26 | End: 2022-09-26 | Stop reason: SDUPTHER

## 2022-08-26 RX ORDER — MELOXICAM 15 MG/1
15 TABLET ORAL
Qty: 30 TABLET | Refills: 0 | Status: SHIPPED | OUTPATIENT
Start: 2022-08-26 | End: 2022-08-26 | Stop reason: CLARIF

## 2022-08-26 RX ORDER — TIZANIDINE 4 MG/1
4 TABLET ORAL NIGHTLY PRN
Qty: 30 TABLET | Refills: 0 | Status: SHIPPED | OUTPATIENT
Start: 2022-08-26 | End: 2022-09-26 | Stop reason: SDUPTHER

## 2022-08-26 ASSESSMENT — ENCOUNTER SYMPTOMS
NAUSEA: 0
DIARRHEA: 0
CONSTIPATION: 0
SHORTNESS OF BREATH: 0
BACK PAIN: 1

## 2022-08-26 NOTE — PROGRESS NOTES
Niurka Severino  ([birth year])    8/26/2022    Subjective:     Niurka Severino is 56 y.o. male who complains today of:    Chief Complaint   Patient presents with    Back Pain    Arm Pain       Last seen by me 1/17/22, Last seen 7/22/2022: This encounter is for Efrain through [facility] established diagnosis. Lumbar MBB's ordered, denied as not currently covered under his [facility] claim. Continues with physical therapy and home exercise program.  EMG done, reviewed below. Medrol Dosepak helped, caused swelling, but he felt overall better. He denies any shortness of breath or chest pain. He is interested in seeing if he may have varicose veins causing some of his leg symptoms, requests referral.  Follows with podiatry. Left shoulder corticosteroid injection on 1/26/2022 provided him with greater than 50% pain relief. He wished it lasted longer. No updates regarding sleep apnea. No other test therapy updates or other physicians, no ER visits. Norco 5/325 daily and tizanidine 4 mg nightly help with remaining functional with chores, personal hygiene, remaining compliant with home exercise program, maintaining his quality of life, and performing activities of daily living. He obtains greater than 50% pain relief without any significant side effects. He is clear to avoid driving or operating heavy machinery or to perform any activities where he may harm himself or others while on pain medications. Low back pain is currently a 8/10. Gets up to a 10/10. The pain is located in both sides low back. He has some right thigh paresthesias that are not nearly as bothersome as his pain in his low back. No clear leg pain. Pain is worse with bending and activity as well as standing. Pain is better with sitting and pain medicine. It has been a constant ache for over 1 year. There are no other associated symptoms or contextual factors.  He denies any classic radicular symptoms, new weakness, saddle anesthesia, bowel or bladder dysfunction, or falls. Left shoulder pain is currently a 6/10. Is up to a 8/10. The pain is worse with lifting things as well as using his left arm. The pain is better with very little. It has been a constant ache for over 1 year. Seen by orthopedics, diagnosed with adhesive capsulitis. MRI left shoulder done, reviewed below. No arm weakness and clumsiness. The right shoulder is okay. There are no other associated symptoms or contextual factors. He denies any classic radicular symptoms, new weakness, saddle anesthesia, bowel or bladder dysfunction, or falls.      Allergies:  Bupropion, Escitalopram, and Hydroxyzine    Past Medical History:   Diagnosis Date    Depression with anxiety 4/24/2017    Hyperglycemia 4/24/2017    Hyperlipidemia 2/18/2014    Insomnia     Perennial allergic rhinitis with seasonal variation 4/24/2017    Pre-diabetes 4/24/2017    Tobacco use disorder 4/24/2017    Type 2 diabetes mellitus without complication, without long-term current use of insulin ([facility] [address] 8/6/2018     Past Surgical History:   Procedure Laterality Date    NASAL FRACTURE SURGERY  2007     Family History   Problem Relation Age of Onset    High Blood Pressure Mother     High Cholesterol Mother     Cancer Father         Throat cancer - smoker    High Blood Pressure Brother     Heart Attack Maternal Grandmother 72    Diabetes Maternal Grandmother     Cancer Maternal Grandfather         unknown    No Known Problems Paternal Grandmother     No Known Problems Paternal Grandfather      Social History     Socioeconomic History    Marital status:      Spouse name: Robina    Number of children: 3    Years of education: Not on file    Highest education level: Not on file   Occupational History    Occupation:  - industrial cleaning   Tobacco Use    Smoking status: Every Day     Packs/day: 0.50     Years: 23.00     Pack years: 11.50     Types: Cigarettes     Start date: 1994 Smokeless tobacco: Never   Vaping Use    Vaping Use: Never used   Substance and Sexual Activity    Alcohol use: No    Drug use: No    Sexual activity: Yes     Partners: Female     Comment: monogamous   Other Topics Concern    Not on file   Social History Narrative    Lives with wife and 3 children        1 dog     Social Determinants of Health     Financial Resource Strain: Not on file   Food Insecurity: Not on file   Transportation Needs: Not on file   Physical Activity: Not on file   Stress: Not on file   Social Connections: Not on file   Intimate Partner Violence: Not on file   Housing Stability: Not on file       Current Outpatient Medications on File Prior to Visit   Medication Sig Dispense Refill    naloxone 4 MG/0.1ML LIQD nasal spray 1 spray by Nasal route as needed for Opioid Reversal 1 each 0    Respiratory Therapy Supplies SHELBY New Full face CPAP  Mask.  1 each 0    ALPRAZolam (XANAX) 0.5 MG tablet TAKE ONE TABLET BY MOUTH THREE TIMES A DAY FOR PANIC  ANXIETY  AS NEEDED      azelastine (ASTELIN) 0.1 % nasal spray INSTILL TWO SPRAYS PER NOSTRIL TWICE DAILY AS NEEDED      buPROPion (WELLBUTRIN SR) 100 MG extended release tablet       cloNIDine (CATAPRES) 0.1 MG tablet TAKE ONE TABLET BY MOUTH THREE TIMES A DAY AS NEEDED for anxiety  high blood pressure      metFORMIN (GLUCOPHAGE-XR) 500 MG extended release tablet TAKE ONE TABLET BY MOUTH ONCE DAILY WITH THE EVENING MEAL      olmesartan (BENICAR) 20 MG tablet TAKE ONE TABLET BY MOUTH EVERY DAY      rosuvastatin (CRESTOR) 10 MG tablet Take by mouth      lidocaine (LMX) 4 % cream Apply a half dollar sized amount to intact skin topically up to twice daily as needed for pain 1 Tube 1    olopatadine (PATANOL) 0.1 % ophthalmic solution Place 1 drop into both eyes 2 times daily 5 mL 0    montelukast (SINGULAIR) 10 MG tablet Take 1 tablet by mouth daily 30 tablet 1    ipratropium (ATROVENT) 0.06 % nasal spray 2 sprays by Nasal route 3 times daily 1 Bottle 1 Elastic Bandages & Supports (KNEE BRACE) MISC Wear during the day over right knee as needed for support 1 each 0    loratadine (CLARITIN) 10 MG tablet Take 1 tablet by mouth daily 30 tablet 5    acetaminophen (TYLENOL) 500 MG tablet Take 1 tablet by mouth every 6 hours as needed for Pain. 60 tablet 0     No current facility-administered medications on file prior to visit. Review of Systems   Constitutional:  Negative for fever. HENT:  Negative for hearing loss. Respiratory:  Negative for shortness of breath. Gastrointestinal:  Negative for constipation, diarrhea and nausea. Genitourinary:  Negative for difficulty urinating. Musculoskeletal:  Positive for back pain. Negative for neck pain. Skin:  Negative for rash. Neurological:  Negative for headaches. Hematological:  Does not bruise/bleed easily. Psychiatric/Behavioral:  Negative for sleep disturbance. Objective:     Vitals:  Temp 96.9 °F (36.1 °C)   Ht 5' 10\" (1.778 m)   Wt 230 lb (104.3 kg)   BMI 33.00 kg/m² Pain Score:   6      Exam performed under coronavirus precautions  General: No acute distress  Eyes: No scleral icterus or lid lag appreciated bilaterally  ENMT: Moist mucous membranes. Hearing grossly intact bilaterally. No masses or lesions on ears or nose  Neck: Symmetric without any overt masses or lesions, trachea midline  Respiratory: Respirations are non-labored  Skin: Visualized skin is intact  Psych: Mood normal. Affect normal. Recent and remote memory intact. Judgment and insight normal.  Neurologic: Gait antalgic, no assistive devices for ambulation. Pt is alert and appropriately interactive. Pleasant and cooperative with history and exam. No signs of excessive sedation. Responds promptly and appropriately to questions asked. No aberrant behaviors appreciated. Sensation grossly intact in both legs.    Reflexes and strength functional for ambulation, no abnormal reflexes appreciated on exam today  Strength greater than 3/5 bilateral legs  Straight leg raise negative bilaterally. Sensation intact in both arms  Reflexes and strength functional for arm use, no abnormal reflexes appreciated on exam today  Strength greater than 3/5 in both arms  Spurling's negative on exam today    Limited range of motion left shoulder with tenderness over the left glenohumeral joint. There is tenderness to palpation over the lower lumbar spinous processes from L2 down to sacrum with bilateral paraspinal muscle tenderness. Rotation and extension reproduces axial low back pain. Other facet provocative maneuvers are positive. Outside record review:  EMG B UE 3/9/2022: This study is limited, but normal. There is no current electrodiagnostic evidence for a bilateral median mononeuropathy at the wrist or generalized large fiber sensorimotor peripheral polyneuropathy. Although limited, there is no clear evidence for an active bilateral cervical motor radiculopathy. MRI L Shoulder 10/14/21: no rotator cuff tear, subacromial bursitis, glenoid labral cyst, question late phase adhesive capsulitis  XR L shoulder 7/13/2021: Bulbous subacromial process, no fracture, may predispose to impingement  XR L hip 7/12/2021: Minimal arthritis, normal hip joint spaces, no fracture  EMG BLE 7/12/2021:Normal, no lumbosacral motor radiculopathy or peripheral polyneuropathy  MRI LS spine 5/24/2021 no fracture. Degenerative disc disease. Congenital spinal canal stenosis. T12-L4 normal canal foramen. Degenerative disc disease and facet arthropathy. L4-L5 mild canal stenosis, mild bilateral foraminal narrowing. L5-S1 normal canal foramen. MRI thoracic spine 2/24/2020: Degenerative disc disease and facet arthropathy. Disc bulge T7-T8. No canal or foraminal stenosis. Unremarkable MRI of the thoracic spine. XR LS Spine 11/19/20: no fracture, no instability, disc spaces preserved.       UDS 1/47/0970: Free of illicits  UDS 9/19/7293: Free of illicits  UDS 2/47/5373: Free of illicits   Opioid risk tool score 1, low risk. Assessment:      Diagnosis Orders   1. Lumbosacral spondylosis without myelopathy  tiZANidine (ZANAFLEX) 4 MG tablet    Urine Drug Screen    HYDROcodone-acetaminophen (NORCO) 5-325 MG per tablet    DISCONTINUED: meloxicam (MOBIC) 15 MG tablet      2. Adhesive capsulitis of left shoulder  tiZANidine (ZANAFLEX) 4 MG tablet    HYDROcodone-acetaminophen (NORCO) 5-325 MG per tablet    DISCONTINUED: meloxicam (MOBIC) 15 MG tablet      3. Chronic, continuous use of opioids  HYDROcodone-acetaminophen (NORCO) 5-325 MG per tablet      4. Encounter for monitoring opioid maintenance therapy  HYDROcodone-acetaminophen (NORCO) 5-325 MG per tablet      5. Left leg pain  Mary Plata, NP, Interventional Radiology, [place]    EMG        +diabetes mellitus, snores, Depression and Anxiety, Insomnia on Ambien  -Min relief with Flexeril, Gabapentin 600 mg, and Lyrica 100 mg BID  Plan:     Periodic Controlled Substance Monitoring: Possible medication side effects, risk of tolerance/dependence & alternative treatments discussed., No signs of potential drug abuse or diversion identified. , Assessed functional status., Obtaining appropriate analgesic effect of treatment. Chong Sutton MD)    Orders Placed This Encounter   Medications    tiZANidine (ZANAFLEX) 4 MG tablet     Sig: Take 1 tablet by mouth nightly as needed (pain and spasms) As needed for pain and spasms     Dispense:  30 tablet     Refill:  0    DISCONTD: meloxicam (MOBIC) 15 MG tablet     Sig: Take 1 tablet by mouth daily (with breakfast) No other nsaids     Dispense:  30 tablet     Refill:  0    HYDROcodone-acetaminophen (NORCO) 5-325 MG per tablet     Sig: Take 1 tablet by mouth daily as needed for Pain for up to 30 days.      Dispense:  30 tablet     Refill:  0     Reduce doses taken as pain becomes manageable       Orders Placed This Encounter   Procedures    Urine Drug Screen    Jenniffer Richardson NP, Interventional Radiology, [place]     Referral Priority:   Routine     Referral Type:   Eval and Treat     Referral Reason:   Specialty Services Required     Referred to Provider:   CHELI Grossman - CNP     Requested Specialty:   Nurse Practitioner     Number of Visits Requested:   1    EMG     Standing Status:   Future     Standing Expiration Date:   8/26/2023     Order Specific Question:   Which body part? Answer:   Bilateral lower extremities       -Continue physical therapy and home exercise program with outside provider  -Refer to Dr. Jodi Underwood NP for evaluation for left leg pain, venous insufficiency  -Follow-up with nephrology, pulmonology and podiatry as recommended. Encouraged updated evaluation for sleep apnea as previously recommended  -Reviewed EMG B UE above, all questions answered   -EMG BLE evaluate left leg pain  -Continue Lidocaine 4% ointment topical BID prn no ref  -Will hold on Meloxicam and all NSAIDs due to history of elevated serum creatinine. No NSAIDs given elevated serum creatinine. We will hold on steroid packs also due to diabetes and concern for blood sugar  -Continue Tizanidine 4 mg by mouth every evening #30 no refills start 8/26/2022. Avoid all other muscle relaxers and/or sedating medicines. - Continue Norco 5/325 daily as needed #30 no refills valid 8/26/2022 - 9/25/2022. [place] reviewed on 8/26/2022.  - Needs further evaluation pulmonology for sleep apnea   - Naloxone prescribed 1/17/2022. MME 5  -Appears that-Bilateral Lumbar L2/3/4/5 medial MBBs denied by [facility]   -He will let us know when he needs repeat left shoulder glenohumeral joint inj. Okay for Valium prior to all procedures. -UDS today. 1 Norco taken this morning, took 1 Norco yesterday.     Controlled Substance Monitoring:    Acute and Chronic Pain Monitoring:   RX Monitoring 8/26/2022   Periodic Controlled Substance Monitoring Possible medication side effects, risk of tolerance/dependence & alternative treatments discussed. ;No signs of potential drug abuse or diversion identified. ;Assessed functional status. ;Obtaining appropriate analgesic effect of treatment. Chronic Pain > 50 MEDD -          Discussed the risks, side effects, and symptoms that would warrant urgent or emergent physician evaluation of all medications prescribed today. Discussed the risks of the above recommended procedures including but not limited to bleeding, infection, worsened pain, damage to surrounding structures, side effects, toxicity, allergic reactions to medications used, immune and stress-response dysfunction, fat necrosis, decreased bone mineralization, cartilage loss, increased fracture risk, avascular necrosis, skin pigmentation changes, blood sugar elevation, need for surgery, premature damage or degeneration of the joint, as well as catastrophic injury such as vision loss, paralysis, stroke, bowel or bladder incontinence, ventilator dependence, loss of use of the joint and/or extremity, and death. Discussed the risks, benefits, alternative procedures, and alternatives to the procedure including no procedure at all. Discussed that we cannot undo any permanent neurologic or orthopaedic damage or change the course of any underlying disease. After thorough discussion, patient expressed complete understanding and willingness to proceed.      Discussed the risks of the above recommended procedures including but not limited to bleeding, infection, worsened pain, damage to surrounding structures, side effects, toxicity, allergic reactions to medications used, immune and stress-response dysfunction, fat necrosis, skin pigmentation changes, blood sugar elevation, headache, vision changes, need for surgery, as well as catastrophic injury such as vision loss, paralysis, stroke, spinal cord and/or plexus infarction or injury, intrathecal injection, spinal cord puncture, arachnoiditis, discitis, bowel or bladder incontinence, ventilator dependence, loss of use of the arms and/or legs, and death. Discussed the risks, benefits, alternative procedures, and alternatives to the procedure including no procedure at all. Discussed that we cannot undo any permanent neurologic damage or change the course of any underlying disease. After thorough discussion, patient expressed understanding and willingness to proceed. Provided education and counseling regarding the diagnosis, prognosis, and treatment options. All questions were answered. Encouraged him to follow-up with his primary care physician and/or specialists as required for his overall health and management of his comorbidities as well as any new positive symptoms mentioned in review of systems above. Care was provided within the definitions and limitations of our specialty practice. Encouraged lifestyle interventions including healthy habits, lifestyle changes, regular aerobic exercise and appropriate weight maintenance as advised by their primary care physician or cardiovascular health provider. Discussed well care and disease prevention/maintenance. All recommendations for therapy are provided to improve function with activities of daily living, decrease pain, and help develop an exercise program. All recommendations for medications are meant to help decrease pain, improve function with activities of daily living, maintain compliance with home exercise program, and improve quality of life. All recommendations for therapeutic injections are meant to help decrease pain, improve function with activities of daily living, maintain compliance with home exercise program, improve quality of life, and decrease reliance upon oral medications. Encouraged compliance with his home exercise program. Recommended compliance with physical therapy program as outlined above.  Informed him to wear bracing as appropriate, and that bracing is not a replacement for core strengthening or muscle stabilization. Discussed the elevated risks of excessive sedation while on pain medications. Advised him against driving or operating heavy machinery or performing any activities where he may harm himself or others while on pain medications. Particular caution was emphasized especially during dose adjustments and medication changes. Discussed the elevated risks of respiratory depression and death while on opioid medications, especially when combined with other sedative substances. Discussed the risks of temporary disability, permanent disability, morbidity, and mortality with poorly-managed or undiagnosed medical conditions and comorbidities. Emphasized the importance of timely medical evaluation and treatment as previously recommended by us or other medical professionals. Risks of not pursing these recommendations were emphasized. Advised him that any lab testing, imaging, or other diagnostic test results are best discussed in person in the office so that we can provide a clear explanation of their significance and best treatment based upon these results. It is his responsibility to make and keep a follow up appointment to discuss these test results in person to discuss the significance of the findings and appropriate follow-up steps. He expressed complete understanding and agreement with the entire plan as outlined above. Portions of this note may have been typed, auto-populated, dictated or transcribed by voice recognition resulting in errors, omissions, or close substitutions which may be missed despite careful proofreading. Please contact the author for any questions or concerns.     Follow up:  Return in about 1 month (around 9/26/2022) for reassessment of pain and symptoms, EMG Internal, External Referral.    Ariel Dumas MD

## 2022-09-19 ENCOUNTER — TELEPHONE (OUTPATIENT)
Dept: PAIN MANAGEMENT | Age: 47
End: 2022-09-19

## 2022-09-19 NOTE — TELEPHONE ENCOUNTER
BENEFITS: BILATERAL LOWER EMG      Insurance: [employer]  Phone: [phone]  Contact Name: Andres Dang  Effective Date: 9.1.2020     Plan year: YES-CALENDAR  Deductible: 2300.00      Deductible Met: 233.32  Allowed/benefits paid at: 100% AFTER DEDUCTIBLE  OOP: 6700.00 MET $878.35  Freq Limits: 54597 & 95886--BASED ON MEDICAL NECESSITY  Prior Auth Requirement: NO AUTH REQUIRED    Notes: NO PRE-EX CLAUSE    Call Reference #: [id number]    Time of call: 9:55AM

## 2022-09-26 ENCOUNTER — OFFICE VISIT (OUTPATIENT)
Dept: PAIN MANAGEMENT | Age: 47
End: 2022-09-26
Payer: COMMERCIAL

## 2022-09-26 DIAGNOSIS — F11.90 CHRONIC, CONTINUOUS USE OF OPIOIDS: ICD-10-CM

## 2022-09-26 DIAGNOSIS — M75.02 ADHESIVE CAPSULITIS OF LEFT SHOULDER: ICD-10-CM

## 2022-09-26 DIAGNOSIS — Z79.891 ENCOUNTER FOR MONITORING OPIOID MAINTENANCE THERAPY: ICD-10-CM

## 2022-09-26 DIAGNOSIS — M79.605 LEFT LEG PAIN: Primary | ICD-10-CM

## 2022-09-26 DIAGNOSIS — M47.817 LUMBOSACRAL SPONDYLOSIS WITHOUT MYELOPATHY: ICD-10-CM

## 2022-09-26 DIAGNOSIS — Z51.81 ENCOUNTER FOR MONITORING OPIOID MAINTENANCE THERAPY: ICD-10-CM

## 2022-09-26 PROCEDURE — 95911 NRV CNDJ TEST 9-10 STUDIES: CPT | Performed by: PHYSICAL MEDICINE & REHABILITATION

## 2022-09-26 PROCEDURE — 95886 MUSC TEST DONE W/N TEST COMP: CPT | Performed by: PHYSICAL MEDICINE & REHABILITATION

## 2022-09-26 RX ORDER — TIZANIDINE 4 MG/1
4 TABLET ORAL NIGHTLY PRN
Qty: 30 TABLET | Refills: 0 | Status: SHIPPED | OUTPATIENT
Start: 2022-09-26 | End: 2022-10-19 | Stop reason: SDUPTHER

## 2022-09-26 RX ORDER — HYDROCODONE BITARTRATE AND ACETAMINOPHEN 5; 325 MG/1; MG/1
1 TABLET ORAL DAILY PRN
Qty: 30 TABLET | Refills: 0 | Status: SHIPPED | OUTPATIENT
Start: 2022-09-26 | End: 2022-10-19 | Stop reason: SDUPTHER

## 2022-09-26 NOTE — PROGRESS NOTES
- Continue tizanidine Zanaflex 4 mg nightly as needed #30 no refills 9/26/2022 - 10/26/2022. Avoid all other muscle relaxers and/or sedating medicines. - Continue Norco 5/325 daily as needed #30 no refills 9/26/2022 - 10/26/2022    Controlled Substance Monitoring:    Acute and Chronic Pain Monitoring:   RX Monitoring 9/26/2022   Periodic Controlled Substance Monitoring Obtaining appropriate analgesic effect of treatment.    Chronic Pain > 50 MEDD -

## 2022-09-26 NOTE — PROGRESS NOTES
Electromyography (EMG)/Nerve conduction studies (NCS) Report: Lower Extremity    Name: Luis Angel Martin   YOB: 1975  Date of Service: 9/26/2022   Provider: Madhu De Jesus MD        INDICATIONS:  Luis Angel Martin is a 56 y.o. male who presents for electrodiagnostic evaluation for left leg pain. He confirms a history of pre diabetes mellitus. Both limbs are necessary to examine in order to evaluate for any evidence of systemic disease as well as establish normal baseline values from which to compare any abnormal unilateral findings. The study is explained and verbal consent to proceed is obtained. NERVE CONDUCTION STUDIES:    Sensory nerve conduction studies: Bilateral sural and superficial peroneal sensory nerve conduction studies demonstrate normal peak latencies and amplitudes. Bilateral lower limb temperatures are normal.     Motor nerve conduction studies: Bilateral peroneal motor nerve conduction studies with pickup over the extensor digitorum brevis demonstrate normal distal latencies and amplitudes. Bilateral tibial motor nerve conduction studies with pickup over the abductor hallucis demonstrate normal distal latencies and amplitudes. H reflex: Bilateral H reflexes are difficult to elicit. ELECTROMYOGRAPHY: This study is limited due to patient tolerance. A disposable monopolar needle is used to evaluate bilateral vastus medialis, tibialis anterior, extensor hallucis longus, peroneus longus, and medial gastrocnemius. Brief sampling of all of the muscles is free of any clear abnormal spontaneous activity. Motor unit recruitment is limited due to patient tolerance. The patient declines lumbar paraspinal muscle sampling. SUMMARY:  This study is limited, but normal. There is no current electrodiagnostic evidence for a generalized large fiber sensorimotor peripheral polyneuropathy.  Although limited, there is no clear evidence for an active bilateral lumbosacral motor radiculopathy. RECOMMENDATIONS: Today's study does not explain the patient's left leg pain. When compared to the NCS/EMG from 7/12/21, today's study is essentially similar. The patient should follow up as previously instructed. If his symptoms persist or worsen, further electrodiagnostic evaluation may be considered if the patient is agreeable. Clinical correlation is recommended.

## 2022-09-28 ENCOUNTER — INITIAL CONSULT (OUTPATIENT)
Dept: INTERVENTIONAL RADIOLOGY/VASCULAR | Age: 47
End: 2022-09-28
Payer: COMMERCIAL

## 2022-09-28 VITALS
HEART RATE: 76 BPM | BODY MASS INDEX: 33 KG/M2 | DIASTOLIC BLOOD PRESSURE: 97 MMHG | WEIGHT: 230 LBS | SYSTOLIC BLOOD PRESSURE: 152 MMHG

## 2022-09-28 DIAGNOSIS — F17.200 SMOKER: ICD-10-CM

## 2022-09-28 DIAGNOSIS — M79.669 PAIN AND SWELLING OF LOWER LEG, UNSPECIFIED LATERALITY: Primary | ICD-10-CM

## 2022-09-28 DIAGNOSIS — E78.5 HYPERLIPIDEMIA, UNSPECIFIED HYPERLIPIDEMIA TYPE: ICD-10-CM

## 2022-09-28 DIAGNOSIS — E11.9 TYPE 2 DIABETES MELLITUS WITHOUT COMPLICATION, WITHOUT LONG-TERM CURRENT USE OF INSULIN (HCC): ICD-10-CM

## 2022-09-28 DIAGNOSIS — M79.89 PAIN AND SWELLING OF LOWER LEG, UNSPECIFIED LATERALITY: Primary | ICD-10-CM

## 2022-09-28 DIAGNOSIS — E66.9 OBESITY (BMI 30-39.9): ICD-10-CM

## 2022-09-28 DIAGNOSIS — Z72.0 TOBACCO ABUSE: ICD-10-CM

## 2022-09-28 DIAGNOSIS — I10 HYPERTENSION, UNSPECIFIED TYPE: ICD-10-CM

## 2022-09-28 DIAGNOSIS — I73.9 PERIPHERAL VASCULAR DISEASE OF EXTREMITY WITH CLAUDICATION (HCC): ICD-10-CM

## 2022-09-28 PROCEDURE — 99204 OFFICE O/P NEW MOD 45 MIN: CPT | Performed by: NURSE PRACTITIONER

## 2022-09-28 ASSESSMENT — ENCOUNTER SYMPTOMS
ABDOMINAL PAIN: 0
GASTROINTESTINAL NEGATIVE: 1
VOMITING: 0
EYES NEGATIVE: 1
BACK PAIN: 1
SHORTNESS OF BREATH: 0
RESPIRATORY NEGATIVE: 1
WHEEZING: 0
NAUSEA: 0
COUGH: 0
SORE THROAT: 0
DIARRHEA: 0
COLOR CHANGE: 0
TROUBLE SWALLOWING: 0

## 2022-09-28 ASSESSMENT — PATIENT HEALTH QUESTIONNAIRE - PHQ9
2. FEELING DOWN, DEPRESSED OR HOPELESS: 0
SUM OF ALL RESPONSES TO PHQ QUESTIONS 1-9: 0
SUM OF ALL RESPONSES TO PHQ QUESTIONS 1-9: 0
5. POOR APPETITE OR OVEREATING: 0
4. FEELING TIRED OR HAVING LITTLE ENERGY: 0
8. MOVING OR SPEAKING SO SLOWLY THAT OTHER PEOPLE COULD HAVE NOTICED. OR THE OPPOSITE, BEING SO FIGETY OR RESTLESS THAT YOU HAVE BEEN MOVING AROUND A LOT MORE THAN USUAL: 0
10. IF YOU CHECKED OFF ANY PROBLEMS, HOW DIFFICULT HAVE THESE PROBLEMS MADE IT FOR YOU TO DO YOUR WORK, TAKE CARE OF THINGS AT HOME, OR GET ALONG WITH OTHER PEOPLE: 0
9. THOUGHTS THAT YOU WOULD BE BETTER OFF DEAD, OR OF HURTING YOURSELF: 0
3. TROUBLE FALLING OR STAYING ASLEEP: 0
SUM OF ALL RESPONSES TO PHQ QUESTIONS 1-9: 0
6. FEELING BAD ABOUT YOURSELF - OR THAT YOU ARE A FAILURE OR HAVE LET YOURSELF OR YOUR FAMILY DOWN: 0
7. TROUBLE CONCENTRATING ON THINGS, SUCH AS READING THE NEWSPAPER OR WATCHING TELEVISION: 0
SUM OF ALL RESPONSES TO PHQ QUESTIONS 1-9: 0

## 2022-09-28 NOTE — PROGRESS NOTES
Vascular Medicine and Interventional Radiology:    Michelle Grey, a male of 56 y.o. came to the office 9/28/2022. Chief Complaint   Patient presents with    Leg Pain     Left leg pain        9/28/2022 HPI: Michelle Grey referred byDr. Augusto De Santiago for evaluation of Left leg pain. Patient presents with symptoms of: BLE leg pain and swelling with LLE worse. Left leg entire leg pain onset since back injury 2/2020. Back pain with LLE radiculopathy. States has frequent falls due to \"Left leg Giving out\". Right leg pain lateral thigh with stabbing, shocking sensation, nothing exacerbates pain, occures with activity and/or rest.  States chronic lower back pain with BLE radiculopathy with LLE worse. Occasional left lower leg edema, not daily. Denies BLE troublesome varicose veins. Ambulates with cane. H/O chronic lower back pain. H/O Back steroid injections now without relief. States next plan would be nerve ablation. NSAIDS: Rx meds with out relief. Leg elevation:  Daily with minimal relief. Stocking use and dates: Has not done conservative therapy with daily consistent wearing of class two compression stockings for at least 6 weeks. PAD risk factors: smokes 1.5 every 3-4 days, trying to quit with Chantix, HTN, HLD, NIDDM, obesity. Intermittent claudication BLE. Denies chest pain. Denies dyspnea.      Family History   Problem Relation Age of Onset    High Blood Pressure Mother     High Cholesterol Mother     Cancer Father         Throat cancer - smoker    High Blood Pressure Brother     Heart Attack Maternal Grandmother 72    Diabetes Maternal Grandmother     Cancer Maternal Grandfather         unknown    No Known Problems Paternal Grandmother     No Known Problems Paternal Grandfather        Past Surgical History:   Procedure Laterality Date    NASAL FRACTURE SURGERY  2007        Past Medical History:   Diagnosis Date    Depression with anxiety 4/24/2017    Hyperglycemia 4/24/2017 Hyperlipidemia 2/18/2014    Insomnia     Perennial allergic rhinitis with seasonal variation 4/24/2017    Pre-diabetes 4/24/2017    Tobacco use disorder 4/24/2017    Type 2 diabetes mellitus without complication, without long-term current use of insulin ([facility] 8/6/2018       Social History     Socioeconomic History    Marital status:      Spouse name: [facility]    Number of children: 3   Occupational History    Occupation:  - industrial cleaning   Tobacco Use    Smoking status: Every Day     Packs/day: 0.50     Years: 23.00     Pack years: 11.50     Types: Cigarettes     Start date: 1994    Smokeless tobacco: Never   Vaping Use    Vaping Use: Never used   Substance and Sexual Activity    Alcohol use: No    Drug use: No    Sexual activity: Yes     Partners: Female     Comment: monogamous   Social History Narrative    Lives with wife and 3 children        1 dog       Allergies   Allergen Reactions    Bupropion Other (See Comments)    Escitalopram Other (See Comments)    Hydroxyzine Other (See Comments)       Current Outpatient Medications on File Prior to Visit   Medication Sig Dispense Refill    tiZANidine (ZANAFLEX) 4 MG tablet Take 1 tablet by mouth nightly as needed (pain and spasms) As needed for pain and spasms 30 tablet 0    HYDROcodone-acetaminophen (NORCO) 5-325 MG per tablet Take 1 tablet by mouth daily as needed for Pain for up to 30 days. 30 tablet 0    naloxone 4 MG/0.1ML LIQD nasal spray 1 spray by Nasal route as needed for Opioid Reversal 1 each 0    Respiratory Therapy Supplies SHELBY New Full face CPAP  Mask.  1 each 0    ALPRAZolam (XANAX) 0.5 MG tablet TAKE ONE TABLET BY MOUTH THREE TIMES A DAY FOR PANIC  ANXIETY  AS NEEDED      azelastine (ASTELIN) 0.1 % nasal spray INSTILL TWO SPRAYS PER NOSTRIL TWICE DAILY AS NEEDED      buPROPion (WELLBUTRIN SR) 100 MG extended release tablet       cloNIDine (CATAPRES) 0.1 MG tablet TAKE ONE TABLET BY MOUTH THREE TIMES A DAY AS NEEDED for anxiety for dizziness, weakness, light-headedness, numbness and headaches. Hematological: Negative. Does not bruise/bleed easily. Psychiatric/Behavioral: Negative. The patient is not nervous/anxious. All other systems reviewed and are negative. OBJECTIVE:  BP (!) 152/97   Pulse 76   Wt 230 lb (104.3 kg)   BMI 33.00 kg/m²     Physical Exam  Constitutional:       General: He is not in acute distress. Appearance: Normal appearance. He is well-developed. He is not ill-appearing. HENT:      Head: Normocephalic and atraumatic. Nose: Nose normal. No congestion. Neck:      Thyroid: No thyromegaly. Vascular: No JVD. Trachea: No tracheal deviation. Cardiovascular:      Rate and Rhythm: Normal rate. Pulses: Normal pulses. Dorsalis pedis pulses are 2+ on the right side and 2+ on the left side. Posterior tibial pulses are 2+ on the right side and 2+ on the left side. Heart sounds: No murmur heard. Pulmonary:      Effort: Pulmonary effort is normal.   Abdominal:      General: Bowel sounds are normal. There is no distension. Palpations: Abdomen is soft. Musculoskeletal:         General: No tenderness or signs of injury. Normal range of motion. Cervical back: Normal range of motion. Right lower leg: No edema. Left lower leg: No edema. Skin:     General: Skin is warm and dry. Capillary Refill: Capillary refill takes 2 to 3 seconds. Findings: No bruising, erythema or lesion. Neurological:      Mental Status: He is alert and oriented to person, place, and time. Psychiatric:         Mood and Affect: Mood normal.         Behavior: Behavior normal.         ASSESSMENT AND PLAN:    Chart review as noted of referring HCP last OV. Medication list reviewed for pre operative examination. Diagnosis Orders   1.  Pain and swelling of lower leg, unspecified laterality  US DUP LOWER ART/BYPASS GRAFTS BILATERAL COMPLETE    US DUP LOWER EXTREMITIES BILATERAL VENOUS    US NON-INV EXTREMITY VEINS BILAT COMP      2. Hyperlipidemia, unspecified hyperlipidemia type  US DUP LOWER ART/BYPASS GRAFTS BILATERAL COMPLETE    US DUP LOWER EXTREMITIES BILATERAL VENOUS    US NON-INV EXTREMITY VEINS BILAT COMP      3. Type 2 diabetes mellitus without complication, without long-term current use of insulin (HCC)  US DUP LOWER ART/BYPASS GRAFTS BILATERAL COMPLETE    US DUP LOWER EXTREMITIES BILATERAL VENOUS    US NON-INV EXTREMITY VEINS BILAT COMP      4. Smoker  US DUP LOWER ART/BYPASS GRAFTS BILATERAL COMPLETE    US DUP LOWER EXTREMITIES BILATERAL VENOUS    US NON-INV EXTREMITY VEINS BILAT COMP      5. Hypertension, unspecified type  US DUP LOWER ART/BYPASS GRAFTS BILATERAL COMPLETE    US DUP LOWER EXTREMITIES BILATERAL VENOUS    US NON-INV EXTREMITY VEINS BILAT COMP      6. Peripheral vascular disease of extremity with claudication (HCC)  US DUP LOWER ART/BYPASS GRAFTS BILATERAL COMPLETE    US DUP LOWER EXTREMITIES BILATERAL VENOUS    US NON-INV EXTREMITY VEINS BILAT COMP      7. Obesity (BMI 30-39. 9)  US DUP LOWER ART/BYPASS GRAFTS BILATERAL COMPLETE    US DUP LOWER EXTREMITIES BILATERAL VENOUS    US NON-INV EXTREMITY VEINS BILAT COMP      8. Tobacco abuse               Plan:     Orders Placed This Encounter   Procedures    US DUP LOWER ART/BYPASS GRAFTS BILATERAL COMPLETE     Standing Status:   Future     Standing Expiration Date:   11/27/2022    US DUP LOWER EXTREMITIES BILATERAL VENOUS     Standing Status:   Future     Standing Expiration Date:   9/28/2023    US NON-INV EXTREMITY VEINS BILAT COMP     Standing Status:   Future     Standing Expiration Date:   9/28/2023        No orders of the defined types were placed in this encounter. --  BLE arterial US given symptoms and PAD risk factors with office visit for results. --  Bilateral LE Venous US duplex and studies evaluate for DVT and/or venous insufficiency with office return for results.  Both LE's are studied for CVI for comparison and to evaluate for any systemic disease even if a LE is asymptomatic. Need to evaluate for DVT due to if venous procedures are required, may not consider doing if DVT is present. Office follow up for results. Educated in detail disease process of venous insufficiency, purpose of ultrasound, and purpose of compression stockings. Will evaluate need for class II compression to legs once US scan and results done. --  Elevate PRN LE's when sitting and/or lying for management of LE edema. --  Follow up with general practitioner for other medical concerns and management. Thank You Dr. Ariella Ames for referral of your patient to our practice for care.      Ahmet Okeefe, APRN - CNP

## 2022-10-19 ENCOUNTER — OFFICE VISIT (OUTPATIENT)
Dept: PAIN MANAGEMENT | Age: 47
End: 2022-10-19
Payer: COMMERCIAL

## 2022-10-19 VITALS
TEMPERATURE: 98.1 F | OXYGEN SATURATION: 98 % | WEIGHT: 230 LBS | DIASTOLIC BLOOD PRESSURE: 74 MMHG | HEART RATE: 72 BPM | HEIGHT: 70 IN | BODY MASS INDEX: 32.93 KG/M2 | SYSTOLIC BLOOD PRESSURE: 132 MMHG

## 2022-10-19 DIAGNOSIS — Z51.81 ENCOUNTER FOR MONITORING OPIOID MAINTENANCE THERAPY: ICD-10-CM

## 2022-10-19 DIAGNOSIS — M75.02 ADHESIVE CAPSULITIS OF LEFT SHOULDER: ICD-10-CM

## 2022-10-19 DIAGNOSIS — F11.90 CHRONIC, CONTINUOUS USE OF OPIOIDS: ICD-10-CM

## 2022-10-19 DIAGNOSIS — Z79.891 ENCOUNTER FOR MONITORING OPIOID MAINTENANCE THERAPY: ICD-10-CM

## 2022-10-19 DIAGNOSIS — M47.817 LUMBOSACRAL SPONDYLOSIS WITHOUT MYELOPATHY: ICD-10-CM

## 2022-10-19 PROCEDURE — 99213 OFFICE O/P EST LOW 20 MIN: CPT | Performed by: NURSE PRACTITIONER

## 2022-10-19 RX ORDER — TIZANIDINE 4 MG/1
4 TABLET ORAL NIGHTLY PRN
Qty: 30 TABLET | Refills: 0 | Status: SHIPPED | OUTPATIENT
Start: 2022-10-19 | End: 2022-11-18

## 2022-10-19 RX ORDER — HYDROCODONE BITARTRATE AND ACETAMINOPHEN 5; 325 MG/1; MG/1
1 TABLET ORAL DAILY PRN
Qty: 30 TABLET | Refills: 0 | Status: SHIPPED | OUTPATIENT
Start: 2022-10-26 | End: 2022-11-25

## 2022-10-19 ASSESSMENT — ENCOUNTER SYMPTOMS
RESPIRATORY NEGATIVE: 1
DIARRHEA: 0
CONSTIPATION: 0
BACK PAIN: 1

## 2022-10-19 NOTE — PROGRESS NOTES
Patient: David Husain  YOB: 1975  Date: 10/19/22        Subjective:     David Husain is a 56 y.o. male who complains today of:    Chief Complaint   Patient presents with    Shoulder Pain     Left shoulder    Back Pain         Allergies:  Bupropion, Escitalopram, and Hydroxyzine    Past Medical History:   Diagnosis Date    Depression with anxiety 4/24/2017    Hyperglycemia 4/24/2017    Hyperlipidemia 2/18/2014    Insomnia     Perennial allergic rhinitis with seasonal variation 4/24/2017    Pre-diabetes 4/24/2017    Tobacco use disorder 4/24/2017    Type 2 diabetes mellitus without complication, without long-term current use of insulin ([facility] 8/6/2018     Past Surgical History:   Procedure Laterality Date    NASAL FRACTURE SURGERY  2007     Family History   Problem Relation Age of Onset    High Blood Pressure Mother     High Cholesterol Mother     Cancer Father         Throat cancer - smoker    High Blood Pressure Brother     Heart Attack Maternal Grandmother 72    Diabetes Maternal Grandmother     Cancer Maternal Grandfather         unknown    No Known Problems Paternal Grandmother     No Known Problems Paternal Grandfather      Social History     Socioeconomic History    Marital status:      Spouse name: Tomas Hansen    Number of children: 3    Years of education: Not on file    Highest education level: Not on file   Occupational History    Occupation:  - industrial cleaning   Tobacco Use    Smoking status: Every Day     Packs/day: 0.50     Years: 23.00     Pack years: 11.50     Types: Cigarettes     Start date: 1994    Smokeless tobacco: Never   Vaping Use    Vaping Use: Never used   Substance and Sexual Activity    Alcohol use: No    Drug use: No    Sexual activity: Yes     Partners: Female     Comment: monogamous   Other Topics Concern    Not on file   Social History Narrative    Lives with wife and 3 children        1 dog     Social Determinants of Health     Financial Resource Strain: Not on file   Food Insecurity: Not on file   Transportation Needs: Not on file   Physical Activity: Not on file   Stress: Not on file   Social Connections: Not on file   Intimate Partner Violence: Not on file   Housing Stability: Not on file       Current Outpatient Medications on File Prior to Visit   Medication Sig Dispense Refill    naloxone 4 MG/0.1ML LIQD nasal spray 1 spray by Nasal route as needed for Opioid Reversal 1 each 0    Respiratory Therapy Supplies SHELBY New Full face CPAP  Mask. 1 each 0    ALPRAZolam (XANAX) 0.5 MG tablet TAKE ONE TABLET BY MOUTH THREE TIMES A DAY FOR PANIC  ANXIETY  AS NEEDED      azelastine (ASTELIN) 0.1 % nasal spray INSTILL TWO SPRAYS PER NOSTRIL TWICE DAILY AS NEEDED      buPROPion (WELLBUTRIN SR) 100 MG extended release tablet       cloNIDine (CATAPRES) 0.1 MG tablet TAKE ONE TABLET BY MOUTH THREE TIMES A DAY AS NEEDED for anxiety  high blood pressure      metFORMIN (GLUCOPHAGE-XR) 500 MG extended release tablet TAKE ONE TABLET BY MOUTH ONCE DAILY WITH THE EVENING MEAL      olmesartan (BENICAR) 20 MG tablet TAKE ONE TABLET BY MOUTH EVERY DAY      rosuvastatin (CRESTOR) 10 MG tablet Take by mouth      lidocaine (LMX) 4 % cream Apply a half dollar sized amount to intact skin topically up to twice daily as needed for pain 1 Tube 1    olopatadine (PATANOL) 0.1 % ophthalmic solution Place 1 drop into both eyes 2 times daily 5 mL 0    montelukast (SINGULAIR) 10 MG tablet Take 1 tablet by mouth daily 30 tablet 1    ipratropium (ATROVENT) 0.06 % nasal spray 2 sprays by Nasal route 3 times daily 1 Bottle 1    Elastic Bandages & Supports (KNEE BRACE) MISC Wear during the day over right knee as needed for support 1 each 0    loratadine (CLARITIN) 10 MG tablet Take 1 tablet by mouth daily 30 tablet 5    acetaminophen (TYLENOL) 500 MG tablet Take 1 tablet by mouth every 6 hours as needed for Pain.  60 tablet 0     No current facility-administered medications on file prior to visit. 60-year-old male follows for chronic low back pain and left shoulder pain. EMG LE normal. Consult with interventional radiology and testing to be done. He is having some right shoulder pain  now. He says he talked to his   and the lumbar MBB injections are approved but waiting for paperwork. Pain 7/10. EMG upper extremities 3/9/2022 normal.  He was started on Norco 5 mg once daily and tizanidine 4 mg once daily. This does help to some degree. He is able to participate in ADLs and chores. He uses a walking stick. He cannot lift his left arm. Difficulty since he uses his walking stick with his right hand cannot use his left. He is seeing Dr. Cristo Larson for physical therapy of his shoulder. Painful after the visits. Wants to get back to work. Also has neck pain. Legs with constant burning. Patient tells me his back issue is from a work-related injury about a year and a half ago. He works as a  and it was snowy and he was lifting something and he felt a pop in his back. He has had severe pain since then that goes down his left leg. He also tells me that due to the left leg giving out on him he fell and injured his left shoulder. He is not able to lift his left arm. He describes his pain and left shoulder across chest and low back pain down left leg to toes. Underwent lumbar facet injections with no pain relief and left GH joint injection with greater than 50% pain relief for 2 days. Diana Fontanez He takes tizanidine 2 mg at night as needed with some relief. Shoulder Pain   Pertinent negatives include no numbness. Review of Systems   Constitutional: Negative. Negative for activity change and unexpected weight change. HENT: Negative. Negative for hearing loss. Respiratory: Negative. Cardiovascular:  Negative for leg swelling. Gastrointestinal:  Negative for constipation and diarrhea. Genitourinary: Negative.     Musculoskeletal:  Positive for arthralgias and back pain. Negative for gait problem, joint swelling and neck pain. Skin:  Negative for rash. Neurological:  Positive for weakness. Negative for dizziness, numbness and headaches. Psychiatric/Behavioral: Negative. Negative for sleep disturbance. Objective:     Vitals:  /74   Pulse 72   Temp 98.1 °F (36.7 °C) (Infrared)   Ht 5' 10\" (1.778 m)   Wt 230 lb (104.3 kg)   SpO2 98%   BMI 33.00 kg/m² Pain Score:   7    Physical Exam  Vitals reviewed. Constitutional:       Appearance: He is well-developed. HENT:      Head: Normocephalic. Nose: Nose normal.   Pulmonary:      Effort: Pulmonary effort is normal.   Musculoskeletal:      Right shoulder: Normal range of motion. Normal strength. Left shoulder: Tenderness present. Decreased range of motion. Decreased strength. Cervical back: Normal range of motion and neck supple. Lymphadenopathy:      Cervical: No cervical adenopathy. Skin:     General: Skin is warm and dry. Findings: No erythema or rash. Neurological:      Mental Status: He is alert and oriented to person, place, and time. Cranial Nerves: No cranial nerve deficit. Deep Tendon Reflexes: Reflexes are normal and symmetric. Reflexes normal.   Psychiatric:         Mood and Affect: Mood normal.         Behavior: Behavior normal.         Thought Content: Thought content normal.         Judgment: Judgment normal.          Assessment:        Diagnosis Orders   1. Lumbosacral spondylosis without myelopathy  tiZANidine (ZANAFLEX) 4 MG tablet    HYDROcodone-acetaminophen (NORCO) 5-325 MG per tablet    Urine Drug Screen      2. Adhesive capsulitis of left shoulder  tiZANidine (ZANAFLEX) 4 MG tablet    HYDROcodone-acetaminophen (NORCO) 5-325 MG per tablet      3. Chronic, continuous use of opioids  HYDROcodone-acetaminophen (NORCO) 5-325 MG per tablet      4.  Encounter for monitoring opioid maintenance therapy  HYDROcodone-acetaminophen ([address]) 5-325 MG per tablet          Plan:     Periodic Controlled Substance Monitoring: Possible medication side effects, risk of tolerance/dependence & alternative treatments discussed., No signs of potential drug abuse or diversion identified. , Assessed functional status. , Random urine drug screen sent today. CHELI Purcell - CNP)    Orders Placed This Encounter   Medications    tiZANidine (ZANAFLEX) 4 MG tablet     Sig: Take 1 tablet by mouth nightly as needed (pain and spasms) As needed for pain and spasms     Dispense:  30 tablet     Refill:  0    HYDROcodone-acetaminophen (NORCO) 5-325 MG per tablet     Sig: Take 1 tablet by mouth daily as needed for Pain for up to 30 days. Dispense:  30 tablet     Refill:  0     Reduce doses taken as pain becomes manageable         Orders Placed This Encounter   Procedures    Urine Drug Screen     Chronic pain/illicits       -UDS, today norco  -He is waiting for lumbar MBB approval paperwork  -Refill Norco 5 mg daily as needed pain, #30 fill date 10/26/2022    Discussed options with the patient today. Anatomic model pathology was shown and reviewed with pt. All questions were answered. Relevant imaging reviewed, NDP reviewed and pain generators reviewed. Pt verbalized understanding and agrees with above plan. Will continue medications as they do help pt function with ADL and improve quality of life. Pt understands potential side effects from opioids such as constipation, dry mouth, dizziness, opioid use disorder, and overdose. Pt has failed non opioid therapy and decision was made to prescribe opioids to help with daily function and improve quality of life. Discussed the principles of opioid tolerance as well as the concerns regarding opioid diversion, misuse, and abuse. Discussed the risks of respiratory depression and death while on pain medications, especially when combined with other sedative substances.      Discussed the elevated risks of excessive sedation while on pain medications. Advised him against driving or operating heavy machinery or performing any activities where he may harm himself or others while on pain medications. Particular caution was emphasized especially during dose adjustments and medication changes. [place]ARRS was reviewed. UTOX from 3/2022 appropriate; ORT score = 1  Daily MME 5  Narcan prescribed 3/2022 and understands the proper use in the event of an overdose. This NP saw pt under direct supervision of Dr Pebbles Barton. Follow up:  Return in about 5 weeks (around 11/23/2022) for review meds and reassess pain.     Elisabeth Tesfaye, CHELI - CNP

## 2022-11-16 ENCOUNTER — OFFICE VISIT (OUTPATIENT)
Dept: PAIN MANAGEMENT | Age: 47
End: 2022-11-16

## 2022-11-16 VITALS
DIASTOLIC BLOOD PRESSURE: 82 MMHG | SYSTOLIC BLOOD PRESSURE: 130 MMHG | WEIGHT: 231 LBS | HEIGHT: 70 IN | TEMPERATURE: 99.3 F | BODY MASS INDEX: 33.07 KG/M2

## 2022-11-16 DIAGNOSIS — M75.02 ADHESIVE CAPSULITIS OF LEFT SHOULDER: ICD-10-CM

## 2022-11-16 DIAGNOSIS — F11.90 CHRONIC, CONTINUOUS USE OF OPIOIDS: ICD-10-CM

## 2022-11-16 DIAGNOSIS — M21.372 FOOT DROP, LEFT: Primary | ICD-10-CM

## 2022-11-16 DIAGNOSIS — Z51.81 ENCOUNTER FOR MONITORING OPIOID MAINTENANCE THERAPY: ICD-10-CM

## 2022-11-16 DIAGNOSIS — M47.817 LUMBOSACRAL SPONDYLOSIS WITHOUT MYELOPATHY: ICD-10-CM

## 2022-11-16 DIAGNOSIS — Z79.891 ENCOUNTER FOR MONITORING OPIOID MAINTENANCE THERAPY: ICD-10-CM

## 2022-11-16 RX ORDER — HYDROCODONE BITARTRATE AND ACETAMINOPHEN 7.5; 325 MG/1; MG/1
1 TABLET ORAL DAILY PRN
Qty: 30 TABLET | Refills: 0 | Status: SHIPPED | OUTPATIENT
Start: 2022-11-25 | End: 2022-12-25

## 2022-11-16 RX ORDER — CYCLOBENZAPRINE HCL 5 MG
5 TABLET ORAL NIGHTLY PRN
Qty: 30 TABLET | Refills: 0 | Status: SHIPPED | OUTPATIENT
Start: 2022-11-16 | End: 2022-11-26

## 2022-11-16 ASSESSMENT — ENCOUNTER SYMPTOMS
CONSTIPATION: 0
BACK PAIN: 1
DIARRHEA: 0
RESPIRATORY NEGATIVE: 1

## 2022-11-16 NOTE — PROGRESS NOTES
Insecurity: Not on file   Transportation Needs: Not on file   Physical Activity: Not on file   Stress: Not on file   Social Connections: Not on file   Intimate Partner Violence: Not on file   Housing Stability: Not on file       Current Outpatient Medications on File Prior to Visit   Medication Sig Dispense Refill    naloxone 4 MG/0.1ML LIQD nasal spray 1 spray by Nasal route as needed for Opioid Reversal 1 each 0    Respiratory Therapy Supplies SHELBY New Full face CPAP  Mask. 1 each 0    ALPRAZolam (XANAX) 0.5 MG tablet TAKE ONE TABLET BY MOUTH THREE TIMES A DAY FOR PANIC  ANXIETY  AS NEEDED      azelastine (ASTELIN) 0.1 % nasal spray INSTILL TWO SPRAYS PER NOSTRIL TWICE DAILY AS NEEDED      buPROPion (WELLBUTRIN SR) 100 MG extended release tablet       cloNIDine (CATAPRES) 0.1 MG tablet TAKE ONE TABLET BY MOUTH THREE TIMES A DAY AS NEEDED for anxiety  high blood pressure      metFORMIN (GLUCOPHAGE-XR) 500 MG extended release tablet TAKE ONE TABLET BY MOUTH ONCE DAILY WITH THE EVENING MEAL      olmesartan (BENICAR) 20 MG tablet TAKE ONE TABLET BY MOUTH EVERY DAY      rosuvastatin (CRESTOR) 10 MG tablet Take by mouth      lidocaine (LMX) 4 % cream Apply a half dollar sized amount to intact skin topically up to twice daily as needed for pain 1 Tube 1    olopatadine (PATANOL) 0.1 % ophthalmic solution Place 1 drop into both eyes 2 times daily 5 mL 0    montelukast (SINGULAIR) 10 MG tablet Take 1 tablet by mouth daily 30 tablet 1    ipratropium (ATROVENT) 0.06 % nasal spray 2 sprays by Nasal route 3 times daily 1 Bottle 1    Elastic Bandages & Supports (KNEE BRACE) MISC Wear during the day over right knee as needed for support 1 each 0    loratadine (CLARITIN) 10 MG tablet Take 1 tablet by mouth daily 30 tablet 5    acetaminophen (TYLENOL) 500 MG tablet Take 1 tablet by mouth every 6 hours as needed for Pain. 60 tablet 0     No current facility-administered medications on file prior to visit.          41-year-old male follows for chronic low back pain and left shoulder pain. The pain starts left upper back and to shoulder then down to low back into both legs to calves. Has left foot drop. EMG LE normal. Consult with [facility] interventional radiology and vascular testing to be done. Still waiting to [facility] response. He says he talked to his   and the lumbar MBB injections are approved but waiting for paperwork. Pain 7/10. EMG upper extremities 3/9/2022 normal.  He was started on Norco 5 mg once daily and tizanidine 4 mg once daily. This does help to some degree, but not enough, asking for something different. He is able to participate in ADLs and chores. He uses a walking stick. He cannot lift his left arm more then 45 degrees. Difficulty since he uses his walking stick with his right hand cannot use his left. He is seeing Dr. Vamshi Ambriz for physical therapy of his shoulder. Painful after the visits. Wants to get back to work. Also has neck pain. Legs with constant burning. Patient tells me his back issue is from a work-related injury about a year and a half ago. He works as a  and it was snowy and he was lifting something and he felt a pop in his back. He has had severe pain since then that goes down his left leg. He also tells me that due to the left leg giving out on him he fell and injured his left shoulder. He is not able to lift his left arm. He describes his pain and left shoulder across chest and low back pain down left leg to toes. Underwent lumbar facet injections with no pain relief and left GH joint injection with greater than 50% pain relief for 2 days. Raúl Kelsey He takes tizanidine 2 mg at night as needed with some relief. Review of Systems   Constitutional: Negative. Negative for activity change and unexpected weight change. HENT: Negative. Negative for hearing loss. Respiratory: Negative. Cardiovascular:  Negative for leg swelling.    Gastrointestinal:  Negative for constipation and diarrhea. Genitourinary: Negative. Musculoskeletal:  Positive for back pain. Negative for gait problem, joint swelling and neck pain. Skin:  Negative for rash. Neurological:  Positive for weakness. Negative for dizziness, numbness and headaches. Psychiatric/Behavioral: Negative. Negative for sleep disturbance. Objective:     Vitals:  /82 (Site: Right Upper Arm)   Temp 99.3 °F (37.4 °C) (Temporal)   Ht 5' 10\" (1.778 m)   Wt 231 lb (104.8 kg)   BMI 33.15 kg/m² Pain Score:   8    Physical Exam  Vitals reviewed. Constitutional:       Appearance: He is well-developed. HENT:      Head: Normocephalic. Nose: Nose normal.   Pulmonary:      Effort: Pulmonary effort is normal.   Musculoskeletal:      Cervical back: Normal range of motion and neck supple. Lumbar back: Tenderness present. Decreased range of motion. Negative right straight leg raise test and negative left straight leg raise test.   Lymphadenopathy:      Cervical: No cervical adenopathy. Skin:     General: Skin is warm and dry. Findings: No erythema or rash. Neurological:      Mental Status: He is alert and oriented to person, place, and time. Cranial Nerves: No cranial nerve deficit. Motor: Weakness present. Gait: Gait abnormal.      Deep Tendon Reflexes: Reflexes are normal and symmetric. Reflexes normal.      Comments: Left foot drop   Psychiatric:         Mood and Affect: Mood normal.         Behavior: Behavior normal.         Thought Content: Thought content normal.         Judgment: Judgment normal.          Assessment:        Diagnosis Orders   1. Foot drop, left  RI AFO PLASTIC      2. Lumbosacral spondylosis without myelopathy  HYDROcodone-acetaminophen (NORCO) 7.5-325 MG per tablet      3. Adhesive capsulitis of left shoulder        4. Chronic, continuous use of opioids        5.  Encounter for monitoring opioid maintenance therapy            Plan:     Periodic Controlled Substance Monitoring: Possible medication side effects, risk of tolerance/dependence & alternative treatments discussed., No signs of potential drug abuse or diversion identified. , Assessed functional status. Lillian Bolivar, APRN - CNP)    Orders Placed This Encounter   Medications    cyclobenzaprine (FLEXERIL) 5 MG tablet     Sig: Take 1 tablet by mouth nightly as needed for Muscle spasms Stop tizanidine     Dispense:  30 tablet     Refill:  0    HYDROcodone-acetaminophen (NORCO) 7.5-325 MG per tablet     Sig: Take 1 tablet by mouth daily as needed for Pain for up to 30 days. Note increase dose     Dispense:  30 tablet     Refill:  0     Reduce doses taken as pain becomes manageable         Orders Placed This Encounter   Procedures    GA AFO PLASTIC     Standing Status:   Future     Standing Expiration Date:   2/16/2023       -increase norco from 5mg daily to 7.5mg daily PRN #30 fill date 11/21/22  -change tizanidine to flexeril 5mg nightly pern spasm #30  -left AFO for foot drop for safety and function    Discussed options with the patient today. Anatomic model pathology was shown and reviewed with pt. All questions were answered. Relevant imaging reviewed, NDP reviewed and pain generators reviewed. Pt verbalized understanding and agrees with above plan. Will continue medications as they do help pt function with ADL and improve quality of life. Pt understands potential side effects from opioids such as constipation, dry mouth, dizziness, opioid use disorder, and overdose. Pt has failed non opioid therapy and decision was made to prescribe opioids to help with daily function and improve quality of life. Discussed the principles of opioid tolerance as well as the concerns regarding opioid diversion, misuse, and abuse. Discussed the risks of respiratory depression and death while on pain medications, especially when combined with other sedative substances.      Discussed the elevated risks of excessive sedation while on pain medications. Advised him against driving or operating heavy machinery or performing any activities where he may harm himself or others while on pain medications. Particular caution was emphasized especially during dose adjustments and medication changes. OARRS was reviewed. UTOX from 10/2022 appropriate; ORT score = 1  Daily MME 5  Narcan prescribed 3/2022 and understands the proper use in the event of an overdose. This NP saw pt under direct supervision of Dr Jaren Blancas. Follow up:  Return in about 5 weeks (around 12/21/2022) for review meds and reassess pain.     Donna Mclean, APRN - CNP

## 2022-12-21 ENCOUNTER — OFFICE VISIT (OUTPATIENT)
Dept: PAIN MANAGEMENT | Age: 47
End: 2022-12-21

## 2022-12-21 VITALS
SYSTOLIC BLOOD PRESSURE: 132 MMHG | TEMPERATURE: 98.1 F | HEIGHT: 70 IN | WEIGHT: 231 LBS | BODY MASS INDEX: 33.07 KG/M2 | DIASTOLIC BLOOD PRESSURE: 84 MMHG

## 2022-12-21 DIAGNOSIS — Z51.81 ENCOUNTER FOR MONITORING OPIOID MAINTENANCE THERAPY: ICD-10-CM

## 2022-12-21 DIAGNOSIS — M79.602 LEFT ARM PAIN: ICD-10-CM

## 2022-12-21 DIAGNOSIS — Z79.891 ENCOUNTER FOR MONITORING OPIOID MAINTENANCE THERAPY: ICD-10-CM

## 2022-12-21 DIAGNOSIS — M47.817 LUMBOSACRAL SPONDYLOSIS WITHOUT MYELOPATHY: Primary | ICD-10-CM

## 2022-12-21 DIAGNOSIS — M75.02 ADHESIVE CAPSULITIS OF LEFT SHOULDER: ICD-10-CM

## 2022-12-21 DIAGNOSIS — M21.372 FOOT DROP, LEFT: ICD-10-CM

## 2022-12-21 RX ORDER — HYDROCODONE BITARTRATE AND ACETAMINOPHEN 7.5; 325 MG/1; MG/1
1 TABLET ORAL DAILY PRN
Qty: 30 TABLET | Refills: 0 | Status: SHIPPED | OUTPATIENT
Start: 2022-12-28 | End: 2023-01-27

## 2022-12-21 RX ORDER — CYCLOBENZAPRINE HCL 5 MG
5 TABLET ORAL NIGHTLY PRN
Qty: 30 TABLET | Refills: 0 | Status: SHIPPED | OUTPATIENT
Start: 2022-12-21 | End: 2022-12-31

## 2022-12-21 ASSESSMENT — ENCOUNTER SYMPTOMS
CONSTIPATION: 0
DIARRHEA: 0
BACK PAIN: 1
RESPIRATORY NEGATIVE: 1

## 2022-12-21 NOTE — PROGRESS NOTES
Patient: Juan Wright  YOB: 1975  Date: 12/21/22        Subjective:     Juan Wright is a 56 y.o. male who complains today of:    Chief Complaint   Patient presents with    Follow-up    Foot Pain    Back Pain         Allergies:  Bupropion, Escitalopram, and Hydroxyzine    Past Medical History:   Diagnosis Date    Depression with anxiety 4/24/2017    Hyperglycemia 4/24/2017    Hyperlipidemia 2/18/2014    Insomnia     Perennial allergic rhinitis with seasonal variation 4/24/2017    Pre-diabetes 4/24/2017    Tobacco use disorder 4/24/2017    Type 2 diabetes mellitus without complication, without long-term current use of insulin ([facility] [address] 8/6/2018     Past Surgical History:   Procedure Laterality Date    NASAL FRACTURE SURGERY  2007     Family History   Problem Relation Age of Onset    High Blood Pressure Mother     High Cholesterol Mother     Cancer Father         Throat cancer - smoker    High Blood Pressure Brother     Heart Attack Maternal Grandmother 72    Diabetes Maternal Grandmother     Cancer Maternal Grandfather         unknown    No Known Problems Paternal Grandmother     No Known Problems Paternal Grandfather      Social History     Socioeconomic History    Marital status:      Spouse name: Levi Roberto    Number of children: 3    Years of education: Not on file    Highest education level: Not on file   Occupational History    Occupation:  - industrial cleaning   Tobacco Use    Smoking status: Every Day     Packs/day: 0.50     Years: 23.00     Pack years: 11.50     Types: Cigarettes     Start date: 1994    Smokeless tobacco: Never   Vaping Use    Vaping Use: Never used   Substance and Sexual Activity    Alcohol use: No    Drug use: No    Sexual activity: Yes     Partners: Female     Comment: monogamous   Other Topics Concern    Not on file   Social History Narrative    Lives with wife and 3 children        1 dog     Social Determinants of Health     Financial Resource Strain: Not on file   Food Insecurity: Not on file   Transportation Needs: Not on file   Physical Activity: Not on file   Stress: Not on file   Social Connections: Not on file   Intimate Partner Violence: Not on file   Housing Stability: Not on file       Current Outpatient Medications on File Prior to Visit   Medication Sig Dispense Refill    naloxone 4 MG/0.1ML LIQD nasal spray 1 spray by Nasal route as needed for Opioid Reversal 1 each 0    Respiratory Therapy Supplies SHELBY New Full face CPAP  Mask. 1 each 0    ALPRAZolam (XANAX) 0.5 MG tablet TAKE ONE TABLET BY MOUTH THREE TIMES A DAY FOR PANIC  ANXIETY  AS NEEDED      azelastine (ASTELIN) 0.1 % nasal spray INSTILL TWO SPRAYS PER NOSTRIL TWICE DAILY AS NEEDED      buPROPion (WELLBUTRIN SR) 100 MG extended release tablet       cloNIDine (CATAPRES) 0.1 MG tablet TAKE ONE TABLET BY MOUTH THREE TIMES A DAY AS NEEDED for anxiety  high blood pressure      metFORMIN (GLUCOPHAGE-XR) 500 MG extended release tablet TAKE ONE TABLET BY MOUTH ONCE DAILY WITH THE EVENING MEAL      olmesartan (BENICAR) 20 MG tablet TAKE ONE TABLET BY MOUTH EVERY DAY      rosuvastatin (CRESTOR) 10 MG tablet Take by mouth      lidocaine (LMX) 4 % cream Apply a half dollar sized amount to intact skin topically up to twice daily as needed for pain 1 Tube 1    olopatadine (PATANOL) 0.1 % ophthalmic solution Place 1 drop into both eyes 2 times daily 5 mL 0    montelukast (SINGULAIR) 10 MG tablet Take 1 tablet by mouth daily 30 tablet 1    ipratropium (ATROVENT) 0.06 % nasal spray 2 sprays by Nasal route 3 times daily 1 Bottle 1    Elastic Bandages & Supports (KNEE BRACE) MISC Wear during the day over right knee as needed for support 1 each 0    loratadine (CLARITIN) 10 MG tablet Take 1 tablet by mouth daily 30 tablet 5    acetaminophen (TYLENOL) 500 MG tablet Take 1 tablet by mouth every 6 hours as needed for Pain. 60 tablet 0     No current facility-administered medications on file prior to visit. 50-year-old male follows for chronic low back pain and left shoulder pain. The increase last month of norco 5mg daily to 7.5mg daily and  tizanidine to flexeril 5mg,  does help for a few hours then pain returns to normal.  His left leg pain wakes him. Also having left arm pain into 4th and 5th fingers. The pain starts left upper back and to shoulder then down to low back into both legs to calves. Has left foot drop. EMG LE and UE normal. Walks with cane. Consult with [facility] interventional radiology and vascular testing to be done. Still waiting to [facility] response. With the meds he is able to participate in ADLs and chores. He uses a walking stick. He cannot lift his left arm more then 45 degrees. Difficulty since he uses his walking stick with his right hand cannot use his left. He is seeing Dr. Emilie Goode for physical therapy of his shoulder. Painful after the visits. Wants to get back to work. Also has neck pain. Legs with constant burning. Patient tells me his back issue is from a work-related injury about a year and a half ago. He works as a  and it was snowy and he was lifting something and he felt a pop in his back. He has had severe pain since then that goes down his left leg. He also tells me that due to the left leg giving out on him he fell and injured his left shoulder. He is not able to lift his left arm. He describes his pain and left shoulder across chest and low back pain down left leg to toes. Underwent lumbar facet injections with no pain relief and left GH joint injection with greater than 50% pain relief for 2 days. [place] He takes tizanidine 2 mg at night as needed with some relief. Foot Pain   Pertinent negatives include no numbness. Review of Systems   Constitutional: Negative. Negative for activity change and unexpected weight change. HENT: Negative. Negative for hearing loss. Respiratory: Negative.      Cardiovascular:  Negative for leg swelling. Gastrointestinal:  Negative for constipation and diarrhea. Genitourinary: Negative. Musculoskeletal:  Positive for arthralgias and back pain. Negative for gait problem, joint swelling and neck pain. Skin:  Negative for rash. Neurological:  Negative for dizziness, weakness, numbness and headaches. Psychiatric/Behavioral: Negative. Negative for sleep disturbance. Objective:     Vitals:  /84 (Site: Left Upper Arm, Position: Sitting)   Temp 98.1 °F (36.7 °C) (Temporal)   Ht 5' 10\" (1.778 m)   Wt 231 lb (104.8 kg)   BMI 33.15 kg/m² Pain Score:   8    Physical Exam  Vitals reviewed. Constitutional:       Appearance: He is well-developed. HENT:      Head: Normocephalic. Nose: Nose normal.   Pulmonary:      Effort: Pulmonary effort is normal.   Musculoskeletal:      Left shoulder: Tenderness present. Decreased range of motion. Decreased strength. Cervical back: Normal range of motion and neck supple. Lumbar back: Tenderness present. Decreased range of motion. Positive left straight leg raise test. Negative right straight leg raise test.   Lymphadenopathy:      Cervical: No cervical adenopathy. Skin:     General: Skin is warm and dry. Findings: No erythema or rash. Neurological:      Mental Status: He is alert and oriented to person, place, and time. Cranial Nerves: No cranial nerve deficit. Deep Tendon Reflexes: Reflexes are normal and symmetric. Reflexes normal.   Psychiatric:         Mood and Affect: Mood normal.         Behavior: Behavior normal.         Thought Content: Thought content normal.         Judgment: Judgment normal.          Assessment:        Diagnosis Orders   1.  Lumbosacral spondylosis without myelopathy  TX INJ DX/THER AGNT PARAVERT FACET JOINT, LUMBAR/SAC, 1ST LEVEL    TX INJ DX/THER AGNT PARAVERT FACET JOINT, LUMBAR/SAC, 2ND LEVEL    TX INJ DX/THER AGNT PARAVERT FACET JOINT, LUMBAR/SAC, ADD LEVEL    HYDROcodone-acetaminophen (NORCO) 7.5-325 MG per tablet      2. Left arm pain        3. Foot drop, left        4. Encounter for monitoring opioid maintenance therapy        5. Adhesive capsulitis of left shoulder            Plan:     Periodic Controlled Substance Monitoring: Possible medication side effects, risk of tolerance/dependence & alternative treatments discussed., No signs of potential drug abuse or diversion identified. , Assessed functional status. Roland Betancur, APRN - CNP)    Orders Placed This Encounter   Medications    HYDROcodone-acetaminophen (NORCO) 7.5-325 MG per tablet     Sig: Take 1 tablet by mouth daily as needed for Pain for up to 30 days. Note increase dose     Dispense:  30 tablet     Refill:  0     Reduce doses taken as pain becomes manageable    cyclobenzaprine (FLEXERIL) 5 MG tablet     Sig: Take 1 tablet by mouth nightly as needed for Muscle spasms Stop tizanidine     Dispense:  30 tablet     Refill:  0         Orders Placed This Encounter   Procedures    LA INJ DX/THER AGNT PARAVERT FACET JOINT, LUMBAR/SAC, 1ST LEVEL     MBB #1 bialt  (345) with SM     Standing Status:   Future     Standing Expiration Date:   3/21/2023    LA INJ DX/THER AGNT PARAVERT FACET JOINT, LUMBAR/SAC, 2ND LEVEL     Standing Status:   Future     Standing Expiration Date:   3/21/2023    LA INJ DX/THER AGNT PARAVERT FACET JOINT, LUMBAR/SAC, ADD LEVEL     Standing Status:   Future     Standing Expiration Date:   3/21/2023       We will go ahead and order diagnostic bilateral L2, L3, L4, L5 medial branch blocks to see if the patient is a candidate for RF ablation. he has failed conservative treatment in the past. Anatomic model of pathology was shown. Risks and benefits of the procedure were discussed. All questions were answered and patient understands and agrees with the plan.    No steroids due to increase BP    -can not have epidural injection due to inability to tolerate steroids  -Waiting on approval for AFO brace for foot drop left    Discussed options with the patient today. Anatomic model pathology was shown and reviewed with pt. All questions were answered. Relevant imaging reviewed, NDP reviewed and pain generators reviewed. Pt verbalized understanding and agrees with above plan. Will continue medications as they do help pt function with ADL and improve quality of life. Pt understands potential side effects from opioids such as constipation, dry mouth, dizziness, opioid use disorder, and overdose. Pt has failed non opioid therapy and decision was made to prescribe opioids to help with daily function and improve quality of life. Discussed the principles of opioid tolerance as well as the concerns regarding opioid diversion, misuse, and abuse. Discussed the risks of respiratory depression and death while on pain medications, especially when combined with other sedative substances. Discussed the elevated risks of excessive sedation while on pain medications. Advised him against driving or operating heavy machinery or performing any activities where he may harm himself or others while on pain medications. Particular caution was emphasized especially during dose adjustments and medication changes. OARRS was reviewed. UTOX from 10/2022 appropriate; ORT score = 1  Daily MME 5  Narcan prescribed 3/2022 and understands the proper use in the event of an overdose. This NP saw pt under direct supervision of Dr Amairani Hull. Follow up:  Return in about 5 weeks (around 1/25/2023) for F/U Dr Amairani Hull.     CHELI Vo - CNP

## 2023-01-25 ENCOUNTER — OFFICE VISIT (OUTPATIENT)
Dept: PAIN MANAGEMENT | Age: 48
End: 2023-01-25

## 2023-01-25 VITALS
DIASTOLIC BLOOD PRESSURE: 82 MMHG | BODY MASS INDEX: 32.93 KG/M2 | HEIGHT: 70 IN | SYSTOLIC BLOOD PRESSURE: 130 MMHG | WEIGHT: 230 LBS | TEMPERATURE: 98.2 F

## 2023-01-25 DIAGNOSIS — F11.90 CHRONIC, CONTINUOUS USE OF OPIOIDS: ICD-10-CM

## 2023-01-25 DIAGNOSIS — Z51.81 ENCOUNTER FOR MONITORING OPIOID MAINTENANCE THERAPY: ICD-10-CM

## 2023-01-25 DIAGNOSIS — M47.817 LUMBOSACRAL SPONDYLOSIS WITHOUT MYELOPATHY: ICD-10-CM

## 2023-01-25 DIAGNOSIS — M75.02 ADHESIVE CAPSULITIS OF LEFT SHOULDER: ICD-10-CM

## 2023-01-25 DIAGNOSIS — Z79.891 ENCOUNTER FOR MONITORING OPIOID MAINTENANCE THERAPY: ICD-10-CM

## 2023-01-25 RX ORDER — CYCLOBENZAPRINE HCL 5 MG
5 TABLET ORAL NIGHTLY PRN
Qty: 30 TABLET | Refills: 0 | Status: SHIPPED | OUTPATIENT
Start: 2023-01-25 | End: 2023-02-04

## 2023-01-25 RX ORDER — HYDROCODONE BITARTRATE AND ACETAMINOPHEN 5; 325 MG/1; MG/1
1 TABLET ORAL DAILY PRN
Qty: 30 TABLET | Refills: 0 | Status: SHIPPED | OUTPATIENT
Start: 2023-01-27 | End: 2023-01-27 | Stop reason: SDUPTHER

## 2023-01-25 ASSESSMENT — ENCOUNTER SYMPTOMS
BACK PAIN: 1
NAUSEA: 0
SHORTNESS OF BREATH: 0
DIARRHEA: 0
CONSTIPATION: 0

## 2023-01-25 NOTE — PROGRESS NOTES
Derik Vasquez  ([id number])    1/25/2023    Subjective:     Derik Vasquez is 56 y.o. male who complains today of:    Chief Complaint   Patient presents with    Back Pain    Leg Pain     Last seen by me 8/26/22, last seen 12/21/22: This encounter is for [place] through [facility] established diagnosis. This is a work related claim. He last worked in 2020, this is a work related injury. Opioids recently increased, he was pleased with this adjustment and increase in pain medications. Discussed his use of alprazolam and risk of resp dep and death, he expresses understanding and agreement. Lumbar medial branch blocks not done. Changed from tizanidine to flexeril, flexeril helps more. Left AFO never received. Saw Kevin NP, tests ordered, yet to follow up. Continues with physical therapy and home exercise program.  EMG done, reviewed below. Medrol Dosepak helped in the past. Follows with podiatry. No other test therapy updates or other physicians, no ER visits. Norco 7.5/325 daily and tizanidine 4 mg nightly help with remaining functional with chores, personal hygiene, remaining compliant with home exercise program, maintaining his quality of life, and performing activities of daily living. He obtains greater than 50% pain relief without any significant side effects. He is clear to avoid driving or operating heavy machinery or to perform any activities where he may harm himself or others while on pain medications. Low back pain is currently a 5/10. Gets up to a 9/10. The pain is located in both sides low back. He has some right thigh paresthesias that are not nearly as bothersome as his pain in his low back. No clear leg pain. Pain is worse with bending and activity as well as standing. Pain is better with sitting and pain medicine. It has been a constant ache for over 1 year. There are no other associated symptoms or contextual factors.  He denies any classic radicular symptoms, new weakness, saddle anesthesia, bowel or bladder dysfunction, or falls. Left shoulder pain is currently a 4/10. Is up to a 7/10. The pain is worse with lifting things as well as using his left arm. The pain is better with very little. It has been a constant ache for over 1 year. Seen by orthopedics, diagnosed with adhesive capsulitis. MRI left shoulder done, reviewed below. No arm weakness and clumsiness. The right shoulder is okay. There are no other associated symptoms or contextual factors. He denies any classic radicular symptoms, new weakness, saddle anesthesia, bowel or bladder dysfunction, or falls.      Allergies:  Bupropion, Escitalopram, and Hydroxyzine    Past Medical History:   Diagnosis Date    Depression with anxiety 4/24/2017    Hyperglycemia 4/24/2017    Hyperlipidemia 2/18/2014    Insomnia     Perennial allergic rhinitis with seasonal variation 4/24/2017    Pre-diabetes 4/24/2017    Tobacco use disorder 4/24/2017    Type 2 diabetes mellitus without complication, without long-term current use of insulin ([facility] [address] 8/6/2018     Past Surgical History:   Procedure Laterality Date    NASAL FRACTURE SURGERY  2007     Family History   Problem Relation Age of Onset    High Blood Pressure Mother     High Cholesterol Mother     Cancer Father         Throat cancer - smoker    High Blood Pressure Brother     Heart Attack Maternal Grandmother 72    Diabetes Maternal Grandmother     Cancer Maternal Grandfather         unknown    No Known Problems Paternal Grandmother     No Known Problems Paternal Grandfather      Social History     Socioeconomic History    Marital status:      Spouse name: Robina    Number of children: 3    Years of education: Not on file    Highest education level: Not on file   Occupational History    Occupation:  - industrial cleaning   Tobacco Use    Smoking status: Every Day     Packs/day: 0.50     Years: 23.00     Pack years: 11.50     Types: Cigarettes     Start date: 1994    Smokeless tobacco: Never   Vaping Use    Vaping Use: Never used   Substance and Sexual Activity    Alcohol use: No    Drug use: No    Sexual activity: Yes     Partners: Female     Comment: monogamous   Other Topics Concern    Not on file   Social History Narrative    Lives with wife and 3 children        1 dog     Social Determinants of Health     Financial Resource Strain: Not on file   Food Insecurity: Not on file   Transportation Needs: Not on file   Physical Activity: Not on file   Stress: Not on file   Social Connections: Not on file   Intimate Partner Violence: Not on file   Housing Stability: Not on file       Current Outpatient Medications on File Prior to Visit   Medication Sig Dispense Refill    naloxone 4 MG/0.1ML LIQD nasal spray 1 spray by Nasal route as needed for Opioid Reversal 1 each 0    Respiratory Therapy Supplies SHELBY New Full face CPAP  Mask.  1 each 0    ALPRAZolam (XANAX) 0.5 MG tablet TAKE ONE TABLET BY MOUTH THREE TIMES A DAY FOR PANIC  ANXIETY  AS NEEDED      azelastine (ASTELIN) 0.1 % nasal spray INSTILL TWO SPRAYS PER NOSTRIL TWICE DAILY AS NEEDED      buPROPion (WELLBUTRIN SR) 100 MG extended release tablet       cloNIDine (CATAPRES) 0.1 MG tablet TAKE ONE TABLET BY MOUTH THREE TIMES A DAY AS NEEDED for anxiety  high blood pressure      metFORMIN (GLUCOPHAGE-XR) 500 MG extended release tablet TAKE ONE TABLET BY MOUTH ONCE DAILY WITH THE EVENING MEAL      olmesartan (BENICAR) 20 MG tablet TAKE ONE TABLET BY MOUTH EVERY DAY      rosuvastatin (CRESTOR) 10 MG tablet Take by mouth      lidocaine (LMX) 4 % cream Apply a half dollar sized amount to intact skin topically up to twice daily as needed for pain 1 Tube 1    olopatadine (PATANOL) 0.1 % ophthalmic solution Place 1 drop into both eyes 2 times daily 5 mL 0    montelukast (SINGULAIR) 10 MG tablet Take 1 tablet by mouth daily 30 tablet 1    ipratropium (ATROVENT) 0.06 % nasal spray 2 sprays by Nasal route 3 times daily 1 Bottle 1    Elastic Bandages & Supports (KNEE BRACE) MISC Wear during the day over right knee as needed for support 1 each 0    loratadine (CLARITIN) 10 MG tablet Take 1 tablet by mouth daily 30 tablet 5    acetaminophen (TYLENOL) 500 MG tablet Take 1 tablet by mouth every 6 hours as needed for Pain. 60 tablet 0     No current facility-administered medications on file prior to visit. Review of Systems   Constitutional:  Negative for fever. HENT:  Negative for hearing loss. Respiratory:  Negative for shortness of breath. Gastrointestinal:  Negative for constipation, diarrhea and nausea. Genitourinary:  Negative for difficulty urinating. Musculoskeletal:  Positive for back pain. Negative for neck pain. Skin:  Negative for rash. Neurological:  Negative for headaches. Hematological:  Does not bruise/bleed easily. Psychiatric/Behavioral:  Negative for sleep disturbance. Objective:     Vitals:  /82 (Site: Left Upper Arm, Position: Sitting)   Temp 98.2 °F (36.8 °C)   Ht 5' 10\" (1.778 m)   Wt 230 lb (104.3 kg)   BMI 33.00 kg/m² Pain Score:   6      Exam performed under coronavirus precautions  General: No acute distress  Eyes: No scleral icterus or lid lag appreciated bilaterally  ENMT: Moist mucous membranes. Hearing grossly intact bilaterally. No masses or lesions on ears or nose  Neck: Symmetric without any overt masses or lesions, trachea midline  Respiratory: Respirations are non-labored  Skin: Visualized skin is intact  Psych: Mood normal. Affect normal. Recent and remote memory intact. Judgment and insight normal.  Neurologic: Gait antalgic, no assistive devices for ambulation. Pt is alert and appropriately interactive. Pleasant and cooperative with history and exam. No signs of excessive sedation. Responds promptly and appropriately to questions asked. No aberrant behaviors appreciated. Sensation grossly intact in both legs.    Reflexes and strength functional for ambulation, no abnormal reflexes appreciated on exam today  Strength greater than 3/5 bilateral legs  Straight leg raise negative bilaterally. Sensation intact in both arms  Reflexes and strength functional for arm use, no abnormal reflexes appreciated on exam today  Strength greater than 3/5 in both arms  Spurling's negative on exam today    Limited range of motion left shoulder with tenderness over the left glenohumeral joint. There is tenderness to palpation over the lower lumbar spinous processes from L2 down to sacrum with bilateral paraspinal muscle tenderness. Rotation and extension reproduces axial low back pain. Other facet provocative maneuvers are positive. Outside record review:  EMG B LE 9/26/22: This study is limited, but normal. There is no current electrodiagnostic evidence for a generalized large fiber sensorimotor peripheral polyneuropathy. Although limited, there is no clear evidence for an active bilateral lumbosacral motor radiculopathy. EMG B UE 3/9/2022: This study is limited, but normal. There is no current electrodiagnostic evidence for a bilateral median mononeuropathy at the wrist or generalized large fiber sensorimotor peripheral polyneuropathy. Although limited, there is no clear evidence for an active bilateral cervical motor radiculopathy. MRI L Shoulder 10/14/21: no rotator cuff tear, subacromial bursitis, glenoid labral cyst, question late phase adhesive capsulitis  XR L shoulder 7/13/2021: Bulbous subacromial process, no fracture, may predispose to impingement  XR L hip 7/12/2021: Minimal arthritis, normal hip joint spaces, no fracture  EMG BLE 7/12/2021:Normal, no lumbosacral motor radiculopathy or peripheral polyneuropathy  MRI LS spine 5/24/2021 no fracture. Degenerative disc disease. Congenital spinal canal stenosis. T12-L4 normal canal foramen. Degenerative disc disease and facet arthropathy.   L4-L5 mild canal stenosis, mild bilateral foraminal narrowing. L5-S1 normal canal foramen. MRI thoracic spine 2/24/2020: Degenerative disc disease and facet arthropathy. Disc bulge T7-T8. No canal or foraminal stenosis. Unremarkable MRI of the thoracic spine. XR LS Spine 11/19/20: no fracture, no instability, disc spaces preserved. UDS 84/26/17: free of illicits, consistent with Norco.   UDS 2/64/79: free of illicits, negative for [place].   UDS 0/29/9584: Free of illicits  UDS 8/29/9238: Free of illicits  UDS 8/50/7677: Free of illicits  Opioid risk tool score 1, low risk. Assessment:      Diagnosis Orders   1. Lumbosacral spondylosis without myelopathy  cyclobenzaprine (FLEXERIL) 5 MG tablet    HYDROcodone-acetaminophen (NORCO) 5-325 MG per tablet      2. Adhesive capsulitis of left shoulder  cyclobenzaprine (FLEXERIL) 5 MG tablet    HYDROcodone-acetaminophen (NORCO) 5-325 MG per tablet      3. Chronic, continuous use of opioids  HYDROcodone-acetaminophen (NORCO) 5-325 MG per tablet      4. Encounter for monitoring opioid maintenance therapy  HYDROcodone-acetaminophen (NORCO) 5-325 MG per tablet        +diabetes mellitus, snores, Depression and Anxiety, Insomnia on Ambien  -Min relief with Flexeril, Gabapentin 600 mg, and Lyrica 100 mg BID  Plan:     Periodic Controlled Substance Monitoring: Possible medication side effects, risk of tolerance/dependence & alternative treatments discussed., No signs of potential drug abuse or diversion identified. , Assessed functional status., Obtaining appropriate analgesic effect of treatment. Katie Marquez MD)    Orders Placed This Encounter   Medications    cyclobenzaprine (FLEXERIL) 5 MG tablet     Sig: Take 1 tablet by mouth nightly as needed for Muscle spasms Stop tizanidine     Dispense:  30 tablet     Refill:  0    HYDROcodone-acetaminophen (NORCO) 5-325 MG per tablet     Sig: Take 1 tablet by mouth daily as needed for Pain for up to 30 days.      Dispense:  30 tablet     Refill:  0     Reduce doses taken as pain becomes manageable       No orders of the defined types were placed in this encounter.      -Continue physical therapy and home exercise program with outside provider  -Enc F/u with Kevin NP as instructed. Follow-up with nephrology, pulmonology and podiatry as recommended. Encouraged updated evaluation for sleep apnea as previously recommended  -Reviewed EMG B LE above, all questions answered  -Continue Lidocaine 4% ointment topical BID prn no ref  -Will hold on Meloxicam and all NSAIDs due to history of elevated serum creatinine. No NSAIDs given elevated serum creatinine. We will hold on steroid packs also due to diabetes and concern for blood sugar  -Continue Cyclobenzaprine 5 mg daily prn #30 no refills start 1/25/2023. Avoid all other muscle relaxers and/or sedating medicines.  -Discussed the risks of Alprazolam benzos and opioids. He is also on muscle relaxers. Will reduce opioid dose at this time with plans to down titrate and discontinue. Consideration for [place] may be given. -Reduce Norco 7.5/325 mg daily down to Norco 5/325 daily as needed #30 no refills valid 1/27-2/26/23. [place] reviewed on 1/25/2023   - Needs further evaluation pulmonology for sleep apnea   - Naloxone prescribed 1/17/2022. MME 5  -Encourage  Bilateral Lumbar L2/3/4/5 MBBs once approved by [facility]   -He will let us know when he needs repeat left shoulder glenohumeral joint inj. Controlled Substance Monitoring:    Acute and Chronic Pain Monitoring:   RX Monitoring 1/25/2023   Periodic Controlled Substance Monitoring Possible medication side effects, risk of tolerance/dependence & alternative treatments discussed. ;No signs of potential drug abuse or diversion identified. ;Assessed functional status. ;Obtaining appropriate analgesic effect of treatment. Chronic Pain > 50 MEDD -          Discussed the risks, side effects, and symptoms that would warrant urgent or emergent physician evaluation of all medications prescribed today. Discussed the risks of the above recommended procedures including but not limited to bleeding, infection, worsened pain, damage to surrounding structures, side effects, toxicity, allergic reactions to medications used, immune and stress-response dysfunction, fat necrosis, decreased bone mineralization, cartilage loss, increased fracture risk, avascular necrosis, skin pigmentation changes, blood sugar elevation, need for surgery, premature damage or degeneration of the joint, as well as catastrophic injury such as vision loss, paralysis, stroke, bowel or bladder incontinence, ventilator dependence, loss of use of the joint and/or extremity, and death. Discussed the risks, benefits, alternative procedures, and alternatives to the procedure including no procedure at all. Discussed that we cannot undo any permanent neurologic or orthopaedic damage or change the course of any underlying disease. After thorough discussion, patient expressed complete understanding and willingness to proceed. Discussed the risks of the above recommended procedures including but not limited to bleeding, infection, worsened pain, damage to surrounding structures, side effects, toxicity, allergic reactions to medications used, immune and stress-response dysfunction, fat necrosis, skin pigmentation changes, blood sugar elevation, headache, vision changes, need for surgery, as well as catastrophic injury such as vision loss, paralysis, stroke, spinal cord and/or plexus infarction or injury, intrathecal injection, spinal cord puncture, arachnoiditis, discitis, bowel or bladder incontinence, ventilator dependence, loss of use of the arms and/or legs, and death. Discussed the risks, benefits, alternative procedures, and alternatives to the procedure including no procedure at all. Discussed that we cannot undo any permanent neurologic damage or change the course of any underlying disease.  After thorough discussion, patient expressed understanding and willingness to proceed. Provided education and counseling regarding the diagnosis, prognosis, and treatment options. All questions were answered. Encouraged him to follow-up with his primary care physician and/or specialists as required for his overall health and management of his comorbidities as well as any new positive symptoms mentioned in review of systems above. Care was provided within the definitions and limitations of our specialty practice. Encouraged lifestyle interventions including healthy habits, lifestyle changes, regular aerobic exercise and appropriate weight maintenance as advised by their primary care physician or cardiovascular health provider. Discussed well care and disease prevention/maintenance. All recommendations for therapy are provided to improve function with activities of daily living, decrease pain, and help develop an exercise program. All recommendations for medications are meant to help decrease pain, improve function with activities of daily living, maintain compliance with home exercise program, and improve quality of life. All recommendations for therapeutic injections are meant to help decrease pain, improve function with activities of daily living, maintain compliance with home exercise program, improve quality of life, and decrease reliance upon oral medications. Encouraged compliance with his home exercise program. Recommended compliance with physical therapy program as outlined above. Informed him to wear bracing as appropriate, and that bracing is not a replacement for core strengthening or muscle stabilization. Discussed the elevated risks of excessive sedation while on pain medications. Advised him against driving or operating heavy machinery or performing any activities where he may harm himself or others while on pain medications. Particular caution was emphasized especially during dose adjustments and medication changes.  Discussed the elevated risks of respiratory depression and death while on opioid medications, especially when combined with other sedative substances. Discussed the risks of temporary disability, permanent disability, morbidity, and mortality with poorly-managed or undiagnosed medical conditions and comorbidities. Emphasized the importance of timely medical evaluation and treatment as previously recommended by us or other medical professionals. Risks of not pursing these recommendations were emphasized. Advised him that any lab testing, imaging, or other diagnostic test results are best discussed in person in the office so that we can provide a clear explanation of their significance and best treatment based upon these results. It is his responsibility to make and keep a follow up appointment to discuss these test results in person to discuss the significance of the findings and appropriate follow-up steps. He expressed complete understanding and agreement with the entire plan as outlined above. Portions of this note may have been typed, auto-populated, dictated or transcribed by voice recognition resulting in errors, omissions, or close substitutions which may be missed despite careful proofreading. Please contact the author for any questions or concerns. Follow up:  Return in about 1 month (around 2/25/2023) for reassessment of pain and symptoms.     Rodríguez Barber MD

## 2023-01-27 DIAGNOSIS — M75.02 ADHESIVE CAPSULITIS OF LEFT SHOULDER: ICD-10-CM

## 2023-01-27 DIAGNOSIS — Z51.81 ENCOUNTER FOR MONITORING OPIOID MAINTENANCE THERAPY: ICD-10-CM

## 2023-01-27 DIAGNOSIS — F11.90 CHRONIC, CONTINUOUS USE OF OPIOIDS: ICD-10-CM

## 2023-01-27 DIAGNOSIS — Z79.891 ENCOUNTER FOR MONITORING OPIOID MAINTENANCE THERAPY: ICD-10-CM

## 2023-01-27 DIAGNOSIS — M47.817 LUMBOSACRAL SPONDYLOSIS WITHOUT MYELOPATHY: ICD-10-CM

## 2023-01-27 RX ORDER — HYDROCODONE BITARTRATE AND ACETAMINOPHEN 5; 325 MG/1; MG/1
1 TABLET ORAL DAILY PRN
Qty: 30 TABLET | Refills: 0 | Status: SHIPPED | OUTPATIENT
Start: 2023-01-27 | End: 2023-02-26

## 2023-01-27 NOTE — TELEPHONE ENCOUNTER
PT states [facility] in [facility] is out of stock of the norco, he is asking if this can be sent to [place] in [facility] instead, or if  he can  a paper prescription he is willing to do that too.

## 2023-02-22 ENCOUNTER — OFFICE VISIT (OUTPATIENT)
Dept: PAIN MANAGEMENT | Age: 48
End: 2023-02-22

## 2023-02-22 VITALS — BODY MASS INDEX: 34.36 KG/M2 | HEIGHT: 70 IN | WEIGHT: 240 LBS | TEMPERATURE: 97.4 F

## 2023-02-22 DIAGNOSIS — M47.817 LUMBOSACRAL SPONDYLOSIS WITHOUT MYELOPATHY: ICD-10-CM

## 2023-02-22 DIAGNOSIS — Z79.891 ENCOUNTER FOR MONITORING OPIOID MAINTENANCE THERAPY: ICD-10-CM

## 2023-02-22 DIAGNOSIS — M75.02 ADHESIVE CAPSULITIS OF LEFT SHOULDER: ICD-10-CM

## 2023-02-22 DIAGNOSIS — F11.90 CHRONIC, CONTINUOUS USE OF OPIOIDS: ICD-10-CM

## 2023-02-22 DIAGNOSIS — Z51.81 ENCOUNTER FOR MONITORING OPIOID MAINTENANCE THERAPY: ICD-10-CM

## 2023-02-22 RX ORDER — CYCLOBENZAPRINE HCL 5 MG
5 TABLET ORAL NIGHTLY PRN
Qty: 30 TABLET | Refills: 0 | Status: SHIPPED | OUTPATIENT
Start: 2023-02-22 | End: 2023-02-23 | Stop reason: SDUPTHER

## 2023-02-22 RX ORDER — HYDROCODONE BITARTRATE AND ACETAMINOPHEN 5; 325 MG/1; MG/1
1 TABLET ORAL DAILY PRN
Qty: 30 TABLET | Refills: 0 | Status: SHIPPED | OUTPATIENT
Start: 2023-02-26 | End: 2023-02-23 | Stop reason: SDUPTHER

## 2023-02-22 ASSESSMENT — ENCOUNTER SYMPTOMS
DIARRHEA: 0
RESPIRATORY NEGATIVE: 1
BACK PAIN: 1
CONSTIPATION: 0

## 2023-02-22 NOTE — PROGRESS NOTES
Patient: Tim Vila  YOB: 1975  Date: 2/22/23        Subjective:     Tim Vila is a 56 y.o. male who complains today of:    Chief Complaint   Patient presents with    Back Pain    Foot Pain     Left         Allergies:  Bupropion, Escitalopram, and Hydroxyzine    Past Medical History:   Diagnosis Date    Depression with anxiety 4/24/2017    Hyperglycemia 4/24/2017    Hyperlipidemia 2/18/2014    Insomnia     Perennial allergic rhinitis with seasonal variation 4/24/2017    Pre-diabetes 4/24/2017    Tobacco use disorder 4/24/2017    Type 2 diabetes mellitus without complication, without long-term current use of insulin ([facility] 8/6/2018     Past Surgical History:   Procedure Laterality Date    NASAL FRACTURE SURGERY  2007     Family History   Problem Relation Age of Onset    High Blood Pressure Mother     High Cholesterol Mother     Cancer Father         Throat cancer - smoker    High Blood Pressure Brother     Heart Attack Maternal Grandmother 72    Diabetes Maternal Grandmother     Cancer Maternal Grandfather         unknown    No Known Problems Paternal Grandmother     No Known Problems Paternal Grandfather      Social History     Socioeconomic History    Marital status:      Spouse name: Alen Erazo    Number of children: 3    Years of education: Not on file    Highest education level: Not on file   Occupational History    Occupation:  - industrial cleaning   Tobacco Use    Smoking status: Every Day     Packs/day: 0.50     Years: 23.00     Pack years: 11.50     Types: Cigarettes     Start date: 1994    Smokeless tobacco: Never   Vaping Use    Vaping Use: Never used   Substance and Sexual Activity    Alcohol use: No    Drug use: No    Sexual activity: Yes     Partners: Female     Comment: monogamous   Other Topics Concern    Not on file   Social History Narrative    Lives with wife and 3 children        1 dog     Social Determinants of Health     Financial Resource Strain: Not on file   Food Insecurity: Not on file   Transportation Needs: Not on file   Physical Activity: Not on file   Stress: Not on file   Social Connections: Not on file   Intimate Partner Violence: Not on file   Housing Stability: Not on file       Current Outpatient Medications on File Prior to Visit   Medication Sig Dispense Refill    naloxone 4 MG/0.1ML LIQD nasal spray 1 spray by Nasal route as needed for Opioid Reversal 1 each 0    Respiratory Therapy Supplies SHELBY New Full face CPAP  Mask. 1 each 0    ALPRAZolam (XANAX) 0.5 MG tablet TAKE ONE TABLET BY MOUTH THREE TIMES A DAY FOR PANIC  ANXIETY  AS NEEDED      azelastine (ASTELIN) 0.1 % nasal spray INSTILL TWO SPRAYS PER NOSTRIL TWICE DAILY AS NEEDED      buPROPion (WELLBUTRIN SR) 100 MG extended release tablet       cloNIDine (CATAPRES) 0.1 MG tablet TAKE ONE TABLET BY MOUTH THREE TIMES A DAY AS NEEDED for anxiety  high blood pressure      metFORMIN (GLUCOPHAGE-XR) 500 MG extended release tablet TAKE ONE TABLET BY MOUTH ONCE DAILY WITH THE EVENING MEAL      olmesartan (BENICAR) 20 MG tablet TAKE ONE TABLET BY MOUTH EVERY DAY      rosuvastatin (CRESTOR) 10 MG tablet Take by mouth      lidocaine (LMX) 4 % cream Apply a half dollar sized amount to intact skin topically up to twice daily as needed for pain 1 Tube 1    olopatadine (PATANOL) 0.1 % ophthalmic solution Place 1 drop into both eyes 2 times daily 5 mL 0    montelukast (SINGULAIR) 10 MG tablet Take 1 tablet by mouth daily 30 tablet 1    ipratropium (ATROVENT) 0.06 % nasal spray 2 sprays by Nasal route 3 times daily 1 Bottle 1    Elastic Bandages & Supports (KNEE BRACE) MISC Wear during the day over right knee as needed for support 1 each 0    loratadine (CLARITIN) 10 MG tablet Take 1 tablet by mouth daily 30 tablet 5    acetaminophen (TYLENOL) 500 MG tablet Take 1 tablet by mouth every 6 hours as needed for Pain. 60 tablet 0     No current facility-administered medications on file prior to visit. 45-year-old male follows for chronic low back pain and left shoulder pain. The increase last month of norco 5mg daily to 7.5mg daily and  tizanidine to flexeril 5mg,  does help for a few hours then pain returns to normal.  His left leg pain wakes him. Also having left arm pain into 4th and 5th fingers. The pain starts left upper back and to shoulder then down to low back into both legs to calves. Has left foot drop. EMG LE and UE normal. Walks with cane. Has PTSD due to his injury. OARRS shows xanax 1mg #90 pills every month but he states that he only takes one at bedtime. He has appt tomorrow with prescriber, psychiatrist.     Consult with [facility] interventional radiology and vascular testing to be done. Still waiting to [address] [place] [address] response. With the meds he is able to participate in ADLs and chores. He uses a walking stick. He cannot lift his left arm more then 45 degrees. Difficulty since he uses his walking stick with his right hand cannot use his left. He is seeing Dr. Daren Meredith for physical therapy of his shoulder. Painful after the visits. Wants to get back to work. Also has neck pain. Legs with constant burning. Patient tells me his back issue is from a work-related injury about a year and a half ago. He works as a  and it was snowy and he was lifting something and he felt a pop in his back. He has had severe pain since then that goes down his left leg. He also tells me that due to the left leg giving out on him he fell and injured his left shoulder. He is not able to lift his left arm. He describes his pain and left shoulder across chest and low back pain down left leg to toes. Underwent lumbar facet injections with no pain relief and left GH joint injection with greater than 50% pain relief for 2 days. Colten Sanders He takes tizanidine 2 mg at night as needed with some relief. Back Pain  Pertinent negatives include no headaches, numbness or weakness.    Foot Pain   Pertinent negatives include no numbness. Review of Systems   Constitutional: Negative. Negative for activity change and unexpected weight change. HENT: Negative. Negative for hearing loss. Respiratory: Negative. Cardiovascular:  Negative for leg swelling. Gastrointestinal:  Negative for constipation and diarrhea. Genitourinary: Negative. Musculoskeletal:  Positive for arthralgias and back pain. Negative for gait problem, joint swelling and neck pain. Skin:  Negative for rash. Neurological:  Negative for dizziness, weakness, numbness and headaches. Psychiatric/Behavioral: Negative. Negative for sleep disturbance. Objective:     Vitals:  Temp 97.4 °F (36.3 °C)   Ht 5' 10\" (1.778 m)   Wt 240 lb (108.9 kg)   BMI 34.44 kg/m² Pain Score:   7    Physical Exam  Vitals reviewed. Constitutional:       Appearance: He is well-developed. HENT:      Head: Normocephalic. Nose: Nose normal.   Pulmonary:      Effort: Pulmonary effort is normal.   Musculoskeletal:      Cervical back: Normal range of motion and neck supple. Lumbar back: Tenderness present. Decreased range of motion. Negative right straight leg raise test and negative left straight leg raise test.   Lymphadenopathy:      Cervical: No cervical adenopathy. Skin:     General: Skin is warm and dry. Findings: No erythema or rash. Neurological:      Mental Status: He is alert and oriented to person, place, and time. Cranial Nerves: No cranial nerve deficit. Deep Tendon Reflexes: Reflexes are normal and symmetric. Reflexes normal.   Psychiatric:         Mood and Affect: Mood normal.         Behavior: Behavior normal.         Thought Content: Thought content normal.         Judgment: Judgment normal.          Assessment:        Diagnosis Orders   1.  Lumbosacral spondylosis without myelopathy  HYDROcodone-acetaminophen (NORCO) 5-325 MG per tablet    Amb External Referral To Pain Medicine    cyclobenzaprine (FLEXERIL) 5 MG tablet      2. Adhesive capsulitis of left shoulder  HYDROcodone-acetaminophen (NORCO) 5-325 MG per tablet    Amb External Referral To Pain Medicine    cyclobenzaprine (FLEXERIL) 5 MG tablet      3. Chronic, continuous use of opioids  HYDROcodone-acetaminophen (NORCO) 5-325 MG per tablet      4. Encounter for monitoring opioid maintenance therapy  HYDROcodone-acetaminophen (Sandra Lennox) 5-325 MG per tablet          Plan:     Periodic Controlled Substance Monitoring: Possible medication side effects, risk of tolerance/dependence & alternative treatments discussed., No signs of potential drug abuse or diversion identified. , Assessed functional status. Melisa Lama, APRN - CNP)    Orders Placed This Encounter   Medications    HYDROcodone-acetaminophen (NORCO) 5-325 MG per tablet     Sig: Take 1 tablet by mouth daily as needed for Pain for up to 30 days. Dispense:  30 tablet     Refill:  0     Reduce doses taken as pain becomes manageable    cyclobenzaprine (FLEXERIL) 5 MG tablet     Sig: Take 1 tablet by mouth nightly as needed for Muscle spasms Stop tizanidine     Dispense:  30 tablet     Refill:  0       Orders Placed This Encounter   Procedures    Amb External Referral To Pain Medicine     Referral Priority:   Routine     Referral Type:   Eval and Treat     Referred to Provider:   Jeimy Torres MD     Requested Specialty:   Pain Management     Number of Visits Requested:   1     -Long discussion about opioids combined with Xanax and Flexeril. He has an appointment with psychiatrist tomorrow. Currently he is getting 1 mg 3 times daily however states he only takes 1. He tells me he would rather be on the Xanax then on the opioids.   I told him that we would discontinue the opioids next month if that is the decision after meeting with psychiatry.  -He also inquired about referral to medical marijuana which I gave him to [address] 5 mg daily as needed pain, #30  -Still waiting on approval for lumbar MBB. Discussed options with the patient today. Anatomic model pathology was shown and reviewed with pt. All questions were answered. Relevant imaging reviewed, NDP reviewed and pain generators reviewed. Pt verbalized understanding and agrees with above plan. Will continue medications as they do help pt function with ADL and improve quality of life. Pt understands potential side effects from opioids such as constipation, dry mouth, dizziness, opioid use disorder, and overdose. Pt has failed non opioid therapy and decision was made to prescribe opioids to help with daily function and improve quality of life. Discussed the principles of opioid tolerance as well as the concerns regarding opioid diversion, misuse, and abuse. Discussed the risks of respiratory depression and death while on pain medications, especially when combined with other sedative substances. Discussed the elevated risks of excessive sedation while on pain medications. Advised him against driving or operating heavy machinery or performing any activities where he may harm himself or others while on pain medications. Particular caution was emphasized especially during dose adjustments and medication changes. OARRS was reviewed. UTOX from 10/2022 appropriate; ORT score = 1  Daily MME 5  Narcan prescribed 3/2022 and understands the proper use in the event of an overdose. This NP saw pt under direct supervision of Dr Kemar Snider. Follow up:  Return in about 4 weeks (around 3/22/2023) for review meds and reassess pain.     CHELI Mccarthy - CNP

## 2023-02-23 DIAGNOSIS — F11.90 CHRONIC, CONTINUOUS USE OF OPIOIDS: ICD-10-CM

## 2023-02-23 DIAGNOSIS — M75.02 ADHESIVE CAPSULITIS OF LEFT SHOULDER: ICD-10-CM

## 2023-02-23 DIAGNOSIS — Z51.81 ENCOUNTER FOR MONITORING OPIOID MAINTENANCE THERAPY: ICD-10-CM

## 2023-02-23 DIAGNOSIS — M47.817 LUMBOSACRAL SPONDYLOSIS WITHOUT MYELOPATHY: ICD-10-CM

## 2023-02-23 DIAGNOSIS — Z79.891 ENCOUNTER FOR MONITORING OPIOID MAINTENANCE THERAPY: ICD-10-CM

## 2023-02-23 RX ORDER — CYCLOBENZAPRINE HCL 5 MG
5 TABLET ORAL NIGHTLY PRN
Qty: 30 TABLET | Refills: 0 | Status: SHIPPED | OUTPATIENT
Start: 2023-02-23 | End: 2023-03-05

## 2023-02-23 RX ORDER — HYDROCODONE BITARTRATE AND ACETAMINOPHEN 5; 325 MG/1; MG/1
1 TABLET ORAL DAILY PRN
Qty: 30 TABLET | Refills: 0 | Status: SHIPPED | OUTPATIENT
Start: 2023-02-26 | End: 2023-03-28

## 2023-05-11 PROBLEM — H11.153 PINGUECULA OF BOTH EYES: Status: ACTIVE | Noted: 2023-05-11

## 2023-05-11 PROBLEM — J30.9 ALLERGIC RHINITIS: Status: ACTIVE | Noted: 2023-05-11

## 2023-05-11 PROBLEM — N52.9 ERECTILE DYSFUNCTION: Status: ACTIVE | Noted: 2023-05-11

## 2023-05-11 PROBLEM — R00.0 TACHYCARDIA: Status: ACTIVE | Noted: 2023-05-11

## 2023-05-11 PROBLEM — R51.9 HEADACHE: Status: ACTIVE | Noted: 2023-05-11

## 2023-05-11 PROBLEM — E78.2 HYPERLIPEMIA, MIXED: Status: ACTIVE | Noted: 2023-05-11

## 2023-05-11 PROBLEM — I10 HTN (HYPERTENSION): Status: ACTIVE | Noted: 2023-05-11

## 2023-05-11 PROBLEM — T14.8XXA PULLED MUSCLE: Status: ACTIVE | Noted: 2023-05-11

## 2023-05-11 PROBLEM — E11.9 DM2 (DIABETES MELLITUS, TYPE 2) (MULTI): Status: ACTIVE | Noted: 2023-05-11

## 2023-05-11 PROBLEM — H53.8 BLURRY VISION: Status: ACTIVE | Noted: 2023-05-11

## 2023-05-11 PROBLEM — F41.9 ANXIETY: Status: ACTIVE | Noted: 2023-05-11

## 2023-05-11 PROBLEM — H53.9 VISION CHANGES: Status: ACTIVE | Noted: 2023-05-11

## 2023-05-11 PROBLEM — G47.00 INSOMNIA: Status: ACTIVE | Noted: 2023-05-11

## 2023-05-11 PROBLEM — L30.9 DERMATITIS: Status: ACTIVE | Noted: 2023-05-11

## 2023-05-11 PROBLEM — R07.89 ATYPICAL CHEST PAIN: Status: ACTIVE | Noted: 2023-05-11

## 2023-05-11 PROBLEM — F17.200 CURRENT EVERY DAY SMOKER: Status: ACTIVE | Noted: 2023-05-11

## 2023-05-11 PROBLEM — G43.909 MIGRAINES: Status: ACTIVE | Noted: 2023-05-11

## 2023-05-11 PROBLEM — M54.9 BACK PAIN: Status: ACTIVE | Noted: 2023-05-11

## 2023-05-11 RX ORDER — ASPIRIN 81 MG/1
81 TABLET ORAL DAILY
COMMUNITY
Start: 2022-08-30

## 2023-05-11 RX ORDER — TADALAFIL 20 MG/1
20 TABLET ORAL DAILY PRN
COMMUNITY
Start: 2021-09-15 | End: 2023-05-18 | Stop reason: SDUPTHER

## 2023-05-11 RX ORDER — ALPRAZOLAM 1 MG/1
1 TABLET ORAL NIGHTLY PRN
COMMUNITY
Start: 2022-03-17

## 2023-05-11 RX ORDER — VARENICLINE TARTRATE 1 MG/1
1 TABLET, FILM COATED ORAL 2 TIMES DAILY
COMMUNITY
Start: 2022-06-16 | End: 2023-05-18 | Stop reason: WASHOUT

## 2023-05-11 RX ORDER — ROSUVASTATIN CALCIUM 10 MG/1
10 TABLET, COATED ORAL DAILY
COMMUNITY
End: 2023-05-18 | Stop reason: SDUPTHER

## 2023-05-11 RX ORDER — OLMESARTAN MEDOXOMIL 40 MG/1
40 TABLET ORAL DAILY
COMMUNITY
End: 2023-05-25 | Stop reason: SDUPTHER

## 2023-05-11 RX ORDER — METFORMIN HYDROCHLORIDE 500 MG/1
500 TABLET, EXTENDED RELEASE ORAL
COMMUNITY
Start: 2019-01-28 | End: 2023-05-18 | Stop reason: SDUPTHER

## 2023-05-11 RX ORDER — HYDROCODONE BITARTRATE AND ACETAMINOPHEN 5; 325 MG/1; MG/1
1 TABLET ORAL DAILY
COMMUNITY
Start: 2022-03-17 | End: 2023-05-18 | Stop reason: WASHOUT

## 2023-05-17 LAB
ALANINE AMINOTRANSFERASE (SGPT) (U/L) IN SER/PLAS: 15 U/L (ref 10–52)
ALBUMIN (G/DL) IN SER/PLAS: 4.1 G/DL (ref 3.4–5)
ALBUMIN (MG/L) IN URINE: <7 MG/L
ALBUMIN/CREATININE (UG/MG) IN URINE: NORMAL UG/MG CRT (ref 0–30)
ALKALINE PHOSPHATASE (U/L) IN SER/PLAS: 69 U/L (ref 33–120)
ANION GAP IN SER/PLAS: 12 MMOL/L (ref 10–20)
ASPARTATE AMINOTRANSFERASE (SGOT) (U/L) IN SER/PLAS: 18 U/L (ref 9–39)
BILIRUBIN TOTAL (MG/DL) IN SER/PLAS: 0.6 MG/DL (ref 0–1.2)
CALCIUM (MG/DL) IN SER/PLAS: 9.2 MG/DL (ref 8.6–10.3)
CARBON DIOXIDE, TOTAL (MMOL/L) IN SER/PLAS: 26 MMOL/L (ref 21–32)
CHLORIDE (MMOL/L) IN SER/PLAS: 108 MMOL/L (ref 98–107)
CHOLESTEROL (MG/DL) IN SER/PLAS: 134 MG/DL (ref 0–199)
CHOLESTEROL IN HDL (MG/DL) IN SER/PLAS: 34.2 MG/DL
CHOLESTEROL/HDL RATIO: 3.9
CREATININE (MG/DL) IN SER/PLAS: 1.34 MG/DL (ref 0.5–1.3)
CREATININE (MG/DL) IN URINE: 280 MG/DL (ref 20–370)
ESTIMATED AVERAGE GLUCOSE FOR HBA1C: 140 MG/DL
GFR MALE: 66 ML/MIN/1.73M2
GLUCOSE (MG/DL) IN SER/PLAS: 110 MG/DL (ref 74–99)
HEMOGLOBIN A1C/HEMOGLOBIN TOTAL IN BLOOD: 6.5 %
LDL: 81 MG/DL (ref 0–99)
POTASSIUM (MMOL/L) IN SER/PLAS: 4.2 MMOL/L (ref 3.5–5.3)
PROTEIN TOTAL: 6.6 G/DL (ref 6.4–8.2)
SODIUM (MMOL/L) IN SER/PLAS: 142 MMOL/L (ref 136–145)
THYROTROPIN (MIU/L) IN SER/PLAS BY DETECTION LIMIT <= 0.05 MIU/L: 2.64 MIU/L (ref 0.44–3.98)
TRIGLYCERIDE (MG/DL) IN SER/PLAS: 92 MG/DL (ref 0–149)
UREA NITROGEN (MG/DL) IN SER/PLAS: 20 MG/DL (ref 6–23)
VLDL: 18 MG/DL (ref 0–40)

## 2023-05-18 ENCOUNTER — OFFICE VISIT (OUTPATIENT)
Dept: PRIMARY CARE | Facility: CLINIC | Age: 48
End: 2023-05-18
Payer: COMMERCIAL

## 2023-05-18 VITALS
HEART RATE: 62 BPM | BODY MASS INDEX: 35.18 KG/M2 | WEIGHT: 245.7 LBS | DIASTOLIC BLOOD PRESSURE: 80 MMHG | SYSTOLIC BLOOD PRESSURE: 122 MMHG | TEMPERATURE: 98 F | HEIGHT: 70 IN | OXYGEN SATURATION: 98 %

## 2023-05-18 DIAGNOSIS — E11.9 TYPE 2 DIABETES MELLITUS WITHOUT COMPLICATION, WITHOUT LONG-TERM CURRENT USE OF INSULIN (MULTI): ICD-10-CM

## 2023-05-18 DIAGNOSIS — F41.9 ANXIETY: ICD-10-CM

## 2023-05-18 DIAGNOSIS — F17.200 SMOKER: ICD-10-CM

## 2023-05-18 DIAGNOSIS — N52.9 ERECTILE DYSFUNCTION, UNSPECIFIED ERECTILE DYSFUNCTION TYPE: ICD-10-CM

## 2023-05-18 DIAGNOSIS — I10 PRIMARY HYPERTENSION: Primary | ICD-10-CM

## 2023-05-18 DIAGNOSIS — E66.01 CLASS 2 SEVERE OBESITY WITH SERIOUS COMORBIDITY AND BODY MASS INDEX (BMI) OF 35.0 TO 35.9 IN ADULT, UNSPECIFIED OBESITY TYPE (MULTI): ICD-10-CM

## 2023-05-18 DIAGNOSIS — M54.9 BACK PAIN, UNSPECIFIED BACK LOCATION, UNSPECIFIED BACK PAIN LATERALITY, UNSPECIFIED CHRONICITY: ICD-10-CM

## 2023-05-18 DIAGNOSIS — E78.2 HYPERLIPEMIA, MIXED: ICD-10-CM

## 2023-05-18 PROBLEM — R53.81 PHYSICAL DECONDITIONING: Status: ACTIVE | Noted: 2020-07-22

## 2023-05-18 PROBLEM — M51.36 BULGING OF LUMBAR INTERVERTEBRAL DISC: Status: ACTIVE | Noted: 2020-07-22

## 2023-05-18 PROBLEM — G57.92 NEURITIS OF LEFT FOOT: Status: ACTIVE | Noted: 2021-07-28

## 2023-05-18 PROBLEM — M48.07 FORAMINAL STENOSIS OF LUMBOSACRAL REGION: Status: ACTIVE | Noted: 2020-07-22

## 2023-05-18 PROBLEM — M51.369 BULGING OF LUMBAR INTERVERTEBRAL DISC: Status: ACTIVE | Noted: 2020-07-22

## 2023-05-18 PROBLEM — M54.16 LUMBAR RADICULOPATHY: Status: ACTIVE | Noted: 2020-07-22

## 2023-05-18 PROBLEM — J30.2 PERENNIAL ALLERGIC RHINITIS WITH SEASONAL VARIATION: Chronic | Status: ACTIVE | Noted: 2017-04-24

## 2023-05-18 PROBLEM — M21.372 FOOT DROP, LEFT: Status: ACTIVE | Noted: 2021-07-28

## 2023-05-18 PROBLEM — F41.8 DEPRESSION WITH ANXIETY: Chronic | Status: ACTIVE | Noted: 2017-04-24

## 2023-05-18 PROBLEM — J30.89 PERENNIAL ALLERGIC RHINITIS WITH SEASONAL VARIATION: Chronic | Status: ACTIVE | Noted: 2017-04-24

## 2023-05-18 PROBLEM — M43.06 LUMBAR SPONDYLOLYSIS: Status: ACTIVE | Noted: 2020-10-28

## 2023-05-18 PROCEDURE — 3079F DIAST BP 80-89 MM HG: CPT | Performed by: FAMILY MEDICINE

## 2023-05-18 PROCEDURE — 3008F BODY MASS INDEX DOCD: CPT | Performed by: FAMILY MEDICINE

## 2023-05-18 PROCEDURE — 99214 OFFICE O/P EST MOD 30 MIN: CPT | Performed by: FAMILY MEDICINE

## 2023-05-18 PROCEDURE — 3074F SYST BP LT 130 MM HG: CPT | Performed by: FAMILY MEDICINE

## 2023-05-18 PROCEDURE — 4010F ACE/ARB THERAPY RXD/TAKEN: CPT | Performed by: FAMILY MEDICINE

## 2023-05-18 PROCEDURE — 3044F HG A1C LEVEL LT 7.0%: CPT | Performed by: FAMILY MEDICINE

## 2023-05-18 RX ORDER — AMLODIPINE BESYLATE 5 MG/1
5 TABLET ORAL DAILY
Qty: 90 TABLET | Refills: 0 | Status: SHIPPED | OUTPATIENT
Start: 2023-05-18 | End: 2023-08-19 | Stop reason: SDUPTHER

## 2023-05-18 RX ORDER — METHYLPREDNISOLONE 4 MG/1
4 TABLET ORAL ONCE
COMMUNITY
Start: 2022-08-15 | End: 2023-05-18 | Stop reason: WASHOUT

## 2023-05-18 RX ORDER — MELOXICAM 7.5 MG/1
7.5 TABLET ORAL DAILY
COMMUNITY
Start: 2022-07-22 | End: 2023-05-18 | Stop reason: WASHOUT

## 2023-05-18 RX ORDER — BUPROPION HYDROCHLORIDE 100 MG/1
100 TABLET, EXTENDED RELEASE ORAL
COMMUNITY
Start: 2021-11-04 | End: 2023-05-18 | Stop reason: WASHOUT

## 2023-05-18 RX ORDER — AMLODIPINE BESYLATE 5 MG/1
5 TABLET ORAL DAILY
COMMUNITY
Start: 2023-02-16 | End: 2023-05-18 | Stop reason: SDUPTHER

## 2023-05-18 RX ORDER — VARENICLINE TARTRATE 1 MG/1
1 TABLET, FILM COATED ORAL 2 TIMES DAILY
Qty: 60 TABLET | Refills: 2 | Status: SHIPPED | OUTPATIENT
Start: 2023-05-18 | End: 2023-08-19 | Stop reason: SDUPTHER

## 2023-05-18 RX ORDER — TADALAFIL 20 MG/1
20 TABLET ORAL DAILY PRN
Qty: 10 TABLET | Refills: 0 | Status: SHIPPED | OUTPATIENT
Start: 2023-05-18 | End: 2023-08-19 | Stop reason: SDUPTHER

## 2023-05-18 RX ORDER — METFORMIN HYDROCHLORIDE 500 MG/1
500 TABLET, EXTENDED RELEASE ORAL
Qty: 90 TABLET | Refills: 0 | Status: SHIPPED | OUTPATIENT
Start: 2023-05-18 | End: 2023-08-19 | Stop reason: SDUPTHER

## 2023-05-18 RX ORDER — PRAZOSIN HYDROCHLORIDE 1 MG/1
1 CAPSULE ORAL NIGHTLY
COMMUNITY
End: 2023-05-18 | Stop reason: WASHOUT

## 2023-05-18 RX ORDER — ASPIRIN 81 MG/1
81 TABLET ORAL DAILY
Qty: 90 TABLET | Refills: 0 | Status: CANCELLED | OUTPATIENT
Start: 2023-05-18

## 2023-05-18 RX ORDER — OLOPATADINE HYDROCHLORIDE 1 MG/ML
1 SOLUTION/ DROPS OPHTHALMIC 2 TIMES DAILY
COMMUNITY
Start: 2019-03-18 | End: 2023-12-08 | Stop reason: WASHOUT

## 2023-05-18 RX ORDER — CYCLOBENZAPRINE HCL 5 MG
5 TABLET ORAL
COMMUNITY
Start: 2023-02-22 | End: 2023-05-18 | Stop reason: WASHOUT

## 2023-05-18 RX ORDER — CLONIDINE HYDROCHLORIDE 0.1 MG/1
0.1 TABLET ORAL 2 TIMES DAILY
COMMUNITY
Start: 2021-10-05 | End: 2023-05-18 | Stop reason: WASHOUT

## 2023-05-18 RX ORDER — NALOXONE HYDROCHLORIDE 4 MG/.1ML
1 SPRAY NASAL
COMMUNITY
Start: 2022-01-17

## 2023-05-18 RX ORDER — ROSUVASTATIN CALCIUM 10 MG/1
10 TABLET, COATED ORAL DAILY
Qty: 90 TABLET | Refills: 0 | Status: SHIPPED | OUTPATIENT
Start: 2023-05-18 | End: 2023-08-19 | Stop reason: SDUPTHER

## 2023-05-18 RX ORDER — VARENICLINE TARTRATE 0.5 MG/1
0.5 TABLET, FILM COATED ORAL 2 TIMES DAILY
Qty: 14 TABLET | Refills: 0 | Status: SHIPPED | OUTPATIENT
Start: 2023-05-18 | End: 2023-08-19 | Stop reason: WASHOUT

## 2023-05-18 RX ORDER — METHOCARBAMOL 500 MG/1
TABLET, FILM COATED ORAL
COMMUNITY
Start: 2022-09-27 | End: 2023-05-18 | Stop reason: WASHOUT

## 2023-05-18 RX ORDER — NAPROXEN 500 MG/1
500 TABLET ORAL 2 TIMES DAILY PRN
COMMUNITY
Start: 2022-09-27 | End: 2023-05-18 | Stop reason: WASHOUT

## 2023-05-18 RX ORDER — FLUTICASONE PROPIONATE 50 MCG
1 SPRAY, SUSPENSION (ML) NASAL
COMMUNITY
Start: 2022-09-13 | End: 2023-05-18 | Stop reason: WASHOUT

## 2023-05-18 RX ORDER — MIRTAZAPINE 15 MG/1
15 TABLET, FILM COATED ORAL NIGHTLY
COMMUNITY
Start: 2022-07-01 | End: 2023-05-18 | Stop reason: WASHOUT

## 2023-05-18 RX ORDER — LIDOCAINE 40 MG/G
CREAM TOPICAL
COMMUNITY
Start: 2021-06-17 | End: 2023-05-18 | Stop reason: WASHOUT

## 2023-05-18 RX ORDER — AZELASTINE 1 MG/ML
2 SPRAY, METERED NASAL 2 TIMES DAILY
COMMUNITY
Start: 2021-11-04 | End: 2023-05-18 | Stop reason: WASHOUT

## 2023-05-18 ASSESSMENT — PATIENT HEALTH QUESTIONNAIRE - PHQ9
2. FEELING DOWN, DEPRESSED OR HOPELESS: NOT AT ALL
SUM OF ALL RESPONSES TO PHQ9 QUESTIONS 1 AND 2: 0
1. LITTLE INTEREST OR PLEASURE IN DOING THINGS: NOT AT ALL

## 2023-05-18 NOTE — PATIENT INSTRUCTIONS
Take your olmesartan and amlodipine at different times to help determine the cause of your dizziness.    Start taking Chantix to help with your smoking cessation. Start with the lower dose first.    Continue the current medications. Follow up in 3 months with labs PRIOR.    It was a pleasure to see you today. Thank you for choosing us for your health care needs.    If you have lab or other testing completed and have not been informed of results within one week, please call the office for your results.    If you haven't done so, consider signing up for [employer], the [facility] personal health record. Ask the staff how you can get started.

## 2023-05-18 NOTE — PROGRESS NOTES
Subjective   Patient ID: Yariel Shaw is a 48 y.o. male who presents for Follow-up.    Patient has hypertension  He does not monitor his BP at home.  His BP remains stable on current medication.  5/18/2023: He states he experiences dizziness right after taking his medication.  Sxs improve when sitting down.     Patient has hyperlipidemia.  Patient tries to limit the amount of fatty foods and high cholesterol foods that are consumed.  He is compliant with his rosuvastatin and denies any noted side effects.     Patient has Type 2 DM.  Pt does not monitor his glucose at home.  He denies any polyuria, polydipsia, polyphagia.  Patient states that he has been compliant with the current diabetes medication.     He has ED.  Tadalafil has continued to work well for him.      Pt unfortunately continues to smoke.   Pt was prescribed varenicline previously and it was helpful but he has continued to smoke.  Pt would like to continue his efforts at smoking cessation.    Pt has anxiety.   He does follow with psych, Dr. Patino who manages his xanax.   Denies any noted side effects.     He has insomnia.  Ambien was discontinued in the past since he is on xanax and Norco from other providers (risks of combination discussed).     Pt has chronic lower back pain.  He does follow with pain management.   Pt was planning to have surgery for his back, he is unsure if he wants to.   He is currently treated with Norco and Tizanidine.          Review of Systems  Constitutional: Patient denies any fever, chills, loss of appetite, or unexplained weight loss. Pt c/o dizziness after taking BP medication as noted above.    Cardiovascular: Patient denies any chest pain, shortness of breath with exertion, tachycardia, palpitations, orthopnea, or paroxysmal nocturnal dyspnea.  Respiratory: Patient denies any cough, shortness of breath, or wheezing.  Gastrointestinal: Patient denies any nausea, vomiting, diarrhea, constipation, melena,  "hematochezia, or reflux symptoms.  Skin: Denies any rashes or skin lesions.     SEE HISTORY OF PRESENT ILLNESS ALSO   ROS IS OTHERWISE NEGATIVE    Objective   /80   Pulse 62   Temp 36.7 °C (98 °F)   Ht 1.778 m (5' 10\")   Wt 111 kg (245 lb 11.2 oz)   SpO2 98%   BMI 35.25 kg/m²     Physical Exam  General Appearance: Alert and cooperative, in no acute distress, well-developed/well-nourished obese male.     Neck: Supple and without adenopathy or rigidity. There is no JVD at 90° and no carotid bruits are noted. There is no thyromegaly, thyroid tenderness, or palpable thyroid nodules.  Heart: Regular rate and rhythm without murmur or ectopy.  Respiratory: Lungs are clear to auscultation bilaterally with good air exchange. Good respiratory effort no accessory muscle use.  Skin: Good turgor, moist, warm and without rashes or lesions.  Neurological exam: Alert and oriented x3, no tremor, normal gait.  Psychiatric: Appropriate mood and affect, good insight and judgment, no delusions or thought disorders, no suicidal or homicidal ideation.  Extremities: No clubbing, cyanosis, or edema    Assessment/Plan     HTN: Blood pressure appears adequately controlled and we will continue with the current antihypertensive therapy.    Hyperlipidemia: Stable on last labs.  Patient will continue with current statin therapy.  Dietary changes, exercise, and maintenance of healthy weight were discussed at length..    DM Type 2:   Most recent hemoglobin A1c was 6.5% on 5/17/23 labs (down from 6.7% on 2/14/23 labs).  We will continue with the same treatment plan.   Dietary changes, exercise, and maintenance of a healthy weight were discussed at length.   He was encouraged to monitor his blood sugars at home once a day.     Current Smoker: Patient understands that continued smoking will increase the risks of lung disease, vascular disease, and multiple malignancies. Pt. has been encouraged to work on smoking cessation and available " smoking cessation aids were discussed.  5/18/2023: Chantix restarted.    Erectile dysfunction: Stable based on symptoms  He will continue the tadalafil as needed.    Obesity: Dietary changes, exercise, and maintenance of a healthy weight were discussed at length.     Anxiety: Stable based on symptoms.  Pt is treated with Xanax from his psychiatrist at this time.    Back pain: Stable but persistent.  He will continue to follow with pain mgmt as directed.            By signing my name below, I, Hasmukh Vegaibe, attest that this documentation has been prepared under the direction and in the presence of Dr. Monsalve.  All medical record entries made by the Scribe were at my direction and personally dictated by me. I have reviewed the chart and agree that the record accurately reflects my personal performance of the history, physical exam, discussion and plan. (Dr. Monsalve).

## 2023-05-25 DIAGNOSIS — I10 PRIMARY HYPERTENSION: Primary | ICD-10-CM

## 2023-05-27 RX ORDER — OLMESARTAN MEDOXOMIL 40 MG/1
40 TABLET ORAL DAILY
Qty: 90 TABLET | Refills: 0 | Status: SHIPPED | OUTPATIENT
Start: 2023-05-27 | End: 2023-08-19 | Stop reason: SDUPTHER

## 2023-06-14 PROBLEM — E66.812 CLASS 2 SEVERE OBESITY WITH SERIOUS COMORBIDITY AND BODY MASS INDEX (BMI) OF 35.0 TO 35.9 IN ADULT: Status: ACTIVE | Noted: 2023-06-14

## 2023-06-14 PROBLEM — E66.01 CLASS 2 SEVERE OBESITY WITH SERIOUS COMORBIDITY AND BODY MASS INDEX (BMI) OF 35.0 TO 35.9 IN ADULT (MULTI): Status: ACTIVE | Noted: 2023-06-14

## 2023-08-17 ENCOUNTER — LAB (OUTPATIENT)
Dept: LAB | Facility: LAB | Age: 48
End: 2023-08-17
Payer: COMMERCIAL

## 2023-08-17 DIAGNOSIS — I10 PRIMARY HYPERTENSION: ICD-10-CM

## 2023-08-17 DIAGNOSIS — E78.2 HYPERLIPEMIA, MIXED: ICD-10-CM

## 2023-08-17 DIAGNOSIS — E11.9 TYPE 2 DIABETES MELLITUS WITHOUT COMPLICATION, WITHOUT LONG-TERM CURRENT USE OF INSULIN (MULTI): ICD-10-CM

## 2023-08-17 LAB
ANION GAP IN SER/PLAS: 11 MMOL/L (ref 10–20)
CALCIUM (MG/DL) IN SER/PLAS: 9.3 MG/DL (ref 8.6–10.3)
CARBON DIOXIDE, TOTAL (MMOL/L) IN SER/PLAS: 26 MMOL/L (ref 21–32)
CHLORIDE (MMOL/L) IN SER/PLAS: 107 MMOL/L (ref 98–107)
CREATININE (MG/DL) IN SER/PLAS: 1.27 MG/DL (ref 0.5–1.3)
ESTIMATED AVERAGE GLUCOSE FOR HBA1C: 140 MG/DL
GFR MALE: 70 ML/MIN/1.73M2
GLUCOSE (MG/DL) IN SER/PLAS: 111 MG/DL (ref 74–99)
HEMOGLOBIN A1C/HEMOGLOBIN TOTAL IN BLOOD: 6.5 %
POTASSIUM (MMOL/L) IN SER/PLAS: 4.2 MMOL/L (ref 3.5–5.3)
SODIUM (MMOL/L) IN SER/PLAS: 140 MMOL/L (ref 136–145)
UREA NITROGEN (MG/DL) IN SER/PLAS: 16 MG/DL (ref 6–23)

## 2023-08-17 PROCEDURE — 36415 COLL VENOUS BLD VENIPUNCTURE: CPT

## 2023-08-17 PROCEDURE — 83036 HEMOGLOBIN GLYCOSYLATED A1C: CPT

## 2023-08-17 PROCEDURE — 80048 BASIC METABOLIC PNL TOTAL CA: CPT

## 2023-08-19 ENCOUNTER — OFFICE VISIT (OUTPATIENT)
Dept: PRIMARY CARE | Facility: CLINIC | Age: 48
End: 2023-08-19
Payer: COMMERCIAL

## 2023-08-19 VITALS
RESPIRATION RATE: 14 BRPM | BODY MASS INDEX: 34.72 KG/M2 | TEMPERATURE: 98 F | WEIGHT: 242 LBS | HEART RATE: 62 BPM | SYSTOLIC BLOOD PRESSURE: 128 MMHG | DIASTOLIC BLOOD PRESSURE: 82 MMHG | OXYGEN SATURATION: 98 %

## 2023-08-19 DIAGNOSIS — F17.200 SMOKER: ICD-10-CM

## 2023-08-19 DIAGNOSIS — Z12.11 ENCOUNTER FOR SCREENING FOR MALIGNANT NEOPLASM OF COLON: ICD-10-CM

## 2023-08-19 DIAGNOSIS — N52.9 ERECTILE DYSFUNCTION, UNSPECIFIED ERECTILE DYSFUNCTION TYPE: ICD-10-CM

## 2023-08-19 DIAGNOSIS — E11.9 TYPE 2 DIABETES MELLITUS WITHOUT COMPLICATION, WITHOUT LONG-TERM CURRENT USE OF INSULIN (MULTI): ICD-10-CM

## 2023-08-19 DIAGNOSIS — F41.9 ANXIETY: ICD-10-CM

## 2023-08-19 DIAGNOSIS — I10 PRIMARY HYPERTENSION: Primary | ICD-10-CM

## 2023-08-19 DIAGNOSIS — M54.9 BACK PAIN, UNSPECIFIED BACK LOCATION, UNSPECIFIED BACK PAIN LATERALITY, UNSPECIFIED CHRONICITY: ICD-10-CM

## 2023-08-19 DIAGNOSIS — Z12.5 PROSTATE CANCER SCREENING: ICD-10-CM

## 2023-08-19 DIAGNOSIS — E78.2 HYPERLIPEMIA, MIXED: ICD-10-CM

## 2023-08-19 DIAGNOSIS — E66.9 CLASS 1 OBESITY WITH SERIOUS COMORBIDITY AND BODY MASS INDEX (BMI) OF 34.0 TO 34.9 IN ADULT, UNSPECIFIED OBESITY TYPE: ICD-10-CM

## 2023-08-19 PROBLEM — E66.811 CLASS 1 OBESITY WITH SERIOUS COMORBIDITY AND BODY MASS INDEX (BMI) OF 34.0 TO 34.9 IN ADULT: Status: ACTIVE | Noted: 2023-06-14

## 2023-08-19 PROCEDURE — 3074F SYST BP LT 130 MM HG: CPT | Performed by: FAMILY MEDICINE

## 2023-08-19 PROCEDURE — 4010F ACE/ARB THERAPY RXD/TAKEN: CPT | Performed by: FAMILY MEDICINE

## 2023-08-19 PROCEDURE — 99214 OFFICE O/P EST MOD 30 MIN: CPT | Performed by: FAMILY MEDICINE

## 2023-08-19 PROCEDURE — 3008F BODY MASS INDEX DOCD: CPT | Performed by: FAMILY MEDICINE

## 2023-08-19 PROCEDURE — 3079F DIAST BP 80-89 MM HG: CPT | Performed by: FAMILY MEDICINE

## 2023-08-19 PROCEDURE — 3044F HG A1C LEVEL LT 7.0%: CPT | Performed by: FAMILY MEDICINE

## 2023-08-19 RX ORDER — VARENICLINE TARTRATE 1 MG/1
1 TABLET, FILM COATED ORAL 2 TIMES DAILY
Qty: 60 TABLET | Refills: 2 | Status: SHIPPED | OUTPATIENT
Start: 2023-08-19 | End: 2023-12-08 | Stop reason: WASHOUT

## 2023-08-19 RX ORDER — TADALAFIL 20 MG/1
20 TABLET ORAL DAILY PRN
Qty: 10 TABLET | Refills: 0 | Status: SHIPPED | OUTPATIENT
Start: 2023-08-19 | End: 2023-11-18 | Stop reason: SDUPTHER

## 2023-08-19 RX ORDER — AMLODIPINE BESYLATE 5 MG/1
5 TABLET ORAL DAILY
Qty: 90 TABLET | Refills: 0 | Status: SHIPPED | OUTPATIENT
Start: 2023-08-19 | End: 2023-11-18 | Stop reason: SDUPTHER

## 2023-08-19 RX ORDER — METFORMIN HYDROCHLORIDE 500 MG/1
500 TABLET, EXTENDED RELEASE ORAL
Qty: 90 TABLET | Refills: 0 | Status: SHIPPED | OUTPATIENT
Start: 2023-08-19 | End: 2023-11-18 | Stop reason: SDUPTHER

## 2023-08-19 RX ORDER — OLMESARTAN MEDOXOMIL 40 MG/1
40 TABLET ORAL DAILY
Qty: 90 TABLET | Refills: 0 | Status: SHIPPED | OUTPATIENT
Start: 2023-08-19 | End: 2023-11-18 | Stop reason: SDUPTHER

## 2023-08-19 RX ORDER — ROSUVASTATIN CALCIUM 10 MG/1
10 TABLET, COATED ORAL DAILY
Qty: 90 TABLET | Refills: 0 | Status: SHIPPED | OUTPATIENT
Start: 2023-08-19 | End: 2023-11-18 | Stop reason: SDUPTHER

## 2023-08-19 RX ORDER — ASPIRIN 81 MG/1
81 TABLET ORAL DAILY
Qty: 90 TABLET | Refills: 0 | Status: CANCELLED | OUTPATIENT
Start: 2023-08-19 | End: 2023-11-17

## 2023-08-19 NOTE — PATIENT INSTRUCTIONS
Referred for screening colonoscopy  PSA with next labs for prostate cancer screening.      Follow up in 3 months with labs to be done PRIOR.    It was a pleasure to see you today. Thank you for choosing us for your health care needs.    If you have lab or other testing completed and have not been informed of results within one week, please call the office for your results.    If you haven't done so, consider signing up for [facility], the [facility] personal health record. Ask the staff how you can get started.

## 2023-08-19 NOTE — PROGRESS NOTES
Subjective   Patient ID: Yariel Shaw is a 48 y.o. male who presents for Follow-up (3 month).    HPI     Labs 8/17/23.    Patient has hypertension  He does not monitor his BP at home.  Denies chest pain, dizziness, lower leg swelling.     Patient has hyperlipidemia.  Patient tries to limit the amount of fatty foods and high cholesterol foods that are consumed.  He is compliant with his rosuvastatin and denies any noted side effects.     Patient has Type 2 DM.  Pt does NOT monitor his glucose at home regularly.  He denies any polyuria, polydipsia, polyphagia.  Patient states that he has been compliant with the current diabetes medication.     He has ED.  Tadalafil has continued to work well for him.      Pt unfortunately continues to smoke.   Pt was prescribed varenicline previously and it was helpful but he has continued to smoke.  8/19/2023:  He reports that he just restarted the medication and would like to continue on the medication.     Pt has anxiety.   He does follow with psych, Dr. Patino who manages his xanax.   Denies any noted side effects.     He has insomnia.  Ambien was discontinued in the past since he is on xanax (risks of combination discussed).     Pt has chronic lower back pain.  He does follow with pain management.   Pt was planning to have surgery for his back, he is unsure if he wants to.   8/19/2023: He no longer is utilizing Norco or Tizanidine.    Review of Systems  Constitutional: Patient denies any fever, chills, loss of appetite, or unexplained weight loss.  Cardiovascular: Patient denies any chest pain, shortness of breath with exertion, tachycardia, palpitations, orthopnea, or paroxysmal nocturnal dyspnea.  Respiratory: Patient denies any cough, shortness breath, or wheezing.  Skin: Denies any rashes or skin lesions.   Neurology: Patient denies any new motor or sensory losses.  Denies any numbness, tingling, weakness, and incoordination of the extremities.  Patient also denies any  tremor, seizures, or gait instability.  Endocrinology: Denies any polyuria, polydipsia, polyphagia, or heat/cold intolerance.      Objective   /82   Pulse 62   Temp 36.7 °C (98 °F)   Resp 14   Wt 110 kg (242 lb)   SpO2 98%   BMI 34.72 kg/m²     Physical Exam  General Appearance: Alert and cooperative, in no acute distress, well-developed/well-nourished, overweight male.    Neck: Supple and without adenopathy or rigidity.  There is no JVD at 90° and no carotid bruits are noted.  There is no thyromegaly, thyroid tenderness, or palpable thyroid nodules.  Heart: Regular rate and rhythm without murmur or ectopy.  Respiratory: Lungs are clear to auscultation bilaterally with good air exchange.  Good respiratory effort and no accessory muscle use.  Skin: Good turgor, moist, warm and without rashes or lesions.  Neurological exam: Alert and oriented ×3, no tremor, normal gait.  Extremities: No clubbing, cyanosis, or edema      Assessment/Plan     Referred for screening colonoscopy  PSA with next labs for prostate cancer screening.        HTN: Blood pressure appears adequately controlled and we will continue with the current antihypertensive therapy.     Hyperlipidemia: Stable on last labs.  Patient will continue with current statin therapy.  Dietary changes, exercise, and maintenance of healthy weight were discussed at length..     DM Type 2:   Most recent hemoglobin A1c was:  Lab Results   Component Value Date    HGBA1C 6.5 (A) 08/17/2023   We will continue with the same treatment plan.   Dietary changes, exercise, and maintenance of a healthy weight were discussed at length.   He was encouraged to monitor his blood sugars at home once a day.      Current Smoker: Patient understands that continued smoking will increase the risks of lung disease, vascular disease, and multiple malignancies. Pt. has been encouraged to work on smoking cessation and available smoking cessation aids were discussed.  8/19/2023:  Pt  restarted Chantix and wants to continue to work on smoking cessation.     Erectile dysfunction: Stable based on symptoms  He will continue the tadalafil as needed.     Obesity: Dietary changes, exercise, and maintenance of a healthy weight were discussed at length.  Goal is to achieve a BMI less than 25.     Anxiety: Stable based on symptoms.  His anxiety is managed by psychiatry.     Back pain: Stable but persistent.  His back pain is managed by pain.      Prostate cancer screening: PSA level to be done with his next labs.    Colon cancer screenin2023: Patient referred for screening colonoscopy.      Orders Placed This Encounter   Procedures    Prostate Specific Antigen, Screen    Comprehensive Metabolic Panel    Hemoglobin A1C    TSH with reflex to Free T4 if abnormal    Referral to General Surgery     Requested Prescriptions     Signed Prescriptions Disp Refills    amLODIPine (Norvasc) 5 mg tablet 90 tablet 0     Sig: Take 1 tablet (5 mg) by mouth once daily.    metFORMIN XR (Glucophage-XR) 500 mg 24 hr tablet 90 tablet 0     Sig: Take 1 tablet (500 mg) by mouth once daily in the evening. Take with meals.    olmesartan (BENIcar) 40 mg tablet 90 tablet 0     Sig: Take 1 tablet (40 mg) by mouth once daily.    rosuvastatin (Crestor) 10 mg tablet 90 tablet 0     Sig: Take 1 tablet (10 mg) by mouth once daily.    tadalafil (Cialis) 20 mg tablet 10 tablet 0     Sig: Take 1 tablet (20 mg) by mouth once daily as needed for erectile dysfunction.    varenicline (Chantix) 1 mg tablet 60 tablet 2     Sig: Take 1 tablet (1 mg) by mouth 2 times a day. Take with full glass of water.

## 2023-10-09 ENCOUNTER — TELEPHONE (OUTPATIENT)
Dept: PRIMARY CARE | Facility: CLINIC | Age: 48
End: 2023-10-09
Payer: COMMERCIAL

## 2023-10-09 NOTE — TELEPHONE ENCOUNTER
Pt would like to know if its ok for him to start taking the [place] root vitamins with his current Meds,  if so he would like to know how you would advise him to take it.

## 2023-10-26 ENCOUNTER — TELEPHONE (OUTPATIENT)
Dept: PRIMARY CARE | Facility: CLINIC | Age: 48
End: 2023-10-26
Payer: COMMERCIAL

## 2023-10-26 NOTE — TELEPHONE ENCOUNTER
Patient would like to know if it is okay with you to stop his bp medications due to getting a procedure colonoscopy done 11/01/2023, was told to stop 7 days before. Please advise.

## 2023-10-26 NOTE — TELEPHONE ENCOUNTER
No,  He should not need to stop his blood pressure medication for a colonoscopy.  They will want him to stop any kind of aspirin or anti-inflammatories such as Motrin, Advil, Aleve, or similar medications as they can cause thinning of the blood.  These types of medications are typically stopped 1 week prior to the procedure.

## 2023-11-01 ENCOUNTER — APPOINTMENT (OUTPATIENT)
Dept: GASTROENTEROLOGY | Facility: HOSPITAL | Age: 48
End: 2023-11-01
Payer: COMMERCIAL

## 2023-11-01 DIAGNOSIS — Z12.11 ENCOUNTER FOR SCREENING FOR MALIGNANT NEOPLASM OF COLON: ICD-10-CM

## 2023-11-15 ENCOUNTER — TELEPHONE (OUTPATIENT)
Age: 48
End: 2023-11-15

## 2023-11-15 ENCOUNTER — OFFICE VISIT (OUTPATIENT)
Age: 48
End: 2023-11-15
Payer: COMMERCIAL

## 2023-11-15 VITALS — BODY MASS INDEX: 32.93 KG/M2 | WEIGHT: 230 LBS | TEMPERATURE: 97.1 F | HEIGHT: 70 IN

## 2023-11-15 DIAGNOSIS — M21.622 TAILOR'S BUNIONETTE, BILATERAL: ICD-10-CM

## 2023-11-15 DIAGNOSIS — M21.621 TAILOR'S BUNIONETTE, BILATERAL: ICD-10-CM

## 2023-11-15 DIAGNOSIS — E11.9 TYPE 2 DIABETES MELLITUS WITHOUT COMPLICATION, WITHOUT LONG-TERM CURRENT USE OF INSULIN (HCC): ICD-10-CM

## 2023-11-15 DIAGNOSIS — M21.372 FOOT DROP, LEFT: ICD-10-CM

## 2023-11-15 DIAGNOSIS — M20.11 HALLUX ABDUCTO VALGUS, BILATERAL: ICD-10-CM

## 2023-11-15 DIAGNOSIS — L84 CALLUS: ICD-10-CM

## 2023-11-15 DIAGNOSIS — M79.671 PAIN IN BOTH FEET: Primary | ICD-10-CM

## 2023-11-15 DIAGNOSIS — M79.672 PAIN IN BOTH FEET: Primary | ICD-10-CM

## 2023-11-15 DIAGNOSIS — G57.92 NEURITIS OF LEFT FOOT: ICD-10-CM

## 2023-11-15 DIAGNOSIS — M20.12 HALLUX ABDUCTO VALGUS, BILATERAL: ICD-10-CM

## 2023-11-15 PROCEDURE — 3044F HG A1C LEVEL LT 7.0%: CPT | Performed by: PODIATRIST

## 2023-11-15 PROCEDURE — 99213 OFFICE O/P EST LOW 20 MIN: CPT | Performed by: PODIATRIST

## 2023-11-15 ASSESSMENT — ENCOUNTER SYMPTOMS
NAUSEA: 0
VOMITING: 0
SHORTNESS OF BREATH: 0
BACK PAIN: 1

## 2023-11-16 ENCOUNTER — LAB (OUTPATIENT)
Dept: LAB | Facility: LAB | Age: 48
End: 2023-11-16
Payer: COMMERCIAL

## 2023-11-16 DIAGNOSIS — E78.2 HYPERLIPEMIA, MIXED: ICD-10-CM

## 2023-11-16 DIAGNOSIS — E11.9 TYPE 2 DIABETES MELLITUS WITHOUT COMPLICATION, WITHOUT LONG-TERM CURRENT USE OF INSULIN (MULTI): ICD-10-CM

## 2023-11-16 DIAGNOSIS — Z12.5 PROSTATE CANCER SCREENING: ICD-10-CM

## 2023-11-16 DIAGNOSIS — I10 PRIMARY HYPERTENSION: ICD-10-CM

## 2023-11-16 LAB
ALBUMIN SERPL BCP-MCNC: 4.2 G/DL (ref 3.4–5)
ALP SERPL-CCNC: 69 U/L (ref 33–120)
ALT SERPL W P-5'-P-CCNC: 20 U/L (ref 10–52)
ANION GAP SERPL CALC-SCNC: 9 MMOL/L (ref 10–20)
AST SERPL W P-5'-P-CCNC: 17 U/L (ref 9–39)
BILIRUB SERPL-MCNC: 0.5 MG/DL (ref 0–1.2)
BUN SERPL-MCNC: 18 MG/DL (ref 6–23)
CALCIUM SERPL-MCNC: 9.1 MG/DL (ref 8.6–10.3)
CHLORIDE SERPL-SCNC: 107 MMOL/L (ref 98–107)
CO2 SERPL-SCNC: 29 MMOL/L (ref 21–32)
CREAT SERPL-MCNC: 1.34 MG/DL (ref 0.5–1.3)
EST. AVERAGE GLUCOSE BLD GHB EST-MCNC: 137 MG/DL
GFR SERPL CREATININE-BSD FRML MDRD: 66 ML/MIN/1.73M*2
GLUCOSE SERPL-MCNC: 119 MG/DL (ref 74–99)
HBA1C MFR BLD: 6.4 %
POTASSIUM SERPL-SCNC: 4.3 MMOL/L (ref 3.5–5.3)
PROT SERPL-MCNC: 6.6 G/DL (ref 6.4–8.2)
PSA SERPL-MCNC: 0.38 NG/ML
SODIUM SERPL-SCNC: 141 MMOL/L (ref 136–145)
TSH SERPL-ACNC: 2.55 MIU/L (ref 0.44–3.98)

## 2023-11-16 PROCEDURE — 80053 COMPREHEN METABOLIC PANEL: CPT

## 2023-11-16 PROCEDURE — 84443 ASSAY THYROID STIM HORMONE: CPT

## 2023-11-16 PROCEDURE — 84153 ASSAY OF PSA TOTAL: CPT

## 2023-11-16 PROCEDURE — 36415 COLL VENOUS BLD VENIPUNCTURE: CPT

## 2023-11-16 PROCEDURE — 83036 HEMOGLOBIN GLYCOSYLATED A1C: CPT

## 2023-11-17 ENCOUNTER — OFFICE VISIT (OUTPATIENT)
Dept: PAIN MANAGEMENT | Age: 48
End: 2023-11-17
Payer: MEDICARE

## 2023-11-17 VITALS
HEIGHT: 70 IN | BODY MASS INDEX: 32.93 KG/M2 | SYSTOLIC BLOOD PRESSURE: 122 MMHG | TEMPERATURE: 97.8 F | DIASTOLIC BLOOD PRESSURE: 80 MMHG | WEIGHT: 230 LBS

## 2023-11-17 DIAGNOSIS — M47.817 LUMBOSACRAL SPONDYLOSIS WITHOUT MYELOPATHY: ICD-10-CM

## 2023-11-17 DIAGNOSIS — M75.02 ADHESIVE CAPSULITIS OF LEFT SHOULDER: ICD-10-CM

## 2023-11-17 PROCEDURE — 99214 OFFICE O/P EST MOD 30 MIN: CPT | Performed by: NURSE PRACTITIONER

## 2023-11-17 RX ORDER — DIAZEPAM 5 MG/1
5 TABLET ORAL SEE ADMIN INSTRUCTIONS
Qty: 2 TABLET | Refills: 0 | Status: SHIPPED | OUTPATIENT
Start: 2023-11-17 | End: 2023-11-18

## 2023-11-17 RX ORDER — CYCLOBENZAPRINE HCL 5 MG
5 TABLET ORAL NIGHTLY PRN
Qty: 30 TABLET | Refills: 0 | Status: SHIPPED | OUTPATIENT
Start: 2023-11-17 | End: 2023-12-17

## 2023-11-17 ASSESSMENT — ENCOUNTER SYMPTOMS
BLOOD IN STOOL: 0
BACK PAIN: 1
SHORTNESS OF BREATH: 0

## 2023-11-18 ENCOUNTER — OFFICE VISIT (OUTPATIENT)
Dept: PRIMARY CARE | Facility: CLINIC | Age: 48
End: 2023-11-18
Payer: COMMERCIAL

## 2023-11-18 VITALS
DIASTOLIC BLOOD PRESSURE: 80 MMHG | HEART RATE: 65 BPM | TEMPERATURE: 98.3 F | WEIGHT: 242.6 LBS | HEIGHT: 70 IN | OXYGEN SATURATION: 97 % | BODY MASS INDEX: 34.73 KG/M2 | SYSTOLIC BLOOD PRESSURE: 132 MMHG

## 2023-11-18 DIAGNOSIS — E11.9 TYPE 2 DIABETES MELLITUS WITHOUT COMPLICATION, WITHOUT LONG-TERM CURRENT USE OF INSULIN (MULTI): ICD-10-CM

## 2023-11-18 DIAGNOSIS — E78.2 HYPERLIPEMIA, MIXED: ICD-10-CM

## 2023-11-18 DIAGNOSIS — M54.9 BACK PAIN, UNSPECIFIED BACK LOCATION, UNSPECIFIED BACK PAIN LATERALITY, UNSPECIFIED CHRONICITY: ICD-10-CM

## 2023-11-18 DIAGNOSIS — Z23 NEED FOR IMMUNIZATION AGAINST INFLUENZA: ICD-10-CM

## 2023-11-18 DIAGNOSIS — F17.200 CURRENT EVERY DAY SMOKER: ICD-10-CM

## 2023-11-18 DIAGNOSIS — N52.9 ERECTILE DYSFUNCTION, UNSPECIFIED ERECTILE DYSFUNCTION TYPE: ICD-10-CM

## 2023-11-18 DIAGNOSIS — F41.9 ANXIETY: ICD-10-CM

## 2023-11-18 DIAGNOSIS — I10 PRIMARY HYPERTENSION: Primary | ICD-10-CM

## 2023-11-18 PROCEDURE — 99214 OFFICE O/P EST MOD 30 MIN: CPT | Performed by: FAMILY MEDICINE

## 2023-11-18 PROCEDURE — 3044F HG A1C LEVEL LT 7.0%: CPT | Performed by: FAMILY MEDICINE

## 2023-11-18 PROCEDURE — 4010F ACE/ARB THERAPY RXD/TAKEN: CPT | Performed by: FAMILY MEDICINE

## 2023-11-18 PROCEDURE — 3075F SYST BP GE 130 - 139MM HG: CPT | Performed by: FAMILY MEDICINE

## 2023-11-18 PROCEDURE — 3079F DIAST BP 80-89 MM HG: CPT | Performed by: FAMILY MEDICINE

## 2023-11-18 PROCEDURE — 3008F BODY MASS INDEX DOCD: CPT | Performed by: FAMILY MEDICINE

## 2023-11-18 PROCEDURE — G0008 ADMIN INFLUENZA VIRUS VAC: HCPCS | Performed by: FAMILY MEDICINE

## 2023-11-18 PROCEDURE — 90686 IIV4 VACC NO PRSV 0.5 ML IM: CPT | Performed by: FAMILY MEDICINE

## 2023-11-18 RX ORDER — METFORMIN HYDROCHLORIDE 500 MG/1
500 TABLET, EXTENDED RELEASE ORAL
Qty: 90 TABLET | Refills: 0 | Status: SHIPPED | OUTPATIENT
Start: 2023-11-18 | End: 2024-01-22 | Stop reason: SDUPTHER

## 2023-11-18 RX ORDER — ROSUVASTATIN CALCIUM 10 MG/1
10 TABLET, COATED ORAL DAILY
Qty: 90 TABLET | Refills: 0 | Status: SHIPPED | OUTPATIENT
Start: 2023-11-18 | End: 2024-01-22 | Stop reason: SDUPTHER

## 2023-11-18 RX ORDER — AMLODIPINE BESYLATE 5 MG/1
5 TABLET ORAL DAILY
Qty: 90 TABLET | Refills: 0 | Status: SHIPPED | OUTPATIENT
Start: 2023-11-18 | End: 2024-01-22 | Stop reason: SDUPTHER

## 2023-11-18 RX ORDER — OLMESARTAN MEDOXOMIL 40 MG/1
40 TABLET ORAL DAILY
Qty: 90 TABLET | Refills: 0 | Status: SHIPPED | OUTPATIENT
Start: 2023-11-18 | End: 2024-01-22 | Stop reason: SDUPTHER

## 2023-11-18 RX ORDER — TADALAFIL 20 MG/1
20 TABLET ORAL DAILY PRN
Qty: 10 TABLET | Refills: 0 | Status: SHIPPED | OUTPATIENT
Start: 2023-11-18 | End: 2024-01-22 | Stop reason: SDUPTHER

## 2023-11-18 ASSESSMENT — PATIENT HEALTH QUESTIONNAIRE - PHQ9
1. LITTLE INTEREST OR PLEASURE IN DOING THINGS: NOT AT ALL
SUM OF ALL RESPONSES TO PHQ9 QUESTIONS 1 AND 2: 0
2. FEELING DOWN, DEPRESSED OR HOPELESS: NOT AT ALL

## 2023-11-18 NOTE — PATIENT INSTRUCTIONS
Kenrick sprarkling water is a sugar free option for you.  There are flavor drops you can add to water as well.      Follow up in 3 months with labs to be done PRIOR.    It was a pleasure to see you today. Thank you for choosing us for your health care needs.    If you have lab or other testing completed and have not been informed of results within one week, please call the office for your results.    If you haven't done so, consider signing up for [facility], the [facility] personal health record. Ask the staff how you can get started.

## 2023-11-18 NOTE — PROGRESS NOTES
Subjective   Patient ID: Yariel Shaw is a 48 y.o. male who presents for Follow-up.    HPI     No new concerns   Labs: 11/16/2023      Patient has hypertension  He does not monitor his BP at home.  Denies chest pain, dizziness, lower leg swelling.      Patient has hyperlipidemia.  Patient tries to limit the amount of fatty foods and high cholesterol foods that are consumed.  He is compliant with his rosuvastatin and denies any noted side effects.     Patient has Type 2 DM.  Pt does NOT monitor his glucose at home regularly.  He denies any polyuria, polydipsia, polyphagia.  Patient states that he has been compliant with the current diabetes medication.     He has ED.  Tadalafil has continued to work well for him.      Pt unfortunately continues to smoke.   11/18/23: Patient reports that he has decreased his smoking to approximately 1 pack/week.     Pt has anxiety.   He does follow with psych, Dr. Patino for his anxiety.  His psychiatrist manages his xanax.   Denies any noted side effects.     He has insomnia.  Ambien was discontinued in the past since he is on xanax (risks of combination discussed).     Pt has chronic lower back pain.  He d is followed with pain management as needed.  At 1 point he was planning to have surgery but has not been ready to proceed.            Review of Systems  Constitutional: Patient denies any fever, chills, loss of appetite, or unexplained weight loss.  Cardiovascular: Patient denies any chest pain, shortness of breath with exertion, tachycardia, palpitations, orthopnea, or paroxysmal nocturnal dyspnea.  Respiratory: Patient denies any cough, shortness breath, or wheezing.  Skin: Denies any rashes or skin lesions.   Neurology: Patient denies any new motor or sensory losses.  Denies any numbness, tingling, weakness, and incoordination of the extremities.  Patient also denies any tremor, seizures, or gait instability.  Endocrinology: Denies any polyuria, polydipsia, polyphagia, or  "heat/cold intolerance.      Objective   /80   Pulse 65   Temp 36.8 °C (98.3 °F)   Ht 1.778 m (5' 10\")   Wt 110 kg (242 lb 9.6 oz)   SpO2 97%   BMI 34.81 kg/m²     Physical Exam  General Appearance: Alert and cooperative, in no acute distress, well-developed/well-nourished.  Neck: Supple and without adenopathy or rigidity.  There is no JVD at 90° and no carotid bruits are noted.  There is no thyromegaly, thyroid tenderness, or palpable thyroid nodules.  Heart: Regular rate and rhythm without murmur or ectopy.  Respiratory: Lungs are clear to auscultation bilaterally with good air exchange.  Good respiratory effort and no accessory muscle use.  Skin: Good turgor, moist, warm and without rashes or lesions.  Neurological exam: Alert and oriented ×3, no tremor, normal gait.  Extremities: No clubbing, cyanosis, or edema      Assessment/Plan     HTN: Blood pressure appears adequately controlled and we will continue with the current antihypertensive therapy.     Hyperlipidemia: Stable on last labs.  Patient will continue with current statin therapy.  Dietary changes, exercise, and maintenance of healthy weight were discussed at length..     DM Type 2:   Most recent hemoglobin A1c was:  Lab Results   Component Value Date    HGBA1C 6.4 (H) 11/16/2023   We will continue with the same treatment plan.   Dietary changes, exercise, and maintenance of a healthy weight were discussed at length.   He was encouraged to monitor his blood sugars at home once a day.      Current Smoker: Patient understands that continued smoking will increase the risks of lung disease, vascular disease, and multiple malignancies. Pt. has been encouraged to work on smoking cessation and available smoking cessation aids were discussed.     Erectile dysfunction: Stable based on symptoms  He will continue the tadalafil as needed.     Obesity: Dietary changes, exercise, and maintenance of a healthy weight were discussed at length.  Goal is to " achieve a BMI less than 25.     Anxiety: Stable based on symptoms.  His anxiety is managed by psychiatry.     Back pain: Stable but persistent.  His back pain is managed by pain.      Prostate cancer screening: PSA level to be done with his next labs.     Colon cancer screenin2023: Patient referred for screening colonoscopy.  Colonoscopy scheduled for 2023           Orders Placed This Encounter   Procedures    Flu vaccine (IIV4) age 6 months and greater, preservative free    Albumin , Urine Random    Basic Metabolic Panel    Hemoglobin A1C    Lipid Panel     Requested Prescriptions     Signed Prescriptions Disp Refills    metFORMIN  mg 24 hr tablet 90 tablet 0     Sig: Take 1 tablet (500 mg) by mouth once daily in the evening. Take with meals.    amLODIPine (Norvasc) 5 mg tablet 90 tablet 0     Sig: Take 1 tablet (5 mg) by mouth once daily.    olmesartan (BENIcar) 40 mg tablet 90 tablet 0     Sig: Take 1 tablet (40 mg) by mouth once daily.    rosuvastatin (Crestor) 10 mg tablet 90 tablet 0     Sig: Take 1 tablet (10 mg) by mouth once daily.    tadalafil (Cialis) 20 mg tablet 10 tablet 0     Sig: Take 1 tablet (20 mg) by mouth once daily as needed for erectile dysfunction.

## 2023-11-28 ENCOUNTER — TELEPHONE (OUTPATIENT)
Dept: PAIN MANAGEMENT | Age: 48
End: 2023-11-28

## 2023-11-28 NOTE — TELEPHONE ENCOUNTER
AUDREY L[address] CLAY    AUTH APPROVED FROM 12/5/23-1/4/24    REFERRAL # [id number]  OK to schedule procedure approved as above. Please note sides/levels approved and date range. (If applicable, sides/levels approved may differ from those ordered)    TO BE SCHEDULED WITH DR. Berkley Shirley

## 2023-12-11 RX ORDER — SODIUM CHLORIDE, SODIUM LACTATE, POTASSIUM CHLORIDE, CALCIUM CHLORIDE 600; 310; 30; 20 MG/100ML; MG/100ML; MG/100ML; MG/100ML
100 INJECTION, SOLUTION INTRAVENOUS CONTINUOUS
Status: CANCELLED | OUTPATIENT
Start: 2023-12-11

## 2023-12-13 ENCOUNTER — APPOINTMENT (OUTPATIENT)
Dept: GASTROENTEROLOGY | Facility: HOSPITAL | Age: 48
End: 2023-12-13
Payer: COMMERCIAL

## 2023-12-15 ENCOUNTER — TELEPHONE (OUTPATIENT)
Dept: PODIATRY | Age: 48
End: 2023-12-15

## 2024-01-03 ENCOUNTER — ANESTHESIA (OUTPATIENT)
Dept: GASTROENTEROLOGY | Facility: HOSPITAL | Age: 49
End: 2024-01-03
Payer: COMMERCIAL

## 2024-01-03 ENCOUNTER — ANESTHESIA EVENT (OUTPATIENT)
Dept: GASTROENTEROLOGY | Facility: HOSPITAL | Age: 49
End: 2024-01-03
Payer: COMMERCIAL

## 2024-01-03 ENCOUNTER — HOSPITAL ENCOUNTER (OUTPATIENT)
Dept: GASTROENTEROLOGY | Facility: HOSPITAL | Age: 49
Discharge: HOME | End: 2024-01-03
Payer: COMMERCIAL

## 2024-01-03 VITALS
SYSTOLIC BLOOD PRESSURE: 137 MMHG | BODY MASS INDEX: 34.28 KG/M2 | TEMPERATURE: 97.5 F | HEART RATE: 75 BPM | DIASTOLIC BLOOD PRESSURE: 89 MMHG | HEIGHT: 70 IN | RESPIRATION RATE: 17 BRPM | WEIGHT: 239.42 LBS | OXYGEN SATURATION: 97 %

## 2024-01-03 DIAGNOSIS — Z12.11 ENCOUNTER FOR SCREENING FOR MALIGNANT NEOPLASM OF COLON: ICD-10-CM

## 2024-01-03 LAB — GLUCOSE BLD MANUAL STRIP-MCNC: 99 MG/DL (ref 74–99)

## 2024-01-03 PROCEDURE — 7100000010 HC PHASE TWO TIME - EACH INCREMENTAL 1 MINUTE

## 2024-01-03 PROCEDURE — 0753T DGTZ GLS MCRSCP SLD LEVEL IV: CPT | Mod: TC,SUR,ELYLAB | Performed by: SURGERY

## 2024-01-03 PROCEDURE — 7100000009 HC PHASE TWO TIME - INITIAL BASE CHARGE

## 2024-01-03 PROCEDURE — 2500000004 HC RX 250 GENERAL PHARMACY W/ HCPCS (ALT 636 FOR OP/ED): Performed by: SURGERY

## 2024-01-03 PROCEDURE — 3700000001 HC GENERAL ANESTHESIA TIME - INITIAL BASE CHARGE

## 2024-01-03 PROCEDURE — 3700000002 HC GENERAL ANESTHESIA TIME - EACH INCREMENTAL 1 MINUTE

## 2024-01-03 PROCEDURE — 2720000007 HC OR 272 NO HCPCS

## 2024-01-03 PROCEDURE — 2500000004 HC RX 250 GENERAL PHARMACY W/ HCPCS (ALT 636 FOR OP/ED): Performed by: NURSE ANESTHETIST, CERTIFIED REGISTERED

## 2024-01-03 PROCEDURE — 88305 TISSUE EXAM BY PATHOLOGIST: CPT | Performed by: PATHOLOGY

## 2024-01-03 PROCEDURE — 45385 COLONOSCOPY W/LESION REMOVAL: CPT | Performed by: SURGERY

## 2024-01-03 PROCEDURE — 2500000005 HC RX 250 GENERAL PHARMACY W/O HCPCS: Performed by: NURSE ANESTHETIST, CERTIFIED REGISTERED

## 2024-01-03 PROCEDURE — 82947 ASSAY GLUCOSE BLOOD QUANT: CPT

## 2024-01-03 RX ORDER — PROPOFOL 10 MG/ML
INJECTION, EMULSION INTRAVENOUS CONTINUOUS PRN
Status: DISCONTINUED | OUTPATIENT
Start: 2024-01-03 | End: 2024-01-03

## 2024-01-03 RX ORDER — LIDOCAINE HYDROCHLORIDE 20 MG/ML
INJECTION, SOLUTION INFILTRATION; PERINEURAL AS NEEDED
Status: DISCONTINUED | OUTPATIENT
Start: 2024-01-03 | End: 2024-01-03

## 2024-01-03 RX ORDER — FENTANYL CITRATE 50 UG/ML
INJECTION, SOLUTION INTRAMUSCULAR; INTRAVENOUS AS NEEDED
Status: DISCONTINUED | OUTPATIENT
Start: 2024-01-03 | End: 2024-01-03

## 2024-01-03 RX ORDER — SODIUM CHLORIDE, SODIUM LACTATE, POTASSIUM CHLORIDE, CALCIUM CHLORIDE 600; 310; 30; 20 MG/100ML; MG/100ML; MG/100ML; MG/100ML
100 INJECTION, SOLUTION INTRAVENOUS CONTINUOUS
Status: DISCONTINUED | OUTPATIENT
Start: 2024-01-03 | End: 2024-01-04 | Stop reason: HOSPADM

## 2024-01-03 RX ORDER — PROPOFOL 10 MG/ML
INJECTION, EMULSION INTRAVENOUS AS NEEDED
Status: DISCONTINUED | OUTPATIENT
Start: 2024-01-03 | End: 2024-01-03

## 2024-01-03 RX ADMIN — LIDOCAINE HYDROCHLORIDE 100 MG: 20 INJECTION, SOLUTION INFILTRATION; PERINEURAL at 09:41

## 2024-01-03 RX ADMIN — PROPOFOL 100 MG: 10 INJECTION, EMULSION INTRAVENOUS at 09:41

## 2024-01-03 RX ADMIN — SODIUM CHLORIDE, POTASSIUM CHLORIDE, SODIUM LACTATE AND CALCIUM CHLORIDE 100 ML/HR: 600; 310; 30; 20 INJECTION, SOLUTION INTRAVENOUS at 08:45

## 2024-01-03 RX ADMIN — PROPOFOL 125 MCG/KG/MIN: 10 INJECTION, EMULSION INTRAVENOUS at 09:41

## 2024-01-03 RX ADMIN — FENTANYL CITRATE 50 MCG: 50 INJECTION, SOLUTION INTRAMUSCULAR; INTRAVENOUS at 09:48

## 2024-01-03 RX ADMIN — FENTANYL CITRATE 50 MCG: 50 INJECTION, SOLUTION INTRAMUSCULAR; INTRAVENOUS at 09:44

## 2024-01-03 SDOH — HEALTH STABILITY: MENTAL HEALTH: CURRENT SMOKER: 1

## 2024-01-03 ASSESSMENT — PAIN SCALES - GENERAL
PAINLEVEL_OUTOF10: 0 - NO PAIN
PAINLEVEL_OUTOF10: 0 - NO PAIN
PAIN_LEVEL: 0
PAINLEVEL_OUTOF10: 0 - NO PAIN

## 2024-01-03 ASSESSMENT — PAIN - FUNCTIONAL ASSESSMENT
PAIN_FUNCTIONAL_ASSESSMENT: 0-10

## 2024-01-03 ASSESSMENT — COLUMBIA-SUICIDE SEVERITY RATING SCALE - C-SSRS
2. HAVE YOU ACTUALLY HAD ANY THOUGHTS OF KILLING YOURSELF?: NO
6. HAVE YOU EVER DONE ANYTHING, STARTED TO DO ANYTHING, OR PREPARED TO DO ANYTHING TO END YOUR LIFE?: NO
1. IN THE PAST MONTH, HAVE YOU WISHED YOU WERE DEAD OR WISHED YOU COULD GO TO SLEEP AND NOT WAKE UP?: NO

## 2024-01-03 NOTE — H&P
History Of Present Illness     48-year-old male patient referred for screening colonoscopy. Denies abdominal pain, constipation, blood in his stool, change in stool caliber or consistency. Denies family history of colorectal cancer or polyp.      Past Medical History  Past Medical History:   Diagnosis Date    Anxiety     Diabetes mellitus (CMS/HCC)     PRE-DIABETES    Hyperlipidemia     Hypertension     Joint pain     Sinusitis     RESOLVED WITH SURGERY    Wears glasses        Surgical History  Past Surgical History:   Procedure Laterality Date    MR HEAD ANGIO WO IV CONTRAST  08/24/2021    MR HEAD ANGIO WO IV CONTRAST 8/24/2021 [facility]    NASAL SINUS SURGERY          Social History  He reports that he has been smoking cigarettes. He has a 10.00 pack-year smoking history. He has never used smokeless tobacco. He reports current drug use. Drug: Marijuana. He reports that he does not drink alcohol.    Family History  Family History   Problem Relation Name Age of Onset    Hypertension Mother      Hyperlipidemia Mother      Allergies Mother      Allergies Father      Throat cancer Father      Hypertension Brother      Diabetes Brother      Heart attack Maternal Grandmother          Allergies  Bupropion and Escitalopram    Review of Systems     Physical Exam   Constitutional: no acute distress, well appearing and well nourished  Eyes: conjunctiva and lids with no erythema, swelling or discharge; EOMI  Ears, Nose, Mouth and Throat: external inspection of ears and nose are normal  Neck: supple, no mass or adenopathy, full range of motion; thyroid not enlarged and no palpable nodules  Pulmonary: normal respiratory effort; clear to auscultation bilaterally, no wheezes or bronchi   Cardiovascular: regular rate and rhythm, no murmurs or extra-heart sounds; pedal pulses are normal; no extremities edema or varicosities, no peripheral edema  Abdomen: soft, non-tender, non-distended; no organomegaly; no peritoneal  sign, no hernia  Musculoskeletal: digits and nails normal without clubbing or cyanosis; Joints, bones and muscles are normal with normal range of motion; muscle strength/tone is normal  Skin: normal without rashes or lesion  Neurologic: cranial nerve II-XII intact grossly; normal gait  Psychiatric: oriented to person, place and time  Last Recorded Vitals  There were no vitals taken for this visit.    Relevant Results         Assessment/Plan   Active Problems:  There are no active Hospital Problems.  48-year-old male patient in need of screening colonoscopy. I explained to him the procedure, the risks and complications which include but not limited to bleeding, infection and bowel injury. All questions answered and willing to proceed.          Jose Marshall MD

## 2024-01-03 NOTE — NURSING NOTE
Huddle and Timeout completed together with team. Patient wristband and RAMA information verified.

## 2024-01-03 NOTE — ANESTHESIA PREPROCEDURE EVALUATION
Patient: Yariel Shaw    Procedure Information       Date/Time: 01/03/24 7281    Scheduled providers: Jose Marshall MD; Corey Hernandez MD; Josephine Whitlock RN; Mariely Baer    Procedure: COLONOSCOPY    Location: [facility]            Relevant Problems   Cardiovascular   (+) Atypical chest pain   (+) HTN (hypertension)   (+) Hyperlipemia, mixed      Endocrine   (+) Class 1 obesity with serious comorbidity and body mass index (BMI) of 34.0 to 34.9 in adult   (+) DM2 (diabetes mellitus, type 2) (CMS/[facility])      Neuro/Psych   (+) Anxiety   (+) Depression with anxiety   (+) Lumbar radiculopathy   (+) Neuritis of left foot      Musculoskeletal   (+) Bulging of lumbar intervertebral disc   (+) Foraminal stenosis of lumbosacral region       Clinical information reviewed:                   NPO Detail:  No data recorded     Physical Exam    Airway  Mallampati: III  TM distance: >3 FB  Neck ROM: full     Cardiovascular - normal exam     Dental - normal exam     Pulmonary - normal exam     Abdominal   (+) obese             Anesthesia Plan    ASA 3     MAC     The patient is a current smoker.  Patient was previously instructed to abstain from smoking on day of procedure.  Patient did not smoke on day of procedure.    intravenous induction   Anesthetic plan and risks discussed with patient.    Plan discussed with CRNA and attending.

## 2024-01-03 NOTE — NURSING NOTE
Patient tolerated procedure well. Appears comfortable with no complaints of pain. VS stable. Arousable prior to transport. Patient transported to [facility] via cart.  Report called  [facility] rn          . Handoff completed

## 2024-01-03 NOTE — ANESTHESIA POSTPROCEDURE EVALUATION
Patient: Yariel Shaw    Procedure Summary       Date: 01/03/24 Room / Location: [facility]    Anesthesia Start: 0936 Anesthesia Stop:     Procedure: COLONOSCOPY Diagnosis: Encounter for screening for malignant neoplasm of colon    Scheduled Providers: Jose Marshall MD; Corey Hernandez MD; Josephine Whitlock RN; Mariely Baer Responsible Provider: Rishi Swartz MD    Anesthesia Type: MAC ASA Status: 3            Anesthesia Type: MAC    Vitals Value Taken Time   /86 01/03/24 1018   Temp  01/03/24 1018   Pulse 101 01/03/24 1018   Resp 14 01/03/24 1018   SpO2 96 01/03/24 1018       Anesthesia Post Evaluation    Patient location during evaluation: bedside  Patient participation: complete - patient participated  Level of consciousness: awake and alert  Pain score: 0  Pain management: adequate  Airway patency: patent  Cardiovascular status: acceptable and stable  Respiratory status: acceptable and room air  Hydration status: acceptable  Postoperative Nausea and Vomiting: none        No notable events documented.

## 2024-01-03 NOTE — DISCHARGE INSTRUCTIONS
Patient Instructions after a Colonoscopy      The anesthetics, sedatives or narcotics which were given to you today will be acting in your body for the next 24 hours, so you might feel a little sleepy or groggy.  This feeling should slowly wear off. Carefully read and follow the instructions.     You received sedation today:  - Do not drive or operate any machinery or power tools of any kind.   - No alcoholic beverages today, not even beer or wine.  - Do not make any important decisions or sign any legal documents.  - No over the counter medications that contain alcohol or that may cause drowsiness.    While it is common to experience mild to moderate abdominal distention, gas, or belching after your procedure, if any of these symptoms occur following discharge from the GI Lab or within one week of having your procedure, call the [facility] to be advised whether a visit to your nearest Urgent Care or Emergency Department is indicated.  Take this paper with you if you go.     - If you develop an allergic reaction to the medications that were given during your procedure such as difficulty breathing, rash, hives, severe nausea, vomiting or lightheadedness.  - If you experience chest pain, shortness of breath, severe abdominal pain, fevers and chills.  -If you develop signs and symptoms of bleeding such as blood in your spit, if your stools turn black, tarry, or bloody  - If you have not urinated within 8 hours following your procedure.  - If your IV site becomes painful, red, inflamed, or looks infected.    If you received a biopsy/polypectomy/sphincterotomy the following instructions apply below:    __ Do not use Aspirin containing products, non-steroidal medications or anti-coagulants for one week following your procedure. (Examples of these types of medications are: Advil, Arthrotec, Aleve, Coumadin, Ecotrin, Heparin, Ibuprofen, Indocin, Motrin, Naprosyn, Nuprin, Plavix, Vioxx, and Voltarin, or their  generic forms.  This list is not all-inclusive.  Check with your physician or pharmacist before resuming medications.)   __ Eat a soft diet today.  Avoid foods that are poorly digested for the next 24 hours.  These foods would include: nuts, beans, lettuce, red meats, and fried foods. Start with liquids and advance your diet as tolerated, gradually work up to eating solids.   __ Do not have a Barium Study or Enema for one week.    Your physician recommends the additional following instructions:    -You have a contact number available for emergencies. The signs and symptoms of potential delayed complications were discussed with you. You may return to normal activities tomorrow.  -Resume your previous diet.  -Continue your present medications.   -We are waiting for your pathology results.  -Your physician has recommended a repeat colonoscopy (date to be determined after pending pathology results are reviewed) for surveillance based on pathology results.  -The findings and recommendations have been discussed with you.  -The findings and recommendations were discussed with your family.  - Please see Medication Reconciliation Form for new medication/medications prescribed.

## 2024-01-05 ENCOUNTER — TELEPHONE (OUTPATIENT)
Dept: PAIN MANAGEMENT | Age: 49
End: 2024-01-05

## 2024-01-05 LAB
LABORATORY COMMENT REPORT: NORMAL
PATH REPORT.FINAL DX SPEC: NORMAL
PATH REPORT.GROSS SPEC: NORMAL
PATH REPORT.TOTAL CANCER: NORMAL

## 2024-01-05 NOTE — TELEPHONE ENCOUNTER
Spoke with Pt, he states he will call back to schedule, informed of dates authorized   BILAT L 3 4 5 MBB     AUTH EXTENDED      1/5/24-2/4/24..      REFERRAL # [id number].

## 2024-01-05 NOTE — TELEPHONE ENCOUNTER
AUDREY L 3 4 5 MBB    AUTH EXTENDED     1/5/24-2/4/24..     REFERRAL # [id number].    CAN BE SCHEDULED WITH DR. WILSON.

## 2024-01-17 ENCOUNTER — TELEPHONE (OUTPATIENT)
Dept: PRIMARY CARE | Facility: CLINIC | Age: 49
End: 2024-01-17
Payer: COMMERCIAL

## 2024-01-17 NOTE — TELEPHONE ENCOUNTER
Patient called stating he has been having itchiness on the back of his calves bilateral. Wants to know if he can get something for it?

## 2024-01-18 ENCOUNTER — OFFICE VISIT (OUTPATIENT)
Dept: SURGERY | Facility: CLINIC | Age: 49
End: 2024-01-18
Payer: COMMERCIAL

## 2024-01-18 DIAGNOSIS — D12.6 TUBULAR ADENOMA OF COLON: Primary | ICD-10-CM

## 2024-01-18 DIAGNOSIS — K64.2 GRADE III HEMORRHOIDS: ICD-10-CM

## 2024-01-18 PROCEDURE — 3008F BODY MASS INDEX DOCD: CPT | Performed by: SURGERY

## 2024-01-18 PROCEDURE — 99213 OFFICE O/P EST LOW 20 MIN: CPT | Performed by: SURGERY

## 2024-01-18 PROCEDURE — 4010F ACE/ARB THERAPY RXD/TAKEN: CPT | Performed by: SURGERY

## 2024-01-18 NOTE — PROGRESS NOTES
Subjective   Patient ID: Yariel Shaw is a 48 y.o. male who presents for Post-op.  HPI  48-year-old patient who is here in follow-up.  Had a colonoscopy on January 3, 2024.  Noted to have grade 3 internal hemorrhoid, 2 polyps removed (hepatic flexure and descending colon) with pathology showing tubular adenoma.  Denies abdominal pain, bloody bowel movement, nausea and vomiting    Objective   Physical Exam  Constitutional: no acute distress, well appearing and well nourished  Pulmonary: normal respiratory effort; clear to auscultation bilaterally, no wheezes or bronchi   Cardiovascular: regular rate and rhythm, no murmurs or extra-heart sounds  Abdomen: soft, non-tender, non-distended  Musculoskeletal: digits and nails normal without clubbing or cyanosis; Joints  Neurologic: cranial nerve II-XII intact grossly; normal gait  Psychiatric: oriented to person, place and time  Assessment/Plan   I discussed with patient the endoscopic findings and the pathology report.  For the hemorrhoids, we talked about management with high-fiber diet, fiber supplement and increase fluid intake.  Repeat colonoscopy in 5 years.         Jose Marshall MD 01/18/24 9:32 AM

## 2024-01-18 NOTE — PATIENT INSTRUCTIONS
Repeat colonoscopy in 5 yrs; for the hemorrhoids, start high-fiber diet, fiber supplement and increase fluid intake

## 2024-01-20 ENCOUNTER — HOSPITAL ENCOUNTER (EMERGENCY)
Age: 49
Discharge: HOME | End: 2024-01-20
Attending: EMERGENCY MEDICINE
Payer: MEDICARE

## 2024-01-20 VITALS
RESPIRATION RATE: 17 BRPM | SYSTOLIC BLOOD PRESSURE: 156 MMHG | BODY MASS INDEX: 32.93 KG/M2 | WEIGHT: 230 LBS | DIASTOLIC BLOOD PRESSURE: 86 MMHG | TEMPERATURE: 98.6 F | OXYGEN SATURATION: 99 % | HEIGHT: 70 IN | HEART RATE: 72 BPM

## 2024-01-20 DIAGNOSIS — L29.9 PRURITIC DISORDER: Primary | ICD-10-CM

## 2024-01-20 PROCEDURE — 96372 THER/PROPH/DIAG INJ SC/IM: CPT

## 2024-01-20 PROCEDURE — 6360000002 HC RX W HCPCS: Performed by: EMERGENCY MEDICINE

## 2024-01-20 PROCEDURE — 99284 EMERGENCY DEPT VISIT MOD MDM: CPT

## 2024-01-20 RX ORDER — DIPHENHYDRAMINE HYDROCHLORIDE 50 MG/ML
25 INJECTION INTRAMUSCULAR; INTRAVENOUS ONCE
Status: COMPLETED | OUTPATIENT
Start: 2024-01-20 | End: 2024-01-20

## 2024-01-20 RX ORDER — DEXAMETHASONE SODIUM PHOSPHATE 10 MG/ML
10 INJECTION, SOLUTION INTRAMUSCULAR; INTRAVENOUS ONCE
Status: COMPLETED | OUTPATIENT
Start: 2024-01-20 | End: 2024-01-20

## 2024-01-20 RX ADMIN — DIPHENHYDRAMINE HYDROCHLORIDE 25 MG: 50 INJECTION INTRAMUSCULAR; INTRAVENOUS at 16:44

## 2024-01-20 RX ADMIN — DEXAMETHASONE SODIUM PHOSPHATE 10 MG: 10 INJECTION INTRAMUSCULAR; INTRAVENOUS at 16:44

## 2024-01-20 ASSESSMENT — PAIN - FUNCTIONAL ASSESSMENT: PAIN_FUNCTIONAL_ASSESSMENT: NONE - DENIES PAIN

## 2024-01-20 NOTE — ED NOTES
Pt in no distress at time of discharge     Discharge instructions given to and explained to pt. Pt verbalized understanding and denies questions at this time.  Pt stable at discharge.

## 2024-01-20 NOTE — ED PROVIDER NOTES
4:43 PM [facility] ED  EMERGENCY DEPARTMENT ENCOUNTER      Pt Name: Jeff Johnson  MRN: [id number]  Birthdate 1975  Date of evaluation: 1/20/2024  Provider: Oswald Roy MD    CHIEF COMPLAINT       Chief Complaint   Patient presents with    itchy legs      X1 week         HISTORY OF PRESENT ILLNESS   (Location/Symptom, Timing/Onset, Context/Setting, Quality, Duration, Modifying Factors, Severity)  Note limiting factors.   48-year-old male presenting with bilateral leg itchiness.  Symptoms have been ongoing for a week.  Notes itchiness from the foot to the knee.  No one else at home with similar symptoms.  No rash.  Has been taking Benadryl with minimal improvement.  Denies any other symptoms.        Nursing Notes were reviewed.    REVIEW OF SYSTEMS    (2-9 systems for level 4, 10 or more for level 5)     Review of Systems   All other systems reviewed and are negative.      Except as noted above the remainder of the review of systems was reviewed and negative.       PAST MEDICAL HISTORY     Past Medical History:   Diagnosis Date    Depression with anxiety 4/24/2017    Hyperglycemia 4/24/2017    Hyperlipidemia 2/18/2014    Insomnia     Perennial allergic rhinitis with seasonal variation 4/24/2017    Pre-diabetes 4/24/2017    Tobacco use disorder 4/24/2017    Type 2 diabetes mellitus without complication, without long-term current use of insulin ([facility]) 8/6/2018         SURGICAL HISTORY       Past Surgical History:   Procedure Laterality Date    NASAL FRACTURE SURGERY  2007         CURRENT MEDICATIONS       Current Discharge Medication List        CONTINUE these medications which have NOT CHANGED    Details   naloxone 4 MG/0.1ML LIQD nasal spray 1 spray by Nasal route as needed for Opioid Reversal  Qty: 1 each, Refills: 0    Associated Diagnoses: Chronic, continuous use of opioids      Respiratory Therapy Supplies SHELBY New Full face CPAP  Mask.  Qty: 1 each, Refills: 0    Associated Diagnoses:

## 2024-01-20 NOTE — ED NOTES
RN went to medicate pt, pt sts that he has been taking Benadryl all week and does not think that it will help  Asks to speak with provider   Provider aware

## 2024-01-22 ENCOUNTER — APPOINTMENT (OUTPATIENT)
Dept: PRIMARY CARE | Facility: CLINIC | Age: 49
End: 2024-01-22
Payer: COMMERCIAL

## 2024-01-22 ENCOUNTER — OFFICE VISIT (OUTPATIENT)
Dept: PRIMARY CARE | Facility: CLINIC | Age: 49
End: 2024-01-22
Payer: COMMERCIAL

## 2024-01-22 VITALS
TEMPERATURE: 98.9 F | HEIGHT: 70 IN | DIASTOLIC BLOOD PRESSURE: 89 MMHG | WEIGHT: 250.7 LBS | OXYGEN SATURATION: 97 % | SYSTOLIC BLOOD PRESSURE: 136 MMHG | HEART RATE: 78 BPM | BODY MASS INDEX: 35.89 KG/M2

## 2024-01-22 DIAGNOSIS — L29.9 ITCHING: ICD-10-CM

## 2024-01-22 DIAGNOSIS — I10 PRIMARY HYPERTENSION: ICD-10-CM

## 2024-01-22 DIAGNOSIS — N52.9 ERECTILE DYSFUNCTION, UNSPECIFIED ERECTILE DYSFUNCTION TYPE: ICD-10-CM

## 2024-01-22 DIAGNOSIS — E78.2 HYPERLIPEMIA, MIXED: ICD-10-CM

## 2024-01-22 DIAGNOSIS — E11.9 TYPE 2 DIABETES MELLITUS WITHOUT COMPLICATION, WITHOUT LONG-TERM CURRENT USE OF INSULIN (MULTI): ICD-10-CM

## 2024-01-22 DIAGNOSIS — L30.9 ECZEMA, UNSPECIFIED TYPE: Primary | ICD-10-CM

## 2024-01-22 PROCEDURE — 99214 OFFICE O/P EST MOD 30 MIN: CPT | Performed by: PHYSICIAN ASSISTANT

## 2024-01-22 PROCEDURE — 3079F DIAST BP 80-89 MM HG: CPT | Performed by: PHYSICIAN ASSISTANT

## 2024-01-22 PROCEDURE — 3075F SYST BP GE 130 - 139MM HG: CPT | Performed by: PHYSICIAN ASSISTANT

## 2024-01-22 PROCEDURE — 4010F ACE/ARB THERAPY RXD/TAKEN: CPT | Performed by: PHYSICIAN ASSISTANT

## 2024-01-22 PROCEDURE — 4004F PT TOBACCO SCREEN RCVD TLK: CPT | Performed by: PHYSICIAN ASSISTANT

## 2024-01-22 PROCEDURE — 3008F BODY MASS INDEX DOCD: CPT | Performed by: PHYSICIAN ASSISTANT

## 2024-01-22 RX ORDER — METFORMIN HYDROCHLORIDE 500 MG/1
500 TABLET, EXTENDED RELEASE ORAL
Qty: 90 TABLET | Refills: 0 | Status: SHIPPED | OUTPATIENT
Start: 2024-01-22 | End: 2024-02-17 | Stop reason: SDUPTHER

## 2024-01-22 RX ORDER — TADALAFIL 20 MG/1
20 TABLET ORAL DAILY PRN
Qty: 10 TABLET | Refills: 0 | Status: SHIPPED | OUTPATIENT
Start: 2024-01-22 | End: 2024-02-17 | Stop reason: SDUPTHER

## 2024-01-22 RX ORDER — AMLODIPINE BESYLATE 5 MG/1
5 TABLET ORAL DAILY
Qty: 90 TABLET | Refills: 0 | Status: SHIPPED | OUTPATIENT
Start: 2024-01-22 | End: 2024-02-17 | Stop reason: SDUPTHER

## 2024-01-22 RX ORDER — ROSUVASTATIN CALCIUM 10 MG/1
10 TABLET, COATED ORAL DAILY
Qty: 90 TABLET | Refills: 0 | Status: SHIPPED | OUTPATIENT
Start: 2024-01-22 | End: 2024-02-17 | Stop reason: SDUPTHER

## 2024-01-22 RX ORDER — OLMESARTAN MEDOXOMIL 40 MG/1
40 TABLET ORAL DAILY
Qty: 90 TABLET | Refills: 0 | Status: SHIPPED | OUTPATIENT
Start: 2024-01-22 | End: 2024-02-17 | Stop reason: SDUPTHER

## 2024-01-22 RX ORDER — AMMONIUM LACTATE 12 G/100G
CREAM TOPICAL AS NEEDED
Qty: 385 G | Refills: 1 | Status: SHIPPED | OUTPATIENT
Start: 2024-01-22 | End: 2025-01-21

## 2024-01-22 RX ORDER — TRIAMCINOLONE ACETONIDE 1 MG/G
CREAM TOPICAL 2 TIMES DAILY
Qty: 45 G | Refills: 1 | Status: SHIPPED | OUTPATIENT
Start: 2024-01-22

## 2024-01-22 NOTE — PROGRESS NOTES
Subjective   Patient ID: Yariel Shaw is a 48 y.o. male who presents for Rash.    HPI   Labs are expected to be done mid- Feb '24 prior to appt with Dr Monsalve    Complains of dried back of bilateral calves and also redness and itchyiness going on for 1 week. OTC tried benadryl, allergra, lotion and cortisone cream.  Patient states he went to ER for it and was given a steroid shot only lasted for a day on 01/20/2024.        HTN-patient is compliant with all antihypertensives and does not complain of any chest pain, shortness of breath, lower extremity edema, or palpitations.  Patient does not check blood pressure at home but here in the office the blood pressure is stable. He needs refills today    HLD- patient states he tries to cholesterol in diet and is consistent with taking cholesterol medications.  He is due for lipid labs 2/17/24. He denies any chest pain, shortness of breath.    DM 2-patient does not check sugars at home and denies any hypoglycemic episodes.  Patient is compliant with all diabetic medications and denies side effects from use.  Patient denies any lower extremity numbness or tingling and states that the DM eye exam is pending. He needs a refill of Metformin today.    ED- stable on Cialis- needs refill today.       Review of Systems  Constitutional: Patient denies any fever, chills, loss of appetite, or unexplained weight loss.  Cardiovascular: Patient denies any chest pain, shortness of breath with exertion, tachycardia, palpitations, orthopnea, or paroxysmal nocturnal dyspnea.  Respiratory: Patient denies any cough, shortness breath, or wheezing.  Gastrointestinal patient denies any nausea, vomiting, diarrhea, constipation, melena, hematochezia, or reflux symptoms  Skin: See HPI  Neurology: Patient denies any new motor or sensory losses.  Denies any numbness, tingling, weakness, and incoordination of the extremities.  Patient also denies any tremor, seizures, or gait  "instability.  Endocrinology: Denies any polyuria, polydipsia, polyphagia, or heat/cold intolerance.    Objective   /89   Pulse 78   Temp 37.2 °C (98.9 °F)   Ht 1.778 m (5' 10\")   Wt 114 kg (250 lb 11.2 oz)   SpO2 97%   BMI 35.97 kg/m²     Physical Exam  Gen. appearance: Alert and cooperative, in no acute distress, well-developed, well-nourished obese male.  Neck: Supple and without adenopathy or rigidity.  There is no JVD at 90° and no carotid bruits are noted.  There is no thyromegaly, thyroid tenderness, or palpable thyroid nodules.  Heart: Regular rate and rhythm without murmur or ectopy.  Lungs: Lungs are clear to auscultation bilaterally with good air exchange.  Skin: Good turgor, moist, warm and without rashes or lesions.  Neurological exam: Alert and oriented ×3, no tremor, normal gait.  Extremities: No clubbing, cyanosis, or edema    Assessment/Plan   Diagnoses and all orders for this visit:  Eczema, unspecified type, with calf itching  -     ammonium lactate (Amlactin) 12 % cream; Apply topically if needed for dry skin.  -     triamcinolone (Kenalog) 0.1 % cream; Apply topically 2 times a day. Apply to affected area 1-2 times daily as needed. Avoid face and groin.  Patient was told that her not the humidity on his [place] to 42%.  Increase hydration, and add triamcinolone cream after shower and then Lac-Hydrin over that to help w/ symptoms.     Primary hypertension  -     amLODIPine (Norvasc) 5 mg tablet; Take 1 tablet (5 mg) by mouth once daily.  -     olmesartan (BENIcar) 40 mg tablet; Take 1 tablet (40 mg) by mouth once daily.  Patient will continue all current antihypertensives and we will continue to monitor.  Currently blood pressure stable in the office and patient is without side effects to treatment.  Encouraged patient low salt diet and increase physical activity.      Hyperlipemia, mixed  -     rosuvastatin (Crestor) 10 mg tablet; Take 1 tablet (10 mg) by mouth once daily.  Patient " will continue current dose of Crestor and is to follow-up in a month with Dr. Monsalve as scheduled.  He denies any symptoms of unstable angina at this time.    Type 2 diabetes mellitus without complication, without long-term current use of insulin (CMS/[facility])  -     metFORMIN  mg 24 hr tablet; Take 1 tablet (500 mg) by mouth once daily in the evening. Take with meals.  Patient is to increase physical activity and try dieting.  He is also trying over-the-counter herbals to see if this will help control his sugars better.  He will continue to take metformin and we will recheck his A1c in February as scheduled.  He currently denies any polyuria, polydipsia.    Erectile dysfunction, unspecified erectile dysfunction type  -     tadalafil (Cialis) 20 mg tablet; Take 1 tablet (20 mg) by mouth once daily as needed for erectile dysfunction.  Patient will continue to take Cialis as directed.    Patient understands that continued smoking will increase the risks of lung disease, vascular disease, and multiple malignancies.    Patient is to return to clinic in 1 month or sooner as needed.  A CBC was added to his labs due in February because 1 has not been done in a while.    Patient understands that should they have testing outside   facilities that we may not receive the results and was told to call us if they have not heard from our office within a week after testing.    [facility] uses voice recognition technology for dictations. Sometimes the software misinterprets words. Please take this into account when reading this.

## 2024-02-02 ENCOUNTER — LAB (OUTPATIENT)
Dept: LAB | Facility: LAB | Age: 49
End: 2024-02-02
Payer: COMMERCIAL

## 2024-02-02 DIAGNOSIS — I10 PRIMARY HYPERTENSION: ICD-10-CM

## 2024-02-02 DIAGNOSIS — E11.9 TYPE 2 DIABETES MELLITUS WITHOUT COMPLICATION, WITHOUT LONG-TERM CURRENT USE OF INSULIN (MULTI): ICD-10-CM

## 2024-02-02 DIAGNOSIS — E78.2 HYPERLIPEMIA, MIXED: ICD-10-CM

## 2024-02-02 DIAGNOSIS — L29.9 ITCHING: ICD-10-CM

## 2024-02-02 LAB
ANION GAP SERPL CALC-SCNC: 11 MMOL/L (ref 10–20)
BUN SERPL-MCNC: 15 MG/DL (ref 6–23)
CALCIUM SERPL-MCNC: 9.1 MG/DL (ref 8.6–10.3)
CHLORIDE SERPL-SCNC: 107 MMOL/L (ref 98–107)
CHOLEST SERPL-MCNC: 126 MG/DL (ref 0–199)
CHOLESTEROL/HDL RATIO: 3.2
CO2 SERPL-SCNC: 27 MMOL/L (ref 21–32)
CREAT SERPL-MCNC: 1.26 MG/DL (ref 0.5–1.3)
CREAT UR-MCNC: 210.8 MG/DL (ref 20–370)
EGFRCR SERPLBLD CKD-EPI 2021: 71 ML/MIN/1.73M*2
ERYTHROCYTE [DISTWIDTH] IN BLOOD BY AUTOMATED COUNT: 13 % (ref 11.5–14.5)
EST. AVERAGE GLUCOSE BLD GHB EST-MCNC: 146 MG/DL
GLUCOSE SERPL-MCNC: 102 MG/DL (ref 74–99)
HBA1C MFR BLD: 6.7 %
HCT VFR BLD AUTO: 43.7 % (ref 41–52)
HDLC SERPL-MCNC: 39 MG/DL
HGB BLD-MCNC: 14.7 G/DL (ref 13.5–17.5)
LDLC SERPL CALC-MCNC: 70 MG/DL
MCH RBC QN AUTO: 28.6 PG (ref 26–34)
MCHC RBC AUTO-ENTMCNC: 33.6 G/DL (ref 32–36)
MCV RBC AUTO: 85 FL (ref 80–100)
MICROALBUMIN UR-MCNC: <7 MG/L
MICROALBUMIN/CREAT UR: NORMAL MG/G{CREAT}
NON HDL CHOLESTEROL: 87 MG/DL (ref 0–149)
NRBC BLD-RTO: 0 /100 WBCS (ref 0–0)
PLATELET # BLD AUTO: 202 X10*3/UL (ref 150–450)
POTASSIUM SERPL-SCNC: 4 MMOL/L (ref 3.5–5.3)
RBC # BLD AUTO: 5.14 X10*6/UL (ref 4.5–5.9)
SODIUM SERPL-SCNC: 141 MMOL/L (ref 136–145)
TRIGL SERPL-MCNC: 87 MG/DL (ref 0–149)
VLDL: 17 MG/DL (ref 0–40)
WBC # BLD AUTO: 6.1 X10*3/UL (ref 4.4–11.3)

## 2024-02-02 PROCEDURE — 82043 UR ALBUMIN QUANTITATIVE: CPT

## 2024-02-02 PROCEDURE — 80048 BASIC METABOLIC PNL TOTAL CA: CPT

## 2024-02-02 PROCEDURE — 80061 LIPID PANEL: CPT

## 2024-02-02 PROCEDURE — 83036 HEMOGLOBIN GLYCOSYLATED A1C: CPT

## 2024-02-02 PROCEDURE — 82570 ASSAY OF URINE CREATININE: CPT

## 2024-02-02 PROCEDURE — 36415 COLL VENOUS BLD VENIPUNCTURE: CPT

## 2024-02-02 PROCEDURE — 85027 COMPLETE CBC AUTOMATED: CPT

## 2024-02-17 ENCOUNTER — OFFICE VISIT (OUTPATIENT)
Dept: PRIMARY CARE | Facility: CLINIC | Age: 49
End: 2024-02-17
Payer: COMMERCIAL

## 2024-02-17 VITALS
DIASTOLIC BLOOD PRESSURE: 82 MMHG | BODY MASS INDEX: 35.66 KG/M2 | HEART RATE: 78 BPM | HEIGHT: 70 IN | SYSTOLIC BLOOD PRESSURE: 132 MMHG | WEIGHT: 249.1 LBS | TEMPERATURE: 97.9 F | OXYGEN SATURATION: 96 %

## 2024-02-17 DIAGNOSIS — E11.9 TYPE 2 DIABETES MELLITUS WITHOUT COMPLICATION, WITHOUT LONG-TERM CURRENT USE OF INSULIN (MULTI): ICD-10-CM

## 2024-02-17 DIAGNOSIS — F41.9 ANXIETY: ICD-10-CM

## 2024-02-17 DIAGNOSIS — N52.9 ERECTILE DYSFUNCTION, UNSPECIFIED ERECTILE DYSFUNCTION TYPE: ICD-10-CM

## 2024-02-17 DIAGNOSIS — M54.9 BACK PAIN, UNSPECIFIED BACK LOCATION, UNSPECIFIED BACK PAIN LATERALITY, UNSPECIFIED CHRONICITY: ICD-10-CM

## 2024-02-17 DIAGNOSIS — I10 PRIMARY HYPERTENSION: Primary | ICD-10-CM

## 2024-02-17 DIAGNOSIS — F17.200 CURRENT EVERY DAY SMOKER: ICD-10-CM

## 2024-02-17 DIAGNOSIS — E78.2 HYPERLIPEMIA, MIXED: ICD-10-CM

## 2024-02-17 DIAGNOSIS — E66.01 CLASS 2 SEVERE OBESITY WITH SERIOUS COMORBIDITY AND BODY MASS INDEX (BMI) OF 35.0 TO 35.9 IN ADULT, UNSPECIFIED OBESITY TYPE (MULTI): ICD-10-CM

## 2024-02-17 PROCEDURE — 3079F DIAST BP 80-89 MM HG: CPT | Performed by: FAMILY MEDICINE

## 2024-02-17 PROCEDURE — 4004F PT TOBACCO SCREEN RCVD TLK: CPT | Performed by: FAMILY MEDICINE

## 2024-02-17 PROCEDURE — 3044F HG A1C LEVEL LT 7.0%: CPT | Performed by: FAMILY MEDICINE

## 2024-02-17 PROCEDURE — 4010F ACE/ARB THERAPY RXD/TAKEN: CPT | Performed by: FAMILY MEDICINE

## 2024-02-17 PROCEDURE — 3075F SYST BP GE 130 - 139MM HG: CPT | Performed by: FAMILY MEDICINE

## 2024-02-17 PROCEDURE — 3008F BODY MASS INDEX DOCD: CPT | Performed by: FAMILY MEDICINE

## 2024-02-17 PROCEDURE — 3062F POS MACROALBUMINURIA REV: CPT | Performed by: FAMILY MEDICINE

## 2024-02-17 PROCEDURE — 99214 OFFICE O/P EST MOD 30 MIN: CPT | Performed by: FAMILY MEDICINE

## 2024-02-17 PROCEDURE — 3048F LDL-C <100 MG/DL: CPT | Performed by: FAMILY MEDICINE

## 2024-02-17 RX ORDER — ROSUVASTATIN CALCIUM 10 MG/1
10 TABLET, COATED ORAL DAILY
Qty: 90 TABLET | Refills: 0 | Status: SHIPPED | OUTPATIENT
Start: 2024-02-17 | End: 2024-05-15 | Stop reason: SDUPTHER

## 2024-02-17 RX ORDER — AMLODIPINE BESYLATE 5 MG/1
5 TABLET ORAL DAILY
Qty: 90 TABLET | Refills: 0 | Status: SHIPPED | OUTPATIENT
Start: 2024-02-17 | End: 2024-05-15 | Stop reason: SDUPTHER

## 2024-02-17 RX ORDER — METFORMIN HYDROCHLORIDE 500 MG/1
500 TABLET, EXTENDED RELEASE ORAL
Qty: 90 TABLET | Refills: 0 | Status: SHIPPED | OUTPATIENT
Start: 2024-02-17 | End: 2024-05-21 | Stop reason: SDUPTHER

## 2024-02-17 RX ORDER — TADALAFIL 20 MG/1
20 TABLET ORAL DAILY PRN
Qty: 10 TABLET | Refills: 0 | Status: SHIPPED | OUTPATIENT
Start: 2024-02-17

## 2024-02-17 RX ORDER — OLMESARTAN MEDOXOMIL 40 MG/1
40 TABLET ORAL DAILY
Qty: 90 TABLET | Refills: 0 | Status: SHIPPED | OUTPATIENT
Start: 2024-02-17 | End: 2024-05-15 | Stop reason: SDUPTHER

## 2024-02-17 NOTE — PATIENT INSTRUCTIONS
Follow up in 3 months with labs to be done PRIOR.    It was a pleasure to see you today. Thank you for choosing us for your health care needs.    If you have lab or other testing completed and have not been informed of results within one week, please call the office for your results.    If you haven't done so, consider signing up for [facility], the [facility] personal health record. Ask the staff how you can get started.

## 2024-02-17 NOTE — PROGRESS NOTES
Subjective   Patient ID: Yariel Shaw is a 48 y.o. male who presents for follow up monitoring and management of multiple medical conditions.      HPI     NO NEW CONCERNS   Labs: 02/02/2024  Colonoscopy: 01/03/2024      Patient has hypertension  He does not monitor his BP at home.  Denies chest pain, dizziness, lower leg swelling.      Patient has hyperlipidemia.  Patient tries to limit the amount of fatty foods and high cholesterol foods that are consumed.  He is compliant with his rosuvastatin and denies any noted side effects.     Patient has Type 2 DM.  Pt does NOT monitor his glucose at home regularly.  He denies any polyuria, polydipsia, polyphagia.  Patient states that he has been compliant with the current diabetes medication.     He has ED.  Tadalafil continues to work well for him.     Pt continues to smoke.   Patient continues to work on smoking cessation but has been unable to completely quit.     Pt has anxiety.   He does follow with psych, Dr. Patino for his anxiety.  His psychiatrist manages his xanax.   Denies any noted side effects.     He has insomnia.  Ambien was discontinued in the past since he is on xanax (risks of combination discussed).     Pt has chronic lower back pain.  He does follow with pain management as needed.              Review of Systems  Constitutional: Patient denies any fever, chills, loss of appetite, or unexplained weight loss.  Cardiovascular: Patient denies any chest pain, shortness of breath with exertion, tachycardia, palpitations, orthopnea, or paroxysmal nocturnal dyspnea.  Respiratory: Patient denies any cough, shortness breath, or wheezing.  Skin: Denies any rashes or skin lesions.   Neurology: Patient denies any new motor or sensory losses.  Denies any numbness, tingling, weakness, and incoordination of the extremities.  Patient also denies any tremor, seizures, or gait instability.  Endocrinology: Denies any polyuria, polydipsia, polyphagia, or heat/cold  "intolerance.      Objective   /82 Comment: 143/90  Pulse 78   Temp 36.6 °C (97.9 °F)   Ht 1.778 m (5' 10\")   Wt 113 kg (249 lb 1.6 oz)   SpO2 96%   BMI 35.74 kg/m²     Physical Exam  General Appearance: Alert and cooperative, in no acute distress, well-developed/well-nourished, overweight male.    Neck: Supple and without adenopathy or rigidity.  There is no JVD at 90° and no carotid bruits are noted.  There is no thyromegaly, thyroid tenderness, or palpable thyroid nodules.  Heart: Regular rate and rhythm without murmur or ectopy.  Respiratory: Lungs are clear to auscultation bilaterally with good air exchange.  Good respiratory effort and no accessory muscle use.  Skin: Good turgor, moist, warm and without rashes or lesions.  Neurological exam: Alert and oriented ×3, no tremor, normal gait.  Extremities: No clubbing, cyanosis, or edema      Assessment/Plan   HTN: Blood pressure appears adequately controlled and we will continue with the current antihypertensive therapy.     Hyperlipidemia: Stable on last labs.  Patient will continue with current statin therapy.  Dietary changes, exercise, and maintenance of healthy weight were discussed at length..     DM Type 2:   Most recent hemoglobin A1c was:  Lab Results   Component Value Date    HGBA1C 6.7 (H) 02/02/2024   We will continue with the same treatment plan.   Dietary changes, exercise, and maintenance of a healthy weight were discussed at length.   He was encouraged to monitor his blood sugars at home once a day.      Erectile dysfunction: Stable based on symptoms  He will continue the tadalafil as needed.    Current Smoker: Patient understands that continued smoking will increase the risks of lung disease, vascular disease, and multiple malignancies. Pt. has been encouraged to work on smoking cessation and available smoking cessation aids were discussed.  2/17/24: HE IS STILL SMOKING SEVERAL CIGARETTES PER DAY         Obesity: Dietary changes, " exercise, and maintenance of a healthy weight were discussed at length.  Goal is to achieve a BMI less than 25.     Anxiety: Stable based on symptoms.  His anxiety is managed by psychiatry.     Back pain: Stable but persistent.  He will continue to follow with pain management as needed.             COLONOSCOPY DUE 1/2029        Orders Placed This Encounter   Procedures    Hemoglobin A1C    Comprehensive Metabolic Panel     Requested Prescriptions     Signed Prescriptions Disp Refills    rosuvastatin (Crestor) 10 mg tablet 90 tablet 0     Sig: Take 1 tablet (10 mg) by mouth once daily.    amLODIPine (Norvasc) 5 mg tablet 90 tablet 0     Sig: Take 1 tablet (5 mg) by mouth once daily.    olmesartan (BENIcar) 40 mg tablet 90 tablet 0     Sig: Take 1 tablet (40 mg) by mouth once daily.    metFORMIN  mg 24 hr tablet 90 tablet 0     Sig: Take 1 tablet (500 mg) by mouth once daily in the evening. Take with meals.    tadalafil (Cialis) 20 mg tablet 10 tablet 0     Sig: Take 1 tablet (20 mg) by mouth once daily as needed for erectile dysfunction.

## 2024-03-04 ENCOUNTER — TELEPHONE (OUTPATIENT)
Dept: PRIMARY CARE | Facility: CLINIC | Age: 49
End: 2024-03-04
Payer: COMMERCIAL

## 2024-03-04 DIAGNOSIS — U07.1 COVID: Primary | ICD-10-CM

## 2024-03-04 NOTE — TELEPHONE ENCOUNTER
Patient states he tested positive for covid yesterday and is requesting if you can send him in a medication, please advise

## 2024-03-05 RX ORDER — NIRMATRELVIR AND RITONAVIR 300-100 MG
3 KIT ORAL 2 TIMES DAILY
Qty: 30 TABLET | Refills: 0 | Status: SHIPPED | OUTPATIENT
Start: 2024-03-05 | End: 2024-03-10

## 2024-03-05 NOTE — TELEPHONE ENCOUNTER
Spoke to pt by phone.  Developed some respiratory symptoms, sweating (no fever).  No SOB or wheezing.    He was seen at [facility] and had a (+) Covid test.    We discussed the use of Paxlovid.  He does have some risk factors for severe disease including HTN and DM.   Risks, benefits, and side effects of the medication were discussed at length.  Questions were answered to the patient's satisfaction the patient wishes to proceed with treatment.  RX for Paxlovid will be sent to his [facility] pharmacy.  Pt advised to stop his rosuvastatin and alprazolam for the 5 days he is on the Paxlovid due to potential interactions.    We discussed conservative care at this point.  I have recommended good hydration with increased fluids.  Patient can utilize Tylenol or ibuprofen (unless contraindicated) for aches and fever.  I have recommended that they seek medical attention immediately should they develop any shortness of breath, chest pain, or pain/swelling of the extremities.    I have recommended monitoring oxygen levels at home with a pulse oximeter.  Patient should seek medical attention if oxygen levels are consistently less than 94%.    We discussed the patient should continue to quarantine for a full 5 days following the onset of symptoms.  If the patient remains fever free for a full 24 hours at the end of the quarantine and other symptoms have improved (need not be resolved), the patient can end quarantine.  Patient should continue to wear a mask for an additional 5 days.

## 2024-05-02 ENCOUNTER — APPOINTMENT (OUTPATIENT)
Dept: RADIOLOGY | Facility: HOSPITAL | Age: 49
End: 2024-05-02
Payer: MEDICARE

## 2024-05-02 ENCOUNTER — HOSPITAL ENCOUNTER (EMERGENCY)
Facility: HOSPITAL | Age: 49
Discharge: HOME | End: 2024-05-02
Attending: INTERNAL MEDICINE
Payer: MEDICARE

## 2024-05-02 VITALS
SYSTOLIC BLOOD PRESSURE: 154 MMHG | BODY MASS INDEX: 28.63 KG/M2 | HEART RATE: 89 BPM | HEIGHT: 70 IN | DIASTOLIC BLOOD PRESSURE: 97 MMHG | OXYGEN SATURATION: 100 % | TEMPERATURE: 97.9 F | RESPIRATION RATE: 18 BRPM | WEIGHT: 200 LBS

## 2024-05-02 DIAGNOSIS — I10 HYPERTENSION, UNSPECIFIED TYPE: ICD-10-CM

## 2024-05-02 DIAGNOSIS — S39.012A STRAIN OF LUMBAR REGION, INITIAL ENCOUNTER: ICD-10-CM
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
## 2024-05-02 DIAGNOSIS — S16.1XXA CERVICAL STRAIN, ACUTE, INITIAL ENCOUNTER: ICD-10-CM

## 2024-05-02 LAB
ABO GROUP (TYPE) IN BLOOD: NORMAL
ALBUMIN SERPL BCP-MCNC: 4.2 G/DL (ref 3.4–5)
ALP SERPL-CCNC: 75 U/L (ref 33–120)
ALT SERPL W P-5'-P-CCNC: 21 U/L (ref 10–52)
ANION GAP SERPL CALC-SCNC: 10 MMOL/L (ref 10–20)
ANTIBODY SCREEN: NORMAL
AST SERPL W P-5'-P-CCNC: 20 U/L (ref 9–39)
BASOPHILS # BLD AUTO: 0.04 X10*3/UL (ref 0–0.1)
BASOPHILS NFR BLD AUTO: 0.5 %
BILIRUB SERPL-MCNC: 0.5 MG/DL (ref 0–1.2)
BUN SERPL-MCNC: 15 MG/DL (ref 6–23)
CALCIUM SERPL-MCNC: 9.2 MG/DL (ref 8.6–10.3)
CHLORIDE SERPL-SCNC: 107 MMOL/L (ref 98–107)
CO2 SERPL-SCNC: 24 MMOL/L (ref 21–32)
CREAT SERPL-MCNC: 1.36 MG/DL (ref 0.5–1.3)
EGFRCR SERPLBLD CKD-EPI 2021: 64 ML/MIN/1.73M*2
EOSINOPHIL # BLD AUTO: 0.33 X10*3/UL (ref 0–0.7)
EOSINOPHIL NFR BLD AUTO: 4 %
ERYTHROCYTE [DISTWIDTH] IN BLOOD BY AUTOMATED COUNT: 12.8 % (ref 11.5–14.5)
ETHANOL SERPL-MCNC: <10 MG/DL
GLUCOSE SERPL-MCNC: 162 MG/DL (ref 74–99)
HCT VFR BLD AUTO: 41.4 % (ref 41–52)
HGB BLD-MCNC: 14.2 G/DL (ref 13.5–17.5)
HOLD SPECIMEN: NORMAL
IMM GRANULOCYTES # BLD AUTO: 0.02 X10*3/UL (ref 0–0.7)
IMM GRANULOCYTES NFR BLD AUTO: 0.2 % (ref 0–0.9)
INR PPP: 1 (ref 0.9–1.1)
LACTATE SERPL-SCNC: 2 MMOL/L (ref 0.4–2)
LYMPHOCYTES # BLD AUTO: 3.98 X10*3/UL (ref 1.2–4.8)
LYMPHOCYTES NFR BLD AUTO: 47.7 %
MCH RBC QN AUTO: 29 PG (ref 26–34)
MCHC RBC AUTO-ENTMCNC: 34.3 G/DL (ref 32–36)
MCV RBC AUTO: 85 FL (ref 80–100)
MONOCYTES # BLD AUTO: 0.7 X10*3/UL (ref 0.1–1)
MONOCYTES NFR BLD AUTO: 8.4 %
NEUTROPHILS # BLD AUTO: 3.27 X10*3/UL (ref 1.2–7.7)
NEUTROPHILS NFR BLD AUTO: 39.2 %
NRBC BLD-RTO: 0 /100 WBCS (ref 0–0)
PLATELET # BLD AUTO: 198 X10*3/UL (ref 150–450)
POTASSIUM SERPL-SCNC: 3.4 MMOL/L (ref 3.5–5.3)
PROT SERPL-MCNC: 7 G/DL (ref 6.4–8.2)
PROTHROMBIN TIME: 11.7 SECONDS (ref 9.8–12.8)
RBC # BLD AUTO: 4.9 X10*6/UL (ref 4.5–5.9)
RH FACTOR (ANTIGEN D): NORMAL
SODIUM SERPL-SCNC: 138 MMOL/L (ref 136–145)
WBC # BLD AUTO: 8.3 X10*3/UL (ref 4.4–11.3)

## 2024-05-02 PROCEDURE — 70450 CT HEAD/BRAIN W/O DYE: CPT | Performed by: SURGERY

## 2024-05-02 PROCEDURE — 74177 CT ABD & PELVIS W/CONTRAST: CPT | Performed by: SURGERY

## 2024-05-02 PROCEDURE — 72131 CT LUMBAR SPINE W/O DYE: CPT

## 2024-05-02 PROCEDURE — 86901 BLOOD TYPING SEROLOGIC RH(D): CPT | Performed by: INTERNAL MEDICINE

## 2024-05-02 PROCEDURE — 99285 EMERGENCY DEPT VISIT HI MDM: CPT | Mod: 25

## 2024-05-02 PROCEDURE — 72170 X-RAY EXAM OF PELVIS: CPT | Performed by: SURGERY

## 2024-05-02 PROCEDURE — 72125 CT NECK SPINE W/O DYE: CPT | Performed by: SURGERY

## 2024-05-02 PROCEDURE — 2550000001 HC RX 255 CONTRASTS: Performed by: INTERNAL MEDICINE

## 2024-05-02 PROCEDURE — 80053 COMPREHEN METABOLIC PANEL: CPT | Performed by: INTERNAL MEDICINE

## 2024-05-02 PROCEDURE — 72128 CT CHEST SPINE W/O DYE: CPT | Performed by: SURGERY

## 2024-05-02 PROCEDURE — 70450 CT HEAD/BRAIN W/O DYE: CPT

## 2024-05-02 PROCEDURE — 72125 CT NECK SPINE W/O DYE: CPT

## 2024-05-02 PROCEDURE — 85025 COMPLETE CBC W/AUTO DIFF WBC: CPT | Performed by: INTERNAL MEDICINE

## 2024-05-02 PROCEDURE — G0390 TRAUMA RESPONS W/HOSP CRITI: HCPCS

## 2024-05-02 PROCEDURE — 99284 EMERGENCY DEPT VISIT MOD MDM: CPT

## 2024-05-02 PROCEDURE — 72170 X-RAY EXAM OF PELVIS: CPT

## 2024-05-02 PROCEDURE — 71260 CT THORAX DX C+: CPT

## 2024-05-02 PROCEDURE — 71260 CT THORAX DX C+: CPT | Performed by: SURGERY

## 2024-05-02 PROCEDURE — 71045 X-RAY EXAM CHEST 1 VIEW: CPT | Performed by: SURGERY

## 2024-05-02 PROCEDURE — 74177 CT ABD & PELVIS W/CONTRAST: CPT

## 2024-05-02 PROCEDURE — 36415 COLL VENOUS BLD VENIPUNCTURE: CPT | Performed by: INTERNAL MEDICINE

## 2024-05-02 PROCEDURE — 85610 PROTHROMBIN TIME: CPT | Performed by: INTERNAL MEDICINE

## 2024-05-02 PROCEDURE — 71045 X-RAY EXAM CHEST 1 VIEW: CPT

## 2024-05-02 PROCEDURE — 72131 CT LUMBAR SPINE W/O DYE: CPT | Performed by: SURGERY

## 2024-05-02 PROCEDURE — 82077 ASSAY SPEC XCP UR&BREATH IA: CPT | Performed by: INTERNAL MEDICINE

## 2024-05-02 PROCEDURE — 72128 CT CHEST SPINE W/O DYE: CPT

## 2024-05-02 PROCEDURE — 83605 ASSAY OF LACTIC ACID: CPT | Performed by: INTERNAL MEDICINE

## 2024-05-02 RX ORDER — ONDANSETRON HYDROCHLORIDE 2 MG/ML
4 INJECTION, SOLUTION INTRAVENOUS ONCE
Status: DISCONTINUED | OUTPATIENT
Start: 2024-05-02 | End: 2024-05-02 | Stop reason: HOSPADM

## 2024-05-02 RX ORDER — IBUPROFEN 600 MG/1
600 TABLET ORAL EVERY 8 HOURS PRN
Qty: 20 TABLET | Refills: 0 | Status: SHIPPED | OUTPATIENT
Start: 2024-05-02 | End: 2024-05-15 | Stop reason: SDUPTHER

## 2024-05-02 RX ORDER — METHOCARBAMOL 500 MG/1
1000 TABLET, FILM COATED ORAL 3 TIMES DAILY PRN
Qty: 20 TABLET | Refills: 0 | Status: SHIPPED | OUTPATIENT
Start: 2024-05-02 | End: 2024-05-15 | Stop reason: SINTOL

## 2024-05-02 RX ORDER — FENTANYL CITRATE 50 UG/ML
100 INJECTION, SOLUTION INTRAMUSCULAR; INTRAVENOUS ONCE
Status: DISCONTINUED | OUTPATIENT
Start: 2024-05-02 | End: 2024-05-02 | Stop reason: HOSPADM

## 2024-05-02 RX ADMIN — IOHEXOL 100 ML: 350 INJECTION, SOLUTION INTRAVENOUS at 19:17

## 2024-05-02 ASSESSMENT — LIFESTYLE VARIABLES
HAVE PEOPLE ANNOYED YOU BY CRITICIZING YOUR DRINKING: NO
EVER HAD A DRINK FIRST THING IN THE MORNING TO STEADY YOUR NERVES TO GET RID OF A HANGOVER: NO
HAVE YOU EVER FELT YOU SHOULD CUT DOWN ON YOUR DRINKING: NO
EVER FELT BAD OR GUILTY ABOUT YOUR DRINKING: NO
TOTAL SCORE: 0

## 2024-05-02 ASSESSMENT — PAIN - FUNCTIONAL ASSESSMENT: PAIN_FUNCTIONAL_ASSESSMENT: 0-10

## 2024-05-02 ASSESSMENT — ENCOUNTER SYMPTOMS
BACK PAIN: 1
WOUND: 1

## 2024-05-02 ASSESSMENT — PAIN DESCRIPTION - ORIENTATION: ORIENTATION: LOWER;MID

## 2024-05-02 ASSESSMENT — COLUMBIA-SUICIDE SEVERITY RATING SCALE - C-SSRS
2. HAVE YOU ACTUALLY HAD ANY THOUGHTS OF KILLING YOURSELF?: NO
1. IN THE PAST MONTH, HAVE YOU WISHED YOU WERE DEAD OR WISHED YOU COULD GO TO SLEEP AND NOT WAKE UP?: NO
6. HAVE YOU EVER DONE ANYTHING, STARTED TO DO ANYTHING, OR PREPARED TO DO ANYTHING TO END YOUR LIFE?: NO

## 2024-05-02 ASSESSMENT — PAIN DESCRIPTION - LOCATION: LOCATION: BACK

## 2024-05-02 ASSESSMENT — PAIN DESCRIPTION - PAIN TYPE: TYPE: ACUTE PAIN

## 2024-05-02 ASSESSMENT — PAIN SCALES - GENERAL: PAINLEVEL_OUTOF10: 9
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
## 2024-05-03 ENCOUNTER — HOSPITAL ENCOUNTER (EMERGENCY)
Facility: HOSPITAL | Age: 49
Discharge: HOME | End: 2024-05-03
Payer: COMMERCIAL

## 2024-05-03 VITALS
BODY MASS INDEX: 35.79 KG/M2 | RESPIRATION RATE: 16 BRPM | HEART RATE: 75 BPM | SYSTOLIC BLOOD PRESSURE: 123 MMHG | TEMPERATURE: 96.8 F | DIASTOLIC BLOOD PRESSURE: 83 MMHG | HEIGHT: 70 IN | OXYGEN SATURATION: 99 % | WEIGHT: 250 LBS

## 2024-05-03 DIAGNOSIS — T78.40XA ALLERGIC REACTION, INITIAL ENCOUNTER: Primary | ICD-10-CM

## 2024-05-03 PROCEDURE — 96372 THER/PROPH/DIAG INJ SC/IM: CPT

## 2024-05-03 PROCEDURE — 99283 EMERGENCY DEPT VISIT LOW MDM: CPT

## 2024-05-03 PROCEDURE — 2500000001 HC RX 250 WO HCPCS SELF ADMINISTERED DRUGS (ALT 637 FOR MEDICARE OP)

## 2024-05-03 PROCEDURE — 2500000004 HC RX 250 GENERAL PHARMACY W/ HCPCS (ALT 636 FOR OP/ED)

## 2024-05-03 RX ORDER — PREDNISONE 20 MG/1
20 TABLET ORAL 2 TIMES DAILY
Qty: 8 TABLET | Refills: 0 | Status: SHIPPED | OUTPATIENT
Start: 2024-05-03 | End: 2024-05-07

## 2024-05-03 RX ORDER — KETOROLAC TROMETHAMINE 30 MG/ML
30 INJECTION, SOLUTION INTRAMUSCULAR; INTRAVENOUS ONCE
Status: COMPLETED | OUTPATIENT
Start: 2024-05-03 | End: 2024-05-03

## 2024-05-03 RX ORDER — DIPHENHYDRAMINE HCL 25 MG
50 TABLET ORAL EVERY 6 HOURS PRN
Qty: 20 TABLET | Refills: 0 | Status: SHIPPED | OUTPATIENT
Start: 2024-05-03 | End: 2024-05-15 | Stop reason: ALTCHOICE

## 2024-05-03 RX ORDER — ORPHENADRINE CITRATE 30 MG/ML
60 INJECTION INTRAMUSCULAR; INTRAVENOUS ONCE
Status: COMPLETED | OUTPATIENT
Start: 2024-05-03 | End: 2024-05-03

## 2024-05-03 RX ORDER — CETIRIZINE HYDROCHLORIDE 10 MG/1
10 TABLET ORAL DAILY
Qty: 7 TABLET | Refills: 0 | Status: SHIPPED | OUTPATIENT
Start: 2024-05-03 | End: 2024-05-15 | Stop reason: ALTCHOICE

## 2024-05-03 RX ORDER — PREDNISONE 20 MG/1
40 TABLET ORAL ONCE
Status: COMPLETED | OUTPATIENT
Start: 2024-05-03 | End: 2024-05-03

## 2024-05-03 RX ORDER — DIPHENHYDRAMINE HCL 25 MG
50 TABLET ORAL ONCE
Status: COMPLETED | OUTPATIENT
Start: 2024-05-03 | End: 2024-05-03

## 2024-05-03 RX ORDER — FAMOTIDINE 20 MG/1
20 TABLET, FILM COATED ORAL ONCE
Status: COMPLETED | OUTPATIENT
Start: 2024-05-03 | End: 2024-05-03

## 2024-05-03 RX ORDER — CYCLOBENZAPRINE HCL 10 MG
10 TABLET ORAL 2 TIMES DAILY PRN
Qty: 14 TABLET | Refills: 0 | Status: SHIPPED | OUTPATIENT
Start: 2024-05-03 | End: 2024-05-15 | Stop reason: ALTCHOICE

## 2024-05-03 RX ADMIN — PREDNISONE 40 MG: 20 TABLET ORAL at 21:10

## 2024-05-03 RX ADMIN — ORPHENADRINE CITRATE 60 MG: 60 INJECTION INTRAMUSCULAR; INTRAVENOUS at 21:40

## 2024-05-03 RX ADMIN — KETOROLAC TROMETHAMINE 30 MG: 30 INJECTION, SOLUTION INTRAMUSCULAR at 21:39

## 2024-05-03 RX ADMIN — DIPHENHYDRAMINE HYDROCHLORIDE 50 MG: 25 TABLET ORAL at 21:10

## 2024-05-03 RX ADMIN — FAMOTIDINE 20 MG: 20 TABLET, FILM COATED ORAL at 21:10

## 2024-05-03 ASSESSMENT — PAIN DESCRIPTION - LOCATION: LOCATION: OTHER (COMMENT)

## 2024-05-03 ASSESSMENT — COLUMBIA-SUICIDE SEVERITY RATING SCALE - C-SSRS
1. IN THE PAST MONTH, HAVE YOU WISHED YOU WERE DEAD OR WISHED YOU COULD GO TO SLEEP AND NOT WAKE UP?: NO
2. HAVE YOU ACTUALLY HAD ANY THOUGHTS OF KILLING YOURSELF?: NO
6. HAVE YOU EVER DONE ANYTHING, STARTED TO DO ANYTHING, OR PREPARED TO DO ANYTHING TO END YOUR LIFE?: NO

## 2024-05-03 ASSESSMENT — PAIN - FUNCTIONAL ASSESSMENT: PAIN_FUNCTIONAL_ASSESSMENT: 0-10

## 2024-05-03 ASSESSMENT — PAIN DESCRIPTION - PAIN TYPE: TYPE: ACUTE PAIN

## 2024-05-03 ASSESSMENT — PAIN SCALES - GENERAL: PAINLEVEL_OUTOF10: 7
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
## 2024-05-14 ENCOUNTER — LAB (OUTPATIENT)
Dept: LAB | Facility: LAB | Age: 49
End: 2024-05-14
Payer: COMMERCIAL

## 2024-05-14 DIAGNOSIS — E78.2 HYPERLIPEMIA, MIXED: ICD-10-CM

## 2024-05-14 DIAGNOSIS — I10 PRIMARY HYPERTENSION: ICD-10-CM

## 2024-05-14 DIAGNOSIS — E11.9 TYPE 2 DIABETES MELLITUS WITHOUT COMPLICATION, WITHOUT LONG-TERM CURRENT USE OF INSULIN (MULTI): ICD-10-CM

## 2024-05-14 LAB
ALBUMIN SERPL BCP-MCNC: 4.1 G/DL (ref 3.4–5)
ALP SERPL-CCNC: 67 U/L (ref 33–120)
ALT SERPL W P-5'-P-CCNC: 19 U/L (ref 10–52)
ANION GAP SERPL CALC-SCNC: 10 MMOL/L (ref 10–20)
AST SERPL W P-5'-P-CCNC: 15 U/L (ref 9–39)
BILIRUB SERPL-MCNC: 0.4 MG/DL (ref 0–1.2)
BUN SERPL-MCNC: 15 MG/DL (ref 6–23)
CALCIUM SERPL-MCNC: 8.9 MG/DL (ref 8.6–10.3)
CHLORIDE SERPL-SCNC: 111 MMOL/L (ref 98–107)
CO2 SERPL-SCNC: 25 MMOL/L (ref 21–32)
CREAT SERPL-MCNC: 1.29 MG/DL (ref 0.5–1.3)
EGFRCR SERPLBLD CKD-EPI 2021: 68 ML/MIN/1.73M*2
EST. AVERAGE GLUCOSE BLD GHB EST-MCNC: 146 MG/DL
GLUCOSE SERPL-MCNC: 132 MG/DL (ref 74–99)
HBA1C MFR BLD: 6.7 %
POTASSIUM SERPL-SCNC: 4.3 MMOL/L (ref 3.5–5.3)
PROT SERPL-MCNC: 6.3 G/DL (ref 6.4–8.2)
SODIUM SERPL-SCNC: 142 MMOL/L (ref 136–145)

## 2024-05-14 PROCEDURE — 80053 COMPREHEN METABOLIC PANEL: CPT

## 2024-05-14 PROCEDURE — 83036 HEMOGLOBIN GLYCOSYLATED A1C: CPT

## 2024-05-14 PROCEDURE — 36415 COLL VENOUS BLD VENIPUNCTURE: CPT
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
## 2024-05-15 ENCOUNTER — OFFICE VISIT (OUTPATIENT)
Dept: PRIMARY CARE | Facility: CLINIC | Age: 49
End: 2024-05-15
Payer: COMMERCIAL

## 2024-05-15 VITALS
WEIGHT: 249.8 LBS | TEMPERATURE: 97.9 F | HEART RATE: 67 BPM | SYSTOLIC BLOOD PRESSURE: 141 MMHG | BODY MASS INDEX: 35.76 KG/M2 | DIASTOLIC BLOOD PRESSURE: 90 MMHG | OXYGEN SATURATION: 98 % | HEIGHT: 70 IN

## 2024-05-15 DIAGNOSIS — F17.200 SMOKER: ICD-10-CM

## 2024-05-15 DIAGNOSIS — E11.9 TYPE 2 DIABETES MELLITUS WITHOUT COMPLICATION, WITHOUT LONG-TERM CURRENT USE OF INSULIN (MULTI): ICD-10-CM
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
## 2024-05-15 DIAGNOSIS — I10 PRIMARY HYPERTENSION: Primary | ICD-10-CM

## 2024-05-15 DIAGNOSIS — E55.9 VITAMIN D DEFICIENCY: ICD-10-CM

## 2024-05-15 DIAGNOSIS — N52.9 ERECTILE DYSFUNCTION, UNSPECIFIED ERECTILE DYSFUNCTION TYPE: ICD-10-CM

## 2024-05-15 DIAGNOSIS — E78.2 HYPERLIPEMIA, MIXED: ICD-10-CM

## 2024-05-15 PROCEDURE — 3008F BODY MASS INDEX DOCD: CPT | Performed by: PHYSICIAN ASSISTANT

## 2024-05-15 PROCEDURE — 3077F SYST BP >= 140 MM HG: CPT | Performed by: PHYSICIAN ASSISTANT

## 2024-05-15 PROCEDURE — 3080F DIAST BP >= 90 MM HG: CPT | Performed by: PHYSICIAN ASSISTANT

## 2024-05-15 PROCEDURE — 4004F PT TOBACCO SCREEN RCVD TLK: CPT | Performed by: PHYSICIAN ASSISTANT

## 2024-05-15 PROCEDURE — 4010F ACE/ARB THERAPY RXD/TAKEN: CPT | Performed by: PHYSICIAN ASSISTANT

## 2024-05-15 PROCEDURE — 3048F LDL-C <100 MG/DL: CPT | Performed by: PHYSICIAN ASSISTANT

## 2024-05-15 PROCEDURE — 3062F POS MACROALBUMINURIA REV: CPT | Performed by: PHYSICIAN ASSISTANT

## 2024-05-15 PROCEDURE — 3044F HG A1C LEVEL LT 7.0%: CPT | Performed by: PHYSICIAN ASSISTANT

## 2024-05-15 PROCEDURE — 99214 OFFICE O/P EST MOD 30 MIN: CPT | Performed by: PHYSICIAN ASSISTANT

## 2024-05-15 RX ORDER — CYCLOBENZAPRINE HCL 10 MG
10 TABLET ORAL 2 TIMES DAILY PRN
Qty: 30 TABLET | Refills: 2 | Status: SHIPPED | OUTPATIENT
Start: 2024-05-15 | End: 2024-07-14

## 2024-05-15 RX ORDER — ROSUVASTATIN CALCIUM 10 MG/1
10 TABLET, COATED ORAL DAILY
Qty: 90 TABLET | Refills: 0 | Status: SHIPPED | OUTPATIENT
Start: 2024-05-15

## 2024-05-15 RX ORDER — AMLODIPINE BESYLATE 5 MG/1
5 TABLET ORAL DAILY
Qty: 90 TABLET | Refills: 0 | Status: SHIPPED | OUTPATIENT
Start: 2024-05-15

## 2024-05-15 RX ORDER — OLMESARTAN MEDOXOMIL 40 MG/1
40 TABLET ORAL DAILY
Qty: 90 TABLET | Refills: 0 | Status: SHIPPED | OUTPATIENT
Start: 2024-05-15

## 2024-05-15 RX ORDER — IBUPROFEN 600 MG/1
600 TABLET ORAL EVERY 8 HOURS PRN
Qty: 60 TABLET | Refills: 1 | Status: SHIPPED | OUTPATIENT
Start: 2024-05-15

## 2024-05-15 ASSESSMENT — PATIENT HEALTH QUESTIONNAIRE - PHQ9
5. POOR APPETITE OR OVEREATING: MORE THAN HALF THE DAYS
10. IF YOU CHECKED OFF ANY PROBLEMS, HOW DIFFICULT HAVE THESE PROBLEMS MADE IT FOR YOU TO DO YOUR WORK, TAKE CARE OF THINGS AT HOME, OR GET ALONG WITH OTHER PEOPLE: EXTREMELY DIFFICULT
SUM OF ALL RESPONSES TO PHQ9 QUESTIONS 1 AND 2: 3
1. LITTLE INTEREST OR PLEASURE IN DOING THINGS: SEVERAL DAYS
8. MOVING OR SPEAKING SO SLOWLY THAT OTHER PEOPLE COULD HAVE NOTICED. OR THE OPPOSITE, BEING SO FIGETY OR RESTLESS THAT YOU HAVE BEEN MOVING AROUND A LOT MORE THAN USUAL: MORE THAN HALF THE DAYS
6. FEELING BAD ABOUT YOURSELF - OR THAT YOU ARE A FAILURE OR HAVE LET YOURSELF OR YOUR FAMILY DOWN: SEVERAL DAYS
2. FEELING DOWN, DEPRESSED OR HOPELESS: MORE THAN HALF THE DAYS
SUM OF ALL RESPONSES TO PHQ QUESTIONS 1-9: 16
3. TROUBLE FALLING OR STAYING ASLEEP OR SLEEPING TOO MUCH: NEARLY EVERY DAY
9. THOUGHTS THAT YOU WOULD BE BETTER OFF DEAD, OR OF HURTING YOURSELF: NOT AT ALL
7. TROUBLE CONCENTRATING ON THINGS, SUCH AS READING THE NEWSPAPER OR WATCHING TELEVISION: MORE THAN HALF THE DAYS
4. FEELING TIRED OR HAVING LITTLE ENERGY: NEARLY EVERY DAY

## 2024-05-21 DIAGNOSIS — E11.9 TYPE 2 DIABETES MELLITUS WITHOUT COMPLICATION, WITHOUT LONG-TERM CURRENT USE OF INSULIN (MULTI): ICD-10-CM

## 2024-05-21 NOTE — TELEPHONE ENCOUNTER
Pt of Debbi's requesting refill  Metformin XR 500mg, 1 tab daily  [facility] in [place]  Pt's # [phone]

## 2024-05-22 RX ORDER — METFORMIN HYDROCHLORIDE 500 MG/1
500 TABLET, EXTENDED RELEASE ORAL
Qty: 90 TABLET | Refills: 0 | Status: SHIPPED | OUTPATIENT
Start: 2024-05-22

## 2024-05-25 ENCOUNTER — APPOINTMENT (OUTPATIENT)
Dept: PRIMARY CARE | Facility: CLINIC | Age: 49
End: 2024-05-25
Payer: COMMERCIAL

## 2024-06-17 ENCOUNTER — APPOINTMENT (OUTPATIENT)
Dept: PRIMARY CARE | Facility: CLINIC | Age: 49
End: 2024-06-17
Payer: COMMERCIAL

## 2024-06-27 ENCOUNTER — APPOINTMENT (OUTPATIENT)
Dept: RADIOLOGY | Facility: HOSPITAL | Age: 49
End: 2024-06-27
Payer: COMMERCIAL

## 2024-06-27 ENCOUNTER — HOSPITAL ENCOUNTER (EMERGENCY)
Facility: HOSPITAL | Age: 49
Discharge: HOME | End: 2024-06-27
Payer: COMMERCIAL

## 2024-06-27 VITALS
SYSTOLIC BLOOD PRESSURE: 133 MMHG | RESPIRATION RATE: 17 BRPM | HEIGHT: 70 IN | DIASTOLIC BLOOD PRESSURE: 85 MMHG | HEART RATE: 71 BPM | BODY MASS INDEX: 34.36 KG/M2 | WEIGHT: 240 LBS | OXYGEN SATURATION: 99 % | TEMPERATURE: 97.3 F

## 2024-06-27 DIAGNOSIS — R51.9 ACUTE NONINTRACTABLE HEADACHE, UNSPECIFIED HEADACHE TYPE: Primary | ICD-10-CM

## 2024-06-27 PROCEDURE — 70450 CT HEAD/BRAIN W/O DYE: CPT | Performed by: RADIOLOGY

## 2024-06-27 PROCEDURE — 99284 EMERGENCY DEPT VISIT MOD MDM: CPT | Mod: 25

## 2024-06-27 PROCEDURE — 70450 CT HEAD/BRAIN W/O DYE: CPT

## 2024-06-27 PROCEDURE — 2500000001 HC RX 250 WO HCPCS SELF ADMINISTERED DRUGS (ALT 637 FOR MEDICARE OP): Performed by: NURSE PRACTITIONER

## 2024-06-27 RX ORDER — IBUPROFEN 600 MG/1
600 TABLET ORAL EVERY 6 HOURS PRN
Qty: 24 TABLET | Refills: 0 | Status: SHIPPED | OUTPATIENT
Start: 2024-06-27

## 2024-06-27 RX ORDER — LORATADINE 10 MG/1
10 TABLET ORAL NIGHTLY
Qty: 14 TABLET | Refills: 0 | Status: SHIPPED | OUTPATIENT
Start: 2024-06-27

## 2024-06-27 RX ORDER — IBUPROFEN 600 MG/1
600 TABLET ORAL ONCE
Status: COMPLETED | OUTPATIENT
Start: 2024-06-27 | End: 2024-06-27

## 2024-06-27 RX ADMIN — IBUPROFEN 600 MG: 600 TABLET, FILM COATED ORAL at 08:14

## 2024-06-27 ASSESSMENT — LIFESTYLE VARIABLES
HAVE YOU EVER FELT YOU SHOULD CUT DOWN ON YOUR DRINKING: NO
TOTAL SCORE: 0
EVER FELT BAD OR GUILTY ABOUT YOUR DRINKING: NO
HAVE PEOPLE ANNOYED YOU BY CRITICIZING YOUR DRINKING: NO
EVER HAD A DRINK FIRST THING IN THE MORNING TO STEADY YOUR NERVES TO GET RID OF A HANGOVER: NO

## 2024-06-27 ASSESSMENT — PAIN - FUNCTIONAL ASSESSMENT
PAIN_FUNCTIONAL_ASSESSMENT: 0-10
PAIN_FUNCTIONAL_ASSESSMENT: 0-10

## 2024-06-27 ASSESSMENT — PAIN SCALES - GENERAL
PAINLEVEL_OUTOF10: 0 - NO PAIN
PAINLEVEL_OUTOF10: 4
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
## 2024-07-03 ENCOUNTER — APPOINTMENT (OUTPATIENT)
Dept: PRIMARY CARE | Facility: CLINIC | Age: 49
End: 2024-07-03
Payer: COMMERCIAL

## 2024-07-03 VITALS
HEIGHT: 70 IN | DIASTOLIC BLOOD PRESSURE: 86 MMHG | BODY MASS INDEX: 37.05 KG/M2 | SYSTOLIC BLOOD PRESSURE: 125 MMHG | TEMPERATURE: 97.5 F | WEIGHT: 258.8 LBS | OXYGEN SATURATION: 95 % | HEART RATE: 69 BPM

## 2024-07-03 DIAGNOSIS — I10 PRIMARY HYPERTENSION: ICD-10-CM

## 2024-07-03 DIAGNOSIS — E11.9 TYPE 2 DIABETES MELLITUS WITHOUT COMPLICATION, WITHOUT LONG-TERM CURRENT USE OF INSULIN (MULTI): ICD-10-CM

## 2024-07-03 DIAGNOSIS — R51.9 ACUTE NONINTRACTABLE HEADACHE, UNSPECIFIED HEADACHE TYPE: Primary | ICD-10-CM

## 2024-07-03 PROCEDURE — 4004F PT TOBACCO SCREEN RCVD TLK: CPT | Performed by: PHYSICIAN ASSISTANT

## 2024-07-03 PROCEDURE — 3048F LDL-C <100 MG/DL: CPT | Performed by: PHYSICIAN ASSISTANT

## 2024-07-03 PROCEDURE — 3079F DIAST BP 80-89 MM HG: CPT | Performed by: PHYSICIAN ASSISTANT

## 2024-07-03 PROCEDURE — 3062F POS MACROALBUMINURIA REV: CPT | Performed by: PHYSICIAN ASSISTANT

## 2024-07-03 PROCEDURE — 3044F HG A1C LEVEL LT 7.0%: CPT | Performed by: PHYSICIAN ASSISTANT

## 2024-07-03 PROCEDURE — 3008F BODY MASS INDEX DOCD: CPT | Performed by: PHYSICIAN ASSISTANT

## 2024-07-03 PROCEDURE — 4010F ACE/ARB THERAPY RXD/TAKEN: CPT | Performed by: PHYSICIAN ASSISTANT

## 2024-07-03 PROCEDURE — 3074F SYST BP LT 130 MM HG: CPT | Performed by: PHYSICIAN ASSISTANT

## 2024-07-03 PROCEDURE — 99213 OFFICE O/P EST LOW 20 MIN: CPT | Performed by: PHYSICIAN ASSISTANT

## 2024-07-03 RX ORDER — LORATADINE 10 MG/1
10 TABLET ORAL NIGHTLY
Qty: 90 TABLET | Refills: 0 | Status: SHIPPED | OUTPATIENT
Start: 2024-07-03 | End: 2024-10-01

## 2024-07-03 ASSESSMENT — PATIENT HEALTH QUESTIONNAIRE - PHQ9
2. FEELING DOWN, DEPRESSED OR HOPELESS: NOT AT ALL
1. LITTLE INTEREST OR PLEASURE IN DOING THINGS: NOT AT ALL
SUM OF ALL RESPONSES TO PHQ9 QUESTIONS 1 AND 2: 0

## 2024-07-03 NOTE — PROGRESS NOTES
"Subjective   Patient ID: Yariel Shaw is a 49 y.o. male who presents for typical headache.    HPI       C/o of typical headache pain is on the right side of head by the ear. Says he was seen @ ER on 06/27/2024 was RX allergy med's and ibp. Denies sx of headaches, ear pain and face pressure.   Main compliant is pressure.   CT head was done in ED and it was NL. They told him it was sinuses. He has year around allergies. He took the allergy med and IBP and sx did get better. He needs an RX of Claritin    HTN- Pt has been off of Benicar for 1wk. He is BP is good today.    He is on a lot of herbals a walking 3 x a day. He wants to try natural before committing to taking pharmaceuticals daily.   He is due for lab f/up and visit in Aug    Review of Systems  Constitutional: Patient denies any fever, chills, loss of appetite, or unexplained weight loss.  Cardiovascular: Denies any chest pain, shortness of breath with exertion, tachycardia, palpitations.  Respiratory: Patient denies any cough, shortness of breath, or wheezing.  Skin:  Denies any rashes or skin lesions.    Objective   /86   Pulse 69   Temp 36.4 °C (97.5 °F)   Ht 1.778 m (5' 10\")   Wt 117 kg (258 lb 12.8 oz)   SpO2 95%   BMI 37.13 kg/m²     Physical Exam  Gen. appearance: Alert and cooperative, no acute distress, well-developed, well-nourished obese male.  Neck: Supple and without adenopathy or rigidity.  There is no JVD at 90° and no carotid bruits are noted.  Cardiovascular: Heart has a regular rate and rhythm without murmur or ectopy.  Respiratory: Lungs are clear to auscultation bilaterally with good air exchange.      Assessment/Plan   Diagnoses and all orders for this visit:  Acute nonintractable headache, unspecified headache type  -     Referral to Primary Care  Likely related to sinusitis.  Improved on Claritin.  Patient needs a refill of that today so that was provided.  He denies any symptoms that are concerning, clinically in the " office exam was normal, and his CT head was also normal.  Will follow-up in 1 month at his routine 3-month follow-up.    Primary hypertension-patient has been off of his olmesartan for a week and his blood pressure is still good.  He is trying to take herbal medications to help control his blood pressure and states that he wanted to this for a month to see if this is helpful.  He will follow-up in 1 month at his routine follow-up to see if this is keeping his blood pressure under control.  He checks his blood pressure daily at home and was told that if his blood pressure goes over 140/90 he is to call the office and restart his olmesartan.    Type 2 diabetes mellitus without complication, without long-term current use of insulin (Multi)-again, patient is taking herbal medications, changing his diet completely, and walking at least 5 miles daily to help control his sugars.  He will continue with this and we will recheck his labs in August for his 3-month follow-up.    Patient understands that should they have testing outside   facilities that we may not receive the results and was told to call us if they have not heard from our office within a week after testing.    [facility] uses voice recognition technology for dictations. Sometimes the software misinterprets words. Please take this into account when reading this.

## 2024-08-05 ENCOUNTER — HOSPITAL ENCOUNTER (EMERGENCY)
Facility: HOSPITAL | Age: 49
Discharge: HOME | End: 2024-08-06
Attending: INTERNAL MEDICINE
Payer: COMMERCIAL

## 2024-08-05 DIAGNOSIS — J02.9 ACUTE PHARYNGITIS, UNSPECIFIED ETIOLOGY: Primary | ICD-10-CM

## 2024-08-05 LAB
FLUAV RNA RESP QL NAA+PROBE: NOT DETECTED
FLUBV RNA RESP QL NAA+PROBE: NOT DETECTED
GLUCOSE BLD MANUAL STRIP-MCNC: 123 MG/DL (ref 74–99)
SARS-COV-2 RNA RESP QL NAA+PROBE: NOT DETECTED

## 2024-08-05 PROCEDURE — 2500000005 HC RX 250 GENERAL PHARMACY W/O HCPCS: Performed by: INTERNAL MEDICINE

## 2024-08-05 PROCEDURE — 82947 ASSAY GLUCOSE BLOOD QUANT: CPT

## 2024-08-05 PROCEDURE — 99283 EMERGENCY DEPT VISIT LOW MDM: CPT | Performed by: INTERNAL MEDICINE

## 2024-08-05 PROCEDURE — 87636 SARSCOV2 & INF A&B AMP PRB: CPT | Performed by: INTERNAL MEDICINE

## 2024-08-05 PROCEDURE — 87651 STREP A DNA AMP PROBE: CPT | Performed by: INTERNAL MEDICINE

## 2024-08-05 RX ORDER — ACETAMINOPHEN 325 MG/1
975 TABLET ORAL ONCE
Status: COMPLETED | OUTPATIENT
Start: 2024-08-05 | End: 2024-08-05

## 2024-08-05 ASSESSMENT — PAIN DESCRIPTION - PAIN TYPE: TYPE: ACUTE PAIN

## 2024-08-05 ASSESSMENT — PAIN DESCRIPTION - ORIENTATION: ORIENTATION: RIGHT;LEFT

## 2024-08-05 ASSESSMENT — PAIN DESCRIPTION - LOCATION: LOCATION: THROAT

## 2024-08-05 ASSESSMENT — PAIN DESCRIPTION - DESCRIPTORS: DESCRIPTORS: SORE

## 2024-08-05 ASSESSMENT — PAIN - FUNCTIONAL ASSESSMENT: PAIN_FUNCTIONAL_ASSESSMENT: 0-10

## 2024-08-05 ASSESSMENT — PAIN SCALES - GENERAL: PAINLEVEL_OUTOF10: 9

## 2024-08-06 VITALS
DIASTOLIC BLOOD PRESSURE: 78 MMHG | SYSTOLIC BLOOD PRESSURE: 141 MMHG | HEART RATE: 70 BPM | HEIGHT: 70 IN | TEMPERATURE: 97.7 F | WEIGHT: 240 LBS | BODY MASS INDEX: 34.36 KG/M2 | RESPIRATION RATE: 18 BRPM | OXYGEN SATURATION: 97 %

## 2024-08-06 LAB — S PYO DNA THROAT QL NAA+PROBE: NOT DETECTED

## 2024-08-06 PROCEDURE — 2500000001 HC RX 250 WO HCPCS SELF ADMINISTERED DRUGS (ALT 637 FOR MEDICARE OP): Performed by: INTERNAL MEDICINE

## 2024-08-06 PROCEDURE — 2500000004 HC RX 250 GENERAL PHARMACY W/ HCPCS (ALT 636 FOR OP/ED): Performed by: INTERNAL MEDICINE

## 2024-08-06 RX ORDER — AMOXICILLIN 250 MG/1
500 CAPSULE ORAL ONCE
Status: COMPLETED | OUTPATIENT
Start: 2024-08-06 | End: 2024-08-06

## 2024-08-06 RX ORDER — AMOXICILLIN 500 MG/1
500 TABLET, FILM COATED ORAL 3 TIMES DAILY
Qty: 30 TABLET | Refills: 0 | Status: SHIPPED | OUTPATIENT
Start: 2024-08-06 | End: 2024-08-16

## 2024-08-06 RX ORDER — PREDNISONE 10 MG/1
40 TABLET ORAL DAILY
Qty: 16 TABLET | Refills: 0 | Status: SHIPPED | OUTPATIENT
Start: 2024-08-06 | End: 2024-08-10

## 2024-08-06 ASSESSMENT — PAIN SCALES - GENERAL: PAINLEVEL_OUTOF10: 0 - NO PAIN

## 2024-08-06 NOTE — ED PROVIDER NOTES
"HPI   Chief Complaint   Patient presents with    Sore Throat     Pt states sore throt and \" I feel like my throat is closing up\" denies any reaction to anything. Talking in full sentences 96% RA       Patient presented for evaluation of pain to the right side of his throat.  Patient notes pain with swallowing.  Patient states the right side of his throat neck feels swollen.  Patient denies runny nose.  Patient notes nonproductive cough.  Denies fever or chills.  Patient states he is having bodyaches.              Patient History   Past Medical History:   Diagnosis Date    Anxiety     Diabetes mellitus (Multi)     PRE-DIABETES    Hyperlipidemia     Hypertension     Joint pain     Sinusitis     RESOLVED WITH SURGERY    Wears glasses      Past Surgical History:   Procedure Laterality Date    MR HEAD ANGIO WO IV CONTRAST  08/24/2021    MR HEAD ANGIO WO IV CONTRAST 8/24/2021 [facility]    NASAL SINUS SURGERY       Family History   Problem Relation Name Age of Onset    Hypertension Mother      Hyperlipidemia Mother      Allergies Mother      Allergies Father      Throat cancer Father      Hypertension Brother      Diabetes Brother      Heart attack Maternal Grandmother       Social History     Tobacco Use    Smoking status: Every Day     Current packs/day: 0.50     Average packs/day: 0.5 packs/day for 20.0 years (10.0 ttl pk-yrs)     Types: Cigarettes    Smokeless tobacco: Never   Vaping Use    Vaping status: Never Used   Substance Use Topics    Alcohol use: Never    Drug use: Never     Comment: OCCASIONAL       Physical Exam   ED Triage Vitals [08/05/24 2238]   Temperature Heart Rate Resp BP   36.5 °C (97.7 °F) 69 -- (!) 158/95      Pulse Ox Temp Source Heart Rate Source Patient Position   97 % Temporal -- Sitting      BP Location FiO2 (%)     Right arm --       Physical Exam  Vitals and nursing note reviewed.   Constitutional:       Appearance: Normal appearance.   HENT:      Head: Atraumatic.      Right Ear: " External ear normal.      Left Ear: External ear normal.      Nose: Nose normal.      Mouth/Throat:      Mouth: Mucous membranes are moist.      Pharynx: Oropharynx is clear. Uvula midline. Posterior oropharyngeal erythema and uvula swelling present. No oropharyngeal exudate.      Tonsils: No tonsillar exudate or tonsillar abscesses. 2+ on the right. 1+ on the left.      Comments: Mild swelling to the uvula.  No evidence of airway compromise.  No elevation of the tongue.  Floor the mouth soft  Eyes:      Extraocular Movements: Extraocular movements intact.      Pupils: Pupils are equal, round, and reactive to light.   Cardiovascular:      Rate and Rhythm: Normal rate and regular rhythm.      Pulses: Normal pulses.   Pulmonary:      Effort: Pulmonary effort is normal.      Breath sounds: Normal breath sounds.   Abdominal:      Palpations: Abdomen is soft.      Tenderness: There is no abdominal tenderness.   Musculoskeletal:         General: No tenderness or signs of injury. Normal range of motion.      Cervical back: Normal range of motion and neck supple. No rigidity or tenderness.   Lymphadenopathy:      Cervical: Cervical adenopathy present.      Right cervical: Superficial cervical adenopathy present.      Left cervical: Superficial cervical adenopathy present.   Skin:     General: Skin is warm and dry.   Neurological:      General: No focal deficit present.      Mental Status: He is alert and oriented to person, place, and time. Mental status is at baseline.   Psychiatric:         Mood and Affect: Mood normal.         Behavior: Behavior normal.           ED Course & MDM   Diagnoses as of 08/08/24 0341   Acute pharyngitis, unspecified etiology                       No data recorded                        Medical Decision Making  Differential diagnosis: Strep pharyngitis, acute viral illness, uvulitis, peritonsillar abscess, other      Patient presenting for evaluation of sore throat.  Patient does have a sore  throat worse on the right.  Lymphadenopathy present in the right submandibular anterior cervical chain.  No obvious peritonsillar abscess.  No nuchal rigidity.  No elevation of the tongue.  Floor the mouth soft.  Viral versus bacterial source considered.  Strep negative.  Viral studies negative.  Patient treated with Magic mouthwash now tolerating p.o.  Patient given dose of Decadron in the ED.  Patient states he is borderline diabetic and thus will monitor glucose.  Will treat with amoxicillin as I do have suspicion for bacterial infection.  Patient is to follow-up with outpatient return to the ED if having worsening symptoms or other concerns.        Procedure  Procedures     Alexy Jimenez DO  08/08/24 0341

## 2024-08-08 ENCOUNTER — APPOINTMENT (OUTPATIENT)
Dept: RADIOLOGY | Facility: HOSPITAL | Age: 49
End: 2024-08-08
Payer: COMMERCIAL

## 2024-08-08 ENCOUNTER — HOSPITAL ENCOUNTER (EMERGENCY)
Facility: HOSPITAL | Age: 49
Discharge: HOME | End: 2024-08-08
Payer: COMMERCIAL

## 2024-08-08 VITALS
HEIGHT: 70 IN | SYSTOLIC BLOOD PRESSURE: 144 MMHG | HEART RATE: 64 BPM | DIASTOLIC BLOOD PRESSURE: 87 MMHG | BODY MASS INDEX: 34.36 KG/M2 | OXYGEN SATURATION: 97 % | TEMPERATURE: 98.1 F | RESPIRATION RATE: 20 BRPM | WEIGHT: 240 LBS

## 2024-08-08 DIAGNOSIS — J02.9 ACUTE PHARYNGITIS, UNSPECIFIED ETIOLOGY: ICD-10-CM

## 2024-08-08 LAB
ANION GAP SERPL CALC-SCNC: 11 MMOL/L (ref 10–20)
BASOPHILS # BLD AUTO: 0.04 X10*3/UL (ref 0–0.1)
BASOPHILS NFR BLD AUTO: 0.4 %
BUN SERPL-MCNC: 19 MG/DL (ref 6–23)
CALCIUM SERPL-MCNC: 8.9 MG/DL (ref 8.6–10.3)
CHLORIDE SERPL-SCNC: 105 MMOL/L (ref 98–107)
CO2 SERPL-SCNC: 26 MMOL/L (ref 21–32)
CREAT SERPL-MCNC: 1.11 MG/DL (ref 0.5–1.3)
CRP SERPL-MCNC: 0.26 MG/DL
EGFRCR SERPLBLD CKD-EPI 2021: 82 ML/MIN/1.73M*2
EOSINOPHIL # BLD AUTO: 0.12 X10*3/UL (ref 0–0.7)
EOSINOPHIL NFR BLD AUTO: 1.2 %
ERYTHROCYTE [DISTWIDTH] IN BLOOD BY AUTOMATED COUNT: 13.2 % (ref 11.5–14.5)
GLUCOSE SERPL-MCNC: 99 MG/DL (ref 74–99)
HCT VFR BLD AUTO: 41.6 % (ref 41–52)
HETEROPH AB SERPLBLD QL IA.RAPID: NEGATIVE
HGB BLD-MCNC: 13.8 G/DL (ref 13.5–17.5)
IMM GRANULOCYTES # BLD AUTO: 0.02 X10*3/UL (ref 0–0.7)
IMM GRANULOCYTES NFR BLD AUTO: 0.2 % (ref 0–0.9)
LYMPHOCYTES # BLD AUTO: 4.17 X10*3/UL (ref 1.2–4.8)
LYMPHOCYTES NFR BLD AUTO: 42.6 %
MCH RBC QN AUTO: 28.4 PG (ref 26–34)
MCHC RBC AUTO-ENTMCNC: 33.2 G/DL (ref 32–36)
MCV RBC AUTO: 86 FL (ref 80–100)
MONOCYTES # BLD AUTO: 0.78 X10*3/UL (ref 0.1–1)
MONOCYTES NFR BLD AUTO: 8 %
NEUTROPHILS # BLD AUTO: 4.65 X10*3/UL (ref 1.2–7.7)
NEUTROPHILS NFR BLD AUTO: 47.6 %
NRBC BLD-RTO: 0 /100 WBCS (ref 0–0)
PLATELET # BLD AUTO: 208 X10*3/UL (ref 150–450)
POTASSIUM SERPL-SCNC: 3.8 MMOL/L (ref 3.5–5.3)
RBC # BLD AUTO: 4.86 X10*6/UL (ref 4.5–5.9)
SODIUM SERPL-SCNC: 138 MMOL/L (ref 136–145)
WBC # BLD AUTO: 9.8 X10*3/UL (ref 4.4–11.3)

## 2024-08-08 PROCEDURE — 86308 HETEROPHILE ANTIBODY SCREEN: CPT | Performed by: NURSE PRACTITIONER

## 2024-08-08 PROCEDURE — 85025 COMPLETE CBC W/AUTO DIFF WBC: CPT | Performed by: NURSE PRACTITIONER

## 2024-08-08 PROCEDURE — 99284 EMERGENCY DEPT VISIT MOD MDM: CPT

## 2024-08-08 PROCEDURE — 36415 COLL VENOUS BLD VENIPUNCTURE: CPT | Performed by: NURSE PRACTITIONER

## 2024-08-08 PROCEDURE — 96361 HYDRATE IV INFUSION ADD-ON: CPT

## 2024-08-08 PROCEDURE — 86140 C-REACTIVE PROTEIN: CPT | Performed by: NURSE PRACTITIONER

## 2024-08-08 PROCEDURE — 70491 CT SOFT TISSUE NECK W/DYE: CPT | Performed by: RADIOLOGY

## 2024-08-08 PROCEDURE — 2500000004 HC RX 250 GENERAL PHARMACY W/ HCPCS (ALT 636 FOR OP/ED): Performed by: NURSE PRACTITIONER

## 2024-08-08 PROCEDURE — 2550000001 HC RX 255 CONTRASTS: Performed by: NURSE PRACTITIONER

## 2024-08-08 PROCEDURE — 70491 CT SOFT TISSUE NECK W/DYE: CPT

## 2024-08-08 PROCEDURE — 80048 BASIC METABOLIC PNL TOTAL CA: CPT | Performed by: NURSE PRACTITIONER

## 2024-08-08 PROCEDURE — 96374 THER/PROPH/DIAG INJ IV PUSH: CPT

## 2024-08-08 PROCEDURE — 96375 TX/PRO/DX INJ NEW DRUG ADDON: CPT

## 2024-08-08 RX ORDER — DEXAMETHASONE SODIUM PHOSPHATE 10 MG/ML
10 INJECTION INTRAMUSCULAR; INTRAVENOUS ONCE
Status: COMPLETED | OUTPATIENT
Start: 2024-08-08 | End: 2024-08-08

## 2024-08-08 RX ORDER — KETOROLAC TROMETHAMINE 30 MG/ML
15 INJECTION, SOLUTION INTRAMUSCULAR; INTRAVENOUS ONCE
Status: COMPLETED | OUTPATIENT
Start: 2024-08-08 | End: 2024-08-08

## 2024-08-08 RX ADMIN — KETOROLAC TROMETHAMINE 15 MG: 30 INJECTION, SOLUTION INTRAMUSCULAR at 13:26

## 2024-08-08 RX ADMIN — IOHEXOL 75 ML: 350 INJECTION, SOLUTION INTRAVENOUS at 14:43

## 2024-08-08 RX ADMIN — DEXAMETHASONE SODIUM PHOSPHATE 10 MG: 10 INJECTION, SOLUTION INTRAMUSCULAR; INTRAVENOUS at 13:26

## 2024-08-08 RX ADMIN — SODIUM CHLORIDE 1000 ML: 9 INJECTION, SOLUTION INTRAVENOUS at 13:20

## 2024-08-08 ASSESSMENT — LIFESTYLE VARIABLES
HAVE YOU EVER FELT YOU SHOULD CUT DOWN ON YOUR DRINKING: NO
EVER HAD A DRINK FIRST THING IN THE MORNING TO STEADY YOUR NERVES TO GET RID OF A HANGOVER: NO
HAVE PEOPLE ANNOYED YOU BY CRITICIZING YOUR DRINKING: NO
TOTAL SCORE: 0
EVER FELT BAD OR GUILTY ABOUT YOUR DRINKING: NO

## 2024-08-08 ASSESSMENT — COLUMBIA-SUICIDE SEVERITY RATING SCALE - C-SSRS
6. HAVE YOU EVER DONE ANYTHING, STARTED TO DO ANYTHING, OR PREPARED TO DO ANYTHING TO END YOUR LIFE?: NO
1. IN THE PAST MONTH, HAVE YOU WISHED YOU WERE DEAD OR WISHED YOU COULD GO TO SLEEP AND NOT WAKE UP?: NO
2. HAVE YOU ACTUALLY HAD ANY THOUGHTS OF KILLING YOURSELF?: NO

## 2024-08-08 ASSESSMENT — PAIN DESCRIPTION - PAIN TYPE: TYPE: ACUTE PAIN

## 2024-08-08 ASSESSMENT — PAIN SCALES - GENERAL
PAINLEVEL_OUTOF10: 7
PAINLEVEL_OUTOF10: 8

## 2024-08-08 ASSESSMENT — PAIN DESCRIPTION - DESCRIPTORS: DESCRIPTORS: ACHING

## 2024-08-08 ASSESSMENT — PAIN DESCRIPTION - LOCATION: LOCATION: THROAT

## 2024-08-08 ASSESSMENT — PAIN SCALES - PAIN ASSESSMENT IN ADVANCED DEMENTIA (PAINAD): TOTALSCORE: MEDICATION (SEE MAR)

## 2024-08-08 ASSESSMENT — PAIN DESCRIPTION - FREQUENCY: FREQUENCY: CONSTANT/CONTINUOUS

## 2024-08-08 ASSESSMENT — PAIN DESCRIPTION - PROGRESSION: CLINICAL_PROGRESSION: NOT CHANGED

## 2024-08-08 ASSESSMENT — PAIN - FUNCTIONAL ASSESSMENT
PAIN_FUNCTIONAL_ASSESSMENT: 0-10
PAIN_FUNCTIONAL_ASSESSMENT: 0-10

## 2024-08-08 ASSESSMENT — PAIN DESCRIPTION - ONSET: ONSET: ONGOING

## 2024-08-08 NOTE — ED PROVIDER NOTES
HPI   Chief Complaint   Patient presents with    Sore Throat     Seen 2 nights ago for same sore throat.       49-year-old male returns emergency department, was seen a few days ago for sore throat, states sore throat started a few days before his initial visit.  Was seen and given steroid, discharged on Augmentin, been taking ibuprofen as well.  Returns today with complaints of continued sore throat, not improving.  No difficulty breathing, no recent fevers.  No known exposures to illness      History provided by:  Patient   used: No            Patient History   Past Medical History:   Diagnosis Date    Anxiety     Diabetes mellitus (Multi)     PRE-DIABETES    Hyperlipidemia     Hypertension     Joint pain     Sinusitis     RESOLVED WITH SURGERY    Wears glasses      Past Surgical History:   Procedure Laterality Date    MR HEAD ANGIO WO IV CONTRAST  08/24/2021    MR HEAD ANGIO WO IV CONTRAST 8/24/2021 [facility]    NASAL SINUS SURGERY       Family History   Problem Relation Name Age of Onset    Hypertension Mother      Hyperlipidemia Mother      Allergies Mother      Allergies Father      Throat cancer Father      Hypertension Brother      Diabetes Brother      Heart attack Maternal Grandmother       Social History     Tobacco Use    Smoking status: Every Day     Current packs/day: 0.50     Average packs/day: 0.5 packs/day for 20.0 years (10.0 ttl pk-yrs)     Types: Cigarettes    Smokeless tobacco: Never   Vaping Use    Vaping status: Never Used   Substance Use Topics    Alcohol use: Never    Drug use: Never     Comment: OCCASIONAL       Physical Exam   ED Triage Vitals [08/08/24 1215]   Temperature Heart Rate Respirations BP   36.7 °C (98.1 °F) 64 20 144/87      Pulse Ox Temp Source Heart Rate Source Patient Position   97 % Temporal Monitor Sitting      BP Location FiO2 (%)     Right arm --       Physical Exam  Physical Exam:  Constitutional: Vitals noted, no distress. Afebrile.    EENT: TMs clear. Posterior oropharynx with bilateral erythema, mild edema, slightly worse right compared to left.  Positive anterior cervical lymphadenopathy, again slightly worse right than left.  Cardiovascular: Regular, rate, rhythm, no murmur.   Pulmonary: Lungs clear bilaterally with good aeration. No adventitious breath sounds.   Neuro: No focal neurologic deficits, NIH score of 0.      ED Course & MDM   Diagnoses as of 08/08/24 1527   Acute pharyngitis, unspecified etiology          Labs Reviewed   BASIC METABOLIC PANEL - Normal       Result Value    Glucose 99      Sodium 138      Potassium 3.8      Chloride 105      Bicarbonate 26      Anion Gap 11      Urea Nitrogen 19      Creatinine 1.11      eGFR 82      Calcium 8.9     C-REACTIVE PROTEIN - Normal    C-Reactive Protein 0.26     MONONUCLEOSIS SCREEN (HETEROPHILE ANTIBODY) - Normal    Mononucleosis Screen Negative     CBC WITH AUTO DIFFERENTIAL    WBC 9.8      nRBC 0.0      RBC 4.86      Hemoglobin 13.8      Hematocrit 41.6      MCV 86      MCH 28.4      MCHC 33.2      RDW 13.2      Platelets 208      Neutrophils % 47.6      Immature Granulocytes %, Automated 0.2      Lymphocytes % 42.6      Monocytes % 8.0      Eosinophils % 1.2      Basophils % 0.4      Neutrophils Absolute 4.65      Immature Granulocytes Absolute, Automated 0.02      Lymphocytes Absolute 4.17      Monocytes Absolute 0.78      Eosinophils Absolute 0.12      Basophils Absolute 0.04          CT soft tissue neck w IV contrast   Final Result   1. Enlargement of the bilateral palatine and lingual tonsils   reflecting acute uncomplicated tonsillitis/pharyngitis. No evidence   for peritonsillar abscess. There is slight asymmetric enlargement of   the right palatine tonsil with parenchymal   heterogeneity/hyperenhancement with mild transverse narrowing of the   patent airway. Small amount of secretions are seen within the pharynx.             I personally reviewed the images/study and  resident's interpretation   and I agree with the findings as stated by Roseanne Weinberg MD (resident   radiologist). This study was analyzed and interpreted at [facility], [place], [place].        MACRO:   None        Signed by: Jc Bruner 8/8/2024 3:19 PM   Dictation workstation:   [id number]               No data recorded     Javi Coma Scale Score: 15 (08/08/24 1213 : Felix Benson RN)                   Medical Decision Making  Given the patient's return visit and slightly worse right versus the left CT soft tissue neck ordered.    Patient medicated with 15 mg of IV Toradol, 10 mg of IV Decadron and given IV fluids.    CBC and metabolic panels unremarkable with normal white cell count, negative CRP.    CT imaging of the neck showed enlargement of the tonsils, otherwise unremarkable.    Discussed continuing with Augmentin and ibuprofen, discharged with prescription for Magic mouthwash as well.    Discussed follow-up with ENT, referral provided.  Discussed return with worsening symptoms or additional concerns.    Procedure  Procedures     Kristin Sung, KEIKO-CNP  08/08/24 152

## 2024-08-10 ENCOUNTER — HOSPITAL ENCOUNTER (EMERGENCY)
Age: 49
Discharge: HOME | End: 2024-08-10
Attending: EMERGENCY MEDICINE
Payer: COMMERCIAL

## 2024-08-10 ENCOUNTER — APPOINTMENT (OUTPATIENT)
Dept: CT IMAGING | Age: 49
End: 2024-08-10
Payer: COMMERCIAL

## 2024-08-10 VITALS
OXYGEN SATURATION: 98 % | TEMPERATURE: 98.7 F | DIASTOLIC BLOOD PRESSURE: 94 MMHG | HEART RATE: 58 BPM | HEIGHT: 70 IN | SYSTOLIC BLOOD PRESSURE: 147 MMHG | RESPIRATION RATE: 18 BRPM | BODY MASS INDEX: 34.36 KG/M2 | WEIGHT: 240 LBS

## 2024-08-10 DIAGNOSIS — J03.90 ACUTE TONSILLITIS, UNSPECIFIED ETIOLOGY: Primary | ICD-10-CM

## 2024-08-10 LAB
ALBUMIN SERPL-MCNC: 4 G/DL (ref 3.5–4.6)
ALP SERPL-CCNC: 81 U/L (ref 35–104)
ALT SERPL-CCNC: 19 U/L (ref 0–41)
ANION GAP SERPL CALCULATED.3IONS-SCNC: 9 MEQ/L (ref 9–15)
AST SERPL-CCNC: 14 U/L (ref 0–40)
BASOPHILS # BLD: 0.1 K/UL (ref 0–0.1)
BASOPHILS NFR BLD: 0.5 % (ref 0.2–1.2)
BILIRUB SERPL-MCNC: 0.3 MG/DL (ref 0.2–0.7)
BUN SERPL-MCNC: 22 MG/DL (ref 6–20)
CALCIUM SERPL-MCNC: 9.2 MG/DL (ref 8.5–9.9)
CHLORIDE SERPL-SCNC: 103 MEQ/L (ref 95–107)
CO2 SERPL-SCNC: 30 MEQ/L (ref 20–31)
CREAT SERPL-MCNC: 1.25 MG/DL (ref 0.7–1.2)
EOSINOPHIL # BLD: 0.2 K/UL (ref 0–0.5)
EOSINOPHIL NFR BLD: 1.7 % (ref 0.8–7)
ERYTHROCYTE [DISTWIDTH] IN BLOOD BY AUTOMATED COUNT: 13.2 % (ref 11.6–14.4)
GLOBULIN SER CALC-MCNC: 2.9 G/DL (ref 2.3–3.5)
GLUCOSE SERPL-MCNC: 107 MG/DL (ref 70–99)
HCT VFR BLD AUTO: 45.5 % (ref 42–52)
HGB BLD-MCNC: 15 G/DL (ref 13.7–17.5)
IMM GRANULOCYTES # BLD: 0 K/UL
IMM GRANULOCYTES NFR BLD: 0.2 %
LYMPHOCYTES # BLD: 4.4 K/UL (ref 1.3–3.6)
LYMPHOCYTES NFR BLD: 45.1 %
MCH RBC QN AUTO: 28.6 PG (ref 25.7–32.2)
MCHC RBC AUTO-ENTMCNC: 33 % (ref 32.3–36.5)
MCV RBC AUTO: 86.7 FL (ref 79–92.2)
MONOCYTES # BLD: 0.9 K/UL (ref 0.3–0.8)
MONOCYTES NFR BLD: 8.7 % (ref 5.3–12.2)
NEUTROPHILS # BLD: 4.3 K/UL (ref 1.8–5.4)
NEUTS SEG NFR BLD: 43.8 % (ref 34–67.9)
PLATELET # BLD AUTO: 201 K/UL (ref 163–337)
POTASSIUM SERPL-SCNC: 4 MEQ/L (ref 3.4–4.9)
PROT SERPL-MCNC: 6.9 G/DL (ref 6.3–8)
RBC # BLD AUTO: 5.25 M/UL (ref 4.63–6.08)
SODIUM SERPL-SCNC: 142 MEQ/L (ref 135–144)
WBC # BLD AUTO: 9.8 K/UL (ref 4.2–9)

## 2024-08-10 PROCEDURE — 70491 CT SOFT TISSUE NECK W/DYE: CPT

## 2024-08-10 PROCEDURE — 96375 TX/PRO/DX INJ NEW DRUG ADDON: CPT

## 2024-08-10 PROCEDURE — 6360000002 HC RX W HCPCS: Performed by: EMERGENCY MEDICINE

## 2024-08-10 PROCEDURE — 85025 COMPLETE CBC W/AUTO DIFF WBC: CPT

## 2024-08-10 PROCEDURE — 80053 COMPREHEN METABOLIC PANEL: CPT

## 2024-08-10 PROCEDURE — 99285 EMERGENCY DEPT VISIT HI MDM: CPT

## 2024-08-10 PROCEDURE — 6360000004 HC RX CONTRAST MEDICATION: Performed by: EMERGENCY MEDICINE

## 2024-08-10 PROCEDURE — 36415 COLL VENOUS BLD VENIPUNCTURE: CPT

## 2024-08-10 PROCEDURE — 2580000003 HC RX 258: Performed by: EMERGENCY MEDICINE

## 2024-08-10 PROCEDURE — 96365 THER/PROPH/DIAG IV INF INIT: CPT

## 2024-08-10 RX ORDER — IBUPROFEN 600 MG/1
600 TABLET ORAL EVERY 6 HOURS PRN
Qty: 25 TABLET | Refills: 0 | Status: SHIPPED | OUTPATIENT
Start: 2024-08-10

## 2024-08-10 RX ORDER — KETOROLAC TROMETHAMINE 30 MG/ML
30 INJECTION, SOLUTION INTRAMUSCULAR; INTRAVENOUS ONCE
Status: COMPLETED | OUTPATIENT
Start: 2024-08-10 | End: 2024-08-10

## 2024-08-10 RX ORDER — METHYLPREDNISOLONE SODIUM SUCCINATE 125 MG/2ML
125 INJECTION, POWDER, LYOPHILIZED, FOR SOLUTION INTRAMUSCULAR; INTRAVENOUS ONCE
Status: COMPLETED | OUTPATIENT
Start: 2024-08-10 | End: 2024-08-10

## 2024-08-10 RX ORDER — AZITHROMYCIN 250 MG/1
TABLET, FILM COATED ORAL
Qty: 6 TABLET | Refills: 0 | Status: SHIPPED | OUTPATIENT
Start: 2024-08-10 | End: 2024-08-20

## 2024-08-10 RX ORDER — WATER 10 ML/10ML
INJECTION INTRAMUSCULAR; INTRAVENOUS; SUBCUTANEOUS
Status: DISCONTINUED
Start: 2024-08-10 | End: 2024-08-10 | Stop reason: WASHOUT

## 2024-08-10 RX ORDER — PREDNISONE 20 MG/1
20 TABLET ORAL 2 TIMES DAILY
Qty: 10 TABLET | Refills: 0 | Status: SHIPPED | OUTPATIENT
Start: 2024-08-10 | End: 2024-08-15

## 2024-08-10 RX ADMIN — KETOROLAC TROMETHAMINE 30 MG: 30 INJECTION, SOLUTION INTRAMUSCULAR at 09:20

## 2024-08-10 RX ADMIN — METHYLPREDNISOLONE SODIUM SUCCINATE 125 MG: 125 INJECTION INTRAMUSCULAR; INTRAVENOUS at 09:27

## 2024-08-10 RX ADMIN — CEFTRIAXONE 1000 MG: 1 INJECTION, POWDER, FOR SOLUTION INTRAMUSCULAR; INTRAVENOUS at 09:29

## 2024-08-10 RX ADMIN — IOPAMIDOL 75 ML: 755 INJECTION, SOLUTION INTRAVENOUS at 09:57

## 2024-08-10 ASSESSMENT — ENCOUNTER SYMPTOMS
PHOTOPHOBIA: 0
ABDOMINAL DISTENTION: 0
SINUS PAIN: 0
VOMITING: 0
RHINORRHEA: 0
ABDOMINAL PAIN: 0
CHEST TIGHTNESS: 0
SHORTNESS OF BREATH: 0
WHEEZING: 0
EYE DISCHARGE: 0
SORE THROAT: 1
COUGH: 0
VOICE CHANGE: 0
SINUS PRESSURE: 0
FACIAL SWELLING: 0

## 2024-08-10 ASSESSMENT — PAIN DESCRIPTION - ONSET: ONSET: ON-GOING

## 2024-08-10 ASSESSMENT — PAIN DESCRIPTION - LOCATION
LOCATION: THROAT
LOCATION: THROAT

## 2024-08-10 ASSESSMENT — PAIN DESCRIPTION - DESCRIPTORS
DESCRIPTORS: SORE
DESCRIPTORS: SORE

## 2024-08-10 ASSESSMENT — PAIN SCALES - GENERAL
PAINLEVEL_OUTOF10: 8
PAINLEVEL_OUTOF10: 5

## 2024-08-10 ASSESSMENT — PAIN - FUNCTIONAL ASSESSMENT: PAIN_FUNCTIONAL_ASSESSMENT: 0-10

## 2024-08-10 ASSESSMENT — LIFESTYLE VARIABLES
HOW MANY STANDARD DRINKS CONTAINING ALCOHOL DO YOU HAVE ON A TYPICAL DAY: PATIENT DOES NOT DRINK
HOW OFTEN DO YOU HAVE A DRINK CONTAINING ALCOHOL: NEVER

## 2024-08-10 ASSESSMENT — PAIN DESCRIPTION - FREQUENCY: FREQUENCY: CONTINUOUS

## 2024-08-10 ASSESSMENT — PAIN DESCRIPTION - PAIN TYPE: TYPE: ACUTE PAIN

## 2024-08-10 NOTE — ED NOTES
Pt resting quietly. States pain better 5/10. Denies any needs at this time. Awaiting CT results. ATB infused with adverse reactions noted.

## 2024-08-10 NOTE — ED PROVIDER NOTES
[facility] ED  eMERGENCY dEPARTMENT eNCOUnter      Pt Name: Jeff Johnson  MRN: [id number]  Birthdate 1975  Date of evaluation: 8/10/2024  Provider: Yohan Silva MD    CHIEF COMPLAINT       Chief Complaint   Patient presents with    Pharyngitis     X 5 days         HISTORY OF PRESENT ILLNESS   (Location/Symptom, Timing/Onset,Context/Setting, Quality, Duration, Modifying Factors, Severity)  Note limiting factors.   Jeff Johnson is a 49 y.o. male who presents to the emergency department for evaluation of sore throat.  Patient reports a sore throat for the past 7 days has been on antibiotics for 5 days and seen in [place] ER twice now for the same complaint.  He feels despite the antibiotics he has gotten progressively worse.  Has right ear pain with swallowing.  No related fever or chills.  No nausea vomiting.  Past medical history significant for hyperglycemia type 2 diabetes and hyperlipidemia    HPI    NursingNotes were reviewed.    REVIEW OF SYSTEMS    (2-9 systems for level 4, 10 or more for level 5)     Review of Systems   Constitutional:  Negative for chills and diaphoresis.   HENT:  Positive for sore throat. Negative for congestion, dental problem, drooling, ear pain, facial swelling, mouth sores, rhinorrhea, sinus pressure, sinus pain and voice change.    Eyes:  Negative for photophobia and discharge.   Respiratory:  Negative for cough, chest tightness, shortness of breath and wheezing.    Cardiovascular:  Negative for chest pain and palpitations.   Gastrointestinal:  Negative for abdominal distention, abdominal pain and vomiting.   Endocrine: Negative for cold intolerance.   Genitourinary:  Negative for difficulty urinating.   Musculoskeletal:  Negative for arthralgias.   Skin:  Negative for pallor and rash.   Allergic/Immunologic: Negative for immunocompromised state.   Neurological:  Negative for dizziness and syncope.   Hematological:  Negative for adenopathy.

## 2024-08-11 DIAGNOSIS — E78.2 HYPERLIPEMIA, MIXED: ICD-10-CM

## 2024-08-11 DIAGNOSIS — I10 PRIMARY HYPERTENSION: ICD-10-CM

## 2024-08-12 ENCOUNTER — OFFICE VISIT (OUTPATIENT)
Dept: OTOLARYNGOLOGY | Facility: CLINIC | Age: 49
End: 2024-08-12
Payer: COMMERCIAL

## 2024-08-12 VITALS
BODY MASS INDEX: 34.44 KG/M2 | HEIGHT: 70 IN | TEMPERATURE: 97.4 F | DIASTOLIC BLOOD PRESSURE: 95 MMHG | SYSTOLIC BLOOD PRESSURE: 149 MMHG

## 2024-08-12 DIAGNOSIS — R09.82 POSTNASAL DRIP: ICD-10-CM

## 2024-08-12 DIAGNOSIS — J02.9 SORE THROAT: Primary | ICD-10-CM

## 2024-08-12 DIAGNOSIS — K21.9 GASTROESOPHAGEAL REFLUX DISEASE, UNSPECIFIED WHETHER ESOPHAGITIS PRESENT: ICD-10-CM

## 2024-08-12 PROCEDURE — 4010F ACE/ARB THERAPY RXD/TAKEN: CPT | Performed by: OTOLARYNGOLOGY

## 2024-08-12 PROCEDURE — 4004F PT TOBACCO SCREEN RCVD TLK: CPT | Performed by: OTOLARYNGOLOGY

## 2024-08-12 PROCEDURE — 3044F HG A1C LEVEL LT 7.0%: CPT | Performed by: OTOLARYNGOLOGY

## 2024-08-12 PROCEDURE — 3048F LDL-C <100 MG/DL: CPT | Performed by: OTOLARYNGOLOGY

## 2024-08-12 PROCEDURE — 3080F DIAST BP >= 90 MM HG: CPT | Performed by: OTOLARYNGOLOGY

## 2024-08-12 PROCEDURE — 3077F SYST BP >= 140 MM HG: CPT | Performed by: OTOLARYNGOLOGY

## 2024-08-12 PROCEDURE — 99204 OFFICE O/P NEW MOD 45 MIN: CPT | Performed by: OTOLARYNGOLOGY

## 2024-08-12 PROCEDURE — 3062F POS MACROALBUMINURIA REV: CPT | Performed by: OTOLARYNGOLOGY

## 2024-08-12 RX ORDER — AZITHROMYCIN 250 MG/1
TABLET, FILM COATED ORAL
COMMUNITY
Start: 2024-08-10 | End: 2024-08-20

## 2024-08-12 RX ORDER — AMLODIPINE BESYLATE 5 MG/1
5 TABLET ORAL DAILY
Qty: 90 TABLET | Refills: 0 | OUTPATIENT
Start: 2024-08-12

## 2024-08-12 RX ORDER — ROSUVASTATIN CALCIUM 10 MG/1
10 TABLET, COATED ORAL DAILY
Qty: 90 TABLET | Refills: 0 | OUTPATIENT
Start: 2024-08-12

## 2024-08-12 NOTE — TELEPHONE ENCOUNTER
Medication refused due to failing protocol.    Requested Prescriptions   Pending Prescriptions Disp Refills    amLODIPine (Norvasc) 5 mg tablet [Pharmacy Med Name: AMLODIPINE BESYLATE 5MG TABLETS] 90 tablet 0     Sig: TAKE 1 TABLET(5 MG) BY MOUTH DAILY       Calcium-Channel Blockers Protocol Passed - 8/11/2024 11:04 AM        Passed - Visit with relevant provider in past 12 months or upcoming 90 days     Recent Visits  Date Type Provider Dept   07/03/24 Office Visit Debbi Casas PA-C Do [id number]   05/15/24 Office Visit Debbi Casas PA-C Do [id number] [facility]   02/17/24 Office Visit Jimmie Monsalve, DO Do [id number]   01/22/24 Office Visit Debbi Casas PA-C Do [id number]   11/18/23 Office Visit Jimmie Monsalve, DO Do [id number]   08/19/23 Office Visit Jimmie Monsalve, DO Do [id number]   Showing recent visits within past 365 days and meeting all other requirements  Future Appointments  Date Type Provider Dept   08/14/24 Appointment Debbi Casas PA-C Do [id number]   Showing future appointments within next 90 days and meeting all other requirements              Passed - Medication not refilled in past 45 days (1.5 months)     No matching medication orders between 6/28/2024 11:05 AM and 8/12/2024 11:05 AM            Passed - Normal serum creatinine in past 12     Creatinine   Date Value Ref Range Status   08/08/2024 1.11 0.50 - 1.30 mg/dL Final     Creatinine, Urine Random   Date Value Ref Range Status   02/02/2024 210.8 20.0 - 370.0 mg/dL Final             Passed - BP under 140/90 in past year or if patient has diabetes, CAD, or PVD, history of stroke or MI, BP under 130/80 in past year     BP Readings from Last 3 Encounters:   08/12/24 (!) 149/95   08/08/24 144/87   08/06/24 141/78                 rosuvastatin (Crestor) 10 mg tablet [Pharmacy Med Name: ROSUVASTATIN 10MG TABLETS] 90 tablet 0     Sig: TAKE 1 TABLET(10 MG) BY MOUTH DAILY       Hmg CoA  Reductase Inhibitors Protocol Failed - 8/11/2024 11:04 AM        Failed - Normal creatine kinase in past 12 months     Total CK   Date Value Ref Range Status   02/20/2021 134 0 - 325 U/L Final             Passed - Lipid panel in past 12 months     LDL Calculated   Date Value Ref Range Status   02/02/2024 70 <=99 mg/dL Final     Comment:                                 Near   Borderline      AGE      Desirable  Optimal    High     High     Very High     0-19 Y     0 - 109     ---    110-129   >/= 130     ----    20-24 Y     0 - 119     ---    120-159   >/= 160     ----      >24 Y     0 -  99   100-129  130-159   160-189     >/=190       HDL-Cholesterol   Date Value Ref Range Status   02/02/2024 39.0 mg/dL Final     Comment:       Age       Very Low   Low     Normal    High    0-19 Y    < 35      < 40     40-45     ----  20-24 Y    ----     < 40      >45      ----        >24 Y      ----     < 40     40-60      >60       Cholesterol   Date Value Ref Range Status   02/02/2024 126 0 - 199 mg/dL Final     Comment:           Age      Desirable   Borderline High   High     0-19 Y     0 - 169       170 - 199     >/= 200    20-24 Y     0 - 189       190 - 224     >/= 225         >24 Y     0 - 199       200 - 239     >/= 240   **All ranges are based on fasting samples. Specific   therapeutic targets will vary based on patient-specific   cardiac risk.    Pediatric guidelines reference:Pediatrics 2011, 128(S5).Adult guidelines reference: NCEP ATPIII Guidelines,VIRA 2001, 258:2486-97    Venipuncture immediately after or during the administration of Metamizole may lead to falsely low results. Testing should be performed immediately prior to Metamizole dosing.     Cholesterol/HDL Ratio   Date Value Ref Range Status   02/02/2024 3.2  Final     Comment:       Ref Values  Desirable  < 3.4  High Risk  > 5.0     Triglycerides   Date Value Ref Range Status   02/02/2024 87 0 - 149 mg/dL Final     Comment:        Age         Desirable    Borderline High   High     Very High   0 D-90 D    19 - 174         ----         ----        ----  91 D- 9 Y     0 -  74        75 -  99     >/= 100      ----    10-19 Y     0 -  89        90 - 129     >/= 130      ----    20-24 Y     0 - 114       115 - 149     >/= 150      ----         >24 Y     0 - 149       150 - 199    200- 499    >/= 500    Venipuncture immediately after or during the administration of Metamizole may lead to falsely low results. Testing should be performed immediately prior to Metamizole dosing.             Passed - Visit with relevant provider in past 12 months or upcoming 90 days        Passed - Normal AST or ALT on file in past 12 months     AST   Date Value Ref Range Status   05/14/2024 15 9 - 39 U/L Final       ALT   Date Value Ref Range Status   05/14/2024 19 10 - 52 U/L Final     Comment:     Patients treated with Sulfasalazine may generate falsely decreased results for ALT.             Passed - Medication not refilled in past 45 days (1.5 months)     No matching medication orders between 6/28/2024 11:05 AM and 8/12/2024 11:05 AM

## 2024-08-12 NOTE — PROGRESS NOTES
Impression:  1. Sore throat        2. Gastroesophageal reflux disease, unspecified whether esophagitis present        3. Postnasal drip             RECOMMENDATIONS/PLAN :  I reassured the patient there is no evidence of any residual tonsillitis however he does have some postnasal drip and it sounds like he is having a persistent sore throat from moderate gastroesophageal reflux disease.  I recommend that he restart his Flonase nasal spray-2 puffs each nostril daily and I also want him to take omeprazole 1 pill daily for the next several months.  He can otherwise follow-up with his family physician as needed.  I do not recommend any surgical intervention at the present time.      **This electronic medical record note was created with the use of voice recognition software.  Despite proofreading, typographical or grammatical errors may be present that could affect meaning of content **    Subjective   Patient ID:     Yariel Shaw is a 49 y.o. male who presents to the office today with a persistent sore throat.  He was recently seen through the emergency department and placed on oral antibiotics.  He seems to be doing better however he continues to complain of soreness low in his throat and he does have heartburn and reflux issues.  Currently he is not taking his omeprazole every single day.  He also complains of some congestion with postnasal drip.  No recent fever or chills.  His previous CT scan in the emergency department revealed no evidence of any abscess.    ROS:  A detailed 12 system review of systems is noted on the intake form has been reviewed with the patient with details noted in the HPI and scanned into the patient's medical record.    Objective     Past Medical History:   Diagnosis Date    Anxiety     Diabetes mellitus (Multi)     PRE-DIABETES    Hyperlipidemia     Hypertension     Joint pain     Sinusitis     RESOLVED WITH SURGERY    Wears glasses         Past Surgical History:   Procedure Laterality  Date    MR HEAD ANGIO WO IV CONTRAST  08/24/2021    MR HEAD ANGIO WO IV CONTRAST 8/24/2021 [facility]    NASAL SINUS SURGERY          Allergies   Allergen Reactions    Bupropion Dizziness     Reaction Date:Approx Jan2020    Escitalopram Dizziness     Reaction Date:Approx June2019          Current Outpatient Medications:     amLODIPine (Norvasc) 5 mg tablet, Take 1 tablet (5 mg) by mouth once daily., Disp: 90 tablet, Rfl: 0    amoxicillin (Amoxil) 500 mg tablet, Take 1 tablet (500 mg) by mouth 3 times a day for 10 days., Disp: 30 tablet, Rfl: 0    azithromycin (Zithromax) 250 mg tablet, 2 TABS DAY 1 THEN 1 TAB DAYS 2-5, Disp: , Rfl:     ibuprofen 600 mg tablet, Take 1 tablet (600 mg) by mouth every 6 hours if needed for mild pain (1 - 3) or fever (temp greater than 38.0 C)., Disp: 24 tablet, Rfl: 0    magic mouthwash (lidocaine, diphenhydrAMINE, Maalox 1:1:1), Swish and swallow 10 mL every 6 hours if needed (sore throat)., Disp: 120 mL, Rfl: 0    metFORMIN  mg 24 hr tablet, Take 1 tablet (500 mg) by mouth once daily in the evening. Take with meals., Disp: 90 tablet, Rfl: 0    olmesartan (BENIcar) 40 mg tablet, Take 1 tablet (40 mg) by mouth once daily., Disp: 90 tablet, Rfl: 0    rosuvastatin (Crestor) 10 mg tablet, Take 1 tablet (10 mg) by mouth once daily., Disp: 90 tablet, Rfl: 0    ALPRAZolam (Xanax) 1 mg tablet, Take 1 tablet (1 mg) by mouth as needed at bedtime., Disp: , Rfl:     ammonium lactate (Amlactin) 12 % cream, Apply topically if needed for dry skin. (Patient not taking: Reported on 8/12/2024), Disp: 385 g, Rfl: 1    aspirin 81 mg EC tablet, Take 1 tablet (81 mg) by mouth once daily., Disp: , Rfl:     cyclobenzaprine (Flexeril) 10 mg tablet, Take 1 tablet (10 mg) by mouth 2 times a day as needed for muscle spasms., Disp: 30 tablet, Rfl: 2    ibuprofen 600 mg tablet, Take 1 tablet (600 mg) by mouth every 8 hours if needed for moderate pain (4 - 6)., Disp: 60 tablet, Rfl: 1    loratadine  "(Claritin) 10 mg tablet, Take 1 tablet (10 mg) by mouth once daily at bedtime. (Patient not taking: Reported on 8/12/2024), Disp: 90 tablet, Rfl: 0    naloxone (Narcan) 4 mg/0.1 mL nasal spray, Administer 1 spray (4 mg) into affected nostril(s)., Disp: , Rfl:     tadalafil (Cialis) 20 mg tablet, Take 1 tablet (20 mg) by mouth once daily as needed for erectile dysfunction. (Patient not taking: Reported on 8/12/2024), Disp: 10 tablet, Rfl: 0    triamcinolone (Kenalog) 0.1 % cream, Apply topically 2 times a day. Apply to affected area 1-2 times daily as needed. Avoid face and groin. (Patient not taking: Reported on 8/12/2024), Disp: 45 g, Rfl: 1     Tobacco Use: High Risk (8/12/2024)    Patient History     Smoking Tobacco Use: Every Day     Smokeless Tobacco Use: Never     Passive Exposure: Not on file        Alcohol Use: Not At Risk (8/10/2024)    Received from [facility]    AUDIT-C     Frequency of Alcohol Consumption: Never     Average Number of Drinks: Patient does not drink     Frequency of Binge Drinking: Never        Social History     Substance and Sexual Activity   Drug Use Never    Comment: OCCASIONAL        Physical Exam:  Visit Vitals  BP (!) 149/95   Temp 36.3 °C (97.4 °F) (Temporal)   Ht 1.778 m (5' 10\")   BMI 34.44 kg/m²   Smoking Status Every Day   BSA 2.32 m²      General: Patient is alert, oriented, cooperative in no apparent distress.  Head: Normocephalic, atraumatic.  Eyes: PERRL, EOMI, Conjunctiva is clear. No nystagmus.  Ears: Right Ear-- Pinna is normal.  External auditory canal is []. Tympanic membrane is [intact, translucent and has good mobility with my pneumatic otoscope. No effusion].  Mastoid is nontender.  Left ear-- Pinna is normal.  External auditory canal is patent. Tympanic membrane is [intact, translucent and has good mobility with my pneumatic otoscope.  No effusion].  Mastoid is nontender.  Nose: Septum is straight.  No septal perforation or lesions. No septal " hematoma/ seroma.  No signs of bleeding.  Inferior turbinates are mildly swollen.   No evidence of intranasal polyps.  No infectious drainage.  Throat:  Floor of mouth is clear, no masses.  Tongue appears normal, no lesions or masses. Gums, gingiva, buccal mucosa appear pink and moist, no lesions. Teeth are in good repair.  No obvious dental infections.  Peritonsillar regions appear symmetric without swelling.  Hard and soft palate appear normal, no obvious cleft. Uvula is midline.  Left Tonsil --+2, no exudates.  Right Tonsil --+2.5, no exudates.  Oropharynx: No lesions. Retropharyngeal wall is flat.  Moderate clear postnasal drip.  Neck: Supple,  no lymphadenopathy.  No masses.  Salivary Glands: He has right and left submandibular gland are palpable but no evidence of any mass or infection.  They are mildly ptotic.  Neurologic: Cranial Nerves 2-12 are grossly intact without focal deficits. Cerebellar function testing is normal.     Results:   []    Procedure:   []    Armando Putnam, DO

## 2024-08-13 ENCOUNTER — HOSPITAL ENCOUNTER (EMERGENCY)
Facility: HOSPITAL | Age: 49
Discharge: HOME | End: 2024-08-13
Attending: STUDENT IN AN ORGANIZED HEALTH CARE EDUCATION/TRAINING PROGRAM
Payer: COMMERCIAL

## 2024-08-13 VITALS
DIASTOLIC BLOOD PRESSURE: 93 MMHG | BODY MASS INDEX: 34.36 KG/M2 | HEART RATE: 75 BPM | WEIGHT: 240 LBS | HEIGHT: 70 IN | OXYGEN SATURATION: 97 % | RESPIRATION RATE: 18 BRPM | SYSTOLIC BLOOD PRESSURE: 147 MMHG | TEMPERATURE: 98.4 F

## 2024-08-13 DIAGNOSIS — R09.82 POST-NASAL DRIP: Primary | ICD-10-CM

## 2024-08-13 DIAGNOSIS — J02.9 PHARYNGITIS, UNSPECIFIED ETIOLOGY: ICD-10-CM

## 2024-08-13 PROCEDURE — 99284 EMERGENCY DEPT VISIT MOD MDM: CPT

## 2024-08-13 PROCEDURE — 99282 EMERGENCY DEPT VISIT SF MDM: CPT

## 2024-08-13 PROCEDURE — 2500000001 HC RX 250 WO HCPCS SELF ADMINISTERED DRUGS (ALT 637 FOR MEDICARE OP)

## 2024-08-13 RX ORDER — LIDOCAINE HYDROCHLORIDE 20 MG/ML
10 SOLUTION OROPHARYNGEAL ONCE
Status: COMPLETED | OUTPATIENT
Start: 2024-08-13 | End: 2024-08-13

## 2024-08-13 RX ORDER — OMEPRAZOLE 40 MG/1
40 CAPSULE, DELAYED RELEASE ORAL
Qty: 30 CAPSULE | Refills: 0 | Status: SHIPPED | OUTPATIENT
Start: 2024-08-13 | End: 2024-09-12

## 2024-08-13 RX ORDER — FLUTICASONE PROPIONATE 50 MCG
2 SPRAY, SUSPENSION (ML) NASAL DAILY
Qty: 16 G | Refills: 0 | Status: SHIPPED | OUTPATIENT
Start: 2024-08-13 | End: 2024-09-12

## 2024-08-13 ASSESSMENT — PAIN DESCRIPTION - PAIN TYPE: TYPE: ACUTE PAIN

## 2024-08-13 ASSESSMENT — PAIN DESCRIPTION - FREQUENCY: FREQUENCY: CONSTANT/CONTINUOUS

## 2024-08-13 ASSESSMENT — PAIN - FUNCTIONAL ASSESSMENT: PAIN_FUNCTIONAL_ASSESSMENT: 0-10

## 2024-08-13 ASSESSMENT — PAIN SCALES - GENERAL: PAINLEVEL_OUTOF10: 8

## 2024-08-13 ASSESSMENT — PAIN DESCRIPTION - LOCATION: LOCATION: THROAT

## 2024-08-13 ASSESSMENT — PAIN DESCRIPTION - DESCRIPTORS: DESCRIPTORS: BURNING;SORE

## 2024-08-13 NOTE — ED TRIAGE NOTES
Patient reports a sore throat for 3-4 days. Denies any SOB, CP, fever, chills, N/V/D, dizziness. No known sick contact.

## 2024-08-14 ENCOUNTER — APPOINTMENT (OUTPATIENT)
Dept: PRIMARY CARE | Facility: CLINIC | Age: 49
End: 2024-08-14
Payer: COMMERCIAL

## 2024-08-14 VITALS
HEART RATE: 69 BPM | HEIGHT: 70 IN | WEIGHT: 239.2 LBS | TEMPERATURE: 98.2 F | SYSTOLIC BLOOD PRESSURE: 129 MMHG | OXYGEN SATURATION: 96 % | DIASTOLIC BLOOD PRESSURE: 88 MMHG | BODY MASS INDEX: 34.24 KG/M2

## 2024-08-14 DIAGNOSIS — F41.9 ANXIETY: ICD-10-CM

## 2024-08-14 DIAGNOSIS — N52.9 ERECTILE DYSFUNCTION, UNSPECIFIED ERECTILE DYSFUNCTION TYPE: ICD-10-CM

## 2024-08-14 DIAGNOSIS — M54.2 NECK PAIN, ACUTE: ICD-10-CM

## 2024-08-14 DIAGNOSIS — E78.2 HYPERLIPEMIA, MIXED: ICD-10-CM

## 2024-08-14 DIAGNOSIS — I10 PRIMARY HYPERTENSION: Primary | ICD-10-CM

## 2024-08-14 DIAGNOSIS — E11.9 TYPE 2 DIABETES MELLITUS WITHOUT COMPLICATION, WITHOUT LONG-TERM CURRENT USE OF INSULIN (MULTI): ICD-10-CM

## 2024-08-14 PROCEDURE — 99213 OFFICE O/P EST LOW 20 MIN: CPT | Performed by: PHYSICIAN ASSISTANT

## 2024-08-14 PROCEDURE — 3074F SYST BP LT 130 MM HG: CPT | Performed by: PHYSICIAN ASSISTANT

## 2024-08-14 PROCEDURE — 3044F HG A1C LEVEL LT 7.0%: CPT | Performed by: PHYSICIAN ASSISTANT

## 2024-08-14 PROCEDURE — 3008F BODY MASS INDEX DOCD: CPT | Performed by: PHYSICIAN ASSISTANT

## 2024-08-14 PROCEDURE — 3048F LDL-C <100 MG/DL: CPT | Performed by: PHYSICIAN ASSISTANT

## 2024-08-14 PROCEDURE — 3062F POS MACROALBUMINURIA REV: CPT | Performed by: PHYSICIAN ASSISTANT

## 2024-08-14 PROCEDURE — 4004F PT TOBACCO SCREEN RCVD TLK: CPT | Performed by: PHYSICIAN ASSISTANT

## 2024-08-14 PROCEDURE — 3079F DIAST BP 80-89 MM HG: CPT | Performed by: PHYSICIAN ASSISTANT

## 2024-08-14 RX ORDER — TRAMADOL HYDROCHLORIDE 50 MG/1
50 TABLET ORAL EVERY 6 HOURS PRN
Qty: 15 TABLET | Refills: 0 | Status: SHIPPED | OUTPATIENT
Start: 2024-08-14 | End: 2024-08-21

## 2024-08-14 ASSESSMENT — ENCOUNTER SYMPTOMS
HEADACHES: 0
TROUBLE SWALLOWING: 1
ABDOMINAL PAIN: 1
COUGH: 0
VOMITING: 0
HOARSE VOICE: 1
SORE THROAT: 1
SHORTNESS OF BREATH: 0
DIARRHEA: 0
STRIDOR: 0
SWOLLEN GLANDS: 1
NECK PAIN: 1

## 2024-08-14 ASSESSMENT — PATIENT HEALTH QUESTIONNAIRE - PHQ9
SUM OF ALL RESPONSES TO PHQ9 QUESTIONS 1 AND 2: 0
1. LITTLE INTEREST OR PLEASURE IN DOING THINGS: NOT AT ALL
2. FEELING DOWN, DEPRESSED OR HOPELESS: NOT AT ALL

## 2024-08-14 NOTE — ED PROVIDER NOTES
"HPI   Chief Complaint   Patient presents with    Sore Throat       Patient is a 49 y.o with PMH of DMII, seasonal allergies and depression presenting for on going complaints of a sore throat. Pt states his sore throat began on 7/30/24 after returning from a trip to [place]. He has since then visited the ED for the same complaints on 8/5/24, 8/8/24, 8/10/24 where they put him on amoxicillin and Zithromax, ibprofuen and lidocaine mouth wash. He reports the medications have not helped, but the lidocaine mouth wash helped for \"a little bit.\"  They also did 2 CT scans which showed no findings of PTA though it did note that his right tonsil was slightly larger.  He saw ENT yesterday, where they told him he had acid reflux, and started him on omeprazole and Flonase.  He denies ever having issues with acid reflux in the past. He reports only the R side of his throat irritating him, limiting his sleep due to pain and difficulty swallowing.  No ear pain.  Pt denies congestion, chest pain, SOB, fevers, chills.  No sick contacts.  His significant other in the room with him reports his voice sounding more like a \"gargle\" now then his baseline.  States that he never started the omeprazole or Flonase that ENT doctor recommended.  Controlling his secretions, no trismus, denies swelling.  No fever or chills.              Patient History   Past Medical History:   Diagnosis Date    Anxiety     Diabetes mellitus (Multi)     PRE-DIABETES    Hyperlipidemia     Hypertension     Joint pain     Sinusitis     RESOLVED WITH SURGERY    Wears glasses      Past Surgical History:   Procedure Laterality Date    MR HEAD ANGIO WO IV CONTRAST  08/24/2021    MR HEAD ANGIO WO IV CONTRAST 8/24/2021 [facility]    NASAL SINUS SURGERY       Family History   Problem Relation Name Age of Onset    Hypertension Mother      Hyperlipidemia Mother      Allergies Mother      Allergies Father      Throat cancer Father      Hypertension Brother      Diabetes " Brother      Heart attack Maternal Grandmother       Social History     Tobacco Use    Smoking status: Every Day     Current packs/day: 0.50     Average packs/day: 0.5 packs/day for 20.0 years (10.0 ttl pk-yrs)     Types: Cigarettes    Smokeless tobacco: Never   Vaping Use    Vaping status: Never Used   Substance Use Topics    Alcohol use: Never    Drug use: Never     Comment: OCCASIONAL       Physical Exam   ED Triage Vitals [08/13/24 1921]   Temperature Heart Rate Respirations BP   36.9 °C (98.4 °F) 75 18 (!) 147/93      Pulse Ox Temp Source Heart Rate Source Patient Position   97 % Temporal -- Sitting      BP Location FiO2 (%)     Left arm --       Physical Exam  Vitals and nursing note reviewed.   Constitutional:       General: He is not in acute distress.     Appearance: Normal appearance. He is not ill-appearing.   HENT:      Head: Normocephalic and atraumatic.      Right Ear: Hearing, tympanic membrane, ear canal and external ear normal. Tympanic membrane is not erythematous or bulging.      Left Ear: Hearing, tympanic membrane, ear canal and external ear normal. Tympanic membrane is not erythematous or bulging.      Nose: Nose normal. No congestion or rhinorrhea.      Right Turbinates: Not swollen or pale.      Left Turbinates: Not swollen or pale.      Right Sinus: No maxillary sinus tenderness or frontal sinus tenderness.      Left Sinus: No maxillary sinus tenderness or frontal sinus tenderness.      Mouth/Throat:      Mouth: Mucous membranes are moist.      Pharynx: Oropharynx is clear. Uvula midline. Posterior oropharyngeal erythema present. No pharyngeal swelling, oropharyngeal exudate or uvula swelling.      Tonsils: No tonsillar exudate or tonsillar abscesses. 0 on the right. 0 on the left.   Eyes:      Extraocular Movements: Extraocular movements intact.      Conjunctiva/sclera: Conjunctivae normal.      Pupils: Pupils are equal, round, and reactive to light.   Cardiovascular:      Rate and Rhythm:  Normal rate and regular rhythm.      Heart sounds: Normal heart sounds.   Pulmonary:      Effort: No accessory muscle usage or respiratory distress.      Breath sounds: Normal breath sounds. No wheezing, rhonchi or rales.   Abdominal:      General: Abdomen is flat. Bowel sounds are normal. There is no distension.      Palpations: Abdomen is soft.      Tenderness: There is no abdominal tenderness. There is no right CVA tenderness or left CVA tenderness.   Musculoskeletal:         General: No swelling or deformity. Normal range of motion.      Cervical back: Normal range of motion and neck supple.      Right lower leg: No edema.      Left lower leg: No edema.   Skin:     General: Skin is warm and dry.      Capillary Refill: Capillary refill takes less than 2 seconds.   Neurological:      General: No focal deficit present.      Mental Status: He is alert and oriented to person, place, and time.      GCS: GCS eye subscore is 4. GCS verbal subscore is 5. GCS motor subscore is 6.      Cranial Nerves: Cranial nerves 2-12 are intact.      Sensory: No sensory deficit.      Motor: Motor function is intact. No weakness.   Psychiatric:         Mood and Affect: Mood and affect normal.         Speech: Speech normal.         Behavior: Behavior normal. Behavior is cooperative.           ED Course & MDM   Diagnoses as of 08/13/24 2018   Post-nasal drip   Pharyngitis, unspecified etiology                 No data recorded     Javi Coma Scale Score: 15 (08/13/24 1924 : Philomena Zafar RN)                           Medical Decision Making  Patient is a 49-year-old male presenting today for sore throat.  This is his fourth ER visit in the past week for this symptom.  He also received 2 CT scans, was given amoxicillin and Zithromax, steroids and also saw ENT yesterday for the same complaint.  TMs bilaterally without erythema or bulging to be concern for otitis media.  His oropharynx is erythematous but no tonsillar swelling or exudate  noted.  He is controlling secretions, no trismus, he is afebrile and without SIRS criteria.  Discussed trial of the omeprazole and Flonase that the ENT discussed with them as he is not started either of those medications.  Discussed that his symptoms also could be contributing from a viral illness even if COVID COVID and flu swabs are negative.  He did have a negative monotest and a negative strep swab as well.  Discussed symptomatic control with ibuprofen and Tylenol.  He also has an appointment with primary care scheduled tomorrow.  I did send in prescriptions for omeprazole and Flonase as he has not picked them up on his own.    Case discussed with ED attending Dr. Ventura        Procedure  Procedures     Marilyn Del Cid PA-C  08/13/24 2051

## 2024-08-14 NOTE — DISCHARGE INSTRUCTIONS
Symptoms could be viral in nature.  Take the omeprazole and Flonase as instructed by ENT.  I did send in prescriptions for both of these to [facility] in [place].  Keep appointment with primary care tomorrow.  You can take Tylenol or ibuprofen as needed for pain

## 2024-08-14 NOTE — PROGRESS NOTES
Subjective   Patient ID: Yariel Shaw is a 49 y.o. male who presents for Follow-up.    Sore Throat   This is a new problem. The current episode started in the past 7 days. The problem has been waxing and waning. The pain is worse on the right side. There has been no fever. The fever has been present for Less than 1 day. The pain is at a severity of 8/10. The pain is moderate. Associated symptoms include abdominal pain, ear pain, a hoarse voice, neck pain, swollen glands and trouble swallowing. Pertinent negatives include no congestion, coughing, diarrhea, drooling, ear discharge, headaches, plugged ear sensation, shortness of breath, stridor or vomiting.        No new concerns   Review labs 2024- hospital   Colon screenin  Labs in system  Pt has lost 10# with diet and exercise.       6 mo f/up:  Patient has hypertension  He does not monitor his BP at home.  Denies chest pain, dizziness, lower leg swelling.      Patient has hyperlipidemia.  Patient tries to limit the amount of fatty foods and high cholesterol foods that are consumed.  He is compliant with his rosuvastatin and denies any noted side effects.    He is requesting tramadol for the LN pain in neck.      Patient has Type 2 DM.  Pt does NOT monitor his glucose at home regularly. Most recent A1c done 24 was 6.7% same as 3 mo ago.  He denies any polyuria, polydipsia, polyphagia.  Patient states that he has been compliant with the current diabetes medication.  He is on herbals.      He has ED.  Tadalafil continues to work well for him.     Pt continues to smoke.   Patient continues to work on smoking cessation but has been unable to completely quit.     Pt has anxiety.   He does follow with psych, Dr. Patino for his anxiety.  His psychiatrist manages his xanax.   Denies any noted side effects.     He has insomnia.  Ambien was discontinued in the past since he is on xanax (risks of combination discussed).     Pt has chronic lower back  "pain.  He does follow with pain management as needed.        Review of Systems   HENT:  Positive for ear pain, hoarse voice, sore throat and trouble swallowing. Negative for congestion, drooling and ear discharge.    Respiratory:  Negative for cough, shortness of breath and stridor.    Gastrointestinal:  Positive for abdominal pain. Negative for diarrhea and vomiting.   Musculoskeletal:  Positive for neck pain.   Neurological:  Negative for headaches.     Constitutional: Patient denies any fever, chills, loss of appetite, or unexplained weight loss.  Cardiovascular: Patient denies any chest pain, shortness of breath with exertion, tachycardia, palpitations, orthopnea, or paroxysmal nocturnal dyspnea.  Respiratory: Patient denies any cough, shortness breath, or wheezing.  Gastrointestinal patient denies any nausea, vomiting, diarrhea, constipation, melena, hematochezia, or reflux symptoms  Skin: Denies any rashes or skin lesions   Neurology: Patient denies any new motor or sensory losses.  Denies any numbness, tingling, weakness, and incoordination of the extremities.  Patient also denies any tremor, seizures, or gait instability.  Endocrinology: Denies any polyuria, polydipsia, polyphagia, or heat/cold intolerance.      Objective   /88   Pulse 69   Temp 36.8 °C (98.2 °F) (Temporal)   Ht 1.778 m (5' 10\")   Wt 109 kg (239 lb 3.2 oz)   SpO2 96%   BMI 34.32 kg/m²     Physical Exam  Gen. appearance: Alert and cooperative, in no acute distress, well-developed, well-nourished obese male.  Neck: Supple and without adenopathy or rigidity.  There is no JVD at 90° and no carotid bruits are noted.  There is no thyromegaly, thyroid tenderness, or palpable thyroid nodules.  Heart: Regular rate and rhythm without murmur or ectopy.  Lungs: Lungs are clear to auscultation bilaterally with good air exchange.  Skin: Good turgor, moist, warm and without rashes or lesions.  Neurological exam: Alert and oriented ×3, no " tremor, normal gait.  Extremities: No clubbing, cyanosis, or edema      Assessment/Plan   Diagnoses and all orders for this visit:  Primary hypertension-patient is currently not on antihypertensives because he is using herbal remedies and has lost 10 pounds.  Blood pressure today in the office looks good.  Patient was told to continue with current regimen and we will continue to monitor.    Hyperlipemia, mixed-again, patient is on herbals to help control his cholesterol and he is due to have his lipid levels rechecked.  Patient was told to wait until he is feeling better before having them rechecked.  He understands that he should be going fasting.  Patient denies any symptoms of unstable angina at this time.    Type 2 diabetes mellitus without complication, without long-term current use of insulin (Multi)  -     Follow Up In [facility] - PCP - Established; Future  Patient is currently not treated with medication and is only on diet and exercise modifications.  He was told to continue with those in the herbals such as berberine that he is using over-the-counter to help control his sugar.  His labs to be done within the next 4 weeks, after he is healed from this lymph node pain in his neck and we will see how well the herbals are doing for his sugars.  It is noted the patient has lost 10 pounds in the past 3 months.  He currently denies any polyuria, polydipsia.    Erectile dysfunction, unspecified erectile dysfunction type-patient is treated with Cialis.  He will continue current dosing and we will continue to monitor.    Neck pain, acute  -     traMADol (Ultram) 50 mg tablet; Take 1 tablet (50 mg) by mouth every 6 hours if needed for severe pain (7 - 10) for up to 7 days.  Patient's lymph nodes are swollen and it is difficult for him to swallow.  The pain is especially bad when he is trying to sleep at night.  Patient was given 15 tablets of tramadol to see if this is helpful.  He is supposed to be  following with an ENT outpatient and I told him that should request a laryngoscopy for further evaluation with ENT.    Anxiety-well-controlled.  Patient is seeing psychiatry.  He will continue with psychiatry appointments and medications as prescribed.  He has no thoughts of hurting himself or others    Other orders  -     Follow Up In Primary Care - Established    Patient is to return to the clinic in 3 months for a 3 mo f/up.  Labs to be done within the next month after he is feeling better with his sore throat/swollen lymph nodes.    Patient understands that should they have testing outside   facilities that we may not receive the results and was told to call us if they have not heard from our office within a week after testing.    [facility] uses voice recognition technology for dictations. Sometimes the software misinterprets words. Please take this into account when reading this.

## 2024-08-15 ENCOUNTER — TELEPHONE (OUTPATIENT)
Dept: PRIMARY CARE | Facility: CLINIC | Age: 49
End: 2024-08-15
Payer: COMMERCIAL

## 2024-08-15 DIAGNOSIS — M54.2 NECK PAIN, ACUTE: Primary | ICD-10-CM

## 2024-08-15 NOTE — TELEPHONE ENCOUNTER
Pt states that he would like a referral to an ENT as he has been having issues with his throat that have made it difficult for him to breathe, please advise

## 2024-08-15 NOTE — TELEPHONE ENCOUNTER
Called patient to verify if h needed a referral, he has stated yes he needs a referral before he can be seen with the Ent specialist he's seeing tomorrow.

## 2024-08-16 ENCOUNTER — OFFICE VISIT (OUTPATIENT)
Dept: OTOLARYNGOLOGY | Facility: CLINIC | Age: 49
End: 2024-08-16
Payer: COMMERCIAL

## 2024-08-16 VITALS — WEIGHT: 236.8 LBS | BODY MASS INDEX: 33.9 KG/M2 | HEIGHT: 70 IN

## 2024-08-16 VITALS — WEIGHT: 236.9 LBS | HEIGHT: 70 IN | TEMPERATURE: 99 F | BODY MASS INDEX: 33.92 KG/M2

## 2024-08-16 DIAGNOSIS — R07.0 THROAT DISCOMFORT: ICD-10-CM

## 2024-08-16 DIAGNOSIS — J02.9 SORE THROAT: Primary | ICD-10-CM

## 2024-08-16 DIAGNOSIS — J35.1 ENLARGED TONSILS: ICD-10-CM

## 2024-08-16 DIAGNOSIS — J35.1 ENLARGED TONSILS: Primary | ICD-10-CM

## 2024-08-16 PROCEDURE — 99214 OFFICE O/P EST MOD 30 MIN: CPT | Mod: 25

## 2024-08-16 PROCEDURE — 99204 OFFICE O/P NEW MOD 45 MIN: CPT

## 2024-08-16 PROCEDURE — 3048F LDL-C <100 MG/DL: CPT

## 2024-08-16 PROCEDURE — 4004F PT TOBACCO SCREEN RCVD TLK: CPT

## 2024-08-16 PROCEDURE — 3044F HG A1C LEVEL LT 7.0%: CPT

## 2024-08-16 PROCEDURE — 31575 DIAGNOSTIC LARYNGOSCOPY: CPT

## 2024-08-16 PROCEDURE — 3008F BODY MASS INDEX DOCD: CPT

## 2024-08-16 PROCEDURE — 3062F POS MACROALBUMINURIA REV: CPT

## 2024-08-16 RX ORDER — NYSTATIN 100000 [USP'U]/ML
5 SUSPENSION ORAL 2 TIMES DAILY
Qty: 140 ML | Refills: 0 | Status: SHIPPED | OUTPATIENT
Start: 2024-08-16 | End: 2024-08-30

## 2024-08-16 RX ORDER — NYSTATIN 100000 [USP'U]/ML
5 SUSPENSION ORAL 2 TIMES DAILY
Qty: 100 ML | Refills: 0 | Status: SHIPPED | OUTPATIENT
Start: 2024-08-16 | End: 2024-08-16

## 2024-08-16 ASSESSMENT — PAIN SCALES - GENERAL: PAINLEVEL: 8

## 2024-08-16 NOTE — PROGRESS NOTES
Reason For Consult  Sore throat     HISTORY OF PRESENT ILLNESS:  Yariel Shaw, who is a 49 y.o. male presenting for an initial visit for sore throat, difficulty swallowing, and tonsil stones.  The patient reports that he has been experiencing a sore throat since July 30th that has made it difficult for him to eat.  The patient was seen in the ED on 8/5/24 and 8/8/24 for pharyngitis. The patient was checked for Strep and Covid. The patient was also seen in the ED on 8/10/24 for acute tonsillitis and 8/13/24 for sore throat. The patient received amoxicillin, Zithromax, lidocaine mouth wash, and prednisone over the course of ED visits.. The patient reports that a tonsil stones have recently appeared. Patient reports that he normal performs good oral hygiene and cleans the tongue. The patient was seen by ENT on 8/12/24 and was started on omeprazole for acid reflux and Flonase. The patient states with recent sore throat it has been more difficult to swallow due to swelling and pain and has been eating mainly soups. The patient states he will have periods of a raspy of voice.        Past Medical History  He has a past medical history of Anxiety, Diabetes mellitus (Multi), Hyperlipidemia, Hypertension, Joint pain, Sinusitis, and Wears glasses.  Surgical History  He has a past surgical history that includes MR angio head wo IV contrast (08/24/2021) and Nasal sinus surgery.     Social History  He reports that he has been smoking cigarettes. He has a 10 pack-year smoking history. He has never used smokeless tobacco. He reports that he does not drink alcohol and does not use drugs.    Allergies  Bupropion and Escitalopram    Review of Systems  All 10 systems were reviewed and negative except for above.      Physical Exam  CONSTITUTIONAL: Well developed, well nourished.    VOICE: normal  RESPIRATION: Breathing comfortably, no stridor.    NEURO: Alert and oriented x3, cranial nerves II-XII intact and symmetric bilaterally.   "  EARS: Normal external ears, external auditory canals, normal hearing to conversational voice.    NOSE: External nose midline, anterior rhinoscopy is normal with limited visualization to the anterior aspect of the interior turbinates. No lesions noted.     ORAL CAVITY/OROPHARYNX/LIPS: Normal mucous membranes, normal floor of mouth/tongue/OP, no masses or lesions are noted.  Right side tonsil stone vs mucus deep in crevices.    SKIN: Neck skin is normal.  PSYCH: Alert and oriented with appropriate mood and affect.        Last Recorded Vitals  Temperature 37.2 °C (99 °F), height 1.778 m (5' 10\"), weight 107 kg (236 lb 14.4 oz).    Procedure  PROCEDURE NOTE:  Recommended flexible laryngoscopy/stroboscopy.  Risks, benefits,  and alternatives were explained.  They wish to proceed and provide verbal consent.     PROCEDURE:  Flexible Laryngoscopy, CPT 72251     INDICATIONS: Inability to tolerate mirror exam or abnormal findings on mirror, Flexible Laryngoscopy/Stroboscopy performed to assess one of the followin. Diagnosis of symptomatic disorder involving the voice, swallow, upper aerodigestive tract, including URSULA disorders, or  2. Preoperative evaluation of vocal cord function for individuals undergoing surgery where the RLN or vagus nerves are at risk of injury, or  3. Further evaluation of abnormalities of the upper aerodigestive tract discovered by another modality, such as CT, MRI, bronchoscopy or EGD    Description of Procedure:    After adequate afrin and lidocaine spray, I advanced the endoscope.  Visualization of the nasopharynx, vallecula, posterior pharyngeal walls, pyriform, epiglottis and post cricoid areas was unremarkable.  The following laryngeal findings were noted:    vocal cord movement was normal  closure was complete  Mucosal wave was not assessed  Compression was increased AP  FVC   edema was none  interarytenoid edema present  Tonsil stone on right vs mucus trapped in crevices.   the " subglottis was widely patent  Pharyngeal wall squeeze was normal  Right palantine tonsil greater than left.   No pooling of secretions.    Procedure well tolerated.   Relevant Results  CT soft tissue of neck-8/10/24    ASSESSMENT AND PLAN:   This is an initial visit for sore throat since July 30th 2024 with clinical findings notable for good vocal cord movement and closure. Increased mucus at base of tongue. White thick sticky film on posterior 1/3 of tongue. Tonsil stone vs mucus on right tonsil. Discussed using a waterpik to clean tonsils. Also discussed gargling with seltzer water for mucus management.      We discussed the treatment options to include, medical and surgical options.  We have decided to proceed as follows:  Nystatin swish and swallow 2 times per day for 14 days.  Refilled lidocaine magic mouthwash for discomfort when swallowing.  Continue use of tongue scraper  Use carbonated water/seltzer water gargles daily for mucus management.  Discussed can have the patient follow up in 2-3 weeks after nystatin treatment. Patient would like to see the doctor at next visit. Will have the patient schedule follow up with Dr. Black for 2-3 weeks.

## 2024-08-17 NOTE — PROGRESS NOTES
ENT Outpatient Consultation    Chief Complaint: Pharyngitis, tonsil stones, tonsillar hypertrophy  History Of Present Illness  Yariel Shaw is a 49 y.o. male presents for evaluation of his tonsils.  Patient has about a 2-week history of right-sided sore throat that has prompted some visits in the emergency room as well as a CT scan.  The CT scan was reviewed and it does show some heterogeneous changes within the tonsil as well as a tonsil stone.  He has been placed on antibiotics however remains symptomatic.  He was seen by ENT in the emergency room as well as this morning for evaluation.  He had a scope exam this morning that was reportedly normal.  He is here today to see if we can express the tonsil stone and also to discuss tonsillectomy     Past Medical History  He has a past medical history of Anxiety, Diabetes mellitus (Multi), Hyperlipidemia, Hypertension, Joint pain, Sinusitis, and Wears glasses.    Surgical History  He has a past surgical history that includes MR angio head wo IV contrast (08/24/2021) and Nasal sinus surgery.     Social History  He reports that he has been smoking cigarettes. He has a 10 pack-year smoking history. He has never used smokeless tobacco. He reports that he does not drink alcohol and does not use drugs.    Family History  Family History   Problem Relation Name Age of Onset    Hypertension Mother      Hyperlipidemia Mother      Allergies Mother      Allergies Father      Throat cancer Father      Hypertension Brother      Diabetes Brother      Heart attack Maternal Grandmother          Allergies  Bupropion and Escitalopram     Physical Exam:  CONSTITUTIONAL:  No acute distress  VOICE:  No hoarseness or other abnormality  RESPIRATION:  Breathing comfortably, no stridor  CV:  No clubbing/cyanosis/edema in hands  EYES:  EOM intact, sclera normal  NEURO:  Alert and oriented times 3, Cranial nerves II-XII grossly intact and symmetric bilaterally  HEAD AND FACE:  Symmetric facial  "features, no masses or lesions, sinuses non-tender to palpation  SALIVARY GLANDS:  Parotid and submandibular glands normal bilaterally  EARS:  Normal external ears, external auditory canals, and TMs to otoscopy, normal hearing to whispered voice.  NOSE:  External nose midline, anterior rhinoscopy is normal with limited visualization to the anterior aspect of the interior turbinates, no bleeding or drainage, no lesions  ORAL CAVITY/OROPHARYNX/LIPS:  Normal mucous membranes, normal floor of mouth/tongue.  Visualization of the oropharynx was difficult due to gag reflex.  At the superior aspect of the right tonsil there is a small amount of purulent drainage and when tonsil was expressed this it appeared to express some drainage as well as a possible stone  PHARYNGEAL WALLS:  No masses or lesions  NECK/LYMPH:  No LAD, no thyroid masses, trachea midline  SKIN:  Neck skin is without scar or injury  PSYCH:  Alert and oriented with appropriate mood and affect     Last Recorded Vitals  Height 1.778 m (5' 10\"), weight 107 kg (236 lb 12.8 oz).    Relevant Results    CT soft tissue neck w IV contrast    Result Date: 8/10/2024  EXAMINATION: CT OF THE NECK SOFT TISSUE WITH CONTRAST  8/10/2024 TECHNIQUE: CT of the neck was performed with the administration of intravenous contrast. Multiplanar reformatted images are provided for review. Automated exposure control, iterative reconstruction, and/or weight based adjustment of the mA/kV was utilized to reduce the radiation dose to as low as reasonably achievable. COMPARISON: None. HISTORY: ORDERING SYSTEM PROVIDED HISTORY: Peritonsillar abscess TECHNOLOGIST PROVIDED HISTORY: Reason for exam:->Peritonsillar abscess Additional Contrast?->1 What reading provider will be dictating this exam?->CRC FINDINGS: PHARYNX/LARYNX:  There is asymmetric enlargement of the right palatine tonsil.  A 1 cm low-density focus is present along the lateral margin of the right palatine tonsil consistent with " a phlegmon.  There is no drainable peritonsillar abscess identified. The uvula is edematous.  The epiglottis is normal.  The glottis is normal. The tongue is unremarkable. SALIVARY GLANDS/THYROID:  The parotid and submandibular glands appear unremarkable.  The thyroid gland appears unremarkable. LYMPH NODES:  No cervical or supraclavicular lymphadenopathy is seen. SOFT TISSUES:  No appreciable soft tissue swelling or mass is seen. BRAIN/ORBITS/SINUSES:  Limited evaluation of the brain and orbits is unremarkable.  Paranasal sinuses and mastoid air cells are clear. LUNG APICES/SUPERIOR MEDIASTINUM:  The lung apices are clear.  No superior mediastinal lymphadenopathy. BONES:  No lytic or blastic osseous lesions are identified.    Heterogeneous enlargement of the right palatine tonsil consistent with an infectious tonsillitis.  There is a 1 cm phlegmon along the lateral margin of the right palatine tonsil but no drainable abscess at this time.    CT soft tissue neck w IV contrast    Result Date: 8/8/2024  Interpreted By:  Jc Bruner and Hofer Lindsay STUDY: CT SOFT TISSUE NECK W IV CONTRAST;  8/8/2024 2:52 pm   INDICATION: Signs/Symptoms:Worsening sore throat.   COMPARISON: None.   ACCESSION NUMBER(S): [id number]   ORDERING CLINICIAN: VICKI CHAVEZ   TECHNIQUE: Axial CT images of the neck were obtained.  The patient received 75 ML of Omnipaque 350 intravenous contrast agent. The images were reformatted in angled axial, coronal and sagittal planes.   FINDINGS: Oral Cavity, Pharynx and Larynx:  Evaluation of the oral cavity is mildly limited by streak artifact from dental hardware. There is mild prominence of the adenoidal soft tissue. Otherwise the nasopharyngeal structures are unremarkable. There is mild symmetric enlargement of the bilateral lingual tonsils. There is moderate, right-greater-than-left, enlargement of the bilateral palatine tonsils with slight transverse narrowing of the patent pharyngeal airway.  In addition, there is an ovoid area of hyperenhancement within the right palatine tonsil measuring 1.1 cm (series 202, image 51) as well as a small tonsillith. No peritonsillar abscess. No invasion into the peritonsillar fat planes or other structures. The hypopharyngeal and laryngeal structures are otherwise unremarkable.   Retropharyngeal and Prevertebral Soft Tissues: Unremarkable.   Lymph nodes: There are few non specific bilateral neck nodes, probably reactive in etiology. The bilateral jugulodigastric lymph nodes are slightly prominent but within normal limits. Similarly, there are nonenlarged level 2 and level 3 nodes bilaterally.   Neck vessels: Bilateral neck vessels are normal in course and caliber and appear patent.   Thyroid gland: The thyroid gland is unremarkable in size and appearance.   Parotid and submandibular glands: The bilateral submandibular gland ducts are prominent bilaterally with no radiopaque sialolith. Bilateral parotid and submandibular glands are otherwise unremarkable in appearance.   Paranasal Sinuses and Mastoids: Visualized paranasal sinuses and bilateral mastoids are clear.   Visualized orbital structures are unremarkable.   Visualized upper lungs are clear.   Visualized cervical spine appears unremarkable.       1. Enlargement of the bilateral palatine and lingual tonsils reflecting acute uncomplicated tonsillitis/pharyngitis. No evidence for peritonsillar abscess. There is slight asymmetric enlargement of the right palatine tonsil with parenchymal heterogeneity/hyperenhancement with mild transverse narrowing of the patent airway. Small amount of secretions are seen within the pharynx.     I personally reviewed the images/study and resident's interpretation and I agree with the findings as stated by Roseanne Weinberg MD (resident radiologist). This study was analyzed and interpreted at [facility], [place], [place].   MACRO: None   Signed by: Jc  [facility] 8/8/2024 3:19 PM Dictation workstation:   [id number]      Assessment and Plan  49 y.o. male with tonsil stones, pharyngitis, and continued pain.  He also has tonsil hypertrophy and sleep disordered breathing.  Patient is interested in tonsillectomy.  We discussed the risk of post tonsillectomy hemorrhage, expected recovery, and postoperative pain management.  He would like to proceed with tonsillectomy.  We will work on scheduling    Sean Hodgson MD

## 2024-08-22 ENCOUNTER — LAB (OUTPATIENT)
Dept: LAB | Facility: LAB | Age: 49
End: 2024-08-22
Payer: COMMERCIAL

## 2024-08-22 DIAGNOSIS — E78.2 HYPERLIPEMIA, MIXED: ICD-10-CM

## 2024-08-22 DIAGNOSIS — E55.9 VITAMIN D DEFICIENCY: ICD-10-CM

## 2024-08-22 DIAGNOSIS — I10 PRIMARY HYPERTENSION: ICD-10-CM

## 2024-08-22 DIAGNOSIS — E11.9 TYPE 2 DIABETES MELLITUS WITHOUT COMPLICATION, WITHOUT LONG-TERM CURRENT USE OF INSULIN (MULTI): ICD-10-CM

## 2024-08-22 LAB
ALBUMIN SERPL BCP-MCNC: 3.9 G/DL (ref 3.4–5)
ALP SERPL-CCNC: 78 U/L (ref 33–120)
ALT SERPL W P-5'-P-CCNC: 19 U/L (ref 10–52)
ANION GAP SERPL CALC-SCNC: 11 MMOL/L (ref 10–20)
AST SERPL W P-5'-P-CCNC: 12 U/L (ref 9–39)
BASOPHILS # BLD AUTO: 0.03 X10*3/UL (ref 0–0.1)
BASOPHILS NFR BLD AUTO: 0.5 %
BILIRUB SERPL-MCNC: 0.6 MG/DL (ref 0–1.2)
BUN SERPL-MCNC: 16 MG/DL (ref 6–23)
CALCIUM SERPL-MCNC: 9.2 MG/DL (ref 8.6–10.3)
CHLORIDE SERPL-SCNC: 104 MMOL/L (ref 98–107)
CHOLEST SERPL-MCNC: 195 MG/DL (ref 0–199)
CHOLESTEROL/HDL RATIO: 4.4
CO2 SERPL-SCNC: 28 MMOL/L (ref 21–32)
CREAT SERPL-MCNC: 1.26 MG/DL (ref 0.5–1.3)
EGFRCR SERPLBLD CKD-EPI 2021: 70 ML/MIN/1.73M*2
EOSINOPHIL # BLD AUTO: 0.29 X10*3/UL (ref 0–0.7)
EOSINOPHIL NFR BLD AUTO: 4.6 %
ERYTHROCYTE [DISTWIDTH] IN BLOOD BY AUTOMATED COUNT: 13.3 % (ref 11.5–14.5)
EST. AVERAGE GLUCOSE BLD GHB EST-MCNC: 148 MG/DL
GLUCOSE SERPL-MCNC: 121 MG/DL (ref 74–99)
HBA1C MFR BLD: 6.8 %
HCT VFR BLD AUTO: 44.8 % (ref 41–52)
HDLC SERPL-MCNC: 44.3 MG/DL
HGB BLD-MCNC: 14.4 G/DL (ref 13.5–17.5)
IMM GRANULOCYTES # BLD AUTO: 0.02 X10*3/UL (ref 0–0.7)
IMM GRANULOCYTES NFR BLD AUTO: 0.3 % (ref 0–0.9)
LDLC SERPL CALC-MCNC: 130 MG/DL
LYMPHOCYTES # BLD AUTO: 2.68 X10*3/UL (ref 1.2–4.8)
LYMPHOCYTES NFR BLD AUTO: 42.5 %
MCH RBC QN AUTO: 28.4 PG (ref 26–34)
MCHC RBC AUTO-ENTMCNC: 32.1 G/DL (ref 32–36)
MCV RBC AUTO: 88 FL (ref 80–100)
MONOCYTES # BLD AUTO: 0.68 X10*3/UL (ref 0.1–1)
MONOCYTES NFR BLD AUTO: 10.8 %
NEUTROPHILS # BLD AUTO: 2.61 X10*3/UL (ref 1.2–7.7)
NEUTROPHILS NFR BLD AUTO: 41.3 %
NON HDL CHOLESTEROL: 151 MG/DL (ref 0–149)
NRBC BLD-RTO: 0 /100 WBCS (ref 0–0)
PLATELET # BLD AUTO: 243 X10*3/UL (ref 150–450)
POTASSIUM SERPL-SCNC: 4.4 MMOL/L (ref 3.5–5.3)
PROT SERPL-MCNC: 6.6 G/DL (ref 6.4–8.2)
RBC # BLD AUTO: 5.07 X10*6/UL (ref 4.5–5.9)
SODIUM SERPL-SCNC: 139 MMOL/L (ref 136–145)
TRIGL SERPL-MCNC: 106 MG/DL (ref 0–149)
TSH SERPL-ACNC: 2.53 MIU/L (ref 0.44–3.98)
VLDL: 21 MG/DL (ref 0–40)
WBC # BLD AUTO: 6.3 X10*3/UL (ref 4.4–11.3)

## 2024-08-22 PROCEDURE — 82652 VIT D 1 25-DIHYDROXY: CPT

## 2024-08-22 PROCEDURE — 80061 LIPID PANEL: CPT

## 2024-08-22 PROCEDURE — 84443 ASSAY THYROID STIM HORMONE: CPT

## 2024-08-22 PROCEDURE — 36415 COLL VENOUS BLD VENIPUNCTURE: CPT

## 2024-08-22 PROCEDURE — 83036 HEMOGLOBIN GLYCOSYLATED A1C: CPT

## 2024-08-22 PROCEDURE — 85025 COMPLETE CBC W/AUTO DIFF WBC: CPT

## 2024-08-22 PROCEDURE — 80053 COMPREHEN METABOLIC PANEL: CPT

## 2024-08-24 LAB — 1,25(OH)2D SERPL-MCNC: 61.8 PG/ML (ref 19.9–79.3)

## 2024-08-28 ENCOUNTER — OFFICE VISIT (OUTPATIENT)
Dept: OTOLARYNGOLOGY | Facility: CLINIC | Age: 49
End: 2024-08-28
Payer: COMMERCIAL

## 2024-08-28 VITALS — BODY MASS INDEX: 34.33 KG/M2 | WEIGHT: 239.8 LBS | HEIGHT: 70 IN

## 2024-08-28 DIAGNOSIS — J35.8 TONSIL ASYMMETRY: ICD-10-CM

## 2024-08-28 DIAGNOSIS — J30.9 ALLERGIC RHINITIS, UNSPECIFIED SEASONALITY, UNSPECIFIED TRIGGER: Primary | ICD-10-CM

## 2024-08-28 PROCEDURE — 3050F LDL-C >= 130 MG/DL: CPT | Performed by: OTOLARYNGOLOGY

## 2024-08-28 PROCEDURE — 3008F BODY MASS INDEX DOCD: CPT | Performed by: OTOLARYNGOLOGY

## 2024-08-28 PROCEDURE — 3044F HG A1C LEVEL LT 7.0%: CPT | Performed by: OTOLARYNGOLOGY

## 2024-08-28 PROCEDURE — 99213 OFFICE O/P EST LOW 20 MIN: CPT | Performed by: OTOLARYNGOLOGY

## 2024-08-28 PROCEDURE — 3062F POS MACROALBUMINURIA REV: CPT | Performed by: OTOLARYNGOLOGY

## 2024-08-28 PROCEDURE — 4004F PT TOBACCO SCREEN RCVD TLK: CPT | Performed by: OTOLARYNGOLOGY

## 2024-08-28 PROCEDURE — 31575 DIAGNOSTIC LARYNGOSCOPY: CPT | Performed by: OTOLARYNGOLOGY

## 2024-08-28 RX ORDER — AZELASTINE 1 MG/ML
1 SPRAY, METERED NASAL 2 TIMES DAILY
Qty: 30 ML | Refills: 1 | Status: SHIPPED | OUTPATIENT
Start: 2024-08-28 | End: 2024-10-27

## 2024-08-28 ASSESSMENT — PAIN SCALES - GENERAL: PAINLEVEL: 0-NO PAIN

## 2024-08-28 NOTE — H&P (VIEW-ONLY)
ENT Outpatient Consultation    Chief Complaint: Pharyngitis, tonsil stones, tonsillar hypertrophy  History Of Present Illness  Yariel Shaw is a 49 y.o. male presents for evaluation of his tonsils.  Patient has about a 2-week history of right-sided sore throat that has prompted some visits in the emergency room as well as a CT scan.  The CT scan was reviewed and it does show some heterogeneous changes within the tonsil as well as a tonsil stone.  He has been placed on antibiotics however remains symptomatic.  He was seen by ENT in the emergency room as well as this morning for evaluation.  He had a scope exam this morning that was reportedly normal.  He is here today to see if we can express the tonsil stone and also to discuss tonsillectomy    8/28/24: Patient returns today for follow-up.  He was seen by Dr. Araujo at [facility] told himthat he had a nasal mass.  He returns today for evaluation    In regard to his throat symptoms his pain is significantly improved     Past Medical History  He has a past medical history of Anxiety, Diabetes mellitus (Multi), Hyperlipidemia, Hypertension, Joint pain, Sinusitis, and Wears glasses.    Surgical History  He has a past surgical history that includes MR angio head wo IV contrast (08/24/2021) and Nasal sinus surgery.     Social History  He reports that he has been smoking cigarettes. He has a 10 pack-year smoking history. He has never used smokeless tobacco. He reports that he does not drink alcohol and does not use drugs.    Family History  Family History   Problem Relation Name Age of Onset    Hypertension Mother      Hyperlipidemia Mother      Allergies Mother      Allergies Father      Throat cancer Father      Hypertension Brother      Diabetes Brother      Heart attack Maternal Grandmother          Allergies  Bupropion and Escitalopram     Physical Exam:  CONSTITUTIONAL:  No acute distress  VOICE:  No hoarseness or other abnormality  RESPIRATION:  Breathing  "comfortably, no stridor  CV:  No clubbing/cyanosis/edema in hands  EYES:  EOM intact, sclera normal  NEURO:  Alert and oriented times 3, Cranial nerves II-XII grossly intact and symmetric bilaterally  HEAD AND FACE:  Symmetric facial features, no masses or lesions, sinuses non-tender to palpation  SALIVARY GLANDS:  Parotid and submandibular glands normal bilaterally  EARS:  Normal external ears, external auditory canals, and TMs to otoscopy, normal hearing to whispered voice.  NOSE:  External nose midline, anterior rhinoscopy is normal with limited visualization to the anterior aspect of the interior turbinates, no bleeding or drainage, no lesions  ORAL CAVITY/OROPHARYNX/LIPS:  Normal mucous membranes, normal floor of mouth/tongue.  The right tonsil is slightly larger than the left however there does not appear to be mucosal mass.  I do not see any drainage or stone  PHARYNGEAL WALLS:  No masses or lesions  NECK/LYMPH:  No LAD, no thyroid masses, trachea midline  SKIN:  Neck skin is without scar or injury  PSYCH:  Alert and oriented with appropriate mood and affect    Procedure Note: Flexible Nasolaryngoscopy  Verbal informed consent was obtained from the patient/patient's guardian. 4% lidocaine mixed with phenylephrine was prepared and dripped into the nose. It was placed in the bilateral naris. Following an appropriate amount of time to allow for adequate anesthesia, a flexible fiberoptic nasolaryngoscope was placed into the patient's bilateral naris. The nasal cavity, nasopharynx, oropharynx, hypopharynx, and all endolaryngeal structures were visualized and were normal except as listed below. Significant findings included:  -No obvious mucosal mass.  He has some lymphoid tissue along the posterior pharyngeal wall and palate consistent with Waldeyer's ring tissue.  The right tonsil is slightly larger than the left.  No obvious base of tongue lesion       Last Recorded Vitals  Height 1.778 m (5' 10\"), weight 109 kg " (239 lb 12.8 oz).    Relevant Results    CT soft tissue neck w IV contrast    Result Date: 8/10/2024  EXAMINATION: CT OF THE NECK SOFT TISSUE WITH CONTRAST  8/10/2024 TECHNIQUE: CT of the neck was performed with the administration of intravenous contrast. Multiplanar reformatted images are provided for review. Automated exposure control, iterative reconstruction, and/or weight based adjustment of the mA/kV was utilized to reduce the radiation dose to as low as reasonably achievable. COMPARISON: None. HISTORY: ORDERING SYSTEM PROVIDED HISTORY: Peritonsillar abscess TECHNOLOGIST PROVIDED HISTORY: Reason for exam:->Peritonsillar abscess Additional Contrast?->1 What reading provider will be dictating this exam?->CRC FINDINGS: PHARYNX/LARYNX:  There is asymmetric enlargement of the right palatine tonsil.  A 1 cm low-density focus is present along the lateral margin of the right palatine tonsil consistent with a phlegmon.  There is no drainable peritonsillar abscess identified. The uvula is edematous.  The epiglottis is normal.  The glottis is normal. The tongue is unremarkable. SALIVARY GLANDS/THYROID:  The parotid and submandibular glands appear unremarkable.  The thyroid gland appears unremarkable. LYMPH NODES:  No cervical or supraclavicular lymphadenopathy is seen. SOFT TISSUES:  No appreciable soft tissue swelling or mass is seen. BRAIN/ORBITS/SINUSES:  Limited evaluation of the brain and orbits is unremarkable.  Paranasal sinuses and mastoid air cells are clear. LUNG APICES/SUPERIOR MEDIASTINUM:  The lung apices are clear.  No superior mediastinal lymphadenopathy. BONES:  No lytic or blastic osseous lesions are identified.    Heterogeneous enlargement of the right palatine tonsil consistent with an infectious tonsillitis.  There is a 1 cm phlegmon along the lateral margin of the right palatine tonsil but no drainable abscess at this time.    CT soft tissue neck w IV contrast    Result Date: 8/8/2024  Interpreted  By:  Jc Bruner and Hofer Lindsay STUDY: CT SOFT TISSUE NECK W IV CONTRAST;  8/8/2024 2:52 pm   INDICATION: Signs/Symptoms:Worsening sore throat.   COMPARISON: None.   ACCESSION NUMBER(S): [id number]   ORDERING CLINICIAN: VICKI CHAVEZ   TECHNIQUE: Axial CT images of the neck were obtained.  The patient received 75 ML of Omnipaque 350 intravenous contrast agent. The images were reformatted in angled axial, coronal and sagittal planes.   FINDINGS: Oral Cavity, Pharynx and Larynx:  Evaluation of the oral cavity is mildly limited by streak artifact from dental hardware. There is mild prominence of the adenoidal soft tissue. Otherwise the nasopharyngeal structures are unremarkable. There is mild symmetric enlargement of the bilateral lingual tonsils. There is moderate, right-greater-than-left, enlargement of the bilateral palatine tonsils with slight transverse narrowing of the patent pharyngeal airway. In addition, there is an ovoid area of hyperenhancement within the right palatine tonsil measuring 1.1 cm (series 202, image 51) as well as a small tonsillith. No peritonsillar abscess. No invasion into the peritonsillar fat planes or other structures. The hypopharyngeal and laryngeal structures are otherwise unremarkable.   Retropharyngeal and Prevertebral Soft Tissues: Unremarkable.   Lymph nodes: There are few non specific bilateral neck nodes, probably reactive in etiology. The bilateral jugulodigastric lymph nodes are slightly prominent but within normal limits. Similarly, there are nonenlarged level 2 and level 3 nodes bilaterally.   Neck vessels: Bilateral neck vessels are normal in course and caliber and appear patent.   Thyroid gland: The thyroid gland is unremarkable in size and appearance.   Parotid and submandibular glands: The bilateral submandibular gland ducts are prominent bilaterally with no radiopaque sialolith. Bilateral parotid and submandibular glands are otherwise unremarkable in appearance.    Paranasal Sinuses and Mastoids: Visualized paranasal sinuses and bilateral mastoids are clear.   Visualized orbital structures are unremarkable.   Visualized upper lungs are clear.   Visualized cervical spine appears unremarkable.       1. Enlargement of the bilateral palatine and lingual tonsils reflecting acute uncomplicated tonsillitis/pharyngitis. No evidence for peritonsillar abscess. There is slight asymmetric enlargement of the right palatine tonsil with parenchymal heterogeneity/hyperenhancement with mild transverse narrowing of the patent airway. Small amount of secretions are seen within the pharynx.     I personally reviewed the images/study and resident's interpretation and I agree with the findings as stated by Roseanne Weinberg MD (resident radiologist). This study was analyzed and interpreted at [place], [place].   MACRO: None   Signed by: Jc Bruner 8/8/2024 3:19 PM Dictation workstation:   [id number]      Assessment and Plan  49 y.o. male with tonsil stones, pharyngitis, and continued pain.  He also has tonsil hypertrophy and sleep disordered breathing.      -Tonsil symptoms have improved since expression of the tonsil stone.  He is second-guessing surgery for tonsillar hypertrophy but would like a biopsy due to the asymmetry on exam  -We will try azelastine for history of allergic rhinitis  -We will proceed with direct laryngoscopy and biopsy.  I will also get records from Dr. Van Hodgson MD

## 2024-08-28 NOTE — PROGRESS NOTES
ENT Outpatient Consultation    Chief Complaint: Pharyngitis, tonsil stones, tonsillar hypertrophy  History Of Present Illness  Yariel Shaw is a 49 y.o. male presents for evaluation of his tonsils.  Patient has about a 2-week history of right-sided sore throat that has prompted some visits in the emergency room as well as a CT scan.  The CT scan was reviewed and it does show some heterogeneous changes within the tonsil as well as a tonsil stone.  He has been placed on antibiotics however remains symptomatic.  He was seen by ENT in the emergency room as well as this morning for evaluation.  He had a scope exam this morning that was reportedly normal.  He is here today to see if we can express the tonsil stone and also to discuss tonsillectomy    8/28/24: Patient returns today for follow-up.  He was seen by Dr[facility]ujo [facility] told himthat he had a nasal mass.  He returns today for evaluation    In regard to his throat symptoms his pain is significantly improved     Past Medical History  He has a past medical history of Anxiety, Diabetes mellitus (Multi), Hyperlipidemia, Hypertension, Joint pain, Sinusitis, and Wears glasses.    Surgical History  He has a past surgical history that includes MR angio head wo IV contrast (08/24/2021) and Nasal sinus surgery.     Social History  He reports that he has been smoking cigarettes. He has a 10 pack-year smoking history. He has never used smokeless tobacco. He reports that he does not drink alcohol and does not use drugs.    Family History  Family History   Problem Relation Name Age of Onset    Hypertension Mother      Hyperlipidemia Mother      Allergies Mother      Allergies Father      Throat cancer Father      Hypertension Brother      Diabetes Brother      Heart attack Maternal Grandmother          Allergies  Bupropion and Escitalopram     Physical Exam:  CONSTITUTIONAL:  No acute distress  VOICE:  No hoarseness or other abnormality  RESPIRATION:  Breathing  "comfortably, no stridor  CV:  No clubbing/cyanosis/edema in hands  EYES:  EOM intact, sclera normal  NEURO:  Alert and oriented times 3, Cranial nerves II-XII grossly intact and symmetric bilaterally  HEAD AND FACE:  Symmetric facial features, no masses or lesions, sinuses non-tender to palpation  SALIVARY GLANDS:  Parotid and submandibular glands normal bilaterally  EARS:  Normal external ears, external auditory canals, and TMs to otoscopy, normal hearing to whispered voice.  NOSE:  External nose midline, anterior rhinoscopy is normal with limited visualization to the anterior aspect of the interior turbinates, no bleeding or drainage, no lesions  ORAL CAVITY/OROPHARYNX/LIPS:  Normal mucous membranes, normal floor of mouth/tongue.  The right tonsil is slightly larger than the left however there does not appear to be mucosal mass.  I do not see any drainage or stone  PHARYNGEAL WALLS:  No masses or lesions  NECK/LYMPH:  No LAD, no thyroid masses, trachea midline  SKIN:  Neck skin is without scar or injury  PSYCH:  Alert and oriented with appropriate mood and affect    Procedure Note: Flexible Nasolaryngoscopy  Verbal informed consent was obtained from the patient/patient's guardian. 4% lidocaine mixed with phenylephrine was prepared and dripped into the nose. It was placed in the bilateral naris. Following an appropriate amount of time to allow for adequate anesthesia, a flexible fiberoptic nasolaryngoscope was placed into the patient's bilateral naris. The nasal cavity, nasopharynx, oropharynx, hypopharynx, and all endolaryngeal structures were visualized and were normal except as listed below. Significant findings included:  -No obvious mucosal mass.  He has some lymphoid tissue along the posterior pharyngeal wall and palate consistent with Waldeyer's ring tissue.  The right tonsil is slightly larger than the left.  No obvious base of tongue lesion       Last Recorded Vitals  Height 1.778 m (5' 10\"), weight 109 kg " (239 lb 12.8 oz).    Relevant Results    CT soft tissue neck w IV contrast    Result Date: 8/10/2024  EXAMINATION: CT OF THE NECK SOFT TISSUE WITH CONTRAST  8/10/2024 TECHNIQUE: CT of the neck was performed with the administration of intravenous contrast. Multiplanar reformatted images are provided for review. Automated exposure control, iterative reconstruction, and/or weight based adjustment of the mA/kV was utilized to reduce the radiation dose to as low as reasonably achievable. COMPARISON: None. HISTORY: ORDERING SYSTEM PROVIDED HISTORY: Peritonsillar abscess TECHNOLOGIST PROVIDED HISTORY: Reason for exam:->Peritonsillar abscess Additional Contrast?->1 What reading provider will be dictating this exam?->CRC FINDINGS: PHARYNX/LARYNX:  There is asymmetric enlargement of the right palatine tonsil.  A 1 cm low-density focus is present along the lateral margin of the right palatine tonsil consistent with a phlegmon.  There is no drainable peritonsillar abscess identified. The uvula is edematous.  The epiglottis is normal.  The glottis is normal. The tongue is unremarkable. SALIVARY GLANDS/THYROID:  The parotid and submandibular glands appear unremarkable.  The thyroid gland appears unremarkable. LYMPH NODES:  No cervical or supraclavicular lymphadenopathy is seen. SOFT TISSUES:  No appreciable soft tissue swelling or mass is seen. BRAIN/ORBITS/SINUSES:  Limited evaluation of the brain and orbits is unremarkable.  Paranasal sinuses and mastoid air cells are clear. LUNG APICES/SUPERIOR MEDIASTINUM:  The lung apices are clear.  No superior mediastinal lymphadenopathy. BONES:  No lytic or blastic osseous lesions are identified.    Heterogeneous enlargement of the right palatine tonsil consistent with an infectious tonsillitis.  There is a 1 cm phlegmon along the lateral margin of the right palatine tonsil but no drainable abscess at this time.    CT soft tissue neck w IV contrast    Result Date: 8/8/2024  Interpreted  By:  Jc Bruner and Hofer Lindsay STUDY: CT SOFT TISSUE NECK W IV CONTRAST;  8/8/2024 2:52 pm   INDICATION: Signs/Symptoms:Worsening sore throat.   COMPARISON: None.   ACCESSION NUMBER(S): [id number]   ORDERING CLINICIAN: VICKI CHAVEZ   TECHNIQUE: Axial CT images of the neck were obtained.  The patient received 75 ML of Omnipaque 350 intravenous contrast agent. The images were reformatted in angled axial, coronal and sagittal planes.   FINDINGS: Oral Cavity, Pharynx and Larynx:  Evaluation of the oral cavity is mildly limited by streak artifact from dental hardware. There is mild prominence of the adenoidal soft tissue. Otherwise the nasopharyngeal structures are unremarkable. There is mild symmetric enlargement of the bilateral lingual tonsils. There is moderate, right-greater-than-left, enlargement of the bilateral palatine tonsils with slight transverse narrowing of the patent pharyngeal airway. In addition, there is an ovoid area of hyperenhancement within the right palatine tonsil measuring 1.1 cm (series 202, image 51) as well as a small tonsillith. No peritonsillar abscess. No invasion into the peritonsillar fat planes or other structures. The hypopharyngeal and laryngeal structures are otherwise unremarkable.   Retropharyngeal and Prevertebral Soft Tissues: Unremarkable.   Lymph nodes: There are few non specific bilateral neck nodes, probably reactive in etiology. The bilateral jugulodigastric lymph nodes are slightly prominent but within normal limits. Similarly, there are nonenlarged level 2 and level 3 nodes bilaterally.   Neck vessels: Bilateral neck vessels are normal in course and caliber and appear patent.   Thyroid gland: The thyroid gland is unremarkable in size and appearance.   Parotid and submandibular glands: The bilateral submandibular gland ducts are prominent bilaterally with no radiopaque sialolith. Bilateral parotid and submandibular glands are otherwise unremarkable in appearance.    Paranasal Sinuses and Mastoids: Visualized paranasal sinuses and bilateral mastoids are clear.   Visualized orbital structures are unremarkable.   Visualized upper lungs are clear.   Visualized cervical spine appears unremarkable.       1. Enlargement of the bilateral palatine and lingual tonsils reflecting acute uncomplicated tonsillitis/pharyngitis. No evidence for peritonsillar abscess. There is slight asymmetric enlargement of the right palatine tonsil with parenchymal heterogeneity/hyperenhancement with mild transverse narrowing of the patent airway. Small amount of secretions are seen within the pharynx.     I personally reviewed the images/study and resident's interpretation and I agree with the findings as stated by Roseanne Weinberg MD (resident radiologist). This study was analyzed and interpreted at [place], [place].   MACRO: None   Signed by: Jc Bruner 8/8/2024 3:19 PM Dictation workstation:   [id number]      Assessment and Plan  49 y.o. male with tonsil stones, pharyngitis, and continued pain.  He also has tonsil hypertrophy and sleep disordered breathing.      -Tonsil symptoms have improved since expression of the tonsil stone.  He is second-guessing surgery for tonsillar hypertrophy but would like a biopsy due to the asymmetry on exam  -We will try azelastine for history of allergic rhinitis  -We will proceed with direct laryngoscopy and biopsy.  I will also get records from Dr. Van Hodgson MD

## 2024-08-29 PROBLEM — J35.8 TONSIL ASYMMETRY: Status: ACTIVE | Noted: 2024-08-28

## 2024-09-03 ENCOUNTER — TELEPHONE (OUTPATIENT)
Dept: PRIMARY CARE | Facility: CLINIC | Age: 49
End: 2024-09-03
Payer: COMMERCIAL

## 2024-09-09 ENCOUNTER — TELEPHONE (OUTPATIENT)
Dept: OTOLARYNGOLOGY | Facility: CLINIC | Age: 49
End: 2024-09-09
Payer: COMMERCIAL

## 2024-09-09 NOTE — TELEPHONE ENCOUNTER
Returned call to Yariel who had some periop questions. All questions answered, postop expectations and medications reviewed. Encouraged him to call back with any additional questions or concerns prior to his procedure Thursday.

## 2024-09-11 ENCOUNTER — ANESTHESIA EVENT (OUTPATIENT)
Dept: OPERATING ROOM | Facility: HOSPITAL | Age: 49
End: 2024-09-11
Payer: COMMERCIAL

## 2024-09-12 ENCOUNTER — ANESTHESIA (OUTPATIENT)
Dept: OPERATING ROOM | Facility: HOSPITAL | Age: 49
End: 2024-09-12
Payer: COMMERCIAL

## 2024-09-12 ENCOUNTER — HOSPITAL ENCOUNTER (OUTPATIENT)
Facility: HOSPITAL | Age: 49
Setting detail: OUTPATIENT SURGERY
Discharge: HOME | End: 2024-09-12
Attending: OTOLARYNGOLOGY | Admitting: OTOLARYNGOLOGY
Payer: COMMERCIAL

## 2024-09-12 VITALS
OXYGEN SATURATION: 97 % | SYSTOLIC BLOOD PRESSURE: 132 MMHG | BODY MASS INDEX: 33.79 KG/M2 | RESPIRATION RATE: 20 BRPM | DIASTOLIC BLOOD PRESSURE: 87 MMHG | HEART RATE: 66 BPM | TEMPERATURE: 97.2 F | HEIGHT: 70 IN | WEIGHT: 236 LBS

## 2024-09-12 DIAGNOSIS — G89.18 POST-OP PAIN: ICD-10-CM

## 2024-09-12 DIAGNOSIS — J35.8 TONSIL ASYMMETRY: Primary | ICD-10-CM

## 2024-09-12 LAB
GLUCOSE BLD MANUAL STRIP-MCNC: 114 MG/DL (ref 74–99)
GLUCOSE BLD MANUAL STRIP-MCNC: 94 MG/DL (ref 74–99)

## 2024-09-12 PROCEDURE — 88305 TISSUE EXAM BY PATHOLOGIST: CPT | Performed by: PATHOLOGY

## 2024-09-12 PROCEDURE — 88342 IMHCHEM/IMCYTCHM 1ST ANTB: CPT | Performed by: PATHOLOGY

## 2024-09-12 PROCEDURE — 7100000009 HC PHASE TWO TIME - INITIAL BASE CHARGE: Performed by: OTOLARYNGOLOGY

## 2024-09-12 PROCEDURE — 88341 IMHCHEM/IMCYTCHM EA ADD ANTB: CPT | Performed by: PATHOLOGY

## 2024-09-12 PROCEDURE — 2500000004 HC RX 250 GENERAL PHARMACY W/ HCPCS (ALT 636 FOR OP/ED)

## 2024-09-12 PROCEDURE — 3700000002 HC GENERAL ANESTHESIA TIME - EACH INCREMENTAL 1 MINUTE: Performed by: OTOLARYNGOLOGY

## 2024-09-12 PROCEDURE — 31535 LARYNGOSCOPY W/BIOPSY: CPT | Performed by: OTOLARYNGOLOGY

## 2024-09-12 PROCEDURE — 3600000003 HC OR TIME - INITIAL BASE CHARGE - PROCEDURE LEVEL THREE: Performed by: OTOLARYNGOLOGY

## 2024-09-12 PROCEDURE — 82947 ASSAY GLUCOSE BLOOD QUANT: CPT

## 2024-09-12 PROCEDURE — 3600000008 HC OR TIME - EACH INCREMENTAL 1 MINUTE - PROCEDURE LEVEL THREE: Performed by: OTOLARYNGOLOGY

## 2024-09-12 PROCEDURE — 88305 TISSUE EXAM BY PATHOLOGIST: CPT | Mod: TC,STJLAB | Performed by: OTOLARYNGOLOGY

## 2024-09-12 PROCEDURE — 2500000004 HC RX 250 GENERAL PHARMACY W/ HCPCS (ALT 636 FOR OP/ED): Performed by: NURSE ANESTHETIST, CERTIFIED REGISTERED

## 2024-09-12 PROCEDURE — 88185 FLOWCYTOMETRY/TC ADD-ON: CPT | Mod: TC | Performed by: OTOLARYNGOLOGY

## 2024-09-12 PROCEDURE — 7100000002 HC RECOVERY ROOM TIME - EACH INCREMENTAL 1 MINUTE: Performed by: OTOLARYNGOLOGY

## 2024-09-12 PROCEDURE — 88189 FLOWCYTOMETRY/READ 16 & >: CPT | Performed by: OTOLARYNGOLOGY

## 2024-09-12 PROCEDURE — 7100000001 HC RECOVERY ROOM TIME - INITIAL BASE CHARGE: Performed by: OTOLARYNGOLOGY

## 2024-09-12 PROCEDURE — 2500000005 HC RX 250 GENERAL PHARMACY W/O HCPCS: Performed by: NURSE ANESTHETIST, CERTIFIED REGISTERED

## 2024-09-12 PROCEDURE — 3700000001 HC GENERAL ANESTHESIA TIME - INITIAL BASE CHARGE: Performed by: OTOLARYNGOLOGY

## 2024-09-12 PROCEDURE — 7100000010 HC PHASE TWO TIME - EACH INCREMENTAL 1 MINUTE: Performed by: OTOLARYNGOLOGY

## 2024-09-12 PROCEDURE — 2500000005 HC RX 250 GENERAL PHARMACY W/O HCPCS

## 2024-09-12 RX ORDER — OXYCODONE HYDROCHLORIDE 10 MG/1
10 TABLET ORAL EVERY 4 HOURS PRN
Status: DISCONTINUED | OUTPATIENT
Start: 2024-09-12 | End: 2024-09-12 | Stop reason: HOSPADM

## 2024-09-12 RX ORDER — LIDOCAINE HYDROCHLORIDE 20 MG/ML
INJECTION, SOLUTION INFILTRATION; PERINEURAL AS NEEDED
Status: DISCONTINUED | OUTPATIENT
Start: 2024-09-12 | End: 2024-09-12

## 2024-09-12 RX ORDER — TRAMADOL HYDROCHLORIDE 50 MG/1
50 TABLET ORAL EVERY 6 HOURS PRN
Qty: 12 TABLET | Refills: 0 | Status: SHIPPED | OUTPATIENT
Start: 2024-09-12 | End: 2024-09-15

## 2024-09-12 RX ORDER — HYDROMORPHONE HYDROCHLORIDE 0.2 MG/ML
0.2 INJECTION INTRAMUSCULAR; INTRAVENOUS; SUBCUTANEOUS EVERY 5 MIN PRN
Status: DISCONTINUED | OUTPATIENT
Start: 2024-09-12 | End: 2024-09-12 | Stop reason: HOSPADM

## 2024-09-12 RX ORDER — MIDAZOLAM HYDROCHLORIDE 1 MG/ML
INJECTION, SOLUTION INTRAMUSCULAR; INTRAVENOUS AS NEEDED
Status: DISCONTINUED | OUTPATIENT
Start: 2024-09-12 | End: 2024-09-12

## 2024-09-12 RX ORDER — SODIUM CHLORIDE, SODIUM LACTATE, POTASSIUM CHLORIDE, CALCIUM CHLORIDE 600; 310; 30; 20 MG/100ML; MG/100ML; MG/100ML; MG/100ML
INJECTION, SOLUTION INTRAVENOUS CONTINUOUS PRN
Status: DISCONTINUED | OUTPATIENT
Start: 2024-09-12 | End: 2024-09-12

## 2024-09-12 RX ORDER — LIDOCAINE HYDROCHLORIDE 10 MG/ML
0.1 INJECTION, SOLUTION INFILTRATION; PERINEURAL ONCE
Status: DISCONTINUED | OUTPATIENT
Start: 2024-09-12 | End: 2024-09-12 | Stop reason: HOSPADM

## 2024-09-12 RX ORDER — LABETALOL HYDROCHLORIDE 5 MG/ML
5 INJECTION, SOLUTION INTRAVENOUS ONCE AS NEEDED
Status: DISCONTINUED | OUTPATIENT
Start: 2024-09-12 | End: 2024-09-12 | Stop reason: HOSPADM

## 2024-09-12 RX ORDER — ONDANSETRON HYDROCHLORIDE 2 MG/ML
4 INJECTION, SOLUTION INTRAVENOUS ONCE AS NEEDED
Status: DISCONTINUED | OUTPATIENT
Start: 2024-09-12 | End: 2024-09-12 | Stop reason: HOSPADM

## 2024-09-12 RX ORDER — SODIUM CHLORIDE, SODIUM LACTATE, POTASSIUM CHLORIDE, CALCIUM CHLORIDE 600; 310; 30; 20 MG/100ML; MG/100ML; MG/100ML; MG/100ML
100 INJECTION, SOLUTION INTRAVENOUS CONTINUOUS
Status: DISCONTINUED | OUTPATIENT
Start: 2024-09-12 | End: 2024-09-12 | Stop reason: HOSPADM

## 2024-09-12 RX ORDER — LIDOCAINE HYDROCHLORIDE 40 MG/ML
SOLUTION TOPICAL AS NEEDED
Status: DISCONTINUED | OUTPATIENT
Start: 2024-09-12 | End: 2024-09-12

## 2024-09-12 RX ORDER — OLMESARTAN MEDOXOMIL 20 MG/1
20 TABLET ORAL DAILY
COMMUNITY

## 2024-09-12 RX ORDER — PROPOFOL 10 MG/ML
INJECTION, EMULSION INTRAVENOUS AS NEEDED
Status: DISCONTINUED | OUTPATIENT
Start: 2024-09-12 | End: 2024-09-12

## 2024-09-12 RX ORDER — ACETAMINOPHEN 325 MG/1
650 TABLET ORAL EVERY 4 HOURS PRN
Status: DISCONTINUED | OUTPATIENT
Start: 2024-09-12 | End: 2024-09-12 | Stop reason: HOSPADM

## 2024-09-12 RX ORDER — OXYCODONE HYDROCHLORIDE 5 MG/1
5 TABLET ORAL EVERY 4 HOURS PRN
Status: DISCONTINUED | OUTPATIENT
Start: 2024-09-12 | End: 2024-09-12 | Stop reason: HOSPADM

## 2024-09-12 RX ORDER — FENTANYL CITRATE 50 UG/ML
INJECTION, SOLUTION INTRAMUSCULAR; INTRAVENOUS AS NEEDED
Status: DISCONTINUED | OUTPATIENT
Start: 2024-09-12 | End: 2024-09-12

## 2024-09-12 RX ORDER — ROCURONIUM BROMIDE 10 MG/ML
INJECTION, SOLUTION INTRAVENOUS AS NEEDED
Status: DISCONTINUED | OUTPATIENT
Start: 2024-09-12 | End: 2024-09-12

## 2024-09-12 RX ORDER — CETIRIZINE HYDROCHLORIDE 5 MG/1
5 TABLET ORAL DAILY
COMMUNITY

## 2024-09-12 SDOH — HEALTH STABILITY: MENTAL HEALTH: CURRENT SMOKER: 0

## 2024-09-12 ASSESSMENT — PAIN SCALES - GENERAL
PAIN_LEVEL: 1
PAINLEVEL_OUTOF10: 0 - NO PAIN

## 2024-09-12 ASSESSMENT — PAIN - FUNCTIONAL ASSESSMENT
PAIN_FUNCTIONAL_ASSESSMENT: 0-10

## 2024-09-12 ASSESSMENT — COLUMBIA-SUICIDE SEVERITY RATING SCALE - C-SSRS
1. IN THE PAST MONTH, HAVE YOU WISHED YOU WERE DEAD OR WISHED YOU COULD GO TO SLEEP AND NOT WAKE UP?: NO
6. HAVE YOU EVER DONE ANYTHING, STARTED TO DO ANYTHING, OR PREPARED TO DO ANYTHING TO END YOUR LIFE?: NO
2. HAVE YOU ACTUALLY HAD ANY THOUGHTS OF KILLING YOURSELF?: NO

## 2024-09-12 NOTE — ANESTHESIA POSTPROCEDURE EVALUATION
Patient: Yariel Shaw    Procedure Summary       Date: 09/12/24 Room / Location: [facility] OR 04 / [facility] OR    Anesthesia Start: 1046 Anesthesia Stop: 1129    Procedure: Panendoscopy (Bilateral) Diagnosis:       Tonsil asymmetry      (Tonsil asymmetry [J35.8])    Surgeons: Sean Hodgson MD Responsible Provider: Neno Smith DO    Anesthesia Type: general ASA Status: 3            Anesthesia Type: general    Vitals Value Taken Time   /80 09/12/24 1156   Temp 36.3 °C (97.3 °F) 09/12/24 1145   Pulse 68 09/12/24 1200   Resp 20 09/12/24 1200   SpO2 97 % 09/12/24 1200   Vitals shown include unfiled device data.    Anesthesia Post Evaluation    Patient location during evaluation: PACU  Patient participation: complete - patient participated  Level of consciousness: awake and alert  Pain score: 1  Pain management: adequate  Multimodal analgesia pain management approach  Airway patency: patent  Two or more strategies used to mitigate risk of obstructive sleep apnea  Cardiovascular status: acceptable  Respiratory status: acceptable  Hydration status: acceptable  Postoperative Nausea and Vomiting: none        No notable events documented.

## 2024-09-12 NOTE — ANESTHESIA PROCEDURE NOTES
Airway  Date/Time: 9/12/2024 11:00 AM  Urgency: elective    Airway not difficult    Staffing  Performed: SRNA, attending and CRNA   Authorized by: Neno Smith DO    Performed by: Neno Smith DO  Patient location during procedure: OR    Indications and Patient Condition  Indications for airway management: anesthesia and airway protection  Spontaneous Ventilation: absent  Sedation level: deep  Preoxygenated: yes  Patient position: sniffing  MILS not maintained throughout  Mask difficulty assessment: 2 - vent by mask + OA or adjuvant +/- NMBA  Planned trial extubation    Final Airway Details  Final airway type: endotracheal airway      Successful airway: ETT  Cuffed: yes   Successful intubation technique: video laryngoscopy  Facilitating devices/methods: intubating stylet  Endotracheal tube insertion site: oral  Blade: Sulaiman  Blade size: #4  ETT size (mm): 7.5  Cormack-Lehane Classification: grade I - full view of glottis  Placement verified by: chest auscultation and capnometry   Measured from: lips  ETT to lips (cm): 22  Number of attempts at approach: 1  Number of other approaches attempted: 0

## 2024-09-12 NOTE — DISCHARGE INSTRUCTIONS
It  is normal to have a sore  throat related to the endotracheal tube or biopsies. You can take tylenol and/or motrin for pain.  You may take tramadol as needed for more severe pain.

## 2024-09-12 NOTE — ANESTHESIA PREPROCEDURE EVALUATION
Patient: Yariel Shaw    Procedure Information       Date/Time: 09/12/24 0945    Procedure: Panendoscopy (Bilateral) - Direct laryngoscopy with biopsy    Location: [facility] OR 04 / [facility] OR    Surgeons: Sean Hodgson MD            Relevant Problems   Cardiac   (+) Atypical chest pain   (+) HTN (hypertension)   (+) Hyperlipemia, mixed      Neuro   (+) Anxiety   (+) Depression with anxiety   (+) Lumbar radiculopathy   (+) Neuritis of left foot      Endocrine   (+) Class 2 severe obesity with serious comorbidity and body mass index (BMI) of 35.0 to 35.9 in adult (Multi)   (+) DM2 (diabetes mellitus, type 2) (Multi)      Musculoskeletal   (+) Bulging of lumbar intervertebral disc   (+) Foraminal stenosis of lumbosacral region       Clinical information reviewed:                   NPO Detail:  No data recorded     Physical Exam    Airway  Mallampati: II  TM distance: >3 FB  Neck ROM: full     Cardiovascular   Rhythm: regular  Rate: normal     Dental - normal exam     Pulmonary   Breath sounds clear to auscultation     Abdominal   (+) obese  Abdomen: soft             Anesthesia Plan    History of general anesthesia?: yes  History of complications of general anesthesia?: no    ASA 3     general     The patient is not a current smoker.    intravenous induction   Postoperative administration of opioids is intended.  Trial extubation is planned.  Anesthetic plan and risks discussed with patient.  Use of blood products discussed with patient who consented to blood products.    Plan discussed with CAA and CRNA.

## 2024-09-12 NOTE — OP NOTE
Panendoscopy (B) Operative Note     Date: 2024  OR Location: [facility] OR    Name: Yariel Shaw, : 1975, Age: 49 y.o., MRN: [id number], Sex: male    Diagnosis  Pre-op Diagnosis      * Tonsil asymmetry [J35.8] Post-op Diagnosis     * Tonsil asymmetry [J35.8]     Procedures  Panendoscopy  72051 - CA LARYNGOSCOPY DIRECT OPERATIVE W/BIOPSY      Surgeons      * Sean Hodgson - Primary    Resident/Fellow/Other Assistant:  Adela Lozano MD    Procedure Summary  Anesthesia: Anesthesia type not filed in the log.  ASA: III  Anesthesia Staff: Anesthesiologist: Neno Smith DO  CRNA: KEIKO Renteria-ELLIS, PEMA  SRNA: Max Bailey  Estimated Blood Loss: 3mL  Intra-op Medications: Administrations occurring from 0945 to 1105 on 24:  * No intraprocedure medications in log *           Anesthesia Record               Intraprocedure I/O Totals       None           Specimen:   ID Type Source Tests Collected by Time   1 : RIGHT SUPERIOR TONSIL Tissue TONSIL BIOPSY RIGHT SURGICAL PATHOLOGY EXAM, FLOW CYTOMETRY TEST Sean Hodgson MD 2024 1108        Staff:   Circulator: Concepcionor  Scrub Person: Cookie        Findings: Right greater than left tonsil asymmetry without obvious submucosal masses or lesions. Biopsies taken from right tonsil sent fresh for lymphoma protocol.     Indications: Yariel Shaw is an 49 y.o. male who is having surgery for Tonsil asymmetry [J35.8].     The patient was seen in the preoperative area. The risks, benefits, complications, treatment options, non-operative alternatives, expected recovery and outcomes were discussed with the patient. The possibilities of reaction to medication, pulmonary aspiration, injury to surrounding structures, bleeding, recurrent infection, the need for additional procedures, failure to diagnose a condition, and creating a complication requiring transfusion or operation were discussed with the patient. The patient concurred with the proposed plan,  giving informed consent.  The site of surgery was properly noted/marked if necessary per policy. The patient has been actively warmed in preoperative area. Preoperative antibiotics are not indicated. Venous thrombosis prophylaxis are not indicated.    Procedure Details:   Patient was seen and evaluated in the pre-operative area. Informed consent was obtained as described above. Patient was then taken to the operative room by the anesthesia team. General anesthesia was induced, and patient was orotracheally intubated without issue. Patient was then turned 90 degrees towards the ENT team. Time out was performed by Dr. Hodgson.    First, direct laryngoscopy was performed using the Dedo laryngoscope. All sites of the upper aerodigestive tract were visualized including the uvula, soft palate, tonsils, vallecula, epiglottis, base of tongue, arytenoids, false and true vocal cords, post-cricoid region and pyriform sinuses. There was noted to be  slight right greater than left tonsil enlargement  and asymmetry. No obvious  masses, lesions, or ulceration was noted. Multiple biopsies were obtained from the right tonsil and  sent fresh for lymphoma protocol. Bleeding was noted to be self-limited. At this point the procedure was terminated. Patient was turned back over to the anesthesia team and was awakened, extubated and transported to the PACU in stable condition.    Dr. Hodgson was present for the critical portions of the procedure.     Complications:  None; patient tolerated the procedure well.    Disposition: PACU - hemodynamically stable.  Condition: stable       Sean Hodgson  Phone Number: [phone]

## 2024-09-17 LAB
CELL COUNT (BLOOD): 1.94 X10*3/UL
CELL POPULATIONS: NORMAL
DIAGNOSIS: NORMAL
FLOW DIFFERENTIAL: NORMAL
FLOW TEST ORDERED: NORMAL
LAB TEST METHOD: NORMAL
NUMBER OF CELLS COLLECTED: NORMAL PER TUBE
PATH REPORT.TOTAL CANCER: NORMAL
SIGNATURE COMMENT: NORMAL
SPECIMEN VIABILITY: NORMAL

## 2024-10-02 LAB
LAB AP ASR DISCLAIMER: NORMAL
LABORATORY COMMENT REPORT: NORMAL
PATH REPORT.FINAL DX SPEC: NORMAL
PATH REPORT.GROSS SPEC: NORMAL
PATH REPORT.RELEVANT HX SPEC: NORMAL
PATH REPORT.TOTAL CANCER: NORMAL

## 2024-10-15 ENCOUNTER — LAB (OUTPATIENT)
Dept: LAB | Facility: LAB | Age: 49
End: 2024-10-15
Payer: COMMERCIAL

## 2024-10-22 ENCOUNTER — TELEPHONE (OUTPATIENT)
Dept: PRIMARY CARE | Facility: CLINIC | Age: 49
End: 2024-10-22
Payer: COMMERCIAL

## 2024-10-22 DIAGNOSIS — N52.9 ERECTILE DYSFUNCTION, UNSPECIFIED ERECTILE DYSFUNCTION TYPE: ICD-10-CM

## 2024-10-22 DIAGNOSIS — N52.9 ERECTILE DYSFUNCTION, UNSPECIFIED ERECTILE DYSFUNCTION TYPE: Primary | ICD-10-CM

## 2024-10-22 RX ORDER — TADALAFIL 20 MG/1
20 TABLET ORAL DAILY PRN
Qty: 12 TABLET | Refills: 3 | Status: SHIPPED | OUTPATIENT
Start: 2024-10-22 | End: 2025-10-22

## 2024-10-22 RX ORDER — TADALAFIL 20 MG/1
20 TABLET ORAL DAILY PRN
Qty: 12 TABLET | Refills: 3 | OUTPATIENT
Start: 2024-10-22 | End: 2025-10-22

## 2024-11-15 ENCOUNTER — OFFICE VISIT (OUTPATIENT)
Dept: URGENT CARE | Age: 49
End: 2024-11-15
Payer: COMMERCIAL

## 2024-11-15 ENCOUNTER — LAB (OUTPATIENT)
Dept: LAB | Facility: LAB | Age: 49
End: 2024-11-15
Payer: COMMERCIAL

## 2024-11-15 ENCOUNTER — TELEPHONE (OUTPATIENT)
Dept: PRIMARY CARE | Facility: CLINIC | Age: 49
End: 2024-11-15

## 2024-11-15 VITALS
SYSTOLIC BLOOD PRESSURE: 136 MMHG | RESPIRATION RATE: 18 BRPM | WEIGHT: 240 LBS | HEART RATE: 81 BPM | HEIGHT: 70 IN | TEMPERATURE: 98.8 F | OXYGEN SATURATION: 98 % | DIASTOLIC BLOOD PRESSURE: 92 MMHG | BODY MASS INDEX: 34.36 KG/M2

## 2024-11-15 DIAGNOSIS — R09.82 POST-NASAL DRIP: ICD-10-CM

## 2024-11-15 DIAGNOSIS — J02.9 PHARYNGITIS, UNSPECIFIED ETIOLOGY: ICD-10-CM

## 2024-11-15 DIAGNOSIS — E11.9 TYPE 2 DIABETES MELLITUS WITHOUT COMPLICATION, WITHOUT LONG-TERM CURRENT USE OF INSULIN (MULTI): ICD-10-CM

## 2024-11-15 DIAGNOSIS — J20.9 ACUTE BRONCHITIS, UNSPECIFIED ORGANISM: Primary | ICD-10-CM

## 2024-11-15 LAB
ALBUMIN SERPL BCP-MCNC: 4.2 G/DL (ref 3.4–5)
ALP SERPL-CCNC: 94 U/L (ref 33–120)
ALT SERPL W P-5'-P-CCNC: 43 U/L (ref 10–52)
ANION GAP SERPL CALC-SCNC: 11 MMOL/L (ref 10–20)
AST SERPL W P-5'-P-CCNC: 23 U/L (ref 9–39)
BILIRUB SERPL-MCNC: 0.4 MG/DL (ref 0–1.2)
BUN SERPL-MCNC: 13 MG/DL (ref 6–23)
CALCIUM SERPL-MCNC: 9.2 MG/DL (ref 8.6–10.3)
CHLORIDE SERPL-SCNC: 106 MMOL/L (ref 98–107)
CO2 SERPL-SCNC: 29 MMOL/L (ref 21–32)
CREAT SERPL-MCNC: 1.22 MG/DL (ref 0.5–1.3)
EGFRCR SERPLBLD CKD-EPI 2021: 73 ML/MIN/1.73M*2
EST. AVERAGE GLUCOSE BLD GHB EST-MCNC: 148 MG/DL
GLUCOSE SERPL-MCNC: 99 MG/DL (ref 74–99)
HBA1C MFR BLD: 6.8 %
POTASSIUM SERPL-SCNC: 3.8 MMOL/L (ref 3.5–5.3)
PROT SERPL-MCNC: 6.8 G/DL (ref 6.4–8.2)
SODIUM SERPL-SCNC: 142 MMOL/L (ref 136–145)

## 2024-11-15 PROCEDURE — 83036 HEMOGLOBIN GLYCOSYLATED A1C: CPT

## 2024-11-15 PROCEDURE — 80053 COMPREHEN METABOLIC PANEL: CPT

## 2024-11-15 RX ORDER — OMEPRAZOLE 40 MG/1
40 CAPSULE, DELAYED RELEASE ORAL
Qty: 30 CAPSULE | Refills: 0 | Status: SHIPPED | OUTPATIENT
Start: 2024-11-15 | End: 2024-12-15

## 2024-11-15 RX ORDER — BROMPHENIRAMINE MALEATE, PSEUDOEPHEDRINE HYDROCHLORIDE, AND DEXTROMETHORPHAN HYDROBROMIDE 2; 30; 10 MG/5ML; MG/5ML; MG/5ML
10 SYRUP ORAL 4 TIMES DAILY PRN
Qty: 240 ML | Refills: 0 | Status: SHIPPED | OUTPATIENT
Start: 2024-11-15 | End: 2024-11-20

## 2024-11-15 ASSESSMENT — ENCOUNTER SYMPTOMS: COUGH: 1

## 2024-11-15 NOTE — PATIENT INSTRUCTIONS
Continue Augmentin and Prednisone as directed; take with food  Bromfed as needed for cough  Follow up with new or worsening symptoms

## 2024-11-15 NOTE — PROGRESS NOTES
Subjective   Patient ID: Yariel Shaw is a 49 y.o. male. She presents today with a chief complaint of Cough (Started 4 days ago /Dry, productive ).    History of Present Illness  Subjective  Yariel Shaw is a 49 y.o. male who presents for evaluation of symptoms of a URI. Symptoms include cough described as nonproductive. Onset of symptoms was 6 days ago and has been unchanged since that time. Patient was seen elsewhere 11/13/24; CXR was unremarkable but he was diagnosed with pneumonia and was prescribed Augmentin, Prednisone, Albuterol and Benzonatate. He is on day 2 of Augmentin and Prednisone. He tried the Albuterol, which did not help. The Benzonatate is not helping either. He denies fevers and shortness of breath. He presents today due to continued coughing and difficulty sleeping due to cough.        Cough        Past Medical History  Allergies as of 11/15/2024 - Reviewed 11/15/2024   Allergen Reaction Noted    Bupropion Dizziness 05/11/2023    Escitalopram Dizziness 05/11/2023       (Not in a hospital admission)       Past Medical History:   Diagnosis Date    Acid reflux     Anxiety     Diabetes mellitus (Multi)     PRE-DIABETES    Hyperlipidemia     Hypertension     Joint pain     Sinusitis     RESOLVED WITH SURGERY    Wears glasses        Past Surgical History:   Procedure Laterality Date    MR HEAD ANGIO WO IV CONTRAST  08/24/2021    MR HEAD ANGIO WO IV CONTRAST 8/24/2021 [facility]    NASAL SINUS SURGERY          reports that he has been smoking cigarettes. He has a 10 pack-year smoking history. He has never used smokeless tobacco. He reports that he does not drink alcohol and does not use drugs.    Review of Systems  Review of Systems   Respiratory:  Positive for cough.                                   Objective    Vitals:    11/15/24 1219   BP: (!) 136/92   BP Location: Right arm   Patient Position: Sitting   BP Cuff Size: Large adult   Pulse: 81   Resp: 18   Temp: 37.1 °C (98.8 °F)  "  SpO2: 98%   Weight: 109 kg (240 lb)   Height: 1.778 m (5' 10\")     No LMP for male patient.    Physical Exam  Constitutional:       General: He is not in acute distress.     Appearance: Normal appearance. He is not toxic-appearing.   HENT:      Right Ear: Tympanic membrane, ear canal and external ear normal.      Left Ear: Tympanic membrane, ear canal and external ear normal.      Nose: Nose normal.      Mouth/Throat:      Mouth: Mucous membranes are moist.      Pharynx: Oropharynx is clear.   Eyes:      Extraocular Movements: Extraocular movements intact.      Conjunctiva/sclera: Conjunctivae normal.      Pupils: Pupils are equal, round, and reactive to light.   Cardiovascular:      Rate and Rhythm: Normal rate and regular rhythm.      Pulses: Normal pulses.      Heart sounds: Normal heart sounds. No murmur heard.  Pulmonary:      Effort: Pulmonary effort is normal.      Breath sounds: Rhonchi present. No wheezing or rales.   Musculoskeletal:      Cervical back: Normal range of motion and neck supple. No rigidity or tenderness.   Lymphadenopathy:      Cervical: No cervical adenopathy.   Neurological:      Mental Status: He is alert.         Procedures    Point of Care Test & Imaging Results from this visit  No results found for this visit on 11/15/24.   XR chest 2 views    Result Date: 11/14/2024  * * *Final Report* * * DATE OF EXAM: Nov 14 2024  9:15AM   CHX   5291  -  XR CHEST 2V FRONTAL/LAT  / ACCESSION #  [id number] PROCEDURE REASON: Lower respiratory infection (e.g., bronchitis, pneumonia, pneumonitis, pulmoniti      * * * * Physician Interpretation * * * *  EXAMINATION:  CHEST RADIOGRAPH (2 VIEW FRONTAL & LATERAL) CLINICAL HISTORY: Lower respiratory infection (e.g., bronchitis, pneumonia, pneumonitis, pulmonitis) MQ:  XC2_6 EXAM DATE/TIME:  11/14/2024 9:15 AM COMPARISON:  No relevant prior studies available. RESULT: Lines, tubes, and devices:  None. Lungs and pleura:  No consolidation. No lung mass. No " pleural effusion. No pneumothorax. Cardiomediastinal silhouette:  Normal cardiomediastinal silhouette. Bones and soft tissues:  Unremarkable.    IMPRESSION: No acute radiographic abnormality. : NARGIS   Transcribe Date/Time: Nov 14 2024  9:19A Dictated by : BRADEN BOYD MD This examination was interpreted and the report reviewed and electronically signed by: BRADEN BOYD MD on Nov 14 2024  9:20AM  EST     Diagnostic study results (if any) were reviewed by Leigh Davidson MD.    Assessment/Plan   Allergies, medications, history, and pertinent labs/EKGs/Imaging reviewed by Leigh Davidson MD.       Orders and Diagnoses  There are no diagnoses linked to this encounter.    Medical Admin Record      Patient disposition: Home    Electronically signed by Leigh Davidson MD  12:30 PM

## 2024-11-18 ENCOUNTER — APPOINTMENT (OUTPATIENT)
Dept: PRIMARY CARE | Facility: CLINIC | Age: 49
End: 2024-11-18
Payer: COMMERCIAL

## 2024-11-18 VITALS
BODY MASS INDEX: 34.65 KG/M2 | SYSTOLIC BLOOD PRESSURE: 148 MMHG | WEIGHT: 242 LBS | OXYGEN SATURATION: 97 % | HEART RATE: 64 BPM | HEIGHT: 70 IN | DIASTOLIC BLOOD PRESSURE: 98 MMHG | TEMPERATURE: 97.9 F

## 2024-11-18 DIAGNOSIS — M43.06 LUMBAR SPONDYLOLYSIS: ICD-10-CM

## 2024-11-18 DIAGNOSIS — E78.2 HYPERLIPEMIA, MIXED: ICD-10-CM

## 2024-11-18 DIAGNOSIS — I10 PRIMARY HYPERTENSION: Primary | ICD-10-CM

## 2024-11-18 DIAGNOSIS — N52.8 OTHER MALE ERECTILE DYSFUNCTION: ICD-10-CM

## 2024-11-18 DIAGNOSIS — F17.200 CURRENT EVERY DAY SMOKER: ICD-10-CM

## 2024-11-18 DIAGNOSIS — Z23 NEED FOR VACCINATION: ICD-10-CM

## 2024-11-18 DIAGNOSIS — F41.9 ANXIETY: ICD-10-CM

## 2024-11-18 DIAGNOSIS — E11.9 TYPE 2 DIABETES MELLITUS WITHOUT COMPLICATION, WITHOUT LONG-TERM CURRENT USE OF INSULIN (MULTI): ICD-10-CM

## 2024-11-18 PROCEDURE — 4010F ACE/ARB THERAPY RXD/TAKEN: CPT | Performed by: PHYSICIAN ASSISTANT

## 2024-11-18 PROCEDURE — 3008F BODY MASS INDEX DOCD: CPT | Performed by: PHYSICIAN ASSISTANT

## 2024-11-18 PROCEDURE — 3062F POS MACROALBUMINURIA REV: CPT | Performed by: PHYSICIAN ASSISTANT

## 2024-11-18 PROCEDURE — G0008 ADMIN INFLUENZA VIRUS VAC: HCPCS | Performed by: PHYSICIAN ASSISTANT

## 2024-11-18 PROCEDURE — 3050F LDL-C >= 130 MG/DL: CPT | Performed by: PHYSICIAN ASSISTANT

## 2024-11-18 PROCEDURE — 3080F DIAST BP >= 90 MM HG: CPT | Performed by: PHYSICIAN ASSISTANT

## 2024-11-18 PROCEDURE — 3077F SYST BP >= 140 MM HG: CPT | Performed by: PHYSICIAN ASSISTANT

## 2024-11-18 PROCEDURE — 3044F HG A1C LEVEL LT 7.0%: CPT | Performed by: PHYSICIAN ASSISTANT

## 2024-11-18 PROCEDURE — 4004F PT TOBACCO SCREEN RCVD TLK: CPT | Performed by: PHYSICIAN ASSISTANT

## 2024-11-18 PROCEDURE — 99213 OFFICE O/P EST LOW 20 MIN: CPT | Performed by: PHYSICIAN ASSISTANT

## 2024-11-18 PROCEDURE — 90656 IIV3 VACC NO PRSV 0.5 ML IM: CPT | Performed by: PHYSICIAN ASSISTANT

## 2024-11-18 RX ORDER — OLMESARTAN MEDOXOMIL 20 MG/1
20 TABLET ORAL DAILY
Qty: 90 TABLET | Refills: 0 | Status: SHIPPED | OUTPATIENT
Start: 2024-11-18 | End: 2025-02-16

## 2024-11-18 RX ORDER — AMLODIPINE BESYLATE 5 MG/1
5 TABLET ORAL DAILY
Qty: 90 TABLET | Refills: 0 | Status: SHIPPED | OUTPATIENT
Start: 2024-11-18

## 2024-11-18 NOTE — PROGRESS NOTES
Subjective   Patient ID: Yariel Shaw is a 49 y.o. male who presents for Follow-up.    HPI     Patient c/o polyps in esophagus in which had surgery but ever since that he has discomfort.     Labs : 11/15/2024, A1c and CMP  Colonoscopy: 01/03/2024  Lost 6 # in 6 mo    6 mo f/up:  Patient has hypertension  He does not monitor his BP at home.  Denies chest pain, dizziness, lower leg swelling.   Pt has been off of the Norvasc.      Patient has hyperlipidemia.  Patient tries to limit the amount of fatty foods and high cholesterol foods that are consumed.  He is compliant with his rosuvastatin and denies any noted side effects.  He is on Crestor daily.         Patient has Type 2 DM.  Pt does NOT monitor his glucose at home regularly. Most recent A1c done 11/15/24 was 6.8% same as 3 mo ago.  He denies any polyuria, polydipsia, polyphagia.  Patient states that he has been compliant with the current diabetes medication.  He is on Metformin daily.  Pt is getting 90 or under for readings at home.      He has ED.  Tadalafil continues to work well for him.     Pt continues to smoke.   Patient continues to work on smoking cessation but has been unable to completely quit.     Pt has anxiety.   He does follow with psych, Dr. Patino for his anxiety.  His psychiatrist manages his xanax.   Denies any noted side effects.     He has insomnia.  Ambien was discontinued in the past since he is on xanax (risks of combination discussed).     Pt has chronic lower back pain.  He does follow with pain management as needed.          Review of Systems  Constitutional: Patient denies any fever, chills, loss of appetite, or unexplained weight loss.  Cardiovascular: Patient denies any chest pain, shortness of breath with exertion, tachycardia, palpitations, orthopnea, or paroxysmal nocturnal dyspnea.  Respiratory: Patient denies any cough, shortness breath, or wheezing.  Gastrointestinal patient denies any nausea, vomiting, diarrhea,  "constipation, melena, hematochezia, or reflux symptoms  Skin: Denies any rashes or skin lesions   Neurology: Patient denies any new motor or sensory losses.  Denies any numbness, tingling, weakness, and incoordination of the extremities.  Patient also denies any tremor, seizures, or gait instability.  Endocrinology: Denies any polyuria, polydipsia, polyphagia, or heat/cold intolerance.    Objective   BP (!) 148/98   Pulse 64   Temp 36.6 °C (97.9 °F) (Temporal)   Ht 1.778 m (5' 10\")   Wt 110 kg (242 lb) Comment: at home  SpO2 97%   BMI 34.72 kg/m²     Physical Exam  Gen. appearance: Alert and cooperative, in no acute distress, well-developed, well-nourished male.  Neck: Supple and without adenopathy or rigidity.  There is no JVD at 90° and no carotid bruits are noted.  There is no thyromegaly, thyroid tenderness, or palpable thyroid nodules.  Heart: Regular rate and rhythm without murmur or ectopy.  Lungs: Lungs are clear to auscultation bilaterally with good air exchange.  Skin: Good turgor, moist, warm and without rashes or lesions.  Neurological exam: Alert and oriented ×3, no tremor, normal gait.  Extremities: No clubbing, cyanosis, or edema    Assessment/Plan   Diagnoses and all orders for this visit:  Primary hypertension  -     amLODIPine (Norvasc) 5 mg tablet; Take 1 tablet (5 mg) by mouth once daily.  -     olmesartan (BENIcar) 20 mg tablet; Take 1 tablet (20 mg) by mouth once daily.  -     Follow Up In [facility] - PCP - Established; Future  Patient had stopped his Norvasc and Benicar.  His blood pressure today in the office is still elevated.  He was told that he is to restart both the first 1 to start would be the Benicar 20 mg daily for 3 days and then he would add in the Norvasc in addition to that every day.  We will recheck this in 6 weeks.    Hyperlipemia, mixed-patient is on herbals to help control his cholesterol.  His most recent LDL was 130, triglycerides 106, and HDL 44.  He " would like to continue current dosing.  He currently denies any symptoms of unstable angina.  Patient understands that as a diabetic hypertensive hyperlipidemic smoker he is at high risk for coronary artery disease but still did not want statin therapy despite my urging.    Type 2 diabetes mellitus without complication, without long-term current use of insulin (Multi)  -     Follow Up In [facility] - PCP - Established  Patient's most recent A1c was 6.8% with a fasting sugar that was improved.  He would like to continue with herbals, diet and exercise.  Patient understands that he should be checking his feet daily, have a yearly diabetic eye exam.  We will follow-up with him in 3 months with labs prior.    Need for vaccination  -     Flu vaccine, trivalent, preservative free, age 6 months and greater (Fluarix/Fluzone/Flulaval)  Pt understands with verbalization that vaccines all have risks (to include: local reaction, systemic reaction). Pt has reviewed the VIS (Vaccine information sheet) and gives consent to have this vaccination despite the risks.    Other male erectile dysfunction-patient will continue with as needed Cialis, sx are well controlled.      Current every day smoker-patient understands that continued smoking will increase the risks of lung disease, vascular disease, and multiple malignancies.    Anxiety-well-controlled with as needed Xanax.  He continues to see psychiatry for this refill.      Lumbar spondylolysis-patient continues to follow with pain management this complaint.  Symptoms are currently stable and he will continue to follow with pain management.    Patient is to return to clinic in 6 weeks for a follow-up on his blood pressure medication restarts.  Otherwise we will follow-up in 3 months for his routine 3-month follow-up with lab prior.    Patient understands that should they have testing outside   facilities that we may not receive the results and was told to call us if  they have not heard from our office within a week after testing.    [facility] uses voice recognition technology for dictations. Sometimes the software misinterprets words. Please take this into account when reading this.

## 2024-12-31 ENCOUNTER — OFFICE VISIT (OUTPATIENT)
Dept: URGENT CARE | Age: 49
End: 2024-12-31
Payer: COMMERCIAL

## 2024-12-31 ENCOUNTER — APPOINTMENT (OUTPATIENT)
Dept: PRIMARY CARE | Facility: CLINIC | Age: 49
End: 2024-12-31
Payer: COMMERCIAL

## 2024-12-31 VITALS
HEART RATE: 90 BPM | DIASTOLIC BLOOD PRESSURE: 83 MMHG | RESPIRATION RATE: 20 BRPM | OXYGEN SATURATION: 95 % | TEMPERATURE: 98.4 F | BODY MASS INDEX: 34.36 KG/M2 | HEIGHT: 70 IN | WEIGHT: 240 LBS | SYSTOLIC BLOOD PRESSURE: 132 MMHG

## 2024-12-31 DIAGNOSIS — J40 BRONCHITIS: Primary | ICD-10-CM

## 2024-12-31 LAB
POC RAPID INFLUENZA A: NEGATIVE
POC RAPID INFLUENZA B: NEGATIVE
POC SARS-COV-2 AG BINAX: NORMAL

## 2024-12-31 PROCEDURE — 3079F DIAST BP 80-89 MM HG: CPT

## 2024-12-31 PROCEDURE — 87811 SARS-COV-2 COVID19 W/OPTIC: CPT

## 2024-12-31 PROCEDURE — 99213 OFFICE O/P EST LOW 20 MIN: CPT

## 2024-12-31 PROCEDURE — 4004F PT TOBACCO SCREEN RCVD TLK: CPT

## 2024-12-31 PROCEDURE — 87804 INFLUENZA ASSAY W/OPTIC: CPT

## 2024-12-31 PROCEDURE — 3008F BODY MASS INDEX DOCD: CPT

## 2024-12-31 PROCEDURE — 3050F LDL-C >= 130 MG/DL: CPT

## 2024-12-31 PROCEDURE — 3075F SYST BP GE 130 - 139MM HG: CPT

## 2024-12-31 PROCEDURE — 4010F ACE/ARB THERAPY RXD/TAKEN: CPT

## 2024-12-31 PROCEDURE — 3044F HG A1C LEVEL LT 7.0%: CPT

## 2024-12-31 PROCEDURE — 3062F POS MACROALBUMINURIA REV: CPT

## 2024-12-31 RX ORDER — FLUTICASONE PROPIONATE 50 MCG
1 SPRAY, SUSPENSION (ML) NASAL DAILY
Qty: 16 G | Refills: 0 | Status: SHIPPED | OUTPATIENT
Start: 2024-12-31 | End: 2025-12-31

## 2024-12-31 RX ORDER — BENZONATATE 200 MG/1
200 CAPSULE ORAL 3 TIMES DAILY PRN
Qty: 30 CAPSULE | Refills: 0 | Status: SHIPPED | OUTPATIENT
Start: 2024-12-31 | End: 2025-01-10

## 2024-12-31 RX ORDER — AZITHROMYCIN 250 MG/1
TABLET, FILM COATED ORAL
Qty: 6 TABLET | Refills: 0 | Status: SHIPPED | OUTPATIENT
Start: 2024-12-31

## 2024-12-31 ASSESSMENT — ENCOUNTER SYMPTOMS
HEADACHES: 1
COUGH: 1

## 2024-12-31 NOTE — PROGRESS NOTES
Subjective   Patient ID: Yareil Shaw is a 49 y.o. male. They present today with a chief complaint of Cough (Non-productive cough. Issues present x 2-3 days. ) and Headache.    History of Present Illness  Subjective  Yariel Shaw is a 49 y.o. male here for evaluation of a cough. The cough is non-productive, without wheezing, dyspnea or hemoptysis and is aggravated by nothing. Onset of symptoms was 3 days ago, gradually worsening since that time. Associated symptoms include postnasal drip and nasal congestion. Denies otalgia, sore throat, sinus pressure/pain.  Patient does not have a history of asthma. Patient has not had recent travel. Patient does have a history of smoking. Patient has not had a previous chest x-ray.     Review of Systems   Constitutional:  Denies fever, chills, malaise, fatigue  ENT: See HPI  Respiratory:  See HPI    Cardiovascular:  Denies chest pain, palpitations, syncope, lightheadedness, dizziness.    Gastrointestinal:  Denies abdominal pain, nausea, vomiting, diarrhea.    Integumentary:  Mikie rash.    All other systems are negative          History provided by:  Patient   used: No    Cough  Associated symptoms include headaches.   Headache  Associated symptoms: cough        Past Medical History  Allergies as of 12/31/2024 - Reviewed 12/31/2024   Allergen Reaction Noted    Bupropion Dizziness 05/11/2023    Escitalopram Dizziness 05/11/2023       (Not in a hospital admission)       Past Medical History:   Diagnosis Date    Acid reflux     Anxiety     Diabetes mellitus (Multi)     PRE-DIABETES    Hyperlipidemia     Hypertension     Joint pain     Sinusitis     RESOLVED WITH SURGERY    Wears glasses        Past Surgical History:   Procedure Laterality Date    MR HEAD ANGIO WO IV CONTRAST  08/24/2021    MR HEAD ANGIO WO IV CONTRAST 8/24/2021 [facility]    NASAL SINUS SURGERY          reports that he has been smoking cigarettes. He has a 10 pack-year smoking  "history. He has never used smokeless tobacco. He reports that he does not drink alcohol and does not use drugs.    Review of Systems  Review of Systems   Respiratory:  Positive for cough.    Neurological:  Positive for headaches.                                  Objective    Vitals:    12/31/24 0904   BP: 132/83   Pulse: 90   Resp: 20   Temp: 36.9 °C (98.4 °F)   TempSrc: Oral   SpO2: 95%   Weight: 109 kg (240 lb)   Height: 1.778 m (5' 10\")     No LMP for male patient.    Physical Exam  Vitals and nursing note reviewed.   Constitutional:       General: He is not in acute distress.     Appearance: Normal appearance. He is normal weight. He is not ill-appearing, toxic-appearing or diaphoretic.   HENT:      Nose: Congestion and rhinorrhea present.      Mouth/Throat:      Mouth: Mucous membranes are moist.   Eyes:      General: No scleral icterus.        Right eye: No discharge.         Left eye: No discharge.      Conjunctiva/sclera: Conjunctivae normal.      Pupils: Pupils are equal, round, and reactive to light.   Cardiovascular:      Rate and Rhythm: Normal rate and regular rhythm.      Pulses: Normal pulses.      Heart sounds: Normal heart sounds.   Pulmonary:      Effort: Pulmonary effort is normal.      Breath sounds: Normal breath sounds.   Musculoskeletal:         General: Normal range of motion.      Cervical back: Normal range of motion. No rigidity.   Skin:     General: Skin is warm and dry.      Coloration: Skin is not jaundiced or pale.      Findings: No bruising or erythema.   Neurological:      General: No focal deficit present.      Mental Status: He is alert.         Procedures    Point of Care Test & Imaging Results from this visit  Results for orders placed or performed in visit on 12/31/24   POCT Covid-19 Rapid Antigen   Result Value Ref Range    POC MANUEL-COV-2 AG  Presumptive negative test for SARS-CoV-2 (no antigen detected)     Presumptive negative test for SARS-CoV-2 (no antigen detected)   POCT " Influenza A/B manually resulted   Result Value Ref Range    POC Rapid Influenza A Negative Negative    POC Rapid Influenza B Negative Negative      No results found.    Diagnostic study results (if any) were reviewed by ASHER Brand.    Assessment/Plan   Allergies, medications, history, and pertinent labs/EKGs/Imaging reviewed by ASHER Brand.         Orders and Diagnoses  Diagnoses and all orders for this visit:  Bronchitis  -     POCT Covid-19 Rapid Antigen  -     POCT Influenza A/B manually resulted  -     fluticasone (Flonase) 50 mcg/actuation nasal spray; Administer 1 spray into each nostril once daily. Shake gently. Before first use, prime pump. After use, clean tip and replace cap.  -     azithromycin (Zithromax) 250 mg tablet; Take 2 tabs (500 mg) by mouth today, then take 1 tab (250 mg) daily on days two to five.  -     benzonatate (Tessalon) 200 mg capsule; Take 1 capsule (200 mg) by mouth 3 times a day as needed for cough for up to 10 days. Do not crush or chew.      Medical Admin Record      Patient disposition: Home    Electronically signed by ASHER Brand  9:56 AM

## 2025-01-30 ENCOUNTER — OFFICE VISIT (OUTPATIENT)
Facility: CLINIC | Age: 50
End: 2025-01-30
Payer: COMMERCIAL

## 2025-01-30 VITALS — BODY MASS INDEX: 36.35 KG/M2 | HEIGHT: 70 IN | WEIGHT: 253.9 LBS

## 2025-01-30 DIAGNOSIS — J30.9 ALLERGIC RHINITIS, UNSPECIFIED SEASONALITY, UNSPECIFIED TRIGGER: ICD-10-CM

## 2025-01-30 DIAGNOSIS — K21.9 GASTROESOPHAGEAL REFLUX DISEASE, UNSPECIFIED WHETHER ESOPHAGITIS PRESENT: ICD-10-CM

## 2025-01-30 PROCEDURE — 99213 OFFICE O/P EST LOW 20 MIN: CPT | Performed by: OTOLARYNGOLOGY

## 2025-01-30 PROCEDURE — 4010F ACE/ARB THERAPY RXD/TAKEN: CPT | Performed by: OTOLARYNGOLOGY

## 2025-01-30 PROCEDURE — 3008F BODY MASS INDEX DOCD: CPT | Performed by: OTOLARYNGOLOGY

## 2025-01-30 RX ORDER — AZELASTINE 1 MG/ML
2 SPRAY, METERED NASAL 2 TIMES DAILY
Qty: 30 ML | Refills: 11 | Status: SHIPPED | OUTPATIENT
Start: 2025-01-30 | End: 2026-01-30

## 2025-01-30 RX ORDER — PANTOPRAZOLE SODIUM 40 MG/1
40 TABLET, DELAYED RELEASE ORAL
Qty: 90 TABLET | Refills: 0 | Status: SHIPPED | OUTPATIENT
Start: 2025-01-30 | End: 2025-04-30

## 2025-01-30 ASSESSMENT — PAIN SCALES - GENERAL: PAINLEVEL_OUTOF10: 0-NO PAIN

## 2025-01-30 NOTE — PROGRESS NOTES
ENT Outpatient Consultation    Chief Complaint: Pharyngitis, tonsil stones, tonsillar hypertrophy  History Of Present Illness  Yariel Shaw is a 49 y.o. male presents for evaluation of his tonsils.  Patient has about a 2-week history of right-sided sore throat that has prompted some visits in the emergency room as well as a CT scan.  The CT scan was reviewed and it does show some heterogeneous changes within the tonsil as well as a tonsil stone.  He has been placed on antibiotics however remains symptomatic.  He was seen by ENT in the emergency room as well as this morning for evaluation.  He had a scope exam this morning that was reportedly normal.  He is here today to see if we can express the tonsil stone and also to discuss tonsillectomy    8/28/24: Patient returns today for follow-up.  He was seen by Dr. Araujo at [facility] told himthat he had a nasal mass.  He returns today for evaluation    In regard to his throat symptoms his pain is significantly improved    1/30/25: Patient returns for follow up. Sore throat has returned. This correlated with stopping PPIs     Past Medical History  He has a past medical history of Acid reflux, Anxiety, Diabetes mellitus (Multi), Hyperlipidemia, Hypertension, Joint pain, Sinusitis, and Wears glasses.    Surgical History  He has a past surgical history that includes MR angio head wo IV contrast (08/24/2021) and Nasal sinus surgery.     Social History  He reports that he has been smoking cigarettes. He has a 10 pack-year smoking history. He has never used smokeless tobacco. He reports that he does not drink alcohol and does not use drugs.    Family History  Family History   Problem Relation Name Age of Onset    Hypertension Mother      Hyperlipidemia Mother      Allergies Mother      Allergies Father      Throat cancer Father      Hypertension Brother      Diabetes Brother      Heart attack Maternal Grandmother          Allergies  Bupropion and Escitalopram     Physical  "Exam:  CONSTITUTIONAL:  No acute distress  VOICE:  No hoarseness or other abnormality  RESPIRATION:  Breathing comfortably, no stridor  CV:  No clubbing/cyanosis/edema in hands  EYES:  EOM intact, sclera normal  NEURO:  Alert and oriented times 3, Cranial nerves II-XII grossly intact and symmetric bilaterally  HEAD AND FACE:  Symmetric facial features, no masses or lesions, sinuses non-tender to palpation  SALIVARY GLANDS:  Parotid and submandibular glands normal bilaterally  EARS:  Normal external ears, external auditory canals, and TMs to otoscopy, normal hearing to whispered voice.  NOSE:  External nose midline, anterior rhinoscopy is normal with limited visualization to the anterior aspect of the interior turbinates, no bleeding or drainage, no lesions  ORAL CAVITY/OROPHARYNX/LIPS:  Normal mucous membranes, normal floor of mouth/tongue.  No abnormal finding on tonsils  PHARYNGEAL WALLS:  No masses or lesions  NECK/LYMPH:  No LAD, no thyroid masses, trachea midline  SKIN:  Neck skin is without scar or injury  PSYCH:  Alert and oriented with appropriate mood and affect       Last Recorded Vitals  Height 1.778 m (5' 10\"), weight 115 kg (253 lb 14.4 oz).    Relevant Results  No new scans  Assessment and Plan  49 y.o. male with tonsil stones, pharyngitis, and continued pain.  He also has tonsil hypertrophy and sleep disordered breathing.  Was taken to the OR for biopsy of right tonsil showing hyperplastic mucosa with no sign of malignancy    -No concerns on exam today  -declined scope exam. Would prefer to restart PPI to see if that helps with symptoms  -restart azelastine and PPI and then follow upin ~ 2 months to reassess. Will consider scope at that time if symptoms persist    Sean Hodgson MD    "

## 2025-02-13 ENCOUNTER — TELEPHONE (OUTPATIENT)
Dept: PRIMARY CARE | Facility: CLINIC | Age: 50
End: 2025-02-13
Payer: COMMERCIAL

## 2025-02-13 NOTE — TELEPHONE ENCOUNTER
Patient called to see if labs can be put in for him.     Was advised byrobby Werner last note pt was supposed to come back in 6 weeks for bp medication check and then shell have him come in 3 months for labs check.

## 2025-02-18 PROBLEM — M25.519 SHOULDER PAIN: Status: ACTIVE | Noted: 2025-02-18

## 2025-02-18 PROBLEM — R00.2 PALPITATIONS: Status: ACTIVE | Noted: 2019-01-21

## 2025-02-18 PROBLEM — R04.0 EPISTAXIS: Status: ACTIVE | Noted: 2025-02-18

## 2025-02-18 PROBLEM — E66.9 OBESITY WITH BODY MASS INDEX 30 OR GREATER: Status: ACTIVE | Noted: 2025-02-18

## 2025-02-18 PROBLEM — G54.1 LUMBOSACRAL PLEXUS LESION: Status: ACTIVE | Noted: 2020-01-01

## 2025-02-18 PROBLEM — R42 LIGHTHEADEDNESS: Status: ACTIVE | Noted: 2025-02-18

## 2025-02-19 ENCOUNTER — APPOINTMENT (OUTPATIENT)
Dept: PRIMARY CARE | Facility: CLINIC | Age: 50
End: 2025-02-19
Payer: COMMERCIAL

## 2025-02-19 VITALS
OXYGEN SATURATION: 98 % | DIASTOLIC BLOOD PRESSURE: 82 MMHG | HEIGHT: 70 IN | BODY MASS INDEX: 36.38 KG/M2 | WEIGHT: 254.1 LBS | TEMPERATURE: 98.3 F | HEART RATE: 75 BPM | RESPIRATION RATE: 16 BRPM | SYSTOLIC BLOOD PRESSURE: 134 MMHG

## 2025-02-19 DIAGNOSIS — E55.9 VITAMIN D DEFICIENCY: ICD-10-CM

## 2025-02-19 DIAGNOSIS — K21.9 GASTROESOPHAGEAL REFLUX DISEASE WITHOUT ESOPHAGITIS: ICD-10-CM

## 2025-02-19 DIAGNOSIS — Z12.5 SCREENING PSA (PROSTATE SPECIFIC ANTIGEN): ICD-10-CM

## 2025-02-19 DIAGNOSIS — E11.9 TYPE 2 DIABETES MELLITUS WITHOUT COMPLICATION, WITHOUT LONG-TERM CURRENT USE OF INSULIN (MULTI): ICD-10-CM

## 2025-02-19 DIAGNOSIS — E66.812 CLASS 2 SEVERE OBESITY WITH SERIOUS COMORBIDITY AND BODY MASS INDEX (BMI) OF 35.0 TO 35.9 IN ADULT, UNSPECIFIED OBESITY TYPE: ICD-10-CM

## 2025-02-19 DIAGNOSIS — Z00.00 MEDICARE ANNUAL WELLNESS VISIT, SUBSEQUENT: Primary | ICD-10-CM

## 2025-02-19 DIAGNOSIS — N52.9 ERECTILE DYSFUNCTION, UNSPECIFIED ERECTILE DYSFUNCTION TYPE: ICD-10-CM

## 2025-02-19 DIAGNOSIS — E66.01 CLASS 2 SEVERE OBESITY WITH SERIOUS COMORBIDITY AND BODY MASS INDEX (BMI) OF 35.0 TO 35.9 IN ADULT, UNSPECIFIED OBESITY TYPE: ICD-10-CM

## 2025-02-19 DIAGNOSIS — I10 PRIMARY HYPERTENSION: ICD-10-CM

## 2025-02-19 DIAGNOSIS — E78.2 HYPERLIPEMIA, MIXED: ICD-10-CM

## 2025-02-19 LAB — POC HEMOGLOBIN A1C: 6.7 % (ref 4.2–6.5)

## 2025-02-19 PROCEDURE — 99214 OFFICE O/P EST MOD 30 MIN: CPT | Performed by: PHYSICIAN ASSISTANT

## 2025-02-19 PROCEDURE — 3075F SYST BP GE 130 - 139MM HG: CPT | Performed by: PHYSICIAN ASSISTANT

## 2025-02-19 PROCEDURE — 4010F ACE/ARB THERAPY RXD/TAKEN: CPT | Performed by: PHYSICIAN ASSISTANT

## 2025-02-19 PROCEDURE — G0439 PPPS, SUBSEQ VISIT: HCPCS | Performed by: PHYSICIAN ASSISTANT

## 2025-02-19 PROCEDURE — 3079F DIAST BP 80-89 MM HG: CPT | Performed by: PHYSICIAN ASSISTANT

## 2025-02-19 PROCEDURE — 83036 HEMOGLOBIN GLYCOSYLATED A1C: CPT | Performed by: PHYSICIAN ASSISTANT

## 2025-02-19 PROCEDURE — 3008F BODY MASS INDEX DOCD: CPT | Performed by: PHYSICIAN ASSISTANT

## 2025-02-19 RX ORDER — TRAZODONE HYDROCHLORIDE 50 MG/1
TABLET ORAL
COMMUNITY
Start: 2025-01-31

## 2025-02-19 RX ORDER — OMEPRAZOLE 40 MG/1
40 CAPSULE, DELAYED RELEASE ORAL
Qty: 90 CAPSULE | Refills: 1 | Status: SHIPPED | OUTPATIENT
Start: 2025-02-19 | End: 2025-08-18

## 2025-02-19 RX ORDER — TADALAFIL 20 MG/1
20 TABLET ORAL DAILY PRN
Qty: 12 TABLET | Refills: 5 | Status: SHIPPED | OUTPATIENT
Start: 2025-02-19 | End: 2026-02-19

## 2025-02-19 RX ORDER — HYDROXYZINE HYDROCHLORIDE 50 MG/1
1 TABLET, FILM COATED ORAL
COMMUNITY
Start: 2025-01-31

## 2025-02-19 RX ORDER — AMLODIPINE BESYLATE 5 MG/1
5 TABLET ORAL DAILY
Qty: 90 TABLET | Refills: 1 | Status: SHIPPED | OUTPATIENT
Start: 2025-02-19 | End: 2025-08-18

## 2025-02-19 RX ORDER — PRAZOSIN HYDROCHLORIDE 1 MG/1
CAPSULE ORAL
COMMUNITY
Start: 2025-01-02

## 2025-02-19 RX ORDER — ZIPRASIDONE HYDROCHLORIDE 20 MG/1
CAPSULE ORAL
COMMUNITY
Start: 2025-01-02

## 2025-02-19 RX ORDER — OLMESARTAN MEDOXOMIL 20 MG/1
20 TABLET ORAL DAILY
Qty: 90 TABLET | Refills: 1 | Status: SHIPPED | OUTPATIENT
Start: 2025-02-19 | End: 2025-08-18

## 2025-02-19 ASSESSMENT — PATIENT HEALTH QUESTIONNAIRE - PHQ9
2. FEELING DOWN, DEPRESSED OR HOPELESS: SEVERAL DAYS
5. POOR APPETITE OR OVEREATING: SEVERAL DAYS
SUM OF ALL RESPONSES TO PHQ QUESTIONS 1-9: 9
1. LITTLE INTEREST OR PLEASURE IN DOING THINGS: MORE THAN HALF THE DAYS
6. FEELING BAD ABOUT YOURSELF - OR THAT YOU ARE A FAILURE OR HAVE LET YOURSELF OR YOUR FAMILY DOWN: SEVERAL DAYS
10. IF YOU CHECKED OFF ANY PROBLEMS, HOW DIFFICULT HAVE THESE PROBLEMS MADE IT FOR YOU TO DO YOUR WORK, TAKE CARE OF THINGS AT HOME, OR GET ALONG WITH OTHER PEOPLE: SOMEWHAT DIFFICULT
8. MOVING OR SPEAKING SO SLOWLY THAT OTHER PEOPLE COULD HAVE NOTICED. OR THE OPPOSITE, BEING SO FIGETY OR RESTLESS THAT YOU HAVE BEEN MOVING AROUND A LOT MORE THAN USUAL: NOT AT ALL
SUM OF ALL RESPONSES TO PHQ9 QUESTIONS 1 AND 2: 3
9. THOUGHTS THAT YOU WOULD BE BETTER OFF DEAD, OR OF HURTING YOURSELF: NOT AT ALL
3. TROUBLE FALLING OR STAYING ASLEEP OR SLEEPING TOO MUCH: MORE THAN HALF THE DAYS
7. TROUBLE CONCENTRATING ON THINGS, SUCH AS READING THE NEWSPAPER OR WATCHING TELEVISION: SEVERAL DAYS
4. FEELING TIRED OR HAVING LITTLE ENERGY: SEVERAL DAYS

## 2025-02-19 ASSESSMENT — ACTIVITIES OF DAILY LIVING (ADL)
TAKING_MEDICATION: INDEPENDENT
MANAGING_FINANCES: INDEPENDENT
GROCERY_SHOPPING: NEEDS ASSISTANCE
BATHING: INDEPENDENT
DOING_HOUSEWORK: NEEDS ASSISTANCE
DRESSING: INDEPENDENT

## 2025-02-19 ASSESSMENT — ENCOUNTER SYMPTOMS
OCCASIONAL FEELINGS OF UNSTEADINESS: 1
LOSS OF SENSATION IN FEET: 1
DEPRESSION: 0

## 2025-02-19 NOTE — PROGRESS NOTES
Subjective   Reason for Visit: Yariel Shaw is an 49 y.o. male here for a Medicare Wellness visit.     Past Medical, Surgical, and Family History reviewed and updated in chart.    Reviewed all medications by prescribing practitioner or clinical pharmacist (such as prescriptions, OTCs, herbal therapies and supplements) and documented in the medical record.    HPI  MCW:  Labs : 11/15/2024, A1c and CMP  Colonoscopy: 01/03/2024  PSA: 11/23, needs katina  Occasional smoker- currently 10 pack years total; smokes 6 a day  AAA: (smoke >100 cig as a man over age 55 or fm Hx)  Screening DM: done  Lipids: 8/24  Stress test 10/22  Carotids (smokers): over age 65  Low dose CT chest (smokers): done 5/24  Tobacco Cessation: declined  DM eye exam: 3 mo ago  Gained 14# since Dec '24, weight lifts daily; he states his jeans are loose    Patient would like to check A1C today and discuss supplements he takes daily.     Patient Self Assessment of Health Status  Patient Self Assessment: Fair        3 mo f/up:  Patient has hypertension  He does not monitor his BP at home.  Denies chest pain, dizziness, lower leg swelling.   Pt has been off of the Norvasc.      Patient has hyperlipidemia.  Patient tries to limit the amount of fatty foods and high cholesterol foods that are consumed.  He is not on a statin and prefers to not go on one despite our urging.         Patient has Type 2 DM.  Most recent A1c done today and it was 6.7% and was 11/15/24 was 6.8% same as 3 mo prior to that.  He denies any polyuria, polydipsia, polyphagia.  Patient states that he has been compliant with the current diabetes medication.  He is on Metformin daily.  Pt is getting 90 or under for readings at home.      He has ED.  Tadalafil continues to work well for him.     Pt continues to smoke.   Patient continues to work on smoking cessation but has been unable to completely quit. He uses less than a 1/2 ppd     Pt has anxiety.   He does follow with Dr. ankit  "Anikeev for his anxiety.  His psychiatrist manages his xanax.   Denies any noted side effects.    GERD_ well controlled on PPI, no blood in stool.     He has insomnia.  Ambien was discontinued in the past since he is on xanax (risks of combination discussed).     Pt has chronic lower back pain.  He does follow with pain management as needed.    Nutrition and Exercise  Current Diet: Well Balanced Diet  Adequate Fluid Intake: Yes  Caffeine: Yes  Exercise Frequency: Infrequently    Functional Ability/Level of Safety  Cognitive Impairment Observed: No cognitive impairment observed    Home Safety Risk Factors: No grab bars, bathroom, No stair Handrails    Patient Care Team:  Debbi Casas PA-C as PCP - General (Family Medicine)  Jimmie Monsalve DO as PCP - [facility] PCP     Review of Systems  Constitutional: Patient denies any fever, chills, loss of appetite, or unexplained weight loss.  Cardiovascular: Patient denies any chest pain, shortness of breath with exertion, tachycardia, palpitations, orthopnea, or paroxysmal nocturnal dyspnea.  Respiratory: Patient denies any cough, shortness breath, or wheezing.  Gastrointestinal patient denies any nausea, vomiting, diarrhea, constipation, melena, hematochezia, or reflux symptoms  Skin: Denies any rashes or skin lesions   Neurology: Patient denies any new motor or sensory losses.  Denies any numbness, tingling, weakness, and incoordination of the extremities.  Patient also denies any tremor, seizures, or gait instability.  Endocrinology: Denies any polyuria, polydipsia, polyphagia, or heat/cold intolerance.    Objective   Vitals:  /90 (BP Location: Left arm, Patient Position: Sitting, BP Cuff Size: Large adult)   Pulse 75   Temp 36.8 °C (98.3 °F) (Temporal)   Resp 16   Ht 1.778 m (5' 10\")   Wt 115 kg (254 lb 1.6 oz)   SpO2 98%   BMI 36.46 kg/m²       Physical Exam  Gen. appearance: Alert and cooperative, in no acute distress, well-developed, well-nourished " obese male.  Neck: Supple and without adenopathy or rigidity.  There is no JVD at 90° and no carotid bruits are noted.  There is no thyromegaly, thyroid tenderness, or palpable thyroid nodules.  Heart: Regular rate and rhythm without murmur or ectopy.  Lungs: Lungs are clear to auscultation bilaterally with good air exchange.  Skin: Good turgor, moist, warm and without rashes or lesions.  Neurological exam: Alert and oriented ×3, no tremor, normal gait.  Extremities: No clubbing, cyanosis, or edema    Assessment/Plan   1. Medicare annual wellness visit, subsequent (Primary)  Labs : 11/15/2024, A1c and CMP  Colonoscopy: 01/03/2024  PSA: 11/23, needs katina  Occasional smoker- currently 10 pack years total; smokes 6 a day  AAA: (smoke >100 cig as a man over age 55 or fm Hx)  Screening DM: done  Lipids: 8/24  Stress test 10/22  Carotids (smokers): over age 65  Low dose CT chest (smokers): done 5/24  Tobacco Cessation: declined  DM eye exam: 3 mo ago  Gained 14# since Dec '24, weight lifts daily; he states his jeans are loose    2. Primary hypertension  Patient tries to watch amount of salt in his diet, he exercises regularly and his blood pressure is currently well-controlled.  He will continue with current dose of amlodipine and olmesartan and we will continue to monitor.    - Follow Up In [facility] - PCP - Established  - amLODIPine (Norvasc) 5 mg tablet; Take 1 tablet (5 mg) by mouth once daily.  Dispense: 90 tablet; Refill: 1  - olmesartan (BENIcar) 20 mg tablet; Take 1 tablet (20 mg) by mouth once daily.  Dispense: 90 tablet; Refill: 1  - Comprehensive Metabolic Panel; Future  - CBC and Auto Differential; Future  - Comprehensive Metabolic Panel  - CBC and Auto Differential  - Follow Up In [facility] - PCP - Established; Future    3. Hyperlipemia, mixed  Patient has declined statin use though he previously used Crestor without side effects.  He would like to maintain cholesterol levels with diet  and exercise and natural supplements.  He is currently without symptoms of unstable angina and his last stress test was in 2022.  Patient will continue with over-the-counter supplements and we will repeat Murali his lipid levels with labs today.    - Lipid Panel; Future  - TSH with reflex to Free T4 if abnormal; Future  - Lipid Panel  - TSH with reflex to Free T4 if abnormal    4. Type 2 diabetes mellitus without complication, without long-term current use of insulin (Multi)  Patient is managing his diabetes with over-the-counter herbs and exercise.  His most recent A1c was 6.7%, down from 6.8% in November.  He denies polyuria, polydipsia lower extremity tingling and numbness.  His diabetic eye exam is up-to-date.  He checks his feet weekly for any lesions.  Patient was encouraged to continue with diet and exercise modifications and we will continue to monitor.  - POCT glycosylated hemoglobin (Hb A1C) manually resulted  - Follow Up In [facility] - PCP - Established; Future    5. Erectile dysfunction, unspecified erectile dysfunction type  Symptoms are stable on Cialis.  He will continue current dosing.  - tadalafil (Cialis) 20 mg tablet; Take 1 tablet (20 mg) by mouth once daily as needed for erectile dysfunction.  Dispense: 12 tablet; Refill: 5    6. Class 2 severe obesity with serious comorbidity and body mass index (BMI) of 35.0 to 35.9 in adult, unspecified obesity type  Diet and exercise were discussed at length.  He was told to increase his berberine to 3 times daily 1 hour prior to meals.  He was told that the goal is for him to be 20 pounds down by June.    7. Gastroesophageal reflux disease without esophagitis  Symptoms are stable on current dose of omeprazole.  He denies any signs of blood in his stool.  He uses Tums less than once per week.  - omeprazole (PriLOSEC) 40 mg DR capsule; Take 1 capsule (40 mg) by mouth once daily in the morning. Take before meals. Do not crush or chew.  Dispense: 90  capsule; Refill: 1    8. Vitamin D deficiency  Patient is currently not on supplementation.  - Vitamin D 1,25 Dihydroxy (for eval of hypercalcemia); Future  - Vitamin D 1,25 Dihydroxy (for eval of hypercalcemia)    9. Screening PSA (prostate specific antigen)  PSA is due for recheck.  - Prostate Specific Antigen, Screen; Future  - Prostate Specific Antigen, Screen     Patient is to have labs done today and we will call him with those results as soon as they are reviewed.  He is to return to the clinic in 3 months for follow-up on hypertension and diabetes with A1c done IO at his next visit.     Patient understands that should they have testing outside   facilities that we may not receive the results and was told to call us if they have not heard from our office within a week after testing.    [facility] uses voice recognition technology for dictations. Sometimes the software misinterprets words. Please take this into account when reading this.

## 2025-02-22 LAB
1,25(OH)2D SERPL-MCNC: 56 PG/ML (ref 18–72)
1,25(OH)2D2 SERPL-MCNC: <8 PG/ML
1,25(OH)2D3 SERPL-MCNC: 56 PG/ML
ALBUMIN SERPL-MCNC: 4.3 G/DL (ref 3.6–5.1)
ALP SERPL-CCNC: 83 U/L (ref 36–130)
ALT SERPL-CCNC: 18 U/L (ref 9–46)
ANION GAP SERPL CALCULATED.4IONS-SCNC: 8 MMOL/L (CALC) (ref 7–17)
AST SERPL-CCNC: 15 U/L (ref 10–40)
BASOPHILS # BLD AUTO: 40 CELLS/UL (ref 0–200)
BASOPHILS NFR BLD AUTO: 0.6 %
BILIRUB SERPL-MCNC: 0.5 MG/DL (ref 0.2–1.2)
BUN SERPL-MCNC: 13 MG/DL (ref 7–25)
CALCIUM SERPL-MCNC: 9.2 MG/DL (ref 8.6–10.3)
CHLORIDE SERPL-SCNC: 105 MMOL/L (ref 98–110)
CHOLEST SERPL-MCNC: 202 MG/DL
CHOLEST/HDLC SERPL: 5.1 (CALC)
CO2 SERPL-SCNC: 27 MMOL/L (ref 20–32)
CREAT SERPL-MCNC: 1.21 MG/DL (ref 0.6–1.29)
EGFRCR SERPLBLD CKD-EPI 2021: 74 ML/MIN/1.73M2
EOSINOPHIL # BLD AUTO: 281 CELLS/UL (ref 15–500)
EOSINOPHIL NFR BLD AUTO: 4.2 %
ERYTHROCYTE [DISTWIDTH] IN BLOOD BY AUTOMATED COUNT: 13 % (ref 11–15)
GLUCOSE SERPL-MCNC: 93 MG/DL (ref 65–99)
HCT VFR BLD AUTO: 42.3 % (ref 38.5–50)
HDLC SERPL-MCNC: 40 MG/DL
HGB BLD-MCNC: 14.7 G/DL (ref 13.2–17.1)
LDLC SERPL CALC-MCNC: 136 MG/DL (CALC)
LYMPHOCYTES # BLD AUTO: 2881 CELLS/UL (ref 850–3900)
LYMPHOCYTES NFR BLD AUTO: 43 %
MCH RBC QN AUTO: 29.4 PG (ref 27–33)
MCHC RBC AUTO-ENTMCNC: 34.8 G/DL (ref 32–36)
MCV RBC AUTO: 84.6 FL (ref 80–100)
MONOCYTES # BLD AUTO: 690 CELLS/UL (ref 200–950)
MONOCYTES NFR BLD AUTO: 10.3 %
NEUTROPHILS # BLD AUTO: 2807 CELLS/UL (ref 1500–7800)
NEUTROPHILS NFR BLD AUTO: 41.9 %
NONHDLC SERPL-MCNC: 162 MG/DL (CALC)
PLATELET # BLD AUTO: 219 THOUSAND/UL (ref 140–400)
PMV BLD REES-ECKER: 11.1 FL (ref 7.5–12.5)
POTASSIUM SERPL-SCNC: 4.3 MMOL/L (ref 3.5–5.3)
PROT SERPL-MCNC: 6.9 G/DL (ref 6.1–8.1)
PSA SERPL-MCNC: 0.52 NG/ML
RBC # BLD AUTO: 5 MILLION/UL (ref 4.2–5.8)
SODIUM SERPL-SCNC: 140 MMOL/L (ref 135–146)
TRIGL SERPL-MCNC: 131 MG/DL
TSH SERPL-ACNC: 2.44 MIU/L (ref 0.4–4.5)
WBC # BLD AUTO: 6.7 THOUSAND/UL (ref 3.8–10.8)

## 2025-03-31 ENCOUNTER — TELEPHONE (OUTPATIENT)
Dept: PRIMARY CARE | Facility: CLINIC | Age: 50
End: 2025-03-31
Payer: COMMERCIAL

## 2025-03-31 DIAGNOSIS — G47.19 EXCESSIVE DAYTIME SLEEPINESS: Primary | ICD-10-CM

## 2025-03-31 NOTE — TELEPHONE ENCOUNTER
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  Fax number is [phone].  He is being seen on Friday with a diagnosis of URSULA.    Please tell them that he called the office, complaining of excessive daytime sleepiness and that is when the order was placed.  The notes do not discuss excessive daytime sleepiness.      Debbi Vazquez

## 2025-04-02 ENCOUNTER — TELEPHONE (OUTPATIENT)
Dept: PRIMARY CARE | Facility: CLINIC | Age: 50
End: 2025-04-02
Payer: COMMERCIAL

## 2025-04-02 ENCOUNTER — APPOINTMENT (OUTPATIENT)
Dept: URGENT CARE | Age: 50
End: 2025-04-02
Payer: COMMERCIAL

## 2025-04-02 DIAGNOSIS — R05.1 ACUTE COUGH: ICD-10-CM

## 2025-04-02 DIAGNOSIS — Z72.0 TOBACCO ABUSE: Primary | ICD-10-CM

## 2025-04-02 RX ORDER — BENZONATATE 200 MG/1
200 CAPSULE ORAL 3 TIMES DAILY PRN
Qty: 21 CAPSULE | Refills: 0 | Status: SHIPPED | OUTPATIENT
Start: 2025-04-02 | End: 2025-04-09

## 2025-04-02 RX ORDER — IBUPROFEN 200 MG
1 TABLET ORAL EVERY 24 HOURS
Qty: 30 PATCH | Refills: 0 | Status: SHIPPED | OUTPATIENT
Start: 2025-04-02 | End: 2025-05-02

## 2025-04-02 RX ORDER — NICOTINE 7MG/24HR
1 PATCH, TRANSDERMAL 24 HOURS TRANSDERMAL EVERY 24 HOURS
Qty: 14 PATCH | Refills: 0 | Status: SHIPPED | OUTPATIENT
Start: 2025-04-02 | End: 2025-04-16

## 2025-04-02 NOTE — TELEPHONE ENCOUNTER
Debbi adler  Pt is calling in and states he would like nicotine patches called in and something for a cough.Pt was last seen 02/19/25.Pt would like this called into [facility] in [place].

## 2025-04-03 ENCOUNTER — OFFICE VISIT (OUTPATIENT)
Dept: URGENT CARE | Age: 50
End: 2025-04-03
Payer: COMMERCIAL

## 2025-04-03 VITALS
HEIGHT: 70 IN | SYSTOLIC BLOOD PRESSURE: 146 MMHG | TEMPERATURE: 97.9 F | BODY MASS INDEX: 34.36 KG/M2 | DIASTOLIC BLOOD PRESSURE: 91 MMHG | HEART RATE: 91 BPM | WEIGHT: 240 LBS | OXYGEN SATURATION: 96 % | RESPIRATION RATE: 20 BRPM

## 2025-04-03 DIAGNOSIS — J40 BRONCHITIS: Primary | ICD-10-CM

## 2025-04-03 PROCEDURE — 3008F BODY MASS INDEX DOCD: CPT | Performed by: NURSE PRACTITIONER

## 2025-04-03 PROCEDURE — 3080F DIAST BP >= 90 MM HG: CPT | Performed by: NURSE PRACTITIONER

## 2025-04-03 PROCEDURE — 3077F SYST BP >= 140 MM HG: CPT | Performed by: NURSE PRACTITIONER

## 2025-04-03 PROCEDURE — 4010F ACE/ARB THERAPY RXD/TAKEN: CPT | Performed by: NURSE PRACTITIONER

## 2025-04-03 PROCEDURE — 99213 OFFICE O/P EST LOW 20 MIN: CPT | Performed by: NURSE PRACTITIONER

## 2025-04-03 RX ORDER — AZITHROMYCIN 250 MG/1
TABLET, FILM COATED ORAL
Qty: 6 TABLET | Refills: 0 | Status: SHIPPED | OUTPATIENT
Start: 2025-04-03 | End: 2025-04-08

## 2025-04-03 RX ORDER — BROMPHENIRAMINE MALEATE, PSEUDOEPHEDRINE HYDROCHLORIDE, AND DEXTROMETHORPHAN HYDROBROMIDE 2; 30; 10 MG/5ML; MG/5ML; MG/5ML
5 SYRUP ORAL 4 TIMES DAILY PRN
Qty: 120 ML | Refills: 0 | Status: SHIPPED | OUTPATIENT
Start: 2025-04-03 | End: 2025-04-13

## 2025-04-03 ASSESSMENT — ENCOUNTER SYMPTOMS
ENDOCRINE NEGATIVE: 1
CONSTITUTIONAL NEGATIVE: 1
COUGH: 1
HEMATOLOGIC/LYMPHATIC NEGATIVE: 1
ALLERGIC/IMMUNOLOGIC NEGATIVE: 1
MUSCULOSKELETAL NEGATIVE: 1
EYES NEGATIVE: 1
NEUROLOGICAL NEGATIVE: 1
GASTROINTESTINAL NEGATIVE: 1
PALPITATIONS: 0
SINUS PRESSURE: 1
PSYCHIATRIC NEGATIVE: 1

## 2025-04-03 NOTE — PROGRESS NOTES
Subjective   Patient ID: Yariel Shaw is a 50 y.o. male. They present today with a chief complaint of Cough (Dry cough x 3 days ).    History of Present Illness    Cough  Associated symptoms include postnasal drip. Pertinent negatives include no chest pain.       Pt presents to urgent care with c/o   nasal congestion, cough for the past few days.  Reports his wife was recently sick with pneumonia.  Reports he has been taking over-the-counter Sudafed, prescription Tessalon Perles and using his Netti pot with minimal relief .    Reports he was unable to sleep last night due to coughing so much.  He is a smoker.Pt denies CP, SOB, palpitations, fevers, abd pain, n/v/d, sick contacts, recent travel.        Past Medical History  Allergies as of 04/03/2025 - Reviewed 04/03/2025   Allergen Reaction Noted    Bupropion Dizziness 05/11/2023    Escitalopram Dizziness 05/11/2023       (Not in a hospital admission)       Past Medical History:   Diagnosis Date    Acid reflux     Anxiety     Diabetes mellitus (Multi)     PRE-DIABETES    Hyperlipidemia     Hypertension     Joint pain     Sinusitis     RESOLVED WITH SURGERY    Wears glasses        Past Surgical History:   Procedure Laterality Date    MR HEAD ANGIO WO IV CONTRAST  08/24/2021    MR HEAD ANGIO WO IV CONTRAST 8/24/2021 [facility]    NASAL SINUS SURGERY          reports that he has been smoking cigarettes. He has a 10 pack-year smoking history. He has never used smokeless tobacco. He reports that he does not drink alcohol and does not use drugs.    Review of Systems  Review of Systems   Constitutional: Negative.    HENT:  Positive for congestion, postnasal drip and sinus pressure.    Eyes: Negative.    Respiratory:  Positive for cough.    Cardiovascular:  Negative for chest pain and palpitations.   Gastrointestinal: Negative.    Endocrine: Negative.    Genitourinary: Negative.    Musculoskeletal: Negative.    Skin: Negative.    Allergic/Immunologic: Negative.  "   Neurological: Negative.    Hematological: Negative.    Psychiatric/Behavioral: Negative.     All other systems reviewed and are negative.                                 Objective    Vitals:    04/03/25 1942   BP: (!) 146/91   BP Location: Right arm   Patient Position: Sitting   BP Cuff Size: Large adult   Pulse: 91   Resp: 20   Temp: 36.6 °C (97.9 °F)   TempSrc: Temporal   SpO2: 96%   Weight: 109 kg (240 lb)   Height: 1.778 m (5' 10\")     No LMP for male patient.    Physical Exam  Vitals and nursing note reviewed.   Constitutional:       General: He is not in acute distress.     Appearance: Normal appearance. He is not ill-appearing or toxic-appearing.   HENT:      Head: Atraumatic.      Nose: Congestion present.      Mouth/Throat:      Mouth: Mucous membranes are moist.      Pharynx: Oropharynx is clear.   Eyes:      Extraocular Movements: Extraocular movements intact.      Conjunctiva/sclera: Conjunctivae normal.      Pupils: Pupils are equal, round, and reactive to light.   Cardiovascular:      Rate and Rhythm: Normal rate.      Pulses: Normal pulses.      Heart sounds: Normal heart sounds.   Pulmonary:      Effort: Pulmonary effort is normal.      Breath sounds: Normal breath sounds.   Skin:     General: Skin is warm and dry.   Neurological:      General: No focal deficit present.      Mental Status: He is alert and oriented to person, place, and time.   Psychiatric:         Mood and Affect: Mood normal.         Behavior: Behavior normal.         Thought Content: Thought content normal.         Procedures    Point of Care Test & Imaging Results from this visit  No results found for this visit on 04/03/25.   Imaging  No results found.    Cardiology, Vascular, and Other Imaging  No other imaging results found for the past 2 days      Diagnostic study results (if any) were reviewed by [facility].    Assessment/Plan   Allergies, medications, history, and pertinent labs/EKGs/Imaging reviewed by Josephine ORNELAS" KEIKO Vicente-CNP.     Medical Decision Making  Urgent Care Course: Patient presents to the  with rhinorrhea, cough, chest congestion. Patient is afebrile, hemodynamically stable.  Patient denies fever, n/v, cp, sob, ab pain, dysuria, diarrhea.   Patient declines viral testing for COVID or flu at this time.  Given longevity of symptoms,   Tobacco use,will treat patient for bronchitis.  Patient given prescriptions for  Z-Michele, Bromfed  Patient given pneumonia warning signs and will f/u with pcp.   Independent Hx provided by: Patient  Social determinant that may affects healthcare: Pharmacy closed  Pt's case/impression summarized and discussed with: Patient  Likely Dx given clinical picture: Bronchitis  Although not an exhaustive list of Differential Diagnosis (though considered), patient's HPI, PE, and other findings are not suggestive of: Pneumonia, pneumothorax, hemothorax, ACS, PE, bronchospasm, asthma exacerbation  Patient at time of discharge was clinically well-appearing and HDS for outpatient management. The patient and/or family was given the opportunity to ask questions prior to discharge, understood my verbal discussion of the plans for treatment, expected course, indications to return to  or seek further treatment in ED, and the need for timely follow up as directed.    Condition: Stable  Disposition: Discharged      Orders and Diagnoses  Diagnoses and all orders for this visit:  Bronchitis  -     azithromycin (Zithromax) 250 mg tablet; Take 2 tabs (500 mg) by mouth today, than 1 daily for 4 days.  -     brompheniramine-pseudoeph-DM 2-30-10 mg/5 mL syrup; Take 5 mL by mouth 4 times a day as needed for congestion or cough for up to 10 days.      Medical Admin Record      Patient disposition: Home    Electronically signed by [facility]  7:53 PM

## 2025-04-05 ENCOUNTER — HOSPITAL ENCOUNTER (EMERGENCY)
Facility: HOSPITAL | Age: 50
Discharge: HOME | End: 2025-04-05
Payer: COMMERCIAL

## 2025-04-05 ENCOUNTER — APPOINTMENT (OUTPATIENT)
Dept: RADIOLOGY | Facility: HOSPITAL | Age: 50
End: 2025-04-05
Payer: COMMERCIAL

## 2025-04-05 ENCOUNTER — APPOINTMENT (OUTPATIENT)
Dept: CARDIOLOGY | Facility: HOSPITAL | Age: 50
End: 2025-04-05
Payer: COMMERCIAL

## 2025-04-05 VITALS
OXYGEN SATURATION: 99 % | HEART RATE: 66 BPM | RESPIRATION RATE: 17 BRPM | HEIGHT: 70 IN | SYSTOLIC BLOOD PRESSURE: 118 MMHG | TEMPERATURE: 96.8 F | DIASTOLIC BLOOD PRESSURE: 71 MMHG | WEIGHT: 240 LBS | BODY MASS INDEX: 34.36 KG/M2

## 2025-04-05 DIAGNOSIS — R10.13 ABDOMINAL PAIN, ACUTE, EPIGASTRIC: Primary | ICD-10-CM

## 2025-04-05 LAB
ALBUMIN SERPL BCP-MCNC: 4.1 G/DL (ref 3.4–5)
ALP SERPL-CCNC: 80 U/L (ref 33–120)
ALT SERPL W P-5'-P-CCNC: 16 U/L (ref 10–52)
ANION GAP SERPL CALC-SCNC: 11 MMOL/L (ref 10–20)
AST SERPL W P-5'-P-CCNC: 21 U/L (ref 9–39)
ATRIAL RATE: 73 BPM
BASOPHILS # BLD AUTO: 0.04 X10*3/UL (ref 0–0.1)
BASOPHILS NFR BLD AUTO: 0.6 %
BILIRUB DIRECT SERPL-MCNC: 0.1 MG/DL (ref 0–0.3)
BILIRUB SERPL-MCNC: 0.3 MG/DL (ref 0–1.2)
BUN SERPL-MCNC: 15 MG/DL (ref 6–23)
CALCIUM SERPL-MCNC: 8.9 MG/DL (ref 8.6–10.3)
CARDIAC TROPONIN I PNL SERPL HS: 3 NG/L (ref 0–20)
CARDIAC TROPONIN I PNL SERPL HS: 3 NG/L (ref 0–20)
CHLORIDE SERPL-SCNC: 104 MMOL/L (ref 98–107)
CO2 SERPL-SCNC: 25 MMOL/L (ref 21–32)
CREAT SERPL-MCNC: 1.42 MG/DL (ref 0.5–1.3)
EGFRCR SERPLBLD CKD-EPI 2021: 61 ML/MIN/1.73M*2
EOSINOPHIL # BLD AUTO: 0.4 X10*3/UL (ref 0–0.7)
EOSINOPHIL NFR BLD AUTO: 5.6 %
ERYTHROCYTE [DISTWIDTH] IN BLOOD BY AUTOMATED COUNT: 13.1 % (ref 11.5–14.5)
GLUCOSE SERPL-MCNC: 151 MG/DL (ref 74–99)
HCT VFR BLD AUTO: 42.5 % (ref 41–52)
HGB BLD-MCNC: 14.2 G/DL (ref 13.5–17.5)
IMM GRANULOCYTES # BLD AUTO: 0.01 X10*3/UL (ref 0–0.7)
IMM GRANULOCYTES NFR BLD AUTO: 0.1 % (ref 0–0.9)
INR PPP: 1.1 (ref 0.9–1.1)
LACTATE SERPL-SCNC: 0.9 MMOL/L (ref 0.4–2)
LIPASE SERPL-CCNC: 34 U/L (ref 9–82)
LYMPHOCYTES # BLD AUTO: 3.91 X10*3/UL (ref 1.2–4.8)
LYMPHOCYTES NFR BLD AUTO: 54.5 %
MCH RBC QN AUTO: 28.6 PG (ref 26–34)
MCHC RBC AUTO-ENTMCNC: 33.4 G/DL (ref 32–36)
MCV RBC AUTO: 86 FL (ref 80–100)
MONOCYTES # BLD AUTO: 0.84 X10*3/UL (ref 0.1–1)
MONOCYTES NFR BLD AUTO: 11.7 %
NEUTROPHILS # BLD AUTO: 1.97 X10*3/UL (ref 1.2–7.7)
NEUTROPHILS NFR BLD AUTO: 27.5 %
NRBC BLD-RTO: 0 /100 WBCS (ref 0–0)
P AXIS: 29 DEGREES
P OFFSET: 199 MS
P ONSET: 145 MS
PLATELET # BLD AUTO: 198 X10*3/UL (ref 150–450)
POTASSIUM SERPL-SCNC: 4.1 MMOL/L (ref 3.5–5.3)
PR INTERVAL: 158 MS
PROT SERPL-MCNC: 7.3 G/DL (ref 6.4–8.2)
PROTHROMBIN TIME: 11.7 SECONDS (ref 9.8–12.4)
Q ONSET: 224 MS
QRS COUNT: 12 BEATS
QRS DURATION: 90 MS
QT INTERVAL: 388 MS
QTC CALCULATION(BAZETT): 427 MS
QTC FREDERICIA: 414 MS
R AXIS: -4 DEGREES
RBC # BLD AUTO: 4.96 X10*6/UL (ref 4.5–5.9)
SODIUM SERPL-SCNC: 136 MMOL/L (ref 136–145)
T AXIS: 15 DEGREES
T OFFSET: 418 MS
VENTRICULAR RATE: 73 BPM
WBC # BLD AUTO: 7.2 X10*3/UL (ref 4.4–11.3)

## 2025-04-05 PROCEDURE — 2550000001 HC RX 255 CONTRASTS: Performed by: PHYSICIAN ASSISTANT

## 2025-04-05 PROCEDURE — 71045 X-RAY EXAM CHEST 1 VIEW: CPT | Mod: FOREIGN READ | Performed by: RADIOLOGY

## 2025-04-05 PROCEDURE — 82248 BILIRUBIN DIRECT: CPT | Performed by: PHYSICIAN ASSISTANT

## 2025-04-05 PROCEDURE — 71045 X-RAY EXAM CHEST 1 VIEW: CPT

## 2025-04-05 PROCEDURE — 85025 COMPLETE CBC W/AUTO DIFF WBC: CPT | Performed by: PHYSICIAN ASSISTANT

## 2025-04-05 PROCEDURE — 80053 COMPREHEN METABOLIC PANEL: CPT | Performed by: PHYSICIAN ASSISTANT

## 2025-04-05 PROCEDURE — 84484 ASSAY OF TROPONIN QUANT: CPT | Performed by: PHYSICIAN ASSISTANT

## 2025-04-05 PROCEDURE — 96375 TX/PRO/DX INJ NEW DRUG ADDON: CPT

## 2025-04-05 PROCEDURE — 83690 ASSAY OF LIPASE: CPT | Performed by: PHYSICIAN ASSISTANT

## 2025-04-05 PROCEDURE — 99285 EMERGENCY DEPT VISIT HI MDM: CPT | Mod: 25

## 2025-04-05 PROCEDURE — 2500000004 HC RX 250 GENERAL PHARMACY W/ HCPCS (ALT 636 FOR OP/ED): Performed by: PHYSICIAN ASSISTANT

## 2025-04-05 PROCEDURE — 93005 ELECTROCARDIOGRAM TRACING: CPT

## 2025-04-05 PROCEDURE — 74177 CT ABD & PELVIS W/CONTRAST: CPT

## 2025-04-05 PROCEDURE — 96361 HYDRATE IV INFUSION ADD-ON: CPT

## 2025-04-05 PROCEDURE — 74177 CT ABD & PELVIS W/CONTRAST: CPT | Mod: FOREIGN READ | Performed by: RADIOLOGY

## 2025-04-05 PROCEDURE — 36415 COLL VENOUS BLD VENIPUNCTURE: CPT | Performed by: PHYSICIAN ASSISTANT

## 2025-04-05 PROCEDURE — 85610 PROTHROMBIN TIME: CPT | Performed by: PHYSICIAN ASSISTANT

## 2025-04-05 PROCEDURE — 83605 ASSAY OF LACTIC ACID: CPT | Performed by: PHYSICIAN ASSISTANT

## 2025-04-05 PROCEDURE — 96374 THER/PROPH/DIAG INJ IV PUSH: CPT | Mod: 59

## 2025-04-05 RX ORDER — FAMOTIDINE 20 MG/1
20 TABLET, FILM COATED ORAL 2 TIMES DAILY
Qty: 30 TABLET | Refills: 0 | Status: SHIPPED | OUTPATIENT
Start: 2025-04-05 | End: 2025-04-20

## 2025-04-05 RX ORDER — NAPROXEN 500 MG/1
500 TABLET ORAL
Qty: 30 TABLET | Refills: 0 | Status: SHIPPED | OUTPATIENT
Start: 2025-04-05 | End: 2025-04-20

## 2025-04-05 RX ORDER — KETOROLAC TROMETHAMINE 30 MG/ML
15 INJECTION, SOLUTION INTRAMUSCULAR; INTRAVENOUS ONCE
Status: COMPLETED | OUTPATIENT
Start: 2025-04-05 | End: 2025-04-05

## 2025-04-05 RX ORDER — ONDANSETRON 4 MG/1
4 TABLET, ORALLY DISINTEGRATING ORAL EVERY 8 HOURS PRN
Qty: 20 TABLET | Refills: 0 | Status: SHIPPED | OUTPATIENT
Start: 2025-04-05 | End: 2025-04-12

## 2025-04-05 RX ORDER — DICYCLOMINE HYDROCHLORIDE 20 MG/1
20 TABLET ORAL 4 TIMES DAILY PRN
Qty: 20 TABLET | Refills: 0 | Status: SHIPPED | OUTPATIENT
Start: 2025-04-05 | End: 2025-04-10

## 2025-04-05 RX ORDER — MORPHINE SULFATE 4 MG/ML
4 INJECTION, SOLUTION INTRAMUSCULAR; INTRAVENOUS ONCE
Status: COMPLETED | OUTPATIENT
Start: 2025-04-05 | End: 2025-04-05

## 2025-04-05 RX ORDER — ONDANSETRON HYDROCHLORIDE 2 MG/ML
4 INJECTION, SOLUTION INTRAVENOUS ONCE
Status: COMPLETED | OUTPATIENT
Start: 2025-04-05 | End: 2025-04-05

## 2025-04-05 RX ADMIN — KETOROLAC TROMETHAMINE 15 MG: 30 INJECTION, SOLUTION INTRAMUSCULAR at 00:37

## 2025-04-05 RX ADMIN — MORPHINE SULFATE 4 MG: 4 INJECTION, SOLUTION INTRAMUSCULAR; INTRAVENOUS at 00:37

## 2025-04-05 RX ADMIN — ONDANSETRON 4 MG: 2 INJECTION INTRAMUSCULAR; INTRAVENOUS at 00:37

## 2025-04-05 RX ADMIN — IOHEXOL 75 ML: 350 INJECTION, SOLUTION INTRAVENOUS at 02:22

## 2025-04-05 RX ADMIN — SODIUM CHLORIDE, POTASSIUM CHLORIDE, SODIUM LACTATE AND CALCIUM CHLORIDE 1000 ML: 600; 310; 30; 20 INJECTION, SOLUTION INTRAVENOUS at 00:38

## 2025-04-05 ASSESSMENT — PAIN DESCRIPTION - FREQUENCY: FREQUENCY: CONSTANT/CONTINUOUS

## 2025-04-05 ASSESSMENT — LIFESTYLE VARIABLES
TOTAL SCORE: 0
EVER FELT BAD OR GUILTY ABOUT YOUR DRINKING: NO
HAVE YOU EVER FELT YOU SHOULD CUT DOWN ON YOUR DRINKING: NO
EVER HAD A DRINK FIRST THING IN THE MORNING TO STEADY YOUR NERVES TO GET RID OF A HANGOVER: NO
HAVE PEOPLE ANNOYED YOU BY CRITICIZING YOUR DRINKING: NO

## 2025-04-05 ASSESSMENT — PAIN DESCRIPTION - PAIN TYPE: TYPE: ACUTE PAIN

## 2025-04-05 ASSESSMENT — PAIN SCALES - GENERAL
PAINLEVEL_OUTOF10: 8
PAINLEVEL_OUTOF10: 5 - MODERATE PAIN

## 2025-04-05 ASSESSMENT — PAIN DESCRIPTION - LOCATION
LOCATION: ABDOMEN
LOCATION_2: BACK

## 2025-04-05 ASSESSMENT — PAIN DESCRIPTION - ORIENTATION
ORIENTATION_2: MID
ORIENTATION: RIGHT

## 2025-04-05 ASSESSMENT — PAIN DESCRIPTION - DESCRIPTORS: DESCRIPTORS: SHARP

## 2025-04-05 ASSESSMENT — PAIN - FUNCTIONAL ASSESSMENT: PAIN_FUNCTIONAL_ASSESSMENT: 0-10

## 2025-04-05 NOTE — Clinical Note
Yariel Shaw was seen and treated in our emergency department on 4/5/2025.  He may return to work on 04/07/2025.       If you have any questions or concerns, please don't hesitate to call.      Jc Retana PA-C

## 2025-04-05 NOTE — ED PROVIDER NOTES
HPI   Chief Complaint   Patient presents with   • Abdominal Pain     Right sided sharp abdominal pain, back pain, and cough       This is a 50-year-old male who presents to the emergency room with chief complaint of upper abdominal pain with cough for the past few days.  Patient states the pain is sharp and stabbing is constant, nothing makes the pain better.  It is worse with coughing as well as with moving.  He denies any nausea vomiting or diarrhea.  Patient states he has no appetite.  He denies any chest pain, any hemoptysis, dizziness or diaphoresis.  Patient does have a history of chronic bronchitis.  He did not take any medications for his symptoms prior to arrival.  His last BM was earlier today was normal.  He denies any hematuria or dysuria.  Denies any history of pancreatitis.  No history of alcohol or drug abuse.      History provided by:  Patient and medical records          Patient History   Past Medical History:   Diagnosis Date   • Acid reflux    • Anxiety    • Diabetes mellitus (Multi)     PRE-DIABETES   • Hyperlipidemia    • Hypertension    • Joint pain    • Sinusitis     RESOLVED WITH SURGERY   • Wears glasses      Past Surgical History:   Procedure Laterality Date   • MR HEAD ANGIO WO IV CONTRAST  08/24/2021    MR HEAD ANGIO WO IV CONTRAST 8/24/2021 [facility]   • NASAL SINUS SURGERY       Family History   Problem Relation Name Age of Onset   • Hypertension Mother     • Hyperlipidemia Mother     • Allergies Mother     • Allergies Father     • Throat cancer Father     • Hypertension Brother     • Diabetes Brother     • Heart attack Maternal Grandmother       Social History     Tobacco Use   • Smoking status: Some Days     Current packs/day: 0.50     Average packs/day: 0.5 packs/day for 20.0 years (10.0 ttl pk-yrs)     Types: Cigarettes   • Smokeless tobacco: Never   Vaping Use   • Vaping status: Never Used   Substance Use Topics   • Alcohol use: Never   • Drug use: Never       Physical  Exam   ED Triage Vitals [04/05/25 0003]   Temperature Heart Rate Respirations BP   36.5 °C (97.7 °F) 84 18 129/86      Pulse Ox Temp Source Heart Rate Source Patient Position   98 % Temporal Monitor Sitting      BP Location FiO2 (%)     Right arm --       Physical Exam  Vitals and nursing note reviewed.   Constitutional:       General: He is awake. He is not in acute distress.     Appearance: Normal appearance. He is well-developed and well-groomed. He is not ill-appearing, toxic-appearing or diaphoretic.      Comments: Temperature is 36.5, heart rate 84, respirations 18, blood pressure was 129/86, pulse ox is 98% on room air, weight 109 kg   HENT:      Head: Normocephalic and atraumatic.      Right Ear: Tympanic membrane, ear canal and external ear normal.      Left Ear: Tympanic membrane, ear canal and external ear normal.      Nose: Nose normal.      Mouth/Throat:      Mouth: Mucous membranes are moist.      Pharynx: Oropharynx is clear.   Eyes:      Extraocular Movements: Extraocular movements intact.      Conjunctiva/sclera: Conjunctivae normal.      Pupils: Pupils are equal, round, and reactive to light.   Cardiovascular:      Rate and Rhythm: Normal rate and regular rhythm.      Pulses: Normal pulses.      Heart sounds: Normal heart sounds.   Pulmonary:      Effort: Pulmonary effort is normal.      Breath sounds: Normal breath sounds. No wheezing, rhonchi or rales.   Abdominal:      General: Abdomen is protuberant. Bowel sounds are normal.      Palpations: Abdomen is soft. There is no mass.      Tenderness: There is abdominal tenderness in the right upper quadrant, epigastric area and left upper quadrant. There is no right CVA tenderness, left CVA tenderness, guarding or rebound.      Hernia: No hernia is present.      Comments: Slightly protuberant abdomen.  Diffuse epigastric right upper quadrant left upper quadrant tenderness   Musculoskeletal:         General: No swelling or tenderness. Normal range of  motion.      Cervical back: Normal range of motion and neck supple.   Skin:     General: Skin is warm and dry.      Capillary Refill: Capillary refill takes less than 2 seconds.      Findings: No rash.   Neurological:      General: No focal deficit present.      Mental Status: He is alert and oriented to person, place, and time. Mental status is at baseline.      GCS: GCS eye subscore is 4. GCS verbal subscore is 5. GCS motor subscore is 6.      Sensory: Sensation is intact.      Motor: Motor function is intact.      Coordination: Coordination is intact.   Psychiatric:         Mood and Affect: Mood normal.         Behavior: Behavior normal. Behavior is cooperative.         Thought Content: Thought content normal.         Judgment: Judgment normal.           ED Course & MDM   Diagnoses as of 04/05/25 0342   Abdominal pain, acute, epigastric                 No data recorded     Taylor Coma Scale Score: 15 (04/05/25 0009 : Vikki Hernandez RN)                           Medical Decision Making  Temperature 36.5, heart rate 84, respirations 18, blood pressure is 129/86, pulse ox is 98% on room air  EKG interprted by me at 0025 hours, normal sinus rhythm rate 90, , QRS of 76, , QTc was 450 no ischemic changes  Patient was medicated with a liter of LR, ketorolac 15 mg IV push, morphine 4 mg IV push, Zofran 4 mg IV push.  I discussed the workup plan with the patient which includes lab work and a CT scan of the abdomen and pelvis.  Patient verbalizes consent.    The patient's lab work was reviewed.  CBC shows a white count of 7.2, hemoglobin 14.2, hematocrit 42.5, platelet count was 198, chemistry panel shows a glucose of 151, creatinine 1.42.  The rest of his metabolic panel was reviewed and unremarkable.  The hepatic function panel was reviewed and within normal limits.  PT is 11.7, INR 1.1, lactic is 0.9, troponin was negative at 3.  EKG interpreted by me at 0 139 normal sinus rhythm rate 73, MD is 158, QRS  was 90, QT was 388, QTc was 427 no STEMI.   X-ray chest unremarkable for any acute cardiopulmonary process.  CT scan of the M pelvis with IV contrast report shows no distinct acute intra-abdominal or pelvic process identified.  There is cholelithiasis again demonstrated without significant pericholecystic inflammatory changes.  The aorta is normal caliber appendix is unremarkable.  At this point, do not suspect pancreatitis, ileus, bowel obstruction, bowel perforation, mesenteric ischemia, AAA or MI.   I am more suspicious of dyspepsia versus gastritis.  The patient was discharged home with prescription for Pepcid, Bentyl and Zofran.  Recommend close follow-up with his PCP he was encouraged to return back to the ER with any concerns or worsening of symptoms.  All questions answered prior to discharge        Procedure  Procedures     Jc Retana PA-C  04/05/25 8522

## 2025-04-13 ENCOUNTER — APPOINTMENT (OUTPATIENT)
Dept: CARDIOLOGY | Facility: HOSPITAL | Age: 50
End: 2025-04-13
Payer: COMMERCIAL

## 2025-04-13 ENCOUNTER — HOSPITAL ENCOUNTER (EMERGENCY)
Facility: HOSPITAL | Age: 50
Discharge: HOME | End: 2025-04-13
Attending: EMERGENCY MEDICINE
Payer: COMMERCIAL

## 2025-04-13 ENCOUNTER — APPOINTMENT (OUTPATIENT)
Dept: RADIOLOGY | Facility: HOSPITAL | Age: 50
End: 2025-04-13
Payer: COMMERCIAL

## 2025-04-13 VITALS
SYSTOLIC BLOOD PRESSURE: 136 MMHG | RESPIRATION RATE: 16 BRPM | HEART RATE: 60 BPM | OXYGEN SATURATION: 99 % | WEIGHT: 240 LBS | DIASTOLIC BLOOD PRESSURE: 83 MMHG | BODY MASS INDEX: 34.36 KG/M2 | HEIGHT: 70 IN | TEMPERATURE: 98.6 F

## 2025-04-13 DIAGNOSIS — R07.9 CHEST PAIN, UNSPECIFIED TYPE: Primary | ICD-10-CM

## 2025-04-13 LAB
ALBUMIN SERPL BCP-MCNC: 4.3 G/DL (ref 3.4–5)
ALP SERPL-CCNC: 76 U/L (ref 33–120)
ALT SERPL W P-5'-P-CCNC: 19 U/L (ref 10–52)
ANION GAP SERPL CALC-SCNC: 10 MMOL/L (ref 10–20)
AST SERPL W P-5'-P-CCNC: 17 U/L (ref 9–39)
ATRIAL RATE: 53 BPM
ATRIAL RATE: 66 BPM
BASOPHILS # BLD AUTO: 0.04 X10*3/UL (ref 0–0.1)
BASOPHILS NFR BLD AUTO: 0.6 %
BILIRUB SERPL-MCNC: 0.5 MG/DL (ref 0–1.2)
BNP SERPL-MCNC: 77 PG/ML (ref 0–99)
BUN SERPL-MCNC: 15 MG/DL (ref 6–23)
CALCIUM SERPL-MCNC: 9 MG/DL (ref 8.6–10.3)
CARDIAC TROPONIN I PNL SERPL HS: <3 NG/L (ref 0–20)
CARDIAC TROPONIN I PNL SERPL HS: <3 NG/L (ref 0–20)
CHLORIDE SERPL-SCNC: 107 MMOL/L (ref 98–107)
CO2 SERPL-SCNC: 26 MMOL/L (ref 21–32)
CREAT SERPL-MCNC: 1.31 MG/DL (ref 0.5–1.3)
D DIMER PPP FEU-MCNC: 365 NG/ML FEU
EGFRCR SERPLBLD CKD-EPI 2021: 67 ML/MIN/1.73M*2
EOSINOPHIL # BLD AUTO: 0.38 X10*3/UL (ref 0–0.7)
EOSINOPHIL NFR BLD AUTO: 6.1 %
ERYTHROCYTE [DISTWIDTH] IN BLOOD BY AUTOMATED COUNT: 12.8 % (ref 11.5–14.5)
GLUCOSE SERPL-MCNC: 101 MG/DL (ref 74–99)
HCT VFR BLD AUTO: 41 % (ref 41–52)
HGB BLD-MCNC: 13.9 G/DL (ref 13.5–17.5)
IMM GRANULOCYTES # BLD AUTO: 0.02 X10*3/UL (ref 0–0.7)
IMM GRANULOCYTES NFR BLD AUTO: 0.3 % (ref 0–0.9)
INR PPP: 1.1 (ref 0.9–1.1)
LACTATE SERPL-SCNC: 0.6 MMOL/L (ref 0.4–2)
LYMPHOCYTES # BLD AUTO: 3.21 X10*3/UL (ref 1.2–4.8)
LYMPHOCYTES NFR BLD AUTO: 51.7 %
MAGNESIUM SERPL-MCNC: 1.96 MG/DL (ref 1.6–2.4)
MCH RBC QN AUTO: 28.6 PG (ref 26–34)
MCHC RBC AUTO-ENTMCNC: 33.9 G/DL (ref 32–36)
MCV RBC AUTO: 84 FL (ref 80–100)
MONOCYTES # BLD AUTO: 0.54 X10*3/UL (ref 0.1–1)
MONOCYTES NFR BLD AUTO: 8.7 %
NEUTROPHILS # BLD AUTO: 2.02 X10*3/UL (ref 1.2–7.7)
NEUTROPHILS NFR BLD AUTO: 32.6 %
NRBC BLD-RTO: 0 /100 WBCS (ref 0–0)
P AXIS: 24 DEGREES
P AXIS: 48 DEGREES
P OFFSET: 190 MS
P OFFSET: 192 MS
P ONSET: 129 MS
P ONSET: 136 MS
PLATELET # BLD AUTO: 247 X10*3/UL (ref 150–450)
POTASSIUM SERPL-SCNC: 4.1 MMOL/L (ref 3.5–5.3)
PR INTERVAL: 164 MS
PR INTERVAL: 178 MS
PROT SERPL-MCNC: 7.1 G/DL (ref 6.4–8.2)
PROTHROMBIN TIME: 11.6 SECONDS (ref 9.8–12.4)
Q ONSET: 218 MS
Q ONSET: 218 MS
QRS COUNT: 11 BEATS
QRS COUNT: 9 BEATS
QRS DURATION: 82 MS
QRS DURATION: 92 MS
QT INTERVAL: 410 MS
QT INTERVAL: 426 MS
QTC CALCULATION(BAZETT): 399 MS
QTC CALCULATION(BAZETT): 429 MS
QTC FREDERICIA: 408 MS
QTC FREDERICIA: 423 MS
R AXIS: -1 DEGREES
R AXIS: 23 DEGREES
RBC # BLD AUTO: 4.86 X10*6/UL (ref 4.5–5.9)
SODIUM SERPL-SCNC: 139 MMOL/L (ref 136–145)
T AXIS: 34 DEGREES
T AXIS: 37 DEGREES
T OFFSET: 423 MS
T OFFSET: 431 MS
VENTRICULAR RATE: 53 BPM
VENTRICULAR RATE: 66 BPM
WBC # BLD AUTO: 6.2 X10*3/UL (ref 4.4–11.3)

## 2025-04-13 PROCEDURE — 99285 EMERGENCY DEPT VISIT HI MDM: CPT | Mod: 25 | Performed by: EMERGENCY MEDICINE

## 2025-04-13 PROCEDURE — 85379 FIBRIN DEGRADATION QUANT: CPT | Performed by: EMERGENCY MEDICINE

## 2025-04-13 PROCEDURE — 36415 COLL VENOUS BLD VENIPUNCTURE: CPT | Performed by: EMERGENCY MEDICINE

## 2025-04-13 PROCEDURE — 84484 ASSAY OF TROPONIN QUANT: CPT | Performed by: EMERGENCY MEDICINE

## 2025-04-13 PROCEDURE — 93005 ELECTROCARDIOGRAM TRACING: CPT

## 2025-04-13 PROCEDURE — 71045 X-RAY EXAM CHEST 1 VIEW: CPT

## 2025-04-13 PROCEDURE — 2500000004 HC RX 250 GENERAL PHARMACY W/ HCPCS (ALT 636 FOR OP/ED): Performed by: EMERGENCY MEDICINE

## 2025-04-13 PROCEDURE — 83880 ASSAY OF NATRIURETIC PEPTIDE: CPT | Performed by: EMERGENCY MEDICINE

## 2025-04-13 PROCEDURE — 83605 ASSAY OF LACTIC ACID: CPT | Performed by: EMERGENCY MEDICINE

## 2025-04-13 PROCEDURE — 83735 ASSAY OF MAGNESIUM: CPT | Performed by: EMERGENCY MEDICINE

## 2025-04-13 PROCEDURE — 96372 THER/PROPH/DIAG INJ SC/IM: CPT | Performed by: EMERGENCY MEDICINE

## 2025-04-13 PROCEDURE — 85610 PROTHROMBIN TIME: CPT | Performed by: EMERGENCY MEDICINE

## 2025-04-13 PROCEDURE — 85025 COMPLETE CBC W/AUTO DIFF WBC: CPT | Performed by: EMERGENCY MEDICINE

## 2025-04-13 PROCEDURE — 80053 COMPREHEN METABOLIC PANEL: CPT | Performed by: EMERGENCY MEDICINE

## 2025-04-13 PROCEDURE — 71045 X-RAY EXAM CHEST 1 VIEW: CPT | Performed by: RADIOLOGY

## 2025-04-13 RX ORDER — MORPHINE SULFATE 4 MG/ML
4 INJECTION, SOLUTION INTRAMUSCULAR; INTRAVENOUS ONCE
Status: DISCONTINUED | OUTPATIENT
Start: 2025-04-13 | End: 2025-04-13 | Stop reason: HOSPADM

## 2025-04-13 RX ORDER — CYCLOBENZAPRINE HCL 10 MG
10 TABLET ORAL 3 TIMES DAILY PRN
Qty: 15 TABLET | Refills: 0 | Status: SHIPPED | OUTPATIENT
Start: 2025-04-13 | End: 2025-04-18

## 2025-04-13 RX ORDER — LIDOCAINE 50 MG/G
1 PATCH TOPICAL DAILY
Qty: 10 PATCH | Refills: 0 | Status: SHIPPED | OUTPATIENT
Start: 2025-04-13

## 2025-04-13 RX ORDER — ONDANSETRON HYDROCHLORIDE 2 MG/ML
4 INJECTION, SOLUTION INTRAVENOUS ONCE
Status: DISCONTINUED | OUTPATIENT
Start: 2025-04-13 | End: 2025-04-13 | Stop reason: HOSPADM

## 2025-04-13 RX ORDER — KETOROLAC TROMETHAMINE 30 MG/ML
30 INJECTION, SOLUTION INTRAMUSCULAR; INTRAVENOUS ONCE
Status: COMPLETED | OUTPATIENT
Start: 2025-04-13 | End: 2025-04-13

## 2025-04-13 RX ADMIN — KETOROLAC TROMETHAMINE 30 MG: 30 INJECTION, SOLUTION INTRAMUSCULAR at 14:00

## 2025-04-13 ASSESSMENT — HEART SCORE
TROPONIN: LESS THAN OR EQUAL TO NORMAL LIMIT
ECG: NORMAL
HEART SCORE: 2
AGE: 45-64
RISK FACTORS: 1-2 RISK FACTORS
HISTORY: SLIGHTLY SUSPICIOUS

## 2025-04-13 ASSESSMENT — PAIN DESCRIPTION - LOCATION: LOCATION: SHOULDER

## 2025-04-13 ASSESSMENT — PAIN SCALES - GENERAL
PAINLEVEL_OUTOF10: 6
PAINLEVEL_OUTOF10: 6

## 2025-04-13 ASSESSMENT — PAIN DESCRIPTION - DESCRIPTORS
DESCRIPTORS: ACHING
DESCRIPTORS: ACHING;DULL

## 2025-04-13 ASSESSMENT — LIFESTYLE VARIABLES
EVER FELT BAD OR GUILTY ABOUT YOUR DRINKING: NO
HAVE YOU EVER FELT YOU SHOULD CUT DOWN ON YOUR DRINKING: NO
HAVE PEOPLE ANNOYED YOU BY CRITICIZING YOUR DRINKING: NO
TOTAL SCORE: 0
EVER HAD A DRINK FIRST THING IN THE MORNING TO STEADY YOUR NERVES TO GET RID OF A HANGOVER: NO

## 2025-04-13 ASSESSMENT — PAIN DESCRIPTION - ONSET: ONSET: ONGOING

## 2025-04-13 ASSESSMENT — PAIN DESCRIPTION - FREQUENCY: FREQUENCY: CONSTANT/CONTINUOUS

## 2025-04-13 ASSESSMENT — PAIN - FUNCTIONAL ASSESSMENT: PAIN_FUNCTIONAL_ASSESSMENT: 0-10

## 2025-04-13 ASSESSMENT — PAIN DESCRIPTION - PAIN TYPE: TYPE: ACUTE PAIN

## 2025-04-13 ASSESSMENT — PAIN DESCRIPTION - ORIENTATION: ORIENTATION: LEFT

## 2025-04-13 ASSESSMENT — PAIN DESCRIPTION - DIRECTION: RADIATING_TOWARDS: CHEST

## 2025-04-13 NOTE — ED PROVIDER NOTES
HPI   Chief Complaint   Patient presents with    Chest Pain     Patient has left shoulder pain that radiates to mid chest. Pain began 2 days ago. Pain 6/10 dull, aching       HPI: 50-year-old male presents with a 2-day history of left anterior chest pain that goes into his left upper arm.  He states is worse if he tries to AB duct his shoulder.  He states that he was doing dumbbells working out.  He denies any numbness tingling or weakness.  He denies any shortness of breath.  Nuys any abdominal pain.  He states he does have a history of hypertension.  No swelling in his legs.  No history of DVT or PE.    Family HX: Denies any significant/pertinent family history.  Social Hx: Denies ETOH or drug use.  Review of systems:  Gen.: No weight loss, fatigue, anorexia, insomnia, fever.   Eyes: No vision loss, double vision, drainage, eye pain.   ENT: No pharyngitis, dry mouth.   Cardiac: No palpitations, syncope, near syncope.   Pulmonary: No shortness of breath, cough, hemoptysis.   Heme/lymph: No swollen glands, fever, bleeding.   GI: No abdominal pain, change in bowel habits, melena, hematemesis, hematochezia, nausea, vomiting, diarrhea.   : No discharge, dysuria, frequency, urgency, hematuria.   Musculoskeletal: No limb pain, joint pain, joint swelling.   Skin: No rashes.   Psych: No depression, anxiety, suicidality, homicidality.   Review of systems is otherwise negative unless stated above or in history of present illness.    Physical Exam:    Appearance: Alert, oriented , cooperative,  in no acute distress. Well nourished & well hydrated.    Skin: Intact,  dry skin, no lesions, rash, petechiae or purpura.     Eyes: PERRLA, EOMs intact,  Conjunctiva pink with no redness or exudates. Eyelids without lesions. No scleral icterus.     ENT: Hearing grossly intact. External auditory canals patent, tympanic membranes intact with visible landmarks. Nares patent, mucus membranes moist. Dentition without lesions. Pharynx  clear, uvula midline.     Neck: Supple, without meningismus. Thyroid not palpable. Trachea at midline. No lymphadenopathy.    Pulmonary: Clear bilaterally with good chest wall excursion. No rales, rhonchi or wheezing. No accessory muscle use or stridor.    Cardiac: Normal S1, S2 without murmur, rub, gallop or extrasystole. No JVD, Carotids without bruits.  TTP about the pectoralis muscle to the left upper arm.  No distal swelling.  2+ pulses throughout.    Abdomen: Soft, nontender, active bowel sounds.  No palpable organomegaly.  No rebound or guarding.  No CVA tenderness.    Genitourinary: Exam deferred.    Musculoskeletal: Full range of motion. no pain, edema, or deformity. Pulses full and equal. No cyanosis, clubbing, or edema.    Neurological:  Cranial nerves II through XII are grossly intact, finger-nose touch is normal, normal sensation, no weakness, no focal findings identified.    Psychiatric: Appropriate mood and affect.     Medical Decision-Making:  Testing: EKG was obtained which is interpreted by me shows a sinus rhythm rate of 66 without evidence of obvious ST elevations or T wave inversions to indicate acute ischemia or infarct.  Repeat EKG again interpreted by me shows a sinus rhythm rate of 53 without evidence of obvious ST elevations or T wave inversions to indicate acute ischemia or infarct.  Patient has negative troponins x 2.  Patient also has a normal VTE.  Chest x-ray was obtained to rule out cardiomegaly infiltrate or pneumothorax which shows no evidence of acute cardiopulmonary process.  Patient was ordered morphine however he stated he needed to drive therefore he was switched to Toradol.  He does have pulses throughout.  He is neurovascularly intact.  At this time he will be discharged home.  He has a heart score of 1.  That the patient's been having pain for 2 days and is 2 negative troponins and an EKG without any obvious changes my suspicion is low that this is ACS.  Treatment:    Reevaluation:   Plan: Homegoing. Discussed differential. Will follow-up with the primary physician in the next 2-3 days. Return if worse. They understand return precautions and discharge instructions. Patient and family/friend/caregiver are in agreement with this plan.   Impression:   1.  Chest pain  2.  Labs Reviewed  COMPREHENSIVE METABOLIC PANEL - Abnormal     Glucose                       101 (*)                Sodium                        139                    Potassium                     4.1                    Chloride                      107                    Bicarbonate                   26                     Anion Gap                     10                     Urea Nitrogen                 15                     Creatinine                    1.31 (*)               eGFR                          67                     Calcium                       9.0                    Albumin                       4.3                    Alkaline Phosphatase          76                     Total Protein                 7.1                    AST                           17                     Bilirubin, Total              0.5                    ALT                           19                  MAGNESIUM - Normal     Magnesium                     1.96                LACTATE - Normal     Lactate                       0.6                      Narrative: Venipuncture immediately after or during the administration of Metamizole may lead to falsely low results. Testing should be performed immediately prior to Metamizole dosing.  PROTIME-INR - Normal     Protime                       11.6                   INR                           1.1                 B-TYPE NATRIURETIC PEPTIDE - Normal     BNP                           77                       Narrative:    <100 pg/mL - Heart failure unlikely                100-299 pg/mL - Intermediate probability of acute heart                                failure exacerbation.  Correlate with clinical                                context and patient history.                  >=300 pg/mL - Heart Failure likely. Correlate with clinical                                context and patient history.                                BNP testing is performed using different testing methodology at [facility] than at other [facility] hospitals. Direct result comparisons should only be made within the same method.                   SERIAL TROPONIN-INITIAL - Normal     Troponin I, High Sensitivity   <3                       Narrative: Less than 99th percentile of normal range cutoff-                Female and children under 18 years old <14 ng/L; Male <21 ng/L: Negative                Repeat testing should be performed if clinically indicated.                                 Female and children under 18 years old 14-50 ng/L; Male 21-50 ng/L:                Consistent with possible cardiac damage and possible increased clinical                 risk. Serial measurements may help to assess extent of myocardial damage.                                 >50 ng/L: Consistent with cardiac damage, increased clinical risk and                myocardial infarction. Serial measurements may help assess extent of                 myocardial damage.                                  NOTE: Children less than 1 year old may have higher baseline troponin                 levels and results should be interpreted in conjunction with the overall                 clinical context.                                 NOTE: Troponin I testing is performed using a different                 testing methodology at [facility] than at other                 [facility]. Direct result comparisons should only                 be made within the same method.  D-DIMER, VTE EXCLUSION - Normal     D-Dimer, Quantitative VTE Exclusion   365                      Narrative: The VTE Exclusion D-Dimer assay is reported in  ng/mL Fibrinogen Equivalent Units (FEU).                                Per 's instructions for use, a value of less than 500 ng/mL (FEU) may help to exclude DVT or PE in outpatients when the assay is used with a clinical pretest probability assessment.([facility] must utilize and document eCalc 'Wells Score Deep Vein Thrombosis Risk' for DVT exclusion only. Emergency Department should utilize  Guidelines for Emergency Department Use of the VTE Exclusion D-Dimer and Clinical Pretest probability assessment model for DVT or PE exclusion.)  SERIAL TROPONIN, 1 HOUR - Normal     Troponin I, High Sensitivity   <3                       Narrative: Less than 99th percentile of normal range cutoff-                Female and children under 18 years old <14 ng/L; Male <21 ng/L: Negative                Repeat testing should be performed if clinically indicated.                                 Female and children under 18 years old 14-50 ng/L; Male 21-50 ng/L:                Consistent with possible cardiac damage and possible increased clinical                 risk. Serial measurements may help to assess extent of myocardial damage.                                 >50 ng/L: Consistent with cardiac damage, increased clinical risk and                myocardial infarction. Serial measurements may help assess extent of                 myocardial damage.                                  NOTE: Children less than 1 year old may have higher baseline troponin                 levels and results should be interpreted in conjunction with the overall                 clinical context.                                 NOTE: Troponin I testing is performed using a different                 testing methodology at [facility] than at other                 [facility]. Direct result comparisons should only                 be made within the same method.  TROPONIN SERIES- (INITIAL, 1 HR)       Narrative: The following  orders were created for panel order Troponin I Series, High Sensitivity (0, 1 HR).                Procedure                               Abnormality         Status                                   ---------                               -----------         ------                                   Troponin I, High Sensiti...[[id number]]  Normal              Final result                             Troponin, High Sensitivi...[[id number]]  Normal              Final result                                             Please view results for these tests on the individual orders.  CBC WITH AUTO DIFFERENTIAL     XR chest 1 view   Final Result    1.  No evidence of acute cardiopulmonary process.                      MACRO:    None          Signed by: Rosi Sewell 4/13/2025 12:51 PM    Dictation workstation:   [id number]                      Patient History   Past Medical History:   Diagnosis Date    Acid reflux     Anxiety     Diabetes mellitus (Multi)     PRE-DIABETES    Hyperlipidemia     Hypertension     Joint pain     Sinusitis     RESOLVED WITH SURGERY    Wears glasses      Past Surgical History:   Procedure Laterality Date    MR HEAD ANGIO WO IV CONTRAST  08/24/2021    MR HEAD ANGIO WO IV CONTRAST 8/24/2021 [facility]    NASAL SINUS SURGERY       Family History   Problem Relation Name Age of Onset    Hypertension Mother      Hyperlipidemia Mother      Allergies Mother      Allergies Father      Throat cancer Father      Hypertension Brother      Diabetes Brother      Heart attack Maternal Grandmother       Social History     Tobacco Use    Smoking status: Some Days     Current packs/day: 0.50     Average packs/day: 0.5 packs/day for 20.0 years (10.0 ttl pk-yrs)     Types: Cigarettes    Smokeless tobacco: Never   Vaping Use    Vaping status: Never Used   Substance Use Topics    Alcohol use: Never    Drug use: Never       Physical Exam   ED Triage Vitals [04/13/25 1129]   Temperature Heart Rate Respirations BP    37 °C (98.6 °F) 68 18 146/86      Pulse Ox Temp Source Heart Rate Source Patient Position   99 % Temporal Monitor Sitting      BP Location FiO2 (%)     Right arm --       Physical Exam      ED Course & MDM   Diagnoses as of 04/13/25 1354   Chest pain, unspecified type                 No data recorded     [place] Coma Scale Score: 15 (04/13/25 1217 : Lissa Love RN) HEART Score: 2 (04/13/25 1353 : Joi Lala MD)                         Medical Decision Making      Procedure  Procedures     Joi Lala MD  04/13/25 3378

## 2025-04-13 NOTE — Clinical Note
Yariel Shaw was seen and treated in our emergency department on 4/13/2025.  He may return to work on 04/20/2025.       If you have any questions or concerns, please don't hesitate to call.      Joi Lala MD

## 2025-04-25 ENCOUNTER — APPOINTMENT (OUTPATIENT)
Dept: CARDIOLOGY | Facility: CLINIC | Age: 50
End: 2025-04-25
Payer: COMMERCIAL

## 2025-04-30 ENCOUNTER — APPOINTMENT (OUTPATIENT)
Facility: CLINIC | Age: 50
End: 2025-04-30
Payer: COMMERCIAL

## 2025-05-21 ENCOUNTER — APPOINTMENT (OUTPATIENT)
Dept: PRIMARY CARE | Facility: CLINIC | Age: 50
End: 2025-05-21
Payer: COMMERCIAL

## 2025-05-21 VITALS
DIASTOLIC BLOOD PRESSURE: 81 MMHG | SYSTOLIC BLOOD PRESSURE: 124 MMHG | HEART RATE: 61 BPM | BODY MASS INDEX: 35.18 KG/M2 | TEMPERATURE: 97.6 F | RESPIRATION RATE: 16 BRPM | HEIGHT: 70 IN | WEIGHT: 245.7 LBS | OXYGEN SATURATION: 97 %

## 2025-05-21 DIAGNOSIS — E78.2 HYPERLIPEMIA, MIXED: ICD-10-CM

## 2025-05-21 DIAGNOSIS — E66.9 OBESITY WITH BODY MASS INDEX 30 OR GREATER: ICD-10-CM

## 2025-05-21 DIAGNOSIS — E11.9 TYPE 2 DIABETES MELLITUS WITHOUT COMPLICATION, WITHOUT LONG-TERM CURRENT USE OF INSULIN: ICD-10-CM

## 2025-05-21 DIAGNOSIS — F17.200 CURRENT EVERY DAY SMOKER: ICD-10-CM

## 2025-05-21 DIAGNOSIS — I10 PRIMARY HYPERTENSION: Primary | ICD-10-CM

## 2025-05-21 LAB
ALBUMIN SERPL-MCNC: 4.5 G/DL (ref 3.6–5.1)
ALP SERPL-CCNC: 77 U/L (ref 36–130)
ALT SERPL-CCNC: 22 U/L (ref 9–46)
ANION GAP SERPL CALCULATED.4IONS-SCNC: 7 MMOL/L (CALC) (ref 7–17)
AST SERPL-CCNC: 20 U/L (ref 10–40)
BILIRUB SERPL-MCNC: 0.5 MG/DL (ref 0.2–1.2)
BUN SERPL-MCNC: 18 MG/DL (ref 7–25)
CALCIUM SERPL-MCNC: 9.3 MG/DL (ref 8.6–10.3)
CHLORIDE SERPL-SCNC: 104 MMOL/L (ref 98–110)
CHOLEST SERPL-MCNC: 218 MG/DL
CHOLEST/HDLC SERPL: 5.5 (CALC)
CO2 SERPL-SCNC: 28 MMOL/L (ref 20–32)
CREAT SERPL-MCNC: 1.28 MG/DL (ref 0.6–1.29)
EGFRCR SERPLBLD CKD-EPI 2021: 69 ML/MIN/1.73M2
GLUCOSE SERPL-MCNC: 110 MG/DL (ref 65–99)
HDLC SERPL-MCNC: 40 MG/DL
LDLC SERPL CALC-MCNC: 150 MG/DL (CALC)
NONHDLC SERPL-MCNC: 178 MG/DL (CALC)
POC HEMOGLOBIN A1C: 6.6 % (ref 4.2–6.5)
POTASSIUM SERPL-SCNC: 4.3 MMOL/L (ref 3.5–5.3)
PROT SERPL-MCNC: 7 G/DL (ref 6.1–8.1)
SODIUM SERPL-SCNC: 139 MMOL/L (ref 135–146)
TRIGL SERPL-MCNC: 151 MG/DL

## 2025-05-21 PROCEDURE — G2211 COMPLEX E/M VISIT ADD ON: HCPCS | Performed by: PHYSICIAN ASSISTANT

## 2025-05-21 PROCEDURE — 3074F SYST BP LT 130 MM HG: CPT | Performed by: PHYSICIAN ASSISTANT

## 2025-05-21 PROCEDURE — 3044F HG A1C LEVEL LT 7.0%: CPT | Performed by: PHYSICIAN ASSISTANT

## 2025-05-21 PROCEDURE — 4010F ACE/ARB THERAPY RXD/TAKEN: CPT | Performed by: PHYSICIAN ASSISTANT

## 2025-05-21 PROCEDURE — 3079F DIAST BP 80-89 MM HG: CPT | Performed by: PHYSICIAN ASSISTANT

## 2025-05-21 PROCEDURE — 99213 OFFICE O/P EST LOW 20 MIN: CPT | Performed by: PHYSICIAN ASSISTANT

## 2025-05-21 PROCEDURE — 3008F BODY MASS INDEX DOCD: CPT | Performed by: PHYSICIAN ASSISTANT

## 2025-05-21 PROCEDURE — 83036 HEMOGLOBIN GLYCOSYLATED A1C: CPT | Performed by: PHYSICIAN ASSISTANT

## 2025-05-21 NOTE — PROGRESS NOTES
Subjective   Patient ID: Yariel Shaw is a 50 y.o. male who presents for 3 MONTH FOLLOW UP.    HPI     Labs with PSA : 02/19/2025  Colonoscopy: 01/03/2024  Last labs: 2/29/25 w/ A1c POCT 6.7%, today's POCT A1c is 6.6%  Gained 3# in 7 months  XST 2022, EKG 4/25  Patient states no new questions or concerns ar this time.     Pt c/o elbow pain x weeks, R hand. Pain is in elbow, worse with over use, no OTCs tried.     3 mo f/up:  Patient has hypertension  He does not monitor his BP at home.  Denies chest pain, dizziness, lower leg swelling.   Pt has been off of the Norvasc.      Patient has hyperlipidemia.  Feb '25 .   Patient tries to limit the amount of fatty foods and high cholesterol foods that are consumed.  He is not on a statin and prefers to not go on one despite our urging.         Patient has Type 2 DM.  Most recent A1c done today and it was 6.6% and it was 6.7% 2/25 and was 6.8% 3 mo prior to that.  Overdue for labs today.  He denies any polyuria, polydipsia, polyphagia.  Patient states that he has been compliant with the current diabetes medication.  He is on Metformin daily.  Pt is getting 90 or under for readings at home.   He is not walking as much b/c of the weather.     He has ED.  Tadalafil continues to work well for him.     Pt continues to smoke.   Patient continues to work on smoking cessation but has been unable to completely quit. He uses less than a 1/2 ppd     Pt has anxiety.   He does follow with psych, Dr. Patino for his anxiety.  His psychiatrist manages his xanax.   Denies any noted side effects.     GERD_ well controlled on PPI, no blood in stool.     He has insomnia.  Ambien was discontinued in the past since he is on xanax (risks of combination discussed).     Pt has chronic lower back pain.  He does follow with pain management as needed.    Sleep NL  BM's NL  Diet is diabetic         Review of Systems  Constitutional: Patient denies any fever, chills, loss of appetite, or  "unexplained weight loss.  Cardiovascular: Patient denies any chest pain, shortness of breath with exertion, tachycardia, palpitations, orthopnea, or paroxysmal nocturnal dyspnea.  Respiratory: Patient denies any cough, shortness breath, or wheezing.  Gastrointestinal patient denies any nausea, vomiting, diarrhea, constipation, melena, hematochezia, or reflux symptoms  Skin: Denies any rashes or skin lesions   Neurology: Patient denies any new motor or sensory losses.  Denies any numbness, tingling, weakness, and incoordination of the extremities.  Patient also denies any tremor, seizures, or gait instability.  Endocrinology: Denies any polyuria, polydipsia, polyphagia, or heat/cold intolerance.    Objective   /81 (BP Location: Right arm, Patient Position: Sitting, BP Cuff Size: Large adult)   Pulse 61   Temp 36.4 °C (97.6 °F) (Temporal)   Resp 16   Ht 1.778 m (5' 10\")   Wt 111 kg (245 lb 11.2 oz)   SpO2 97%   BMI 35.25 kg/m²     Physical Exam  Gen. appearance: Alert and cooperative, in no acute distress, well-developed, obese well-nourished male.  Neck: Supple and without adenopathy or rigidity.  There is no JVD at 90° and no carotid bruits are noted.  There is no thyromegaly, thyroid tenderness, or palpable thyroid nodules.  Heart: Regular rate and rhythm without murmur or ectopy.  Lungs: Lungs are clear to auscultation bilaterally with good air exchange.  Skin: Good turgor, moist, warm and without rashes or lesions.  Neurological exam: Alert and oriented ×3, no tremor, normal gait.  Extremities: No clubbing, cyanosis, or edema    Assessment/Plan   Diagnoses and all orders for this visit:  Primary hypertension  -     Follow Up In [facility] - PCP - Established  Patient blood pressure is currently well-controlled at 124/81.  He is currently on Norvasc 5 mg daily, Benicar 20 mg daily, prazosin 3 mg nightly.  He denies any lower extremity edema, chest pain, shortness of breath with " exertion.    Hyperlipemia, mixed  -     Comprehensive Metabolic Panel; Future  -     Lipid Panel; Future  -     Lipid Panel; Future  Patient denies any symptoms of unstable angina.  His most recent LDL was above goal for a diabetic.  He declines statin use stating he does not want to use 1 of those and he would prefer to regulate his cholesterol with diet and exercise means only.    Most recent stress test was done in 2022.    He will continue with diet and exercise modifications by adding the apple cider vinegar and recheck today and in 3 months.      Type 2 diabetes mellitus without complication, without long-term current use of insulin    -     Follow Up In [facility] - PCP - Established  -     Hemoglobin A1C; Future  -     Comprehensive Metabolic Panel; Future  -     Albumin-Creatinine Ratio, Urine Random; Future  -     Follow Up In [facility] - PCP - Established; Future  -     POCT glycosylated hemoglobin (Hb A1C) manually resulted    Most recent A1c done today and it was 6.6% and it was 6.7% 2/25 and was 6.8% 3 mo prior to that.  He has been working on diet modifications but admits to not exercising because of weather changes.  He will incorporate increased physical activity and continue with the diet changes.  We will recheck this in 3 months prior to his visit.  He currently denies polyuria, polydipsia.  He is currently on Benicar to protect his kidneys.  Most recent creatinine was 1.3 and GFR was 67.  He understands that he should be seeing an eye doctor once yearly and he checks his feet weekly.  He was told to add an aspirin 81mg a day to his daily medications.    Obesity with body mass index 30 or greater-diet and exercise modifications were discussed at length.  He understands that he should be approximately 5 to 7 pounds down within the next 3 months.    Current every day smoker-smoking cessation products were prescribed at last visit but he was unable to fill them due to cost.   He is considering acupuncture and hypnosis towards the end of summer when he can afford it.  Patient understands that continued smoking will increase the risks of lung disease, vascular disease, and multiple malignancies.    Patient is to have labs done today.  He is to return to clinic in 3 months with labs prior to that as well for 3-month follow-up.    Patient understands that should they have testing outside   facilities that we may not receive the results and was told to call us if they have not heard from our office within a week after testing.     Please be aware that any referrals discussed today should be placed today within our office.    Tests last labs ordered should also result in prompt scheduling today as you leave the office.  If not done today then a phone call for scheduling is expected in a timely manner-within 2 weeks.  If testing is done-a result should be available to patient within 2 weeks time unless otherwise specified.   You, the patient or caregiver, are responsible for making sure what is discussed is actually scheduled and completed.  If suboptimal understanding of results, tests, referral reasons occurs it is understood that a follow-up appointment with me should be made to discuss and clarify the understanding.  Follow-up at next scheduled visit as planned or discussed during visit is expected.  You are to return sooner if new or unresolved issues of concern occur.        [facility] uses voice recognition technology for dictations. Sometimes the software misinterprets words. Please take this into account when reading this.

## 2025-06-12 ENCOUNTER — HOSPITAL ENCOUNTER (EMERGENCY)
Age: 50
Discharge: HOME | End: 2025-06-12
Payer: MEDICARE

## 2025-06-12 ENCOUNTER — APPOINTMENT (OUTPATIENT)
Dept: CT IMAGING | Age: 50
End: 2025-06-12
Payer: MEDICARE

## 2025-06-12 VITALS
SYSTOLIC BLOOD PRESSURE: 151 MMHG | WEIGHT: 240 LBS | DIASTOLIC BLOOD PRESSURE: 93 MMHG | BODY MASS INDEX: 34.36 KG/M2 | HEART RATE: 65 BPM | HEIGHT: 70 IN | OXYGEN SATURATION: 98 % | TEMPERATURE: 98.3 F | RESPIRATION RATE: 18 BRPM

## 2025-06-12 DIAGNOSIS — R10.9 ABDOMINAL WALL PAIN: Primary | ICD-10-CM

## 2025-06-12 LAB
ALBUMIN SERPL-MCNC: 4.3 G/DL (ref 3.5–4.6)
ALP SERPL-CCNC: 98 U/L (ref 35–104)
ALT SERPL-CCNC: 24 U/L (ref 0–41)
ANION GAP SERPL CALCULATED.3IONS-SCNC: 11 MEQ/L (ref 9–15)
AST SERPL-CCNC: 25 U/L (ref 0–40)
BASOPHILS # BLD: 0 K/UL (ref 0–0.1)
BASOPHILS NFR BLD: 0.6 % (ref 0.2–1.2)
BILIRUB SERPL-MCNC: 0.3 MG/DL (ref 0.2–0.7)
BILIRUB UR QL STRIP: NEGATIVE
BUN SERPL-MCNC: 17 MG/DL (ref 6–20)
CALCIUM SERPL-MCNC: 9 MG/DL (ref 8.5–9.9)
CHLORIDE SERPL-SCNC: 105 MEQ/L (ref 95–107)
CLARITY UR: CLEAR
CO2 SERPL-SCNC: 23 MEQ/L (ref 20–31)
COLOR UR: YELLOW
CREAT SERPL-MCNC: 1.3 MG/DL (ref 0.7–1.2)
EOSINOPHIL # BLD: 0.3 K/UL (ref 0–0.5)
EOSINOPHIL NFR BLD: 4.7 % (ref 0.8–7)
ERYTHROCYTE [DISTWIDTH] IN BLOOD BY AUTOMATED COUNT: 12.9 % (ref 11.6–14.4)
GLOBULIN SER CALC-MCNC: 2.9 G/DL (ref 2.3–3.5)
GLUCOSE SERPL-MCNC: 101 MG/DL (ref 70–99)
GLUCOSE UR STRIP-MCNC: NEGATIVE MG/DL
HCT VFR BLD AUTO: 41.3 % (ref 42–52)
HGB BLD-MCNC: 14 G/DL (ref 13.7–17.5)
HGB UR QL STRIP: NEGATIVE
IMM GRANULOCYTES # BLD: 0 K/UL
IMM GRANULOCYTES NFR BLD: 0.1 %
KETONES UR STRIP-MCNC: NEGATIVE MG/DL
LEUKOCYTE ESTERASE UR QL STRIP: NEGATIVE
LYMPHOCYTES # BLD: 3.6 K/UL (ref 1.3–3.6)
LYMPHOCYTES NFR BLD: 53.4 %
MCH RBC QN AUTO: 28.6 PG (ref 25.7–32.2)
MCHC RBC AUTO-ENTMCNC: 33.9 % (ref 32.3–36.5)
MCV RBC AUTO: 84.5 FL (ref 79–92.2)
MONOCYTES # BLD: 0.6 K/UL (ref 0.3–0.8)
MONOCYTES NFR BLD: 8.9 % (ref 5.3–12.2)
NEUTROPHILS # BLD: 2.2 K/UL (ref 1.8–5.4)
NEUTS SEG NFR BLD: 32.3 % (ref 34–67.9)
NITRITE UR QL STRIP: NEGATIVE
PH UR STRIP: 5.5 [PH] (ref 5–9)
PLATELET # BLD AUTO: 233 K/UL (ref 163–337)
POTASSIUM SERPL-SCNC: 4 MEQ/L (ref 3.4–4.9)
PROT SERPL-MCNC: 7.2 G/DL (ref 6.3–8)
PROT UR STRIP-MCNC: NEGATIVE MG/DL
RBC # BLD AUTO: 4.89 M/UL (ref 4.63–6.08)
SODIUM SERPL-SCNC: 139 MEQ/L (ref 135–144)
SP GR UR STRIP: 1.02 (ref 1–1.03)
URINE REFLEX TO CULTURE: NORMAL
UROBILINOGEN UR STRIP-ACNC: 0.2 E.U./DL
WBC # BLD AUTO: 6.8 K/UL (ref 4.2–9)

## 2025-06-12 PROCEDURE — 81003 URINALYSIS AUTO W/O SCOPE: CPT

## 2025-06-12 PROCEDURE — 99285 EMERGENCY DEPT VISIT HI MDM: CPT

## 2025-06-12 PROCEDURE — 85025 COMPLETE CBC W/AUTO DIFF WBC: CPT

## 2025-06-12 PROCEDURE — 96374 THER/PROPH/DIAG INJ IV PUSH: CPT

## 2025-06-12 PROCEDURE — 6360000004 HC RX CONTRAST MEDICATION

## 2025-06-12 PROCEDURE — 2580000003 HC RX 258

## 2025-06-12 PROCEDURE — 80053 COMPREHEN METABOLIC PANEL: CPT

## 2025-06-12 PROCEDURE — 6360000002 HC RX W HCPCS

## 2025-06-12 PROCEDURE — 74177 CT ABD & PELVIS W/CONTRAST: CPT

## 2025-06-12 PROCEDURE — 96361 HYDRATE IV INFUSION ADD-ON: CPT

## 2025-06-12 PROCEDURE — 36415 COLL VENOUS BLD VENIPUNCTURE: CPT

## 2025-06-12 RX ORDER — IOPAMIDOL 755 MG/ML
75 INJECTION, SOLUTION INTRAVASCULAR
Status: COMPLETED | OUTPATIENT
Start: 2025-06-12 | End: 2025-06-12

## 2025-06-12 RX ORDER — IBUPROFEN 600 MG/1
600 TABLET, FILM COATED ORAL 3 TIMES DAILY PRN
Qty: 30 TABLET | Refills: 0 | Status: SHIPPED | OUTPATIENT
Start: 2025-06-12

## 2025-06-12 RX ORDER — 0.9 % SODIUM CHLORIDE 0.9 %
1000 INTRAVENOUS SOLUTION INTRAVENOUS ONCE
Status: COMPLETED | OUTPATIENT
Start: 2025-06-12 | End: 2025-06-12

## 2025-06-12 RX ORDER — KETOROLAC TROMETHAMINE 30 MG/ML
30 INJECTION, SOLUTION INTRAMUSCULAR; INTRAVENOUS ONCE
Status: COMPLETED | OUTPATIENT
Start: 2025-06-12 | End: 2025-06-12

## 2025-06-12 RX ORDER — CYCLOBENZAPRINE HCL 10 MG
10 TABLET ORAL 3 TIMES DAILY PRN
Qty: 21 TABLET | Refills: 0 | Status: SHIPPED | OUTPATIENT
Start: 2025-06-12 | End: 2025-06-22

## 2025-06-12 RX ADMIN — IOPAMIDOL 75 ML: 755 INJECTION, SOLUTION INTRAVENOUS at 19:54

## 2025-06-12 RX ADMIN — KETOROLAC TROMETHAMINE 30 MG: 30 INJECTION, SOLUTION INTRAMUSCULAR at 18:51

## 2025-06-12 RX ADMIN — SODIUM CHLORIDE 1000 ML: 0.9 INJECTION, SOLUTION INTRAVENOUS at 18:50

## 2025-06-12 ASSESSMENT — LIFESTYLE VARIABLES
HOW OFTEN DO YOU HAVE A DRINK CONTAINING ALCOHOL: NEVER
HOW MANY STANDARD DRINKS CONTAINING ALCOHOL DO YOU HAVE ON A TYPICAL DAY: PATIENT DOES NOT DRINK

## 2025-06-12 ASSESSMENT — PAIN DESCRIPTION - ORIENTATION: ORIENTATION: RIGHT

## 2025-06-12 ASSESSMENT — ENCOUNTER SYMPTOMS
ALLERGIC/IMMUNOLOGIC NEGATIVE: 1
CONSTIPATION: 0
COLOR CHANGE: 0
NAUSEA: 1
BACK PAIN: 0
SHORTNESS OF BREATH: 0
DIARRHEA: 0
ABDOMINAL PAIN: 1
VOMITING: 0

## 2025-06-12 ASSESSMENT — PAIN DESCRIPTION - FREQUENCY: FREQUENCY: INTERMITTENT

## 2025-06-12 ASSESSMENT — PAIN DESCRIPTION - DESCRIPTORS: DESCRIPTORS: SHARP;DULL

## 2025-06-12 ASSESSMENT — PAIN DESCRIPTION - PAIN TYPE: TYPE: ACUTE PAIN

## 2025-06-12 ASSESSMENT — PAIN - FUNCTIONAL ASSESSMENT
PAIN_FUNCTIONAL_ASSESSMENT: PREVENTS OR INTERFERES SOME ACTIVE ACTIVITIES AND ADLS
PAIN_FUNCTIONAL_ASSESSMENT: 0-10

## 2025-06-12 ASSESSMENT — PAIN DESCRIPTION - LOCATION: LOCATION: ABDOMEN

## 2025-06-12 ASSESSMENT — PAIN SCALES - GENERAL: PAINLEVEL_OUTOF10: 6

## 2025-06-12 ASSESSMENT — PAIN DESCRIPTION - ONSET: ONSET: GRADUAL

## 2025-06-12 NOTE — ED TRIAGE NOTES
Pt a/o x 3 skin pink w/d resp non labored.pt reports rt side back pain and radiates into rt abd to mid area. Pt describes as sharp and comes and goes with movement. Pt had normal bm and no blood in urine.

## 2025-06-12 NOTE — ED PROVIDER NOTES
[facility] EMERGENCY DEPARTMENT  EMERGENCY DEPARTMENT ENCOUNTER      Pt Name: Jeff Johnson  MRN: [id number]  Birthdate 1975  Date of evaluation: 6/12/2025  Provider: CHELI Gross CNP      HISTORY OF PRESENT ILLNESS    Jeff Johnson is a 50 y.o. male who presents to the Emergency Department with patient reporting left side pain ongoing for 1 week he denies any vomiting denies fevers denies constipation or diarrhea his last bowel movement was this morning states it was normal.  Reports pain does come and go in waves.  It is improved by shifting his weight to the left side worsened as he rolls his weight to the right side when sitting in his chair or laying in bed.  Pain is not associated with eating or meals.  History of type 2 diabetes hyperlipidemia no prior history of kidney stones.        REVIEW OF SYSTEMS       Review of Systems   Constitutional:  Negative for appetite change, chills and fever.   HENT: Negative.     Respiratory:  Negative for shortness of breath.    Cardiovascular: Negative.    Gastrointestinal:  Positive for abdominal pain and nausea. Negative for constipation, diarrhea and vomiting.   Endocrine: Negative.    Genitourinary:  Negative for decreased urine volume, dysuria, flank pain and hematuria.   Musculoskeletal:  Negative for back pain.   Skin:  Negative for color change.   Allergic/Immunologic: Negative.    Neurological:  Negative for dizziness, weakness and numbness.   Hematological: Negative.    Psychiatric/Behavioral: Negative.           PAST MEDICAL HISTORY     Past Medical History:   Diagnosis Date    Depression with anxiety 4/24/2017    Hyperglycemia 4/24/2017    Hyperlipidemia 2/18/2014    Insomnia     Perennial allergic rhinitis with seasonal variation 4/24/2017    Pre-diabetes 4/24/2017    Tobacco use disorder 4/24/2017    Type 2 diabetes mellitus without complication, without long-term current use of insulin ([facility]) 8/6/2018         SURGICAL HISTORY       Past

## 2025-06-27 ENCOUNTER — OFFICE VISIT (OUTPATIENT)
Dept: URGENT CARE | Age: 50
End: 2025-06-27
Payer: COMMERCIAL

## 2025-06-27 VITALS
BODY MASS INDEX: 32.93 KG/M2 | HEIGHT: 70 IN | WEIGHT: 230 LBS | RESPIRATION RATE: 20 BRPM | SYSTOLIC BLOOD PRESSURE: 113 MMHG | TEMPERATURE: 98.5 F | HEART RATE: 74 BPM | DIASTOLIC BLOOD PRESSURE: 77 MMHG | OXYGEN SATURATION: 97 %

## 2025-06-27 DIAGNOSIS — H66.92 ACUTE LEFT OTITIS MEDIA: Primary | ICD-10-CM

## 2025-06-27 LAB
POC HUMAN RHINOVIRUS PCR: NEGATIVE
POC INFLUENZA A VIRUS PCR: NEGATIVE
POC INFLUENZA B VIRUS PCR: NEGATIVE
POC RESPIRATORY SYNCYTIAL VIRUS PCR: NEGATIVE
POC STREPTOCOCCUS PYOGENES (GROUP A STREP) PCR: NEGATIVE

## 2025-06-27 RX ORDER — AMOXICILLIN AND CLAVULANATE POTASSIUM 875; 125 MG/1; MG/1
875 TABLET, FILM COATED ORAL 2 TIMES DAILY
Qty: 14 TABLET | Refills: 0 | Status: SHIPPED | OUTPATIENT
Start: 2025-06-27 | End: 2025-07-04

## 2025-06-27 ASSESSMENT — ENCOUNTER SYMPTOMS
DIARRHEA: 0
SINUS PAIN: 0
ABDOMINAL PAIN: 0
WHEEZING: 0
SHORTNESS OF BREATH: 0
NAUSEA: 0
SINUS PRESSURE: 0
FEVER: 0
RHINORRHEA: 0
CHILLS: 0
VOMITING: 0
SORE THROAT: 1
COUGH: 0

## 2025-06-27 NOTE — PROGRESS NOTES
"Subjective   Patient ID: Yariel Shaw is a 50 y.o. male. They present today with a chief complaint of Sore Throat (Sore throat, bodyaches, nasal congestion, slightly dizzy per pt/Pt states his throat is the only thing that hurts today./Wife was diagnosed with pneumonia last week per pt ).    History of Present Illness  HPI    Patient presents with sore throat, congestion, PND. He was having body aches. Admits his wife was diagnosed with pneumonia last week. No fever/chills, CP, SOB, N/V/D. He has not tried anything.    Past Medical History  Allergies as of 06/27/2025 - Reviewed 06/27/2025   Allergen Reaction Noted    Bupropion Dizziness 05/11/2023    Escitalopram Dizziness 05/11/2023       Prescriptions Prior to Admission[1]     Medical History[2]    Surgical History[3]     reports that he has quit smoking. His smoking use included cigarettes. He has a 10 pack-year smoking history. He has never used smokeless tobacco. He reports that he does not drink alcohol and does not use drugs.    Review of Systems  Review of Systems   Constitutional:  Negative for chills and fever.   HENT:  Positive for congestion and sore throat. Negative for ear pain, postnasal drip, rhinorrhea, sinus pressure and sinus pain.    Respiratory:  Negative for cough, shortness of breath and wheezing.    Cardiovascular:  Negative for chest pain.   Gastrointestinal:  Negative for abdominal pain, diarrhea, nausea and vomiting.                                  Objective    Vitals:    06/27/25 1050   BP: 113/77   BP Location: Right arm   Patient Position: Sitting   BP Cuff Size: Large adult   Pulse: 74   Resp: 20   Temp: 36.9 °C (98.5 °F)   TempSrc: Temporal   SpO2: 97%   Weight: 104 kg (230 lb)   Height: 1.778 m (5' 10\")     No LMP for male patient.    Physical Exam  Constitutional:       General: He is not in acute distress.     Appearance: Normal appearance. He is not ill-appearing or toxic-appearing.   HENT:      Head: Normocephalic and " atraumatic.      Right Ear: Hearing and external ear normal. A middle ear effusion is present. Tympanic membrane is not erythematous or bulging.      Left Ear: Hearing and external ear normal.  No middle ear effusion. Tympanic membrane is not erythematous or bulging.      Nose: Nose normal.      Mouth/Throat:      Lips: Pink.      Pharynx: Posterior oropharyngeal erythema present. No pharyngeal swelling or oropharyngeal exudate.   Cardiovascular:      Rate and Rhythm: Normal rate and regular rhythm.      Heart sounds: Normal heart sounds.   Pulmonary:      Effort: Pulmonary effort is normal. No respiratory distress.      Breath sounds: Normal breath sounds.   Skin:     General: Skin is warm and dry.   Neurological:      General: No focal deficit present.      Mental Status: He is alert and oriented to person, place, and time.   Psychiatric:         Attention and Perception: Attention normal.         Mood and Affect: Mood normal.         Speech: Speech normal.         Behavior: Behavior normal.         Procedures    Point of Care Test & Imaging Results from this visit  Results for orders placed or performed in visit on 06/27/25   POCT SPOTFIRE R/ST Panel Mini w/Strep A ([facility]) manually resulted   Result Value Ref Range    POC Group A Strep, PCR Negative Negative    POC Respiratory Syncytial Virus PCR Negative Negative    POC Influenza A Virus PCR Negative Negative    POC Influenza B Virus PCR Negative Negative    POC Human Rhinovirus PCR Negative Negative      Imaging  No results found.    Cardiology, Vascular, and Other Imaging  No other imaging results found for the past 2 days      Diagnostic study results (if any) were reviewed by ASHER Strong.    Assessment/Plan   Allergies, medications, history, and pertinent labs/EKGs/Imaging reviewed by ASHER Strong.     Medical Decision Making  Symptoms consistent with left OM, rx for Augmentin. At time of discharge patient was clinically  well-appearing and HDS for outpatient management. He was educated regarding diagnosis, supportive care, OTC and Rx medications. He was given the opportunity to ask questions prior to discharge.  They verbalized understanding of my discussion of the plans for treatment, expected course, indications to return to  or seek further evaluation in ED, and the need for timely follow up as directed.         Orders and Diagnoses  Diagnoses and all orders for this visit:  Acute left otitis media  -     POCT SPOTFIRE R/ST Panel Mini w/Strep A ([facility]) manually resulted  -     amoxicillin-clavulanate (Augmentin) 875-125 mg tablet; Take 1 tablet (875 mg of amoxicillin) by mouth 2 times a day for 7 days.      Medical Admin Record      Patient disposition: Home    Electronically signed by ASHER Strong  12:29 PM           [1] (Not in a hospital admission)   [2]   Past Medical History:  Diagnosis Date    Acid reflux     Anxiety     Diabetes mellitus (Multi)     PRE-DIABETES    Hyperlipidemia     Hypertension     Joint pain     Sinusitis     RESOLVED WITH SURGERY    Wears glasses    [3]   Past Surgical History:  Procedure Laterality Date    MR HEAD ANGIO WO IV CONTRAST  08/24/2021    MR HEAD ANGIO WO IV CONTRAST 8/24/2021 [facility]    NASAL SINUS SURGERY

## 2025-06-30 ENCOUNTER — APPOINTMENT (OUTPATIENT)
Facility: CLINIC | Age: 50
End: 2025-06-30
Payer: COMMERCIAL

## 2025-07-09 ENCOUNTER — APPOINTMENT (OUTPATIENT)
Facility: CLINIC | Age: 50
End: 2025-07-09
Payer: COMMERCIAL

## 2025-07-23 ENCOUNTER — APPOINTMENT (OUTPATIENT)
Dept: PRIMARY CARE | Facility: CLINIC | Age: 50
End: 2025-07-23
Payer: COMMERCIAL

## 2025-07-23 VITALS
RESPIRATION RATE: 16 BRPM | DIASTOLIC BLOOD PRESSURE: 77 MMHG | BODY MASS INDEX: 35.92 KG/M2 | HEART RATE: 57 BPM | TEMPERATURE: 96.6 F | OXYGEN SATURATION: 98 % | WEIGHT: 250.9 LBS | HEIGHT: 70 IN | SYSTOLIC BLOOD PRESSURE: 120 MMHG

## 2025-07-23 DIAGNOSIS — E11.9 TYPE 2 DIABETES MELLITUS WITHOUT COMPLICATION, WITHOUT LONG-TERM CURRENT USE OF INSULIN: ICD-10-CM

## 2025-07-23 DIAGNOSIS — I10 PRIMARY HYPERTENSION: Primary | ICD-10-CM

## 2025-07-23 DIAGNOSIS — E78.2 HYPERLIPEMIA, MIXED: ICD-10-CM

## 2025-07-23 DIAGNOSIS — R06.83 SNORING: ICD-10-CM

## 2025-07-23 PROCEDURE — 99213 OFFICE O/P EST LOW 20 MIN: CPT | Performed by: PHYSICIAN ASSISTANT

## 2025-07-23 PROCEDURE — 3044F HG A1C LEVEL LT 7.0%: CPT | Performed by: PHYSICIAN ASSISTANT

## 2025-07-23 PROCEDURE — 3074F SYST BP LT 130 MM HG: CPT | Performed by: PHYSICIAN ASSISTANT

## 2025-07-23 PROCEDURE — 4010F ACE/ARB THERAPY RXD/TAKEN: CPT | Performed by: PHYSICIAN ASSISTANT

## 2025-07-23 PROCEDURE — 3078F DIAST BP <80 MM HG: CPT | Performed by: PHYSICIAN ASSISTANT

## 2025-07-23 PROCEDURE — 3008F BODY MASS INDEX DOCD: CPT | Performed by: PHYSICIAN ASSISTANT

## 2025-07-23 PROCEDURE — G2211 COMPLEX E/M VISIT ADD ON: HCPCS | Performed by: PHYSICIAN ASSISTANT

## 2025-07-23 RX ORDER — CYCLOBENZAPRINE HCL 10 MG
10 TABLET ORAL 3 TIMES DAILY PRN
COMMUNITY
Start: 2025-06-12

## 2025-07-23 RX ORDER — IBUPROFEN 600 MG/1
1 TABLET, FILM COATED ORAL EVERY 8 HOURS PRN
COMMUNITY
Start: 2025-06-12

## 2025-07-23 RX ORDER — MONTELUKAST SODIUM 10 MG/1
10 TABLET ORAL NIGHTLY
COMMUNITY
Start: 2025-06-30

## 2025-07-23 NOTE — PROGRESS NOTES
Subjective   Patient ID: Yariel Shaw is a 50 y.o. male who presents for Follow-up.    HPI     Patient states he is following up and has no new questions or concerns at this time.      Stopped smoking with Laser, quit last month (smoked since age 18)  Gained 5# since May  Exercise- walks 1.5 hrs a day  Low appetite, once a day; salad  Sleep is broken up; + snoring. Occ fatigue, no h/o having a sleep study    Labs with PSA : 02/19/2025  Colonoscopy: 01/03/2024  Last labs: 2/29/25 w/ A1c POCT 6.7%, today's POCT A1c is 6.6%  Gained 3# in 7 months  XST 2022, EKG 4/25  Patient states no new questions or concerns ar this time.      3 mo f/up:  Patient has hypertension  He does not monitor his BP at home.  Denies chest pain, dizziness, lower leg swelling.   Pt has been off of the Norvasc.      Patient has hyperlipidemia.  May '25 LDL 15-.   Patient tries to limit the amount of fatty foods and high cholesterol foods that are consumed.  He is not on a statin and prefers to not go on one despite our urging.   Cardiac workup was done when he was in the emergency room in April     Patient has Type 2 DM.  Most recent A1c done today and it was 6.6% , he has gained 5# since last visit but just stopped smoking. He denies any polyuria, polydipsia, polyphagia.  Patient states that he has been compliant with the current diabetes medication.  He is on Metformin daily.    He has ED.  Tadalafil continues to work well for him.        Pt has anxiety.   He does follow with psych, Dr. Patino for his anxiety.  His psychiatrist manages his xanax.   Denies any noted side effects.     GERD_ well controlled on PPI, no blood in stool.      Pt has chronic lower back pain.  He does follow with pain management as needed. Sx are stable and he is able to perform all ADL's without limitation.   Needs form filled out pertaining to this        Review of Systems  Constitutional: Patient denies any fever, chills, loss of appetite, or unexplained weight  "loss.  Cardiovascular: Denies any chest pain, shortness of breath with exertion, tachycardia, palpitations.  Respiratory: Patient denies any cough, shortness of breath, or wheezing.  Skin:  Denies any rashes or skin lesions.    Objective   /77 (BP Location: Left arm, Patient Position: Sitting, BP Cuff Size: Large adult)   Pulse 57   Temp 35.9 °C (96.6 °F) (Temporal)   Resp 16   Ht 1.778 m (5' 10\")   Wt 114 kg (250 lb 14.4 oz)   SpO2 98%   BMI 36.00 kg/m²     Physical Exam  Gen. appearance: Alert and cooperative, no acute distress, well-developed, well-nourished obese male.  Neck: Supple and without adenopathy or rigidity.  There is no JVD at 90° and no carotid bruits are noted.  Cardiovascular: Heart has a regular rate and rhythm without murmur or ectopy.  Respiratory: Lungs are clear to auscultation bilaterally with good air exchange.      Assessment/Plan   Diagnoses and all orders for this visit:  Primary hypertension  -     CBC and Auto Differential; Future  Patient blood pressure is well-controlled.  He will continue current dose of of appetite hypertensives and we will continue to monitor.    Hyperlipemia, mixed  -     Lipid Panel; Future  Patient has refused a statin despite my advice and encouragement.  He is currently on herbal teas and supplements to help control his LDL.  A repeat LDL will be due in 3 months.  He is currently without any symptoms of unstable angina and had a most recent EKG being in April 2025.    Type 2 diabetes mellitus without complication, without long-term current use of insulin  -     Comprehensive Metabolic Panel; Future  -     Hemoglobin A1C; Future  -     Albumin-Creatinine Ratio, Urine Random; Future  -     Follow Up In [facility] - PCP - Established; Future  Patient denies polyuria, polydipsia and has gained 5 pounds since he stopped smoking a month ago.  Diet and exercise modifications were discussed at length.  He is currently off of all diabetic " medications being that he is trying to supplements instead.   He is to return to the clinic in 3 months with labs prior.    Snoring and insomnia  -     In-Center Sleep Study (Non-Sleep Provider); Future  Patient is to have a sleep study and we will call him with that result as soon as it is reviewed.    Patient is to return to clinic in 3 months with labs prior for 3-month follow-up.    Patient understands that should they have testing outside   facilities that we may not receive the results and was told to call us if they have not heard from our office within a week after testing.     Please be aware that any referrals discussed today should be placed today within our office.    Tests last labs ordered should also result in prompt scheduling today as you leave the office.  If not done today then a phone call for scheduling is expected in a timely manner-within 2 weeks.  If testing is done-a result should be available to patient within 2 weeks time unless otherwise specified.   You, the patient or caregiver, are responsible for making sure what is discussed is actually scheduled and completed.  If suboptimal understanding of results, tests, referral reasons occurs it is understood that a follow-up appointment with me should be made to discuss and clarify the understanding.  Follow-up at next scheduled visit as planned or discussed during visit is expected.  You are to return sooner if new or unresolved issues of concern occur.        [facility] uses voice recognition technology for dictations. Sometimes the software misinterprets words. Please take this into account when reading this.

## 2025-07-28 ENCOUNTER — APPOINTMENT (OUTPATIENT)
Dept: OTOLARYNGOLOGY | Facility: CLINIC | Age: 50
End: 2025-07-28
Payer: COMMERCIAL

## 2025-08-13 DIAGNOSIS — I10 PRIMARY HYPERTENSION: ICD-10-CM

## 2025-08-13 RX ORDER — OLMESARTAN MEDOXOMIL 20 MG/1
20 TABLET ORAL DAILY
Qty: 90 TABLET | Refills: 0 | OUTPATIENT
Start: 2025-08-13

## 2025-08-13 RX ORDER — AMLODIPINE BESYLATE 5 MG/1
5 TABLET ORAL DAILY
Qty: 90 TABLET | Refills: 0 | OUTPATIENT
Start: 2025-08-13

## 2025-08-18 ENCOUNTER — APPOINTMENT (OUTPATIENT)
Dept: RADIOLOGY | Facility: HOSPITAL | Age: 50
End: 2025-08-18
Payer: COMMERCIAL

## 2025-08-18 ENCOUNTER — HOSPITAL ENCOUNTER (EMERGENCY)
Facility: HOSPITAL | Age: 50
Discharge: HOME | End: 2025-08-18
Payer: COMMERCIAL

## 2025-08-18 ENCOUNTER — APPOINTMENT (OUTPATIENT)
Dept: CARDIOLOGY | Facility: HOSPITAL | Age: 50
End: 2025-08-18
Payer: COMMERCIAL

## 2025-08-18 ENCOUNTER — OFFICE VISIT (OUTPATIENT)
Dept: PRIMARY CARE | Facility: CLINIC | Age: 50
End: 2025-08-18
Payer: COMMERCIAL

## 2025-08-18 ENCOUNTER — APPOINTMENT (OUTPATIENT)
Dept: SLEEP MEDICINE | Facility: HOSPITAL | Age: 50
End: 2025-08-18
Payer: COMMERCIAL

## 2025-08-18 VITALS
HEIGHT: 70 IN | OXYGEN SATURATION: 97 % | WEIGHT: 249 LBS | TEMPERATURE: 98.1 F | RESPIRATION RATE: 16 BRPM | DIASTOLIC BLOOD PRESSURE: 79 MMHG | HEART RATE: 61 BPM | SYSTOLIC BLOOD PRESSURE: 143 MMHG | BODY MASS INDEX: 35.65 KG/M2

## 2025-08-18 VITALS
BODY MASS INDEX: 35.65 KG/M2 | OXYGEN SATURATION: 98 % | TEMPERATURE: 97.3 F | SYSTOLIC BLOOD PRESSURE: 118 MMHG | HEIGHT: 70 IN | DIASTOLIC BLOOD PRESSURE: 78 MMHG | WEIGHT: 249 LBS | HEART RATE: 67 BPM | RESPIRATION RATE: 16 BRPM

## 2025-08-18 DIAGNOSIS — R10.11 RUQ PAIN: ICD-10-CM

## 2025-08-18 DIAGNOSIS — K80.20 CALCULUS OF GALLBLADDER WITHOUT CHOLECYSTITIS WITHOUT OBSTRUCTION: Primary | ICD-10-CM

## 2025-08-18 DIAGNOSIS — R10.9 FLANK PAIN: Primary | ICD-10-CM

## 2025-08-18 LAB
ALBUMIN SERPL BCP-MCNC: 4.5 G/DL (ref 3.4–5)
ALP SERPL-CCNC: 75 U/L (ref 33–120)
ALT SERPL W P-5'-P-CCNC: 23 U/L (ref 10–52)
ANION GAP SERPL CALC-SCNC: 10 MMOL/L (ref 10–20)
AST SERPL W P-5'-P-CCNC: 18 U/L (ref 9–39)
BASOPHILS # BLD AUTO: 0.05 X10*3/UL (ref 0–0.1)
BASOPHILS NFR BLD AUTO: 0.8 %
BILIRUB SERPL-MCNC: 0.6 MG/DL (ref 0–1.2)
BUN SERPL-MCNC: 15 MG/DL (ref 6–23)
CALCIUM SERPL-MCNC: 9.2 MG/DL (ref 8.6–10.3)
CARDIAC TROPONIN I PNL SERPL HS: <3 NG/L (ref 0–20)
CHLORIDE SERPL-SCNC: 104 MMOL/L (ref 98–107)
CO2 SERPL-SCNC: 26 MMOL/L (ref 21–32)
CREAT SERPL-MCNC: 1.21 MG/DL (ref 0.5–1.3)
EGFRCR SERPLBLD CKD-EPI 2021: 73 ML/MIN/1.73M*2
EOSINOPHIL # BLD AUTO: 0.24 X10*3/UL (ref 0–0.7)
EOSINOPHIL NFR BLD AUTO: 4 %
ERYTHROCYTE [DISTWIDTH] IN BLOOD BY AUTOMATED COUNT: 13 % (ref 11.5–14.5)
GLUCOSE SERPL-MCNC: 93 MG/DL (ref 74–99)
HCT VFR BLD AUTO: 43.6 % (ref 41–52)
HGB BLD-MCNC: 14.6 G/DL (ref 13.5–17.5)
IMM GRANULOCYTES # BLD AUTO: 0.02 X10*3/UL (ref 0–0.7)
IMM GRANULOCYTES NFR BLD AUTO: 0.3 % (ref 0–0.9)
LACTATE SERPL-SCNC: 0.9 MMOL/L (ref 0.4–2)
LIPASE SERPL-CCNC: 25 U/L (ref 9–82)
LYMPHOCYTES # BLD AUTO: 2.95 X10*3/UL (ref 1.2–4.8)
LYMPHOCYTES NFR BLD AUTO: 49.3 %
MAGNESIUM SERPL-MCNC: 1.9 MG/DL (ref 1.6–2.4)
MCH RBC QN AUTO: 28.1 PG (ref 26–34)
MCHC RBC AUTO-ENTMCNC: 33.5 G/DL (ref 32–36)
MCV RBC AUTO: 84 FL (ref 80–100)
MONOCYTES # BLD AUTO: 0.47 X10*3/UL (ref 0.1–1)
MONOCYTES NFR BLD AUTO: 7.9 %
NEUTROPHILS # BLD AUTO: 2.25 X10*3/UL (ref 1.2–7.7)
NEUTROPHILS NFR BLD AUTO: 37.7 %
NRBC BLD-RTO: 0 /100 WBCS (ref 0–0)
PLATELET # BLD AUTO: 242 X10*3/UL (ref 150–450)
POC APPEARANCE, URINE: CLEAR
POC BILIRUBIN, URINE: NEGATIVE
POC BLOOD, URINE: NEGATIVE
POC COLOR, URINE: YELLOW
POC GLUCOSE, URINE: NEGATIVE MG/DL
POC KETONES, URINE: NEGATIVE MG/DL
POC LEUKOCYTES, URINE: NEGATIVE
POC NITRITE,URINE: NEGATIVE
POC PH, URINE: 6 PH
POC PROTEIN, URINE: NEGATIVE MG/DL
POC SPECIFIC GRAVITY, URINE: 1.01
POC UROBILINOGEN, URINE: 0.2 EU/DL
POTASSIUM SERPL-SCNC: 4 MMOL/L (ref 3.5–5.3)
PROT SERPL-MCNC: 7.5 G/DL (ref 6.4–8.2)
RBC # BLD AUTO: 5.2 X10*6/UL (ref 4.5–5.9)
SODIUM SERPL-SCNC: 136 MMOL/L (ref 136–145)
WBC # BLD AUTO: 6 X10*3/UL (ref 4.4–11.3)

## 2025-08-18 PROCEDURE — 84075 ASSAY ALKALINE PHOSPHATASE: CPT | Performed by: PHYSICIAN ASSISTANT

## 2025-08-18 PROCEDURE — 74177 CT ABD & PELVIS W/CONTRAST: CPT | Performed by: RADIOLOGY

## 2025-08-18 PROCEDURE — 99285 EMERGENCY DEPT VISIT HI MDM: CPT | Mod: 25

## 2025-08-18 PROCEDURE — 96372 THER/PROPH/DIAG INJ SC/IM: CPT | Mod: 59 | Performed by: PHYSICIAN ASSISTANT

## 2025-08-18 PROCEDURE — 2500000004 HC RX 250 GENERAL PHARMACY W/ HCPCS (ALT 636 FOR OP/ED): Performed by: PHYSICIAN ASSISTANT

## 2025-08-18 PROCEDURE — 83690 ASSAY OF LIPASE: CPT | Performed by: PHYSICIAN ASSISTANT

## 2025-08-18 PROCEDURE — 4010F ACE/ARB THERAPY RXD/TAKEN: CPT | Performed by: PHYSICIAN ASSISTANT

## 2025-08-18 PROCEDURE — 96374 THER/PROPH/DIAG INJ IV PUSH: CPT | Mod: 59

## 2025-08-18 PROCEDURE — 99213 OFFICE O/P EST LOW 20 MIN: CPT | Performed by: PHYSICIAN ASSISTANT

## 2025-08-18 PROCEDURE — 84484 ASSAY OF TROPONIN QUANT: CPT | Performed by: PHYSICIAN ASSISTANT

## 2025-08-18 PROCEDURE — 81003 URINALYSIS AUTO W/O SCOPE: CPT | Performed by: PHYSICIAN ASSISTANT

## 2025-08-18 PROCEDURE — 2550000001 HC RX 255 CONTRASTS: Performed by: PHYSICIAN ASSISTANT

## 2025-08-18 PROCEDURE — 83605 ASSAY OF LACTIC ACID: CPT | Performed by: PHYSICIAN ASSISTANT

## 2025-08-18 PROCEDURE — 3008F BODY MASS INDEX DOCD: CPT | Performed by: PHYSICIAN ASSISTANT

## 2025-08-18 PROCEDURE — 3044F HG A1C LEVEL LT 7.0%: CPT | Performed by: PHYSICIAN ASSISTANT

## 2025-08-18 PROCEDURE — 85025 COMPLETE CBC W/AUTO DIFF WBC: CPT | Performed by: PHYSICIAN ASSISTANT

## 2025-08-18 PROCEDURE — 93005 ELECTROCARDIOGRAM TRACING: CPT

## 2025-08-18 PROCEDURE — 83735 ASSAY OF MAGNESIUM: CPT | Performed by: PHYSICIAN ASSISTANT

## 2025-08-18 PROCEDURE — 96375 TX/PRO/DX INJ NEW DRUG ADDON: CPT

## 2025-08-18 PROCEDURE — 36415 COLL VENOUS BLD VENIPUNCTURE: CPT | Performed by: PHYSICIAN ASSISTANT

## 2025-08-18 PROCEDURE — 96361 HYDRATE IV INFUSION ADD-ON: CPT

## 2025-08-18 PROCEDURE — 3074F SYST BP LT 130 MM HG: CPT | Performed by: PHYSICIAN ASSISTANT

## 2025-08-18 PROCEDURE — 3078F DIAST BP <80 MM HG: CPT | Performed by: PHYSICIAN ASSISTANT

## 2025-08-18 PROCEDURE — 74177 CT ABD & PELVIS W/CONTRAST: CPT

## 2025-08-18 RX ORDER — DICYCLOMINE HYDROCHLORIDE 20 MG/1
20 TABLET ORAL 3 TIMES DAILY PRN
Qty: 15 TABLET | Refills: 0 | Status: SHIPPED | OUTPATIENT
Start: 2025-08-18

## 2025-08-18 RX ORDER — ONDANSETRON 4 MG/1
4 TABLET, ORALLY DISINTEGRATING ORAL EVERY 8 HOURS PRN
Qty: 15 TABLET | Refills: 0 | Status: SHIPPED | OUTPATIENT
Start: 2025-08-18

## 2025-08-18 RX ORDER — DICYCLOMINE HYDROCHLORIDE 10 MG/ML
20 INJECTION INTRAMUSCULAR ONCE
Status: COMPLETED | OUTPATIENT
Start: 2025-08-18 | End: 2025-08-18

## 2025-08-18 RX ORDER — ONDANSETRON HYDROCHLORIDE 2 MG/ML
4 INJECTION, SOLUTION INTRAVENOUS ONCE
Status: COMPLETED | OUTPATIENT
Start: 2025-08-18 | End: 2025-08-18

## 2025-08-18 RX ORDER — FAMOTIDINE 10 MG/ML
20 INJECTION, SOLUTION INTRAVENOUS ONCE
Status: COMPLETED | OUTPATIENT
Start: 2025-08-18 | End: 2025-08-18

## 2025-08-18 RX ADMIN — ONDANSETRON 4 MG: 2 INJECTION INTRAMUSCULAR; INTRAVENOUS at 13:22

## 2025-08-18 RX ADMIN — DICYCLOMINE HYDROCHLORIDE 20 MG: 10 INJECTION, SOLUTION INTRAMUSCULAR at 13:09

## 2025-08-18 RX ADMIN — FAMOTIDINE 20 MG: 10 INJECTION, SOLUTION INTRAVENOUS at 13:22

## 2025-08-18 RX ADMIN — SODIUM CHLORIDE 1000 ML: 0.9 INJECTION, SOLUTION INTRAVENOUS at 13:23

## 2025-08-18 RX ADMIN — IOHEXOL 75 ML: 350 INJECTION, SOLUTION INTRAVENOUS at 13:42

## 2025-08-18 ASSESSMENT — PAIN DESCRIPTION - PROGRESSION: CLINICAL_PROGRESSION: GRADUALLY IMPROVING

## 2025-08-18 ASSESSMENT — PAIN DESCRIPTION - LOCATION: LOCATION: ABDOMEN

## 2025-08-18 ASSESSMENT — LIFESTYLE VARIABLES
EVER FELT BAD OR GUILTY ABOUT YOUR DRINKING: NO
HAVE YOU EVER FELT YOU SHOULD CUT DOWN ON YOUR DRINKING: NO
TOTAL SCORE: 0
EVER HAD A DRINK FIRST THING IN THE MORNING TO STEADY YOUR NERVES TO GET RID OF A HANGOVER: NO
HAVE PEOPLE ANNOYED YOU BY CRITICIZING YOUR DRINKING: NO

## 2025-08-18 ASSESSMENT — PAIN - FUNCTIONAL ASSESSMENT
PAIN_FUNCTIONAL_ASSESSMENT: 0-10
PAIN_FUNCTIONAL_ASSESSMENT: 0-10

## 2025-08-18 ASSESSMENT — PAIN DESCRIPTION - ORIENTATION: ORIENTATION: RIGHT

## 2025-08-18 ASSESSMENT — PAIN SCALES - GENERAL
PAINLEVEL_OUTOF10: 5 - MODERATE PAIN
PAINLEVEL_OUTOF10: 6

## 2025-08-19 ENCOUNTER — HOSPITAL ENCOUNTER (OUTPATIENT)
Dept: RADIOLOGY | Facility: HOSPITAL | Age: 50
Discharge: HOME | End: 2025-08-19
Payer: COMMERCIAL

## 2025-08-19 ENCOUNTER — HOSPITAL ENCOUNTER (EMERGENCY)
Facility: HOSPITAL | Age: 50
Discharge: HOME | End: 2025-08-19
Attending: EMERGENCY MEDICINE
Payer: COMMERCIAL

## 2025-08-19 VITALS
TEMPERATURE: 98.1 F | OXYGEN SATURATION: 98 % | SYSTOLIC BLOOD PRESSURE: 140 MMHG | HEIGHT: 70 IN | BODY MASS INDEX: 34.36 KG/M2 | DIASTOLIC BLOOD PRESSURE: 86 MMHG | RESPIRATION RATE: 15 BRPM | WEIGHT: 240 LBS | HEART RATE: 69 BPM

## 2025-08-19 DIAGNOSIS — T78.40XA ALLERGIC REACTION, INITIAL ENCOUNTER: Primary | ICD-10-CM

## 2025-08-19 DIAGNOSIS — L50.9 URTICARIA: ICD-10-CM

## 2025-08-19 DIAGNOSIS — R10.11 RUQ PAIN: ICD-10-CM

## 2025-08-19 LAB
ALBUMIN SERPL-MCNC: 4.4 G/DL (ref 3.6–5.1)
ALP SERPL-CCNC: 72 U/L (ref 36–130)
ALT SERPL-CCNC: 22 U/L (ref 9–46)
ANION GAP SERPL CALCULATED.4IONS-SCNC: 9 MMOL/L (CALC) (ref 7–17)
AST SERPL-CCNC: 17 U/L (ref 10–40)
BASOPHILS # BLD AUTO: 49 CELLS/UL (ref 0–200)
BASOPHILS NFR BLD AUTO: 0.8 %
BILIRUB SERPL-MCNC: 0.5 MG/DL (ref 0.2–1.2)
BUN SERPL-MCNC: 15 MG/DL (ref 7–25)
CALCIUM SERPL-MCNC: 9.2 MG/DL (ref 8.6–10.3)
CHLORIDE SERPL-SCNC: 104 MMOL/L (ref 98–110)
CO2 SERPL-SCNC: 25 MMOL/L (ref 20–32)
CREAT SERPL-MCNC: 1.23 MG/DL (ref 0.6–1.29)
EGFRCR SERPLBLD CKD-EPI 2021: 72 ML/MIN/1.73M2
EOSINOPHIL # BLD AUTO: 232 CELLS/UL (ref 15–500)
EOSINOPHIL NFR BLD AUTO: 3.8 %
ERYTHROCYTE [DISTWIDTH] IN BLOOD BY AUTOMATED COUNT: 12.9 % (ref 11–15)
GLUCOSE SERPL-MCNC: 92 MG/DL (ref 65–99)
HCT VFR BLD AUTO: 44.3 % (ref 38.5–50)
HGB BLD-MCNC: 14.7 G/DL (ref 13.2–17.1)
LYMPHOCYTES # BLD AUTO: 3001 CELLS/UL (ref 850–3900)
LYMPHOCYTES NFR BLD AUTO: 49.2 %
MCH RBC QN AUTO: 28.8 PG (ref 27–33)
MCHC RBC AUTO-ENTMCNC: 33.2 G/DL (ref 32–36)
MCV RBC AUTO: 86.9 FL (ref 80–100)
MONOCYTES # BLD AUTO: 531 CELLS/UL (ref 200–950)
MONOCYTES NFR BLD AUTO: 8.7 %
NEUTROPHILS # BLD AUTO: 2288 CELLS/UL (ref 1500–7800)
NEUTROPHILS NFR BLD AUTO: 37.5 %
PLATELET # BLD AUTO: 258 THOUSAND/UL (ref 140–400)
PMV BLD REES-ECKER: 10.3 FL (ref 7.5–12.5)
POTASSIUM SERPL-SCNC: 4.3 MMOL/L (ref 3.5–5.3)
PROT SERPL-MCNC: 7.2 G/DL (ref 6.1–8.1)
RBC # BLD AUTO: 5.1 MILLION/UL (ref 4.2–5.8)
SODIUM SERPL-SCNC: 138 MMOL/L (ref 135–146)
WBC # BLD AUTO: 6.1 THOUSAND/UL (ref 3.8–10.8)

## 2025-08-19 PROCEDURE — 76705 ECHO EXAM OF ABDOMEN: CPT | Performed by: STUDENT IN AN ORGANIZED HEALTH CARE EDUCATION/TRAINING PROGRAM

## 2025-08-19 PROCEDURE — 99284 EMERGENCY DEPT VISIT MOD MDM: CPT | Mod: 25 | Performed by: EMERGENCY MEDICINE

## 2025-08-19 PROCEDURE — 96374 THER/PROPH/DIAG INJ IV PUSH: CPT

## 2025-08-19 PROCEDURE — 96375 TX/PRO/DX INJ NEW DRUG ADDON: CPT

## 2025-08-19 PROCEDURE — 96361 HYDRATE IV INFUSION ADD-ON: CPT

## 2025-08-19 PROCEDURE — 76705 ECHO EXAM OF ABDOMEN: CPT

## 2025-08-19 PROCEDURE — 2500000004 HC RX 250 GENERAL PHARMACY W/ HCPCS (ALT 636 FOR OP/ED): Performed by: EMERGENCY MEDICINE

## 2025-08-19 RX ORDER — FAMOTIDINE 10 MG/ML
20 INJECTION, SOLUTION INTRAVENOUS ONCE
Status: COMPLETED | OUTPATIENT
Start: 2025-08-19 | End: 2025-08-19

## 2025-08-19 RX ORDER — PREDNISONE 50 MG/1
50 TABLET ORAL DAILY
Qty: 4 TABLET | Refills: 0 | Status: SHIPPED | OUTPATIENT
Start: 2025-08-19 | End: 2025-08-23

## 2025-08-19 RX ORDER — EPINEPHRINE 0.3 MG/.3ML
1 INJECTION SUBCUTANEOUS ONCE AS NEEDED
Qty: 1 EACH | Refills: 0 | Status: SHIPPED | OUTPATIENT
Start: 2025-08-19

## 2025-08-19 RX ORDER — DIPHENHYDRAMINE HYDROCHLORIDE 50 MG/ML
50 INJECTION, SOLUTION INTRAMUSCULAR; INTRAVENOUS ONCE
Status: COMPLETED | OUTPATIENT
Start: 2025-08-19 | End: 2025-08-19

## 2025-08-19 RX ADMIN — METHYLPREDNISOLONE SODIUM SUCCINATE 125 MG: 125 INJECTION, POWDER, FOR SOLUTION INTRAMUSCULAR; INTRAVENOUS at 18:40

## 2025-08-19 RX ADMIN — SODIUM CHLORIDE 1000 ML: 0.9 INJECTION, SOLUTION INTRAVENOUS at 18:41

## 2025-08-19 RX ADMIN — FAMOTIDINE 20 MG: 10 INJECTION, SOLUTION INTRAVENOUS at 18:41

## 2025-08-19 RX ADMIN — DIPHENHYDRAMINE HYDROCHLORIDE 50 MG: 50 INJECTION, SOLUTION INTRAMUSCULAR; INTRAVENOUS at 18:41

## 2025-08-19 ASSESSMENT — LIFESTYLE VARIABLES
EVER FELT BAD OR GUILTY ABOUT YOUR DRINKING: NO
EVER HAD A DRINK FIRST THING IN THE MORNING TO STEADY YOUR NERVES TO GET RID OF A HANGOVER: NO
HAVE YOU EVER FELT YOU SHOULD CUT DOWN ON YOUR DRINKING: NO
TOTAL SCORE: 0
HAVE PEOPLE ANNOYED YOU BY CRITICIZING YOUR DRINKING: NO

## 2025-08-19 ASSESSMENT — PAIN - FUNCTIONAL ASSESSMENT: PAIN_FUNCTIONAL_ASSESSMENT: 0-10

## 2025-08-19 ASSESSMENT — PAIN SCALES - GENERAL
PAINLEVEL_OUTOF10: 0 - NO PAIN
PAINLEVEL_OUTOF10: 0 - NO PAIN

## 2025-08-20 ENCOUNTER — APPOINTMENT (OUTPATIENT)
Dept: PRIMARY CARE | Facility: CLINIC | Age: 50
End: 2025-08-20
Payer: COMMERCIAL

## 2025-08-21 DIAGNOSIS — E11.9 TYPE 2 DIABETES MELLITUS WITHOUT COMPLICATION, WITHOUT LONG-TERM CURRENT USE OF INSULIN: ICD-10-CM

## 2025-08-21 DIAGNOSIS — E78.2 HYPERLIPEMIA, MIXED: ICD-10-CM

## 2025-08-22 ENCOUNTER — APPOINTMENT (OUTPATIENT)
Dept: OTOLARYNGOLOGY | Facility: CLINIC | Age: 50
End: 2025-08-22
Payer: COMMERCIAL

## 2025-08-24 LAB
ATRIAL RATE: 56 BPM
P AXIS: 28 DEGREES
P OFFSET: 196 MS
P ONSET: 139 MS
PR INTERVAL: 164 MS
Q ONSET: 221 MS
QRS COUNT: 9 BEATS
QRS DURATION: 90 MS
QT INTERVAL: 416 MS
QTC CALCULATION(BAZETT): 401 MS
QTC FREDERICIA: 406 MS
R AXIS: 12 DEGREES
T AXIS: 41 DEGREES
T OFFSET: 429 MS
VENTRICULAR RATE: 56 BPM

## 2025-08-25 ENCOUNTER — APPOINTMENT (OUTPATIENT)
Dept: SURGERY | Facility: CLINIC | Age: 50
End: 2025-08-25
Payer: COMMERCIAL

## 2025-08-25 VITALS
SYSTOLIC BLOOD PRESSURE: 148 MMHG | WEIGHT: 250 LBS | DIASTOLIC BLOOD PRESSURE: 88 MMHG | HEIGHT: 70 IN | BODY MASS INDEX: 35.79 KG/M2

## 2025-08-25 DIAGNOSIS — I10 PRIMARY HYPERTENSION: ICD-10-CM

## 2025-08-25 DIAGNOSIS — K80.20 GALLSTONES: Primary | ICD-10-CM

## 2025-08-25 PROCEDURE — 3079F DIAST BP 80-89 MM HG: CPT | Performed by: SURGERY

## 2025-08-25 PROCEDURE — 3008F BODY MASS INDEX DOCD: CPT | Performed by: SURGERY

## 2025-08-25 PROCEDURE — 99214 OFFICE O/P EST MOD 30 MIN: CPT | Performed by: SURGERY

## 2025-08-25 PROCEDURE — 3077F SYST BP >= 140 MM HG: CPT | Performed by: SURGERY

## 2025-08-25 PROCEDURE — 3044F HG A1C LEVEL LT 7.0%: CPT | Performed by: SURGERY

## 2025-08-26 RX ORDER — OLMESARTAN MEDOXOMIL 20 MG/1
20 TABLET ORAL DAILY
Qty: 90 TABLET | Refills: 0 | Status: SHIPPED | OUTPATIENT
Start: 2025-08-26

## 2025-09-12 ENCOUNTER — APPOINTMENT (OUTPATIENT)
Dept: OTOLARYNGOLOGY | Facility: CLINIC | Age: 50
End: 2025-09-12
Payer: COMMERCIAL

## 2025-10-22 ENCOUNTER — APPOINTMENT (OUTPATIENT)
Dept: PRIMARY CARE | Facility: CLINIC | Age: 50
End: 2025-10-22
Payer: COMMERCIAL

## (undated) DEVICE — COVER, TABLE, 54X90

## (undated) DEVICE — SOLUTION, IRRIGATION, SODIUM CHLORIDE 0.9%, 1000 ML, POUR BOTTLE

## (undated) DEVICE — TUBING, SUCTION, CONNECTING, 9/32 X 10FT, LF

## (undated) DEVICE — CONTAINER, SPECIMEN, 4 OZ, OR PEEL PACK, STERILE

## (undated) DEVICE — GLOVE, SURGICAL, PROTEXIS PI , 7.5, PF, LF

## (undated) DEVICE — TOWEL PACK, STERILE, 4/PACK, BLUE

## (undated) DEVICE — SOLUTION, IRRIGATION, STERILE WATER, 1000 ML, POUR BOTTLE